# Patient Record
Sex: MALE | Race: WHITE | HISPANIC OR LATINO | Employment: OTHER | URBAN - METROPOLITAN AREA
[De-identification: names, ages, dates, MRNs, and addresses within clinical notes are randomized per-mention and may not be internally consistent; named-entity substitution may affect disease eponyms.]

---

## 2017-02-13 ENCOUNTER — HOSPITAL ENCOUNTER (OUTPATIENT)
Dept: RADIOLOGY | Facility: MEDICAL CENTER | Age: 62
End: 2017-02-13
Attending: FAMILY MEDICINE
Payer: MEDICARE

## 2017-02-13 DIAGNOSIS — C64.9 MALIGNANT NEOPLASM OF KIDNEY, EXCEPT PELVIS: ICD-10-CM

## 2017-02-13 PROCEDURE — 76775 US EXAM ABDO BACK WALL LIM: CPT

## 2017-03-01 ENCOUNTER — HOSPITAL ENCOUNTER (OUTPATIENT)
Dept: LAB | Facility: MEDICAL CENTER | Age: 62
End: 2017-03-01
Attending: NURSE PRACTITIONER
Payer: MEDICARE

## 2017-03-01 LAB
ALBUMIN SERPL BCP-MCNC: 4.2 G/DL (ref 3.2–4.9)
ALBUMIN/GLOB SERPL: 1.3 G/DL
ALP SERPL-CCNC: 44 U/L (ref 30–99)
ALT SERPL-CCNC: 23 U/L (ref 2–50)
ANION GAP SERPL CALC-SCNC: 7 MMOL/L (ref 0–11.9)
AST SERPL-CCNC: 17 U/L (ref 12–45)
BASOPHILS # BLD AUTO: 0.06 K/UL (ref 0–0.12)
BASOPHILS NFR BLD AUTO: 0.9 % (ref 0–1.8)
BILIRUB SERPL-MCNC: 0.2 MG/DL (ref 0.1–1.5)
BUN SERPL-MCNC: 11 MG/DL (ref 8–22)
CALCIUM SERPL-MCNC: 9.8 MG/DL (ref 8.5–10.5)
CHLORIDE SERPL-SCNC: 101 MMOL/L (ref 96–112)
CO2 SERPL-SCNC: 26 MMOL/L (ref 20–33)
CREAT SERPL-MCNC: 0.95 MG/DL (ref 0.5–1.4)
EOSINOPHIL # BLD: 0.32 K/UL (ref 0–0.51)
EOSINOPHIL NFR BLD AUTO: 4.7 % (ref 0–6.9)
ERYTHROCYTE [DISTWIDTH] IN BLOOD BY AUTOMATED COUNT: 40.5 FL (ref 35.9–50)
GLOBULIN SER CALC-MCNC: 3.3 G/DL (ref 1.9–3.5)
GLUCOSE SERPL-MCNC: 114 MG/DL (ref 65–99)
HCT VFR BLD AUTO: 47.5 % (ref 42–52)
HGB BLD-MCNC: 15.4 G/DL (ref 14–18)
IMM GRANULOCYTES # BLD AUTO: 0.01 K/UL (ref 0–0.11)
IMM GRANULOCYTES NFR BLD AUTO: 0.1 % (ref 0–0.9)
LYMPHOCYTES # BLD: 2.15 K/UL (ref 1–4.8)
LYMPHOCYTES NFR BLD AUTO: 31.4 % (ref 22–41)
MCH RBC QN AUTO: 30.8 PG (ref 27–33)
MCHC RBC AUTO-ENTMCNC: 32.4 G/DL (ref 33.7–35.3)
MCV RBC AUTO: 95 FL (ref 81.4–97.8)
MONOCYTES # BLD: 0.58 K/UL (ref 0–0.85)
MONOCYTES NFR BLD AUTO: 8.5 % (ref 0–13.4)
NEUTROPHILS # BLD: 3.73 K/UL (ref 1.82–7.42)
NEUTROPHILS NFR BLD AUTO: 54.4 % (ref 44–72)
NRBC # BLD AUTO: 0 K/UL
NRBC BLD-RTO: 0 /100 WBC
PLATELET # BLD AUTO: 190 K/UL (ref 164–446)
PMV BLD AUTO: 9.6 FL (ref 9–12.9)
POTASSIUM SERPL-SCNC: 4.6 MMOL/L (ref 3.6–5.5)
PROT SERPL-MCNC: 7.5 G/DL (ref 6–8.2)
RBC # BLD AUTO: 5 M/UL (ref 4.7–6.1)
SODIUM SERPL-SCNC: 134 MMOL/L (ref 135–145)
WBC # BLD AUTO: 6.9 K/UL (ref 4.8–10.8)

## 2017-03-01 PROCEDURE — 80053 COMPREHEN METABOLIC PANEL: CPT

## 2017-03-01 PROCEDURE — 85025 COMPLETE CBC W/AUTO DIFF WBC: CPT

## 2017-03-01 PROCEDURE — 87522 HEPATITIS C REVRS TRNSCRPJ: CPT

## 2017-03-01 PROCEDURE — 36415 COLL VENOUS BLD VENIPUNCTURE: CPT

## 2017-03-14 LAB
DETECTION Z3100: NORMAL
HCV RNA # SERPL NAA+PROBE: NORMAL COPIES/ML
HCV RNA SERPL NAA+PROBE-ACNC: NORMAL IU/ML
HCV RNA SERPL NAA+PROBE-LOG IU: NORMAL LOG10 IU/ML
HCV RNA SERPL NAA+PROBE-LOG#: NORMAL LOG10COPY/ML
TEST INFO  93644: NORMAL

## 2018-01-22 ENCOUNTER — HOSPITAL ENCOUNTER (OUTPATIENT)
Dept: RADIOLOGY | Facility: MEDICAL CENTER | Age: 63
End: 2018-01-22
Attending: FAMILY MEDICINE
Payer: MEDICARE

## 2018-01-22 DIAGNOSIS — C64.9 RENAL CELL CARCINOMA, UNSPECIFIED LATERALITY (HCC): ICD-10-CM

## 2018-01-22 PROCEDURE — 76775 US EXAM ABDO BACK WALL LIM: CPT

## 2018-01-27 ENCOUNTER — HOSPITAL ENCOUNTER (OUTPATIENT)
Dept: LAB | Facility: MEDICAL CENTER | Age: 63
End: 2018-01-27
Attending: FAMILY MEDICINE
Payer: MEDICARE

## 2018-01-27 ENCOUNTER — HOSPITAL ENCOUNTER (OUTPATIENT)
Dept: LAB | Facility: MEDICAL CENTER | Age: 63
End: 2018-01-27
Attending: INTERNAL MEDICINE
Payer: MEDICARE

## 2018-01-27 LAB
ALBUMIN SERPL BCP-MCNC: 4.4 G/DL (ref 3.2–4.9)
ALBUMIN SERPL BCP-MCNC: 4.7 G/DL (ref 3.2–4.9)
ALBUMIN/GLOB SERPL: 1.4 G/DL
ALBUMIN/GLOB SERPL: 1.7 G/DL
ALP SERPL-CCNC: 49 U/L (ref 30–99)
ALP SERPL-CCNC: 49 U/L (ref 30–99)
ALT SERPL-CCNC: 25 U/L (ref 2–50)
ALT SERPL-CCNC: 25 U/L (ref 2–50)
ANION GAP SERPL CALC-SCNC: 8 MMOL/L (ref 0–11.9)
ANION GAP SERPL CALC-SCNC: 9 MMOL/L (ref 0–11.9)
APPEARANCE UR: CLEAR
AST SERPL-CCNC: 24 U/L (ref 12–45)
AST SERPL-CCNC: 24 U/L (ref 12–45)
BASOPHILS # BLD AUTO: 0.9 % (ref 0–1.8)
BASOPHILS # BLD AUTO: 0.9 % (ref 0–1.8)
BASOPHILS # BLD: 0.06 K/UL (ref 0–0.12)
BASOPHILS # BLD: 0.06 K/UL (ref 0–0.12)
BILIRUB CONJ SERPL-MCNC: <0.1 MG/DL (ref 0.1–0.5)
BILIRUB INDIRECT SERPL-MCNC: NORMAL MG/DL (ref 0–1)
BILIRUB SERPL-MCNC: 0.5 MG/DL (ref 0.1–1.5)
BILIRUB SERPL-MCNC: 0.5 MG/DL (ref 0.1–1.5)
BILIRUB UR QL STRIP.AUTO: NEGATIVE
BUN SERPL-MCNC: 12 MG/DL (ref 8–22)
BUN SERPL-MCNC: 12 MG/DL (ref 8–22)
CALCIUM SERPL-MCNC: 9.1 MG/DL (ref 8.5–10.5)
CALCIUM SERPL-MCNC: 9.2 MG/DL (ref 8.5–10.5)
CHLORIDE SERPL-SCNC: 103 MMOL/L (ref 96–112)
CHLORIDE SERPL-SCNC: 104 MMOL/L (ref 96–112)
CHOLEST SERPL-MCNC: 190 MG/DL (ref 100–199)
CO2 SERPL-SCNC: 23 MMOL/L (ref 20–33)
CO2 SERPL-SCNC: 24 MMOL/L (ref 20–33)
COLOR UR: YELLOW
CREAT SERPL-MCNC: 0.93 MG/DL (ref 0.5–1.4)
CREAT SERPL-MCNC: 0.93 MG/DL (ref 0.5–1.4)
EOSINOPHIL # BLD AUTO: 0.34 K/UL (ref 0–0.51)
EOSINOPHIL # BLD AUTO: 0.37 K/UL (ref 0–0.51)
EOSINOPHIL NFR BLD: 5.2 % (ref 0–6.9)
EOSINOPHIL NFR BLD: 5.5 % (ref 0–6.9)
ERYTHROCYTE [DISTWIDTH] IN BLOOD BY AUTOMATED COUNT: 44.9 FL (ref 35.9–50)
ERYTHROCYTE [DISTWIDTH] IN BLOOD BY AUTOMATED COUNT: 45.1 FL (ref 35.9–50)
GLOBULIN SER CALC-MCNC: 2.7 G/DL (ref 1.9–3.5)
GLOBULIN SER CALC-MCNC: 3.1 G/DL (ref 1.9–3.5)
GLUCOSE SERPL-MCNC: 94 MG/DL (ref 65–99)
GLUCOSE SERPL-MCNC: 94 MG/DL (ref 65–99)
GLUCOSE UR STRIP.AUTO-MCNC: NEGATIVE MG/DL
HCT VFR BLD AUTO: 48.3 % (ref 42–52)
HCT VFR BLD AUTO: 49.7 % (ref 42–52)
HDLC SERPL-MCNC: 55 MG/DL
HGB BLD-MCNC: 16.3 G/DL (ref 14–18)
HGB BLD-MCNC: 16.6 G/DL (ref 14–18)
IMM GRANULOCYTES # BLD AUTO: 0.03 K/UL (ref 0–0.11)
IMM GRANULOCYTES # BLD AUTO: 0.04 K/UL (ref 0–0.11)
IMM GRANULOCYTES NFR BLD AUTO: 0.5 % (ref 0–0.9)
IMM GRANULOCYTES NFR BLD AUTO: 0.6 % (ref 0–0.9)
KETONES UR STRIP.AUTO-MCNC: NEGATIVE MG/DL
LDLC SERPL CALC-MCNC: 117 MG/DL
LEUKOCYTE ESTERASE UR QL STRIP.AUTO: NEGATIVE
LYMPHOCYTES # BLD AUTO: 2.48 K/UL (ref 1–4.8)
LYMPHOCYTES # BLD AUTO: 2.49 K/UL (ref 1–4.8)
LYMPHOCYTES NFR BLD: 36.9 % (ref 22–41)
LYMPHOCYTES NFR BLD: 37.6 % (ref 22–41)
MCH RBC QN AUTO: 30.5 PG (ref 27–33)
MCH RBC QN AUTO: 31.7 PG (ref 27–33)
MCHC RBC AUTO-ENTMCNC: 32.8 G/DL (ref 33.7–35.3)
MCHC RBC AUTO-ENTMCNC: 34.4 G/DL (ref 33.7–35.3)
MCV RBC AUTO: 92.4 FL (ref 81.4–97.8)
MCV RBC AUTO: 92.9 FL (ref 81.4–97.8)
MICRO URNS: NORMAL
MONOCYTES # BLD AUTO: 0.8 K/UL (ref 0–0.85)
MONOCYTES # BLD AUTO: 0.84 K/UL (ref 0–0.85)
MONOCYTES NFR BLD AUTO: 11.9 % (ref 0–13.4)
MONOCYTES NFR BLD AUTO: 12.7 % (ref 0–13.4)
NEUTROPHILS # BLD AUTO: 2.85 K/UL (ref 1.82–7.42)
NEUTROPHILS # BLD AUTO: 2.99 K/UL (ref 1.82–7.42)
NEUTROPHILS NFR BLD: 43.1 % (ref 44–72)
NEUTROPHILS NFR BLD: 44.2 % (ref 44–72)
NITRITE UR QL STRIP.AUTO: NEGATIVE
NRBC # BLD AUTO: 0 K/UL
NRBC # BLD AUTO: 0 K/UL
NRBC BLD-RTO: 0 /100 WBC
NRBC BLD-RTO: 0 /100 WBC
PH UR STRIP.AUTO: 5.5 [PH]
PLATELET # BLD AUTO: 232 K/UL (ref 164–446)
PLATELET # BLD AUTO: 237 K/UL (ref 164–446)
PMV BLD AUTO: 9.7 FL (ref 9–12.9)
PMV BLD AUTO: 9.8 FL (ref 9–12.9)
POTASSIUM SERPL-SCNC: 4.2 MMOL/L (ref 3.6–5.5)
POTASSIUM SERPL-SCNC: 4.2 MMOL/L (ref 3.6–5.5)
PROT SERPL-MCNC: 7.4 G/DL (ref 6–8.2)
PROT SERPL-MCNC: 7.5 G/DL (ref 6–8.2)
PROT UR QL STRIP: NEGATIVE MG/DL
PSA SERPL-MCNC: 1.41 NG/ML (ref 0–4)
RBC # BLD AUTO: 5.23 M/UL (ref 4.7–6.1)
RBC # BLD AUTO: 5.35 M/UL (ref 4.7–6.1)
RBC UR QL AUTO: NEGATIVE
SODIUM SERPL-SCNC: 135 MMOL/L (ref 135–145)
SODIUM SERPL-SCNC: 136 MMOL/L (ref 135–145)
SP GR UR STRIP.AUTO: 1.01
TRIGL SERPL-MCNC: 91 MG/DL (ref 0–149)
UROBILINOGEN UR STRIP.AUTO-MCNC: 0.2 MG/DL
WBC # BLD AUTO: 6.6 K/UL (ref 4.8–10.8)
WBC # BLD AUTO: 6.8 K/UL (ref 4.8–10.8)

## 2018-01-27 PROCEDURE — 80061 LIPID PANEL: CPT

## 2018-01-27 PROCEDURE — 80053 COMPREHEN METABOLIC PANEL: CPT | Mod: 91

## 2018-01-27 PROCEDURE — 85025 COMPLETE CBC W/AUTO DIFF WBC: CPT

## 2018-01-27 PROCEDURE — 80053 COMPREHEN METABOLIC PANEL: CPT

## 2018-01-27 PROCEDURE — 84153 ASSAY OF PSA TOTAL: CPT

## 2018-01-27 PROCEDURE — 85025 COMPLETE CBC W/AUTO DIFF WBC: CPT | Mod: 91

## 2018-01-27 PROCEDURE — 82248 BILIRUBIN DIRECT: CPT

## 2018-01-27 PROCEDURE — 80197 ASSAY OF TACROLIMUS: CPT

## 2018-01-27 PROCEDURE — 36415 COLL VENOUS BLD VENIPUNCTURE: CPT

## 2018-01-27 PROCEDURE — 87522 HEPATITIS C REVRS TRNSCRPJ: CPT

## 2018-01-27 PROCEDURE — 81003 URINALYSIS AUTO W/O SCOPE: CPT

## 2018-01-30 LAB
HCV RNA SERPL NAA+PROBE-ACNC: NORMAL IU/ML
TACROLIMUS BLD-MCNC: 11.9 NG/ML
TEST INFO  93644: NORMAL

## 2018-03-31 ENCOUNTER — APPOINTMENT (OUTPATIENT)
Dept: RADIOLOGY | Facility: MEDICAL CENTER | Age: 63
DRG: 083 | End: 2018-03-31
Attending: EMERGENCY MEDICINE
Payer: MEDICARE

## 2018-03-31 ENCOUNTER — HOSPITAL ENCOUNTER (INPATIENT)
Facility: MEDICAL CENTER | Age: 63
LOS: 9 days | DRG: 083 | End: 2018-04-10
Attending: EMERGENCY MEDICINE | Admitting: SURGERY
Payer: MEDICARE

## 2018-03-31 DIAGNOSIS — S20.212A CHEST WALL CONTUSION, LEFT, INITIAL ENCOUNTER: ICD-10-CM

## 2018-03-31 DIAGNOSIS — G89.4 CHRONIC PAIN SYNDROME: ICD-10-CM

## 2018-03-31 DIAGNOSIS — S06.2X0A: ICD-10-CM
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
## 2018-03-31 DIAGNOSIS — S22.42XA CLOSED FRACTURE OF MULTIPLE RIBS OF LEFT SIDE, INITIAL ENCOUNTER: ICD-10-CM

## 2018-03-31 DIAGNOSIS — F10.920 ALCOHOLIC INTOXICATION WITHOUT COMPLICATION (HCC): ICD-10-CM

## 2018-03-31 LAB
ABO GROUP BLD: NORMAL
ALBUMIN SERPL BCP-MCNC: 3.9 G/DL (ref 3.2–4.9)
ALBUMIN/GLOB SERPL: 1.3 G/DL
ALP SERPL-CCNC: 51 U/L (ref 30–99)
ALT SERPL-CCNC: 26 U/L (ref 2–50)
ANION GAP SERPL CALC-SCNC: 8 MMOL/L (ref 0–11.9)
APTT PPP: 27 SEC (ref 24.7–36)
AST SERPL-CCNC: 21 U/L (ref 12–45)
BILIRUB SERPL-MCNC: 0.3 MG/DL (ref 0.1–1.5)
BLD GP AB SCN SERPL QL: NORMAL
BUN SERPL-MCNC: 9 MG/DL (ref 8–22)
CALCIUM SERPL-MCNC: 8.1 MG/DL (ref 8.5–10.5)
CHLORIDE SERPL-SCNC: 103 MMOL/L (ref 96–112)
CO2 SERPL-SCNC: 23 MMOL/L (ref 20–33)
CREAT SERPL-MCNC: 0.9 MG/DL (ref 0.5–1.4)
ERYTHROCYTE [DISTWIDTH] IN BLOOD BY AUTOMATED COUNT: 43.8 FL (ref 35.9–50)
ETHANOL BLD-MCNC: 0.19 G/DL
GLOBULIN SER CALC-MCNC: 3 G/DL (ref 1.9–3.5)
GLUCOSE SERPL-MCNC: 97 MG/DL (ref 65–99)
HCT VFR BLD AUTO: 44.3 % (ref 42–52)
HGB BLD-MCNC: 15.3 G/DL (ref 14–18)
INR PPP: 1.06 (ref 0.87–1.13)
MCH RBC QN AUTO: 32.1 PG (ref 27–33)
MCHC RBC AUTO-ENTMCNC: 34.5 G/DL (ref 33.7–35.3)
MCV RBC AUTO: 93.1 FL (ref 81.4–97.8)
PLATELET # BLD AUTO: 190 K/UL (ref 164–446)
PMV BLD AUTO: 8.8 FL (ref 9–12.9)
POTASSIUM SERPL-SCNC: 3.2 MMOL/L (ref 3.6–5.5)
PROT SERPL-MCNC: 6.9 G/DL (ref 6–8.2)
PROTHROMBIN TIME: 13.5 SEC (ref 12–14.6)
RBC # BLD AUTO: 4.76 M/UL (ref 4.7–6.1)
RH BLD: NORMAL
SODIUM SERPL-SCNC: 134 MMOL/L (ref 135–145)
WBC # BLD AUTO: 9.5 K/UL (ref 4.8–10.8)

## 2018-03-31 PROCEDURE — 99291 CRITICAL CARE FIRST HOUR: CPT

## 2018-03-31 PROCEDURE — 700117 HCHG RX CONTRAST REV CODE 255: Performed by: EMERGENCY MEDICINE

## 2018-03-31 PROCEDURE — G0390 TRAUMA RESPONS W/HOSP CRITI: HCPCS

## 2018-03-31 PROCEDURE — 85610 PROTHROMBIN TIME: CPT

## 2018-03-31 PROCEDURE — 72125 CT NECK SPINE W/O DYE: CPT

## 2018-03-31 PROCEDURE — 700111 HCHG RX REV CODE 636 W/ 250 OVERRIDE (IP): Performed by: EMERGENCY MEDICINE

## 2018-03-31 PROCEDURE — 71260 CT THORAX DX C+: CPT

## 2018-03-31 PROCEDURE — 72131 CT LUMBAR SPINE W/O DYE: CPT

## 2018-03-31 PROCEDURE — 86900 BLOOD TYPING SEROLOGIC ABO: CPT

## 2018-03-31 PROCEDURE — 72128 CT CHEST SPINE W/O DYE: CPT

## 2018-03-31 PROCEDURE — 96374 THER/PROPH/DIAG INJ IV PUSH: CPT

## 2018-03-31 PROCEDURE — 86901 BLOOD TYPING SEROLOGIC RH(D): CPT

## 2018-03-31 PROCEDURE — 85730 THROMBOPLASTIN TIME PARTIAL: CPT

## 2018-03-31 PROCEDURE — 85027 COMPLETE CBC AUTOMATED: CPT

## 2018-03-31 PROCEDURE — 80053 COMPREHEN METABOLIC PANEL: CPT

## 2018-03-31 PROCEDURE — 86850 RBC ANTIBODY SCREEN: CPT

## 2018-03-31 PROCEDURE — 70450 CT HEAD/BRAIN W/O DYE: CPT

## 2018-03-31 PROCEDURE — 80307 DRUG TEST PRSMV CHEM ANLYZR: CPT

## 2018-03-31 RX ORDER — MORPHINE SULFATE 4 MG/ML
INJECTION, SOLUTION INTRAMUSCULAR; INTRAVENOUS
Status: DISPENSED
Start: 2018-03-31 | End: 2018-04-01

## 2018-03-31 RX ORDER — MORPHINE SULFATE 4 MG/ML
INJECTION, SOLUTION INTRAMUSCULAR; INTRAVENOUS
Status: DISCONTINUED | OUTPATIENT
Start: 2018-03-31 | End: 2018-04-02

## 2018-03-31 RX ORDER — OXYCODONE HYDROCHLORIDE 5 MG/1
5 TABLET ORAL ONCE
Status: COMPLETED | OUTPATIENT
Start: 2018-04-01 | End: 2018-04-01

## 2018-03-31 RX ADMIN — IOHEXOL 100 ML: 350 INJECTION, SOLUTION INTRAVENOUS at 22:52

## 2018-03-31 RX ADMIN — MORPHINE SULFATE 4 MG: 4 INJECTION INTRAVENOUS at 21:46

## 2018-04-01 ENCOUNTER — APPOINTMENT (OUTPATIENT)
Dept: RADIOLOGY | Facility: MEDICAL CENTER | Age: 63
DRG: 083 | End: 2018-04-01
Attending: NURSE PRACTITIONER
Payer: MEDICARE

## 2018-04-01 ENCOUNTER — APPOINTMENT (OUTPATIENT)
Dept: RADIOLOGY | Facility: MEDICAL CENTER | Age: 63
DRG: 083 | End: 2018-04-01
Attending: SURGERY
Payer: MEDICARE

## 2018-04-01 ENCOUNTER — RESOLUTE PROFESSIONAL BILLING HOSPITAL PROF FEE (OUTPATIENT)
Dept: HOSPITALIST | Facility: MEDICAL CENTER | Age: 63
End: 2018-04-01
Payer: MEDICARE

## 2018-04-01 PROBLEM — S22.42XA CLOSED FRACTURE OF MULTIPLE RIBS OF LEFT SIDE: Status: ACTIVE | Noted: 2018-04-01

## 2018-04-01 PROBLEM — Z78.9 NO CONTRAINDICATION TO DEEP VEIN THROMBOSIS (DVT) PROPHYLAXIS: Status: ACTIVE | Noted: 2018-04-01

## 2018-04-01 PROBLEM — Z90.5 H/O UNILATERAL NEPHRECTOMY: Status: ACTIVE | Noted: 2018-04-01

## 2018-04-01 PROBLEM — F10.929 ALCOHOL INTOXICATION (HCC): Status: ACTIVE | Noted: 2018-04-01

## 2018-04-01 PROBLEM — B18.2 CHRONIC HEPATITIS C VIRUS INFECTION (HCC): Status: ACTIVE | Noted: 2018-04-01

## 2018-04-01 PROBLEM — G89.4 CHRONIC PAIN SYNDROME: Status: ACTIVE | Noted: 2018-04-01

## 2018-04-01 PROBLEM — M79.642 HAND PAIN, LEFT: Status: ACTIVE | Noted: 2018-04-01

## 2018-04-01 PROBLEM — Q60.0 KIDNEY CONGENITALLY ABSENT, LEFT: Status: ACTIVE | Noted: 2018-04-01

## 2018-04-01 PROBLEM — Z94.4 LIVER TRANSPLANT RECIPIENT (HCC): Status: ACTIVE | Noted: 2018-04-01

## 2018-04-01 PROBLEM — T14.90XA TRAUMA: Status: ACTIVE | Noted: 2018-04-01

## 2018-04-01 LAB
ABO GROUP BLD: NORMAL
BASOPHILS # BLD AUTO: 0.5 % (ref 0–1.8)
BASOPHILS # BLD: 0.08 K/UL (ref 0–0.12)
EOSINOPHIL # BLD AUTO: 0.08 K/UL (ref 0–0.51)
EOSINOPHIL NFR BLD: 0.5 % (ref 0–6.9)
ERYTHROCYTE [DISTWIDTH] IN BLOOD BY AUTOMATED COUNT: 42.5 FL (ref 35.9–50)
HCT VFR BLD AUTO: 45.3 % (ref 42–52)
HGB BLD-MCNC: 15.6 G/DL (ref 14–18)
IMM GRANULOCYTES # BLD AUTO: 0.1 K/UL (ref 0–0.11)
IMM GRANULOCYTES NFR BLD AUTO: 0.7 % (ref 0–0.9)
LYMPHOCYTES # BLD AUTO: 2.65 K/UL (ref 1–4.8)
LYMPHOCYTES NFR BLD: 17.2 % (ref 22–41)
MCH RBC QN AUTO: 31.3 PG (ref 27–33)
MCHC RBC AUTO-ENTMCNC: 34.4 G/DL (ref 33.7–35.3)
MCV RBC AUTO: 90.8 FL (ref 81.4–97.8)
MONOCYTES # BLD AUTO: 1.63 K/UL (ref 0–0.85)
MONOCYTES NFR BLD AUTO: 10.6 % (ref 0–13.4)
NEUTROPHILS # BLD AUTO: 10.84 K/UL (ref 1.82–7.42)
NEUTROPHILS NFR BLD: 70.5 % (ref 44–72)
NRBC # BLD AUTO: 0 K/UL
NRBC BLD-RTO: 0 /100 WBC
PLATELET # BLD AUTO: 196 K/UL (ref 164–446)
PMV BLD AUTO: 9.5 FL (ref 9–12.9)
RBC # BLD AUTO: 4.99 M/UL (ref 4.7–6.1)
RH BLD: NORMAL
WBC # BLD AUTO: 15.4 K/UL (ref 4.8–10.8)

## 2018-04-01 PROCEDURE — 700111 HCHG RX REV CODE 636 W/ 250 OVERRIDE (IP): Performed by: SURGERY

## 2018-04-01 PROCEDURE — A9270 NON-COVERED ITEM OR SERVICE: HCPCS | Performed by: SURGERY

## 2018-04-01 PROCEDURE — 700111 HCHG RX REV CODE 636 W/ 250 OVERRIDE (IP): Mod: JG | Performed by: SURGERY

## 2018-04-01 PROCEDURE — 700105 HCHG RX REV CODE 258: Performed by: SURGERY

## 2018-04-01 PROCEDURE — 99233 SBSQ HOSP IP/OBS HIGH 50: CPT | Performed by: SURGERY

## 2018-04-01 PROCEDURE — 93005 ELECTROCARDIOGRAM TRACING: CPT | Performed by: EMERGENCY MEDICINE

## 2018-04-01 PROCEDURE — 85025 COMPLETE CBC W/AUTO DIFF WBC: CPT

## 2018-04-01 PROCEDURE — 71045 X-RAY EXAM CHEST 1 VIEW: CPT

## 2018-04-01 PROCEDURE — 700112 HCHG RX REV CODE 229: Performed by: SURGERY

## 2018-04-01 PROCEDURE — 700102 HCHG RX REV CODE 250 W/ 637 OVERRIDE(OP): Performed by: SURGERY

## 2018-04-01 PROCEDURE — 770022 HCHG ROOM/CARE - ICU (200)

## 2018-04-01 PROCEDURE — 700111 HCHG RX REV CODE 636 W/ 250 OVERRIDE (IP): Performed by: NURSE PRACTITIONER

## 2018-04-01 PROCEDURE — 94668 MNPJ CHEST WALL SBSQ: CPT

## 2018-04-01 PROCEDURE — 700101 HCHG RX REV CODE 250: Performed by: EMERGENCY MEDICINE

## 2018-04-01 PROCEDURE — 700105 HCHG RX REV CODE 258: Performed by: EMERGENCY MEDICINE

## 2018-04-01 PROCEDURE — A9270 NON-COVERED ITEM OR SERVICE: HCPCS | Performed by: EMERGENCY MEDICINE

## 2018-04-01 PROCEDURE — 700102 HCHG RX REV CODE 250 W/ 637 OVERRIDE(OP): Performed by: EMERGENCY MEDICINE

## 2018-04-01 PROCEDURE — 73130 X-RAY EXAM OF HAND: CPT | Mod: LT

## 2018-04-01 RX ORDER — SODIUM CHLORIDE 9 MG/ML
1000 INJECTION, SOLUTION INTRAVENOUS ONCE
Status: COMPLETED | OUTPATIENT
Start: 2018-04-01 | End: 2018-04-01

## 2018-04-01 RX ORDER — TACROLIMUS 1 MG/1
2 CAPSULE ORAL 2 TIMES DAILY
COMMUNITY

## 2018-04-01 RX ORDER — HYDROMORPHONE HYDROCHLORIDE 2 MG/ML
1 INJECTION, SOLUTION INTRAMUSCULAR; INTRAVENOUS; SUBCUTANEOUS
Status: DISCONTINUED | OUTPATIENT
Start: 2018-04-01 | End: 2018-04-01

## 2018-04-01 RX ORDER — BISACODYL 10 MG
10 SUPPOSITORY, RECTAL RECTAL
Status: DISCONTINUED | OUTPATIENT
Start: 2018-04-01 | End: 2018-04-10 | Stop reason: HOSPADM

## 2018-04-01 RX ORDER — HYDROMORPHONE HYDROCHLORIDE 2 MG/ML
1 INJECTION, SOLUTION INTRAMUSCULAR; INTRAVENOUS; SUBCUTANEOUS
Status: DISCONTINUED | OUTPATIENT
Start: 2018-04-01 | End: 2018-04-02

## 2018-04-01 RX ORDER — SODIUM CHLORIDE, SODIUM LACTATE, POTASSIUM CHLORIDE, CALCIUM CHLORIDE 600; 310; 30; 20 MG/100ML; MG/100ML; MG/100ML; MG/100ML
INJECTION, SOLUTION INTRAVENOUS CONTINUOUS
Status: DISCONTINUED | OUTPATIENT
Start: 2018-04-01 | End: 2018-04-02

## 2018-04-01 RX ORDER — MULTIVIT WITH MINERALS/LUTEIN
1 TABLET ORAL DAILY
COMMUNITY
End: 2018-04-24

## 2018-04-01 RX ORDER — CITALOPRAM 20 MG/1
20 TABLET ORAL
Status: DISCONTINUED | OUTPATIENT
Start: 2018-04-01 | End: 2018-04-10 | Stop reason: HOSPADM

## 2018-04-01 RX ORDER — CHLORHEXIDINE GLUCONATE ORAL RINSE 1.2 MG/ML
15 SOLUTION DENTAL EVERY 12 HOURS
Status: DISCONTINUED | OUTPATIENT
Start: 2018-04-01 | End: 2018-04-01

## 2018-04-01 RX ORDER — FAMOTIDINE 20 MG/1
20 TABLET, FILM COATED ORAL 2 TIMES DAILY
Status: DISCONTINUED | OUTPATIENT
Start: 2018-04-01 | End: 2018-04-02

## 2018-04-01 RX ORDER — CITALOPRAM 20 MG/1
20 TABLET ORAL
COMMUNITY
End: 2021-05-06

## 2018-04-01 RX ORDER — TACROLIMUS 1 MG/1
2 CAPSULE ORAL EVERY 12 HOURS
Status: DISCONTINUED | OUTPATIENT
Start: 2018-04-01 | End: 2018-04-10 | Stop reason: HOSPADM

## 2018-04-01 RX ORDER — LISINOPRIL 10 MG/1
10 TABLET ORAL EVERY MORNING
COMMUNITY

## 2018-04-01 RX ORDER — OXYCODONE HYDROCHLORIDE 10 MG/1
10 TABLET ORAL
Status: DISCONTINUED | OUTPATIENT
Start: 2018-04-01 | End: 2018-04-02

## 2018-04-01 RX ORDER — ENEMA 19; 7 G/133ML; G/133ML
1 ENEMA RECTAL
Status: DISCONTINUED | OUTPATIENT
Start: 2018-04-01 | End: 2018-04-10 | Stop reason: HOSPADM

## 2018-04-01 RX ORDER — LISINOPRIL 10 MG/1
10 TABLET ORAL DAILY
Status: DISCONTINUED | OUTPATIENT
Start: 2018-04-01 | End: 2018-04-10 | Stop reason: HOSPADM

## 2018-04-01 RX ORDER — ONDANSETRON 2 MG/ML
4 INJECTION INTRAMUSCULAR; INTRAVENOUS EVERY 4 HOURS PRN
Status: DISCONTINUED | OUTPATIENT
Start: 2018-04-01 | End: 2018-04-10 | Stop reason: HOSPADM

## 2018-04-01 RX ORDER — OXYCODONE HYDROCHLORIDE 10 MG/1
20 TABLET ORAL
Status: DISCONTINUED | OUTPATIENT
Start: 2018-04-01 | End: 2018-04-02

## 2018-04-01 RX ORDER — AMOXICILLIN 250 MG
1 CAPSULE ORAL
Status: DISCONTINUED | OUTPATIENT
Start: 2018-04-01 | End: 2018-04-10 | Stop reason: HOSPADM

## 2018-04-01 RX ORDER — DOCUSATE SODIUM 100 MG/1
100 CAPSULE, LIQUID FILLED ORAL 2 TIMES DAILY
Status: DISCONTINUED | OUTPATIENT
Start: 2018-04-01 | End: 2018-04-10 | Stop reason: HOSPADM

## 2018-04-01 RX ORDER — LIDOCAINE 50 MG/G
1 PATCH TOPICAL ONCE
Status: COMPLETED | OUTPATIENT
Start: 2018-04-01 | End: 2018-04-01

## 2018-04-01 RX ORDER — AMOXICILLIN 250 MG
1 CAPSULE ORAL NIGHTLY
Status: DISCONTINUED | OUTPATIENT
Start: 2018-04-01 | End: 2018-04-10 | Stop reason: HOSPADM

## 2018-04-01 RX ORDER — POLYETHYLENE GLYCOL 3350 17 G/17G
1 POWDER, FOR SOLUTION ORAL 2 TIMES DAILY
Status: DISCONTINUED | OUTPATIENT
Start: 2018-04-01 | End: 2018-04-10 | Stop reason: HOSPADM

## 2018-04-01 RX ORDER — MORPHINE SULFATE 15 MG/1
15 TABLET, FILM COATED, EXTENDED RELEASE ORAL EVERY 12 HOURS
Status: DISCONTINUED | OUTPATIENT
Start: 2018-04-01 | End: 2018-04-02

## 2018-04-01 RX ADMIN — HYDROMORPHONE HYDROCHLORIDE 1 MG: 2 INJECTION INTRAMUSCULAR; INTRAVENOUS; SUBCUTANEOUS at 10:32

## 2018-04-01 RX ADMIN — SODIUM CHLORIDE, POTASSIUM CHLORIDE, SODIUM LACTATE AND CALCIUM CHLORIDE: 600; 310; 30; 20 INJECTION, SOLUTION INTRAVENOUS at 02:42

## 2018-04-01 RX ADMIN — HYDROMORPHONE HYDROCHLORIDE 1 MG: 2 INJECTION INTRAMUSCULAR; INTRAVENOUS; SUBCUTANEOUS at 14:31

## 2018-04-01 RX ADMIN — OXYCODONE HYDROCHLORIDE 20 MG: 10 TABLET ORAL at 12:31

## 2018-04-01 RX ADMIN — HYDROMORPHONE HYDROCHLORIDE 1 MG: 2 INJECTION INTRAMUSCULAR; INTRAVENOUS; SUBCUTANEOUS at 08:29

## 2018-04-01 RX ADMIN — FAMOTIDINE 20 MG: 20 TABLET, FILM COATED ORAL at 20:25

## 2018-04-01 RX ADMIN — OXYCODONE HYDROCHLORIDE 20 MG: 10 TABLET ORAL at 18:35

## 2018-04-01 RX ADMIN — TACROLIMUS 2 MG: 1 CAPSULE ORAL at 14:24

## 2018-04-01 RX ADMIN — ONDANSETRON HYDROCHLORIDE 4 MG: 2 INJECTION, SOLUTION INTRAMUSCULAR; INTRAVENOUS at 19:39

## 2018-04-01 RX ADMIN — ENOXAPARIN SODIUM 30 MG: 100 INJECTION SUBCUTANEOUS at 02:51

## 2018-04-01 RX ADMIN — LIDOCAINE 1 PATCH: 50 PATCH CUTANEOUS at 01:09

## 2018-04-01 RX ADMIN — HYDROMORPHONE HYDROCHLORIDE 1 MG: 2 INJECTION INTRAMUSCULAR; INTRAVENOUS; SUBCUTANEOUS at 04:40

## 2018-04-01 RX ADMIN — OXYCODONE HYDROCHLORIDE 5 MG: 5 TABLET ORAL at 00:20

## 2018-04-01 RX ADMIN — SODIUM CHLORIDE 1000 ML: 9 INJECTION, SOLUTION INTRAVENOUS at 01:45

## 2018-04-01 RX ADMIN — DOCUSATE SODIUM 100 MG: 100 CAPSULE ORAL at 02:51

## 2018-04-01 RX ADMIN — HYDROMORPHONE HYDROCHLORIDE 1 MG: 2 INJECTION INTRAMUSCULAR; INTRAVENOUS; SUBCUTANEOUS at 12:32

## 2018-04-01 RX ADMIN — OXYCODONE HYDROCHLORIDE 20 MG: 10 TABLET ORAL at 09:25

## 2018-04-01 RX ADMIN — OXYCODONE HYDROCHLORIDE 20 MG: 10 TABLET ORAL at 03:02

## 2018-04-01 RX ADMIN — ENOXAPARIN SODIUM 30 MG: 100 INJECTION SUBCUTANEOUS at 08:28

## 2018-04-01 RX ADMIN — HYDROMORPHONE HYDROCHLORIDE 1 MG: 2 INJECTION INTRAMUSCULAR; INTRAVENOUS; SUBCUTANEOUS at 16:40

## 2018-04-01 RX ADMIN — FAMOTIDINE 20 MG: 20 TABLET, FILM COATED ORAL at 08:28

## 2018-04-01 RX ADMIN — ENOXAPARIN SODIUM 30 MG: 100 INJECTION SUBCUTANEOUS at 20:25

## 2018-04-01 RX ADMIN — TACROLIMUS 2 MG: 1 CAPSULE ORAL at 20:25

## 2018-04-01 RX ADMIN — OXYCODONE HYDROCHLORIDE 20 MG: 10 TABLET ORAL at 06:11

## 2018-04-01 RX ADMIN — OXYCODONE HYDROCHLORIDE 20 MG: 10 TABLET ORAL at 15:31

## 2018-04-01 RX ADMIN — OXYCODONE HYDROCHLORIDE 20 MG: 10 TABLET ORAL at 22:18

## 2018-04-01 RX ADMIN — HYDROMORPHONE HYDROCHLORIDE 1 MG: 2 INJECTION INTRAMUSCULAR; INTRAVENOUS; SUBCUTANEOUS at 20:27

## 2018-04-01 RX ADMIN — LISINOPRIL 10 MG: 20 TABLET ORAL at 14:25

## 2018-04-01 RX ADMIN — HYDROMORPHONE HYDROCHLORIDE 1 MG: 2 INJECTION INTRAMUSCULAR; INTRAVENOUS; SUBCUTANEOUS at 02:38

## 2018-04-01 RX ADMIN — FAMOTIDINE 20 MG: 20 TABLET, FILM COATED ORAL at 02:51

## 2018-04-01 ASSESSMENT — PAIN SCALES - GENERAL
PAINLEVEL_OUTOF10: 10
PAINLEVEL_OUTOF10: 9
PAINLEVEL_OUTOF10: 10
PAINLEVEL_OUTOF10: 8
PAINLEVEL_OUTOF10: 10
PAINLEVEL_OUTOF10: 3
PAINLEVEL_OUTOF10: 10

## 2018-04-01 ASSESSMENT — COPD QUESTIONNAIRES
HAVE YOU SMOKED AT LEAST 100 CIGARETTES IN YOUR ENTIRE LIFE: YES
COPD SCREENING SCORE: 4
DO YOU EVER COUGH UP ANY MUCUS OR PHLEGM?: NO/ONLY WITH OCCASIONAL COLDS OR INFECTIONS
DURING THE PAST 4 WEEKS HOW MUCH DID YOU FEEL SHORT OF BREATH: NONE/LITTLE OF THE TIME

## 2018-04-01 ASSESSMENT — LIFESTYLE VARIABLES
TOTAL SCORE: 0
ALCOHOL_USE: YES
EVER FELT BAD OR GUILTY ABOUT YOUR DRINKING: NO
HAVE PEOPLE ANNOYED YOU BY CRITICIZING YOUR DRINKING: NO
HAVE YOU EVER FELT YOU SHOULD CUT DOWN ON YOUR DRINKING: NO
AVERAGE NUMBER OF DAYS PER WEEK YOU HAVE A DRINK CONTAINING ALCOHOL: 1
ON A TYPICAL DAY WHEN YOU DRINK ALCOHOL HOW MANY DRINKS DO YOU HAVE: 2
HOW MANY TIMES IN THE PAST YEAR HAVE YOU HAD 5 OR MORE DRINKS IN A DAY: 0
EVER_SMOKED: YES
CONSUMPTION TOTAL: NEGATIVE
EVER HAD A DRINK FIRST THING IN THE MORNING TO STEADY YOUR NERVES TO GET RID OF A HANGOVER: NO
TOTAL SCORE: 0
TOTAL SCORE: 0

## 2018-04-01 ASSESSMENT — PATIENT HEALTH QUESTIONNAIRE - PHQ9
5. POOR APPETITE OR OVEREATING: NOT AT ALL
4. FEELING TIRED OR HAVING LITTLE ENERGY: NOT AT ALL
2. FEELING DOWN, DEPRESSED, IRRITABLE, OR HOPELESS: NOT AT ALL
3. TROUBLE FALLING OR STAYING ASLEEP OR SLEEPING TOO MUCH: SEVERAL DAYS
8. MOVING OR SPEAKING SO SLOWLY THAT OTHER PEOPLE COULD HAVE NOTICED. OR THE OPPOSITE, BEING SO FIGETY OR RESTLESS THAT YOU HAVE BEEN MOVING AROUND A LOT MORE THAN USUAL: NOT AT ALL
SUM OF ALL RESPONSES TO PHQ9 QUESTIONS 1 AND 2: 1
SUM OF ALL RESPONSES TO PHQ QUESTIONS 1-9: 2
6. FEELING BAD ABOUT YOURSELF - OR THAT YOU ARE A FAILURE OR HAVE LET YOURSELF OR YOUR FAMILY DOWN: NOT AL ALL
1. LITTLE INTEREST OR PLEASURE IN DOING THINGS: SEVERAL DAYS
7. TROUBLE CONCENTRATING ON THINGS, SUCH AS READING THE NEWSPAPER OR WATCHING TELEVISION: NOT AT ALL
9. THOUGHTS THAT YOU WOULD BE BETTER OFF DEAD, OR OF HURTING YOURSELF: NOT AT ALL

## 2018-04-01 ASSESSMENT — ENCOUNTER SYMPTOMS
ABDOMINAL PAIN: 0
MYALGIAS: 1
SHORTNESS OF BREATH: 0
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
## 2018-04-01 NOTE — CARE PLAN
Problem: Hyperinflation:  Goal: Prevent or improve atelectasis  Outcome: PROGRESSING AS EXPECTED  Pt is achieving 3944-3580 mL on IS. PEP ordered QID

## 2018-04-01 NOTE — PROGRESS NOTES
Patient admitted to [place] [id number]. Brought up from ED via gurney with ACLS RN and CCT. Vital signs stable. Patient complaining of left rib cage pain.

## 2018-04-01 NOTE — PROGRESS NOTES
Med rec partially completed by interview with patient at bedside and [facility]  [facility] has been called and awaiting return call back regarding pain pump  No abx in past 30 days  Allergy reviewed

## 2018-04-01 NOTE — PROGRESS NOTES
2 RN skin check complete. Abrasion on left knee and left palm. All preventative precautions in place.

## 2018-04-01 NOTE — CARE PLAN
Problem: Pain Management  Goal: Pain level will decrease to patient's comfort goal  Outcome: PROGRESSING SLOWER THAN EXPECTED  Providing pain meds as ordered. Educated patient on splinting ribs to reduce pain when coughing. Assisting patient with repositioning for comfort.

## 2018-04-01 NOTE — CARE PLAN
Problem: Communication  Goal: The ability to communicate needs accurately and effectively will improve    Intervention: Educate patient and significant other/support system about the plan of care, procedures, treatments, medications and allow for questions  Educated the patient on pain management techniques available for comfort in order to deep breathe and cough effectively.         Problem: Respiratory:  Goal: Respiratory status will improve    Intervention: Assess and monitor pulmonary status  Educated patient on use of incentive spirometer use for lung expansion and healing.
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
## 2018-04-01 NOTE — CARE PLAN
Problem: Respiratory:  Goal: Respiratory status will improve    Intervention: Educate and encourage coughing and deep breathing  Educated patient on importance of coughing and deep breathing to improve lung status and clear secretions. Encouraging patient to utilize pillow to splint and improve ability to take deep breaths.

## 2018-04-01 NOTE — ED NOTES
Pt ambulated w/ assistance and some minimal difficulty, however on the whole able to ambulate under own power, upon sitting down Pt had syncopal event, laid flat in bed, hooked back up to monitor, immediately regained consciousness w/ recollection of the event. ERP notified immediately, EKG being obtained, 1 L NS infusing, lab called for repeat CBC.

## 2018-04-01 NOTE — ED NOTES
Upon assessment pt has chest asymmetry with left larger than right side, brought to attention of ICU Rn, trauma rn comments possible new change. VSS, Pt to ICU now. NS gtts continued to floor

## 2018-04-02 ENCOUNTER — APPOINTMENT (OUTPATIENT)
Dept: RADIOLOGY | Facility: MEDICAL CENTER | Age: 63
DRG: 083 | End: 2018-04-02
Attending: SURGERY
Payer: MEDICARE

## 2018-04-02 PROBLEM — E83.42 HYPOMAGNESEMIA: Status: ACTIVE | Noted: 2018-04-02

## 2018-04-02 LAB
ALBUMIN SERPL BCP-MCNC: 3.8 G/DL (ref 3.2–4.9)
ALBUMIN/GLOB SERPL: 1.3 G/DL
ALP SERPL-CCNC: 55 U/L (ref 30–99)
ALT SERPL-CCNC: 23 U/L (ref 2–50)
ANION GAP SERPL CALC-SCNC: 6 MMOL/L (ref 0–11.9)
AST SERPL-CCNC: 23 U/L (ref 12–45)
BASOPHILS # BLD AUTO: 0.4 % (ref 0–1.8)
BASOPHILS # BLD: 0.04 K/UL (ref 0–0.12)
BILIRUB SERPL-MCNC: 0.6 MG/DL (ref 0.1–1.5)
BUN SERPL-MCNC: 11 MG/DL (ref 8–22)
CALCIUM SERPL-MCNC: 8.5 MG/DL (ref 8.5–10.5)
CHLORIDE SERPL-SCNC: 98 MMOL/L (ref 96–112)
CO2 SERPL-SCNC: 26 MMOL/L (ref 20–33)
CREAT SERPL-MCNC: 0.85 MG/DL (ref 0.5–1.4)
EOSINOPHIL # BLD AUTO: 0.2 K/UL (ref 0–0.51)
EOSINOPHIL NFR BLD: 2.1 % (ref 0–6.9)
ERYTHROCYTE [DISTWIDTH] IN BLOOD BY AUTOMATED COUNT: 41.6 FL (ref 35.9–50)
GLOBULIN SER CALC-MCNC: 3 G/DL (ref 1.9–3.5)
GLUCOSE SERPL-MCNC: 116 MG/DL (ref 65–99)
HCT VFR BLD AUTO: 40.6 % (ref 42–52)
HGB BLD-MCNC: 14.2 G/DL (ref 14–18)
IMM GRANULOCYTES # BLD AUTO: 0.04 K/UL (ref 0–0.11)
IMM GRANULOCYTES NFR BLD AUTO: 0.4 % (ref 0–0.9)
LYMPHOCYTES # BLD AUTO: 1.86 K/UL (ref 1–4.8)
LYMPHOCYTES NFR BLD: 19.6 % (ref 22–41)
MAGNESIUM SERPL-MCNC: 1.3 MG/DL (ref 1.5–2.5)
MCH RBC QN AUTO: 31.7 PG (ref 27–33)
MCHC RBC AUTO-ENTMCNC: 35 G/DL (ref 33.7–35.3)
MCV RBC AUTO: 90.6 FL (ref 81.4–97.8)
MONOCYTES # BLD AUTO: 1.25 K/UL (ref 0–0.85)
MONOCYTES NFR BLD AUTO: 13.2 % (ref 0–13.4)
NEUTROPHILS # BLD AUTO: 6.1 K/UL (ref 1.82–7.42)
NEUTROPHILS NFR BLD: 64.3 % (ref 44–72)
NRBC # BLD AUTO: 0 K/UL
NRBC BLD-RTO: 0 /100 WBC
PHOSPHATE SERPL-MCNC: 3 MG/DL (ref 2.5–4.5)
PLATELET # BLD AUTO: 171 K/UL (ref 164–446)
PMV BLD AUTO: 9 FL (ref 9–12.9)
POTASSIUM SERPL-SCNC: 3.7 MMOL/L (ref 3.6–5.5)
PROT SERPL-MCNC: 6.8 G/DL (ref 6–8.2)
RBC # BLD AUTO: 4.48 M/UL (ref 4.7–6.1)
SODIUM SERPL-SCNC: 130 MMOL/L (ref 135–145)
WBC # BLD AUTO: 9.5 K/UL (ref 4.8–10.8)

## 2018-04-02 PROCEDURE — 84100 ASSAY OF PHOSPHORUS: CPT

## 2018-04-02 PROCEDURE — 700111 HCHG RX REV CODE 636 W/ 250 OVERRIDE (IP): Performed by: SURGERY

## 2018-04-02 PROCEDURE — A9270 NON-COVERED ITEM OR SERVICE: HCPCS | Performed by: SURGERY

## 2018-04-02 PROCEDURE — 83735 ASSAY OF MAGNESIUM: CPT

## 2018-04-02 PROCEDURE — 700102 HCHG RX REV CODE 250 W/ 637 OVERRIDE(OP): Performed by: SURGERY

## 2018-04-02 PROCEDURE — 71045 X-RAY EXAM CHEST 1 VIEW: CPT

## 2018-04-02 PROCEDURE — 700111 HCHG RX REV CODE 636 W/ 250 OVERRIDE (IP): Performed by: NURSE PRACTITIONER

## 2018-04-02 PROCEDURE — 700102 HCHG RX REV CODE 250 W/ 637 OVERRIDE(OP): Performed by: NURSE PRACTITIONER

## 2018-04-02 PROCEDURE — 700111 HCHG RX REV CODE 636 W/ 250 OVERRIDE (IP): Performed by: PHYSICIAN ASSISTANT

## 2018-04-02 PROCEDURE — 80053 COMPREHEN METABOLIC PANEL: CPT

## 2018-04-02 PROCEDURE — 94668 MNPJ CHEST WALL SBSQ: CPT

## 2018-04-02 PROCEDURE — 85025 COMPLETE CBC W/AUTO DIFF WBC: CPT

## 2018-04-02 PROCEDURE — A9270 NON-COVERED ITEM OR SERVICE: HCPCS | Performed by: NURSE PRACTITIONER

## 2018-04-02 PROCEDURE — 700112 HCHG RX REV CODE 229: Performed by: SURGERY

## 2018-04-02 PROCEDURE — 700111 HCHG RX REV CODE 636 W/ 250 OVERRIDE (IP): Mod: JG | Performed by: SURGERY

## 2018-04-02 PROCEDURE — 99233 SBSQ HOSP IP/OBS HIGH 50: CPT | Performed by: SURGERY

## 2018-04-02 PROCEDURE — 770006 HCHG ROOM/CARE - MED/SURG/GYN SEMI*

## 2018-04-02 RX ORDER — SODIUM CHLORIDE, SODIUM LACTATE, POTASSIUM CHLORIDE, CALCIUM CHLORIDE 600; 310; 30; 20 MG/100ML; MG/100ML; MG/100ML; MG/100ML
INJECTION, SOLUTION INTRAVENOUS CONTINUOUS
Status: DISCONTINUED | OUTPATIENT
Start: 2018-04-02 | End: 2018-04-02

## 2018-04-02 RX ORDER — HYDROMORPHONE HYDROCHLORIDE 2 MG/ML
1 INJECTION, SOLUTION INTRAMUSCULAR; INTRAVENOUS; SUBCUTANEOUS
Status: DISCONTINUED | OUTPATIENT
Start: 2018-04-02 | End: 2018-04-02

## 2018-04-02 RX ORDER — HYDROMORPHONE HYDROCHLORIDE 2 MG/1
2 TABLET ORAL
Status: DISCONTINUED | OUTPATIENT
Start: 2018-04-02 | End: 2018-04-04

## 2018-04-02 RX ORDER — HYDROMORPHONE HYDROCHLORIDE 2 MG/1
4 TABLET ORAL
Status: DISCONTINUED | OUTPATIENT
Start: 2018-04-02 | End: 2018-04-04

## 2018-04-02 RX ORDER — HEPARIN SODIUM 5000 [USP'U]/ML
5000 INJECTION, SOLUTION INTRAVENOUS; SUBCUTANEOUS EVERY 8 HOURS
Status: DISCONTINUED | OUTPATIENT
Start: 2018-04-02 | End: 2018-04-04

## 2018-04-02 RX ORDER — MAGNESIUM SULFATE HEPTAHYDRATE 40 MG/ML
4 INJECTION, SOLUTION INTRAVENOUS ONCE
Status: COMPLETED | OUTPATIENT
Start: 2018-04-02 | End: 2018-04-02

## 2018-04-02 RX ORDER — MORPHINE SULFATE 15 MG/1
30 TABLET, FILM COATED, EXTENDED RELEASE ORAL EVERY 12 HOURS
Status: DISCONTINUED | OUTPATIENT
Start: 2018-04-02 | End: 2018-04-02

## 2018-04-02 RX ADMIN — TACROLIMUS 2 MG: 1 CAPSULE ORAL at 07:58

## 2018-04-02 RX ADMIN — HYDROMORPHONE HYDROCHLORIDE 4 MG: 2 TABLET ORAL at 20:07

## 2018-04-02 RX ADMIN — LISINOPRIL 10 MG: 20 TABLET ORAL at 07:58

## 2018-04-02 RX ADMIN — HYDROMORPHONE HYDROCHLORIDE 1 MG: 10 INJECTION, SOLUTION INTRAMUSCULAR; INTRAVENOUS; SUBCUTANEOUS at 21:32

## 2018-04-02 RX ADMIN — HYDROMORPHONE HYDROCHLORIDE 1 MG: 2 INJECTION INTRAMUSCULAR; INTRAVENOUS; SUBCUTANEOUS at 14:12

## 2018-04-02 RX ADMIN — ENOXAPARIN SODIUM 30 MG: 100 INJECTION SUBCUTANEOUS at 07:57

## 2018-04-02 RX ADMIN — HYDROMORPHONE HYDROCHLORIDE 1 MG: 2 INJECTION INTRAMUSCULAR; INTRAVENOUS; SUBCUTANEOUS at 00:03

## 2018-04-02 RX ADMIN — MAGNESIUM SULFATE IN WATER 4 G: 40 INJECTION, SOLUTION INTRAVENOUS at 10:11

## 2018-04-02 RX ADMIN — HYDROMORPHONE HYDROCHLORIDE 4 MG: 2 TABLET ORAL at 10:09

## 2018-04-02 RX ADMIN — HYDROMORPHONE HYDROCHLORIDE 1 MG: 2 INJECTION INTRAMUSCULAR; INTRAVENOUS; SUBCUTANEOUS at 03:14

## 2018-04-02 RX ADMIN — FAMOTIDINE 20 MG: 20 TABLET, FILM COATED ORAL at 07:58

## 2018-04-02 RX ADMIN — HYDROMORPHONE HYDROCHLORIDE 1 MG: 10 INJECTION, SOLUTION INTRAMUSCULAR; INTRAVENOUS; SUBCUTANEOUS at 21:08

## 2018-04-02 RX ADMIN — STANDARDIZED SENNA CONCENTRATE AND DOCUSATE SODIUM 1 TABLET: 8.6; 5 TABLET, FILM COATED ORAL at 20:08

## 2018-04-02 RX ADMIN — HYDROMORPHONE HYDROCHLORIDE 1 MG: 2 INJECTION INTRAMUSCULAR; INTRAVENOUS; SUBCUTANEOUS at 05:14

## 2018-04-02 RX ADMIN — HYDROMORPHONE HYDROCHLORIDE 1 MG: 2 INJECTION INTRAMUSCULAR; INTRAVENOUS; SUBCUTANEOUS at 08:09

## 2018-04-02 RX ADMIN — HYDROMORPHONE HYDROCHLORIDE 4 MG: 2 TABLET ORAL at 13:09

## 2018-04-02 RX ADMIN — TACROLIMUS 2 MG: 1 CAPSULE ORAL at 20:10

## 2018-04-02 RX ADMIN — OXYCODONE HYDROCHLORIDE 20 MG: 10 TABLET ORAL at 07:12

## 2018-04-02 RX ADMIN — CITALOPRAM HYDROBROMIDE 20 MG: 20 TABLET ORAL at 20:08

## 2018-04-02 RX ADMIN — HEPARIN SODIUM 5000 UNITS: 5000 INJECTION, SOLUTION INTRAVENOUS; SUBCUTANEOUS at 20:08

## 2018-04-02 RX ADMIN — HYDROMORPHONE HYDROCHLORIDE 1 MG: 2 INJECTION INTRAMUSCULAR; INTRAVENOUS; SUBCUTANEOUS at 11:14

## 2018-04-02 RX ADMIN — HYDROMORPHONE HYDROCHLORIDE 4 MG: 2 TABLET ORAL at 16:16

## 2018-04-02 RX ADMIN — HYDROMORPHONE HYDROCHLORIDE 1 MG: 2 INJECTION INTRAMUSCULAR; INTRAVENOUS; SUBCUTANEOUS at 16:03

## 2018-04-02 RX ADMIN — ASPIRIN 81 MG: 81 TABLET, COATED ORAL at 07:58

## 2018-04-02 RX ADMIN — HYDROMORPHONE HYDROCHLORIDE 4 MG: 2 TABLET ORAL at 23:05

## 2018-04-02 RX ADMIN — HYDROMORPHONE HYDROCHLORIDE 1 MG: 2 INJECTION INTRAMUSCULAR; INTRAVENOUS; SUBCUTANEOUS at 17:54

## 2018-04-02 RX ADMIN — DOCUSATE SODIUM 100 MG: 100 CAPSULE ORAL at 14:12

## 2018-04-02 ASSESSMENT — ENCOUNTER SYMPTOMS
RESPIRATORY NEGATIVE: 1
NECK PAIN: 0
MYALGIAS: 1
GASTROINTESTINAL NEGATIVE: 1
BACK PAIN: 1
CONSTITUTIONAL NEGATIVE: 1
CARDIOVASCULAR NEGATIVE: 1
EYES NEGATIVE: 1
ROS GI COMMENTS: BM PRIOR TO ARRIVAL, (+) FLATUS
DEPRESSION: 1
NEUROLOGICAL NEGATIVE: 1

## 2018-04-02 ASSESSMENT — PATIENT HEALTH QUESTIONNAIRE - PHQ9
9. THOUGHTS THAT YOU WOULD BE BETTER OFF DEAD, OR OF HURTING YOURSELF: NOT AT ALL
4. FEELING TIRED OR HAVING LITTLE ENERGY: NOT AT ALL
2. FEELING DOWN, DEPRESSED, IRRITABLE, OR HOPELESS: NOT AT ALL
8. MOVING OR SPEAKING SO SLOWLY THAT OTHER PEOPLE COULD HAVE NOTICED. OR THE OPPOSITE, BEING SO FIGETY OR RESTLESS THAT YOU HAVE BEEN MOVING AROUND A LOT MORE THAN USUAL: NOT AT ALL
7. TROUBLE CONCENTRATING ON THINGS, SUCH AS READING THE NEWSPAPER OR WATCHING TELEVISION: NOT AT ALL
5. POOR APPETITE OR OVEREATING: NOT AT ALL
6. FEELING BAD ABOUT YOURSELF - OR THAT YOU ARE A FAILURE OR HAVE LET YOURSELF OR YOUR FAMILY DOWN: NOT AL ALL
SUM OF ALL RESPONSES TO PHQ QUESTIONS 1-9: 2
SUM OF ALL RESPONSES TO PHQ9 QUESTIONS 1 AND 2: 1
3. TROUBLE FALLING OR STAYING ASLEEP OR SLEEPING TOO MUCH: SEVERAL DAYS
1. LITTLE INTEREST OR PLEASURE IN DOING THINGS: SEVERAL DAYS

## 2018-04-02 ASSESSMENT — PAIN SCALES - GENERAL
PAINLEVEL_OUTOF10: 10
PAINLEVEL_OUTOF10: 9
PAINLEVEL_OUTOF10: 7
PAINLEVEL_OUTOF10: 9
PAINLEVEL_OUTOF10: 10
PAINLEVEL_OUTOF10: 7
PAINLEVEL_OUTOF10: 10
PAINLEVEL_OUTOF10: 6
PAINLEVEL_OUTOF10: 10
PAINLEVEL_OUTOF10: 10
PAINLEVEL_OUTOF10: 8
PAINLEVEL_OUTOF10: 7
PAINLEVEL_OUTOF10: 8
PAINLEVEL_OUTOF10: 9
PAINLEVEL_OUTOF10: 4
PAINLEVEL_OUTOF10: 10
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
## 2018-04-02 NOTE — PROGRESS NOTES
"Patient continues to complain of pain, medicated per MAR. Chronic pain issues complicated by acute left-sided rib pain the patient describes as \"sharp and stabbing\". Patient mobilized, given support from pillows, provided emotional reassurance and warm blankets.    "

## 2018-04-02 NOTE — CARE PLAN
Problem: Safety  Goal: Will remain free from injury  Call light within reach, bed in low position, non skid socks in place, and pt educated on importance of calling for assistance. Pt verbalizes understanding of safety measures and calls for assistance.      Problem: Pain Management  Goal: Pain level will decrease to patient's comfort goal  Scheduled and prn pain medication in use, see MAR.

## 2018-04-02 NOTE — CARE PLAN
Problem: Safety  Goal: Will remain free from injury    Intervention: Provide assistance with mobility  Patient educated on call light and mobilizing with assistance.      Problem: Mobility  Goal: Risk for activity intolerance will decrease  Outcome: MET Date Met: 04/02/18  Patient up to chair x 2, plans to walk around unit today.
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
## 2018-04-02 NOTE — PROGRESS NOTES
Late entry: Bedside report received from oly RN. Plan of care and pt condition reviewed. LDA's and IVF verified. Pt has 9/10 pain at this time. Medicated per MAR. Assessment complete per OBS. VSS and at baseline at this time. Pt repositioned for comfort, up to chair. Mouth brushed, dentures in place, face washed, linen changed. Call light within reach. Bed in lowest, locked position with bed alarm on. Will continue to monitor.

## 2018-04-03 ENCOUNTER — APPOINTMENT (OUTPATIENT)
Dept: RADIOLOGY | Facility: MEDICAL CENTER | Age: 63
DRG: 083 | End: 2018-04-03
Attending: SURGERY
Payer: MEDICARE

## 2018-04-03 PROBLEM — S06.2XAA INTRACRANIAL HEMATOMA FOLLOWING INJURY (HCC): Status: ACTIVE | Noted: 2018-04-03

## 2018-04-03 LAB
ALBUMIN SERPL BCP-MCNC: 3.3 G/DL (ref 3.2–4.9)
ALBUMIN/GLOB SERPL: 1.2 G/DL
ALP SERPL-CCNC: 45 U/L (ref 30–99)
ALT SERPL-CCNC: 26 U/L (ref 2–50)
ANION GAP SERPL CALC-SCNC: 9 MMOL/L (ref 0–11.9)
APTT PPP: 29.2 SEC (ref 24.7–36)
AST SERPL-CCNC: 27 U/L (ref 12–45)
BASOPHILS # BLD AUTO: 0.4 % (ref 0–1.8)
BASOPHILS # BLD AUTO: 0.4 % (ref 0–1.8)
BASOPHILS # BLD: 0.03 K/UL (ref 0–0.12)
BASOPHILS # BLD: 0.04 K/UL (ref 0–0.12)
BILIRUB SERPL-MCNC: 0.8 MG/DL (ref 0.1–1.5)
BUN SERPL-MCNC: 11 MG/DL (ref 8–22)
CALCIUM SERPL-MCNC: 8.7 MG/DL (ref 8.4–10.2)
CHLORIDE SERPL-SCNC: 97 MMOL/L (ref 96–112)
CO2 SERPL-SCNC: 27 MMOL/L (ref 20–33)
CREAT SERPL-MCNC: 0.94 MG/DL (ref 0.5–1.4)
EOSINOPHIL # BLD AUTO: 0.15 K/UL (ref 0–0.51)
EOSINOPHIL # BLD AUTO: 0.22 K/UL (ref 0–0.51)
EOSINOPHIL NFR BLD: 1.8 % (ref 0–6.9)
EOSINOPHIL NFR BLD: 2.3 % (ref 0–6.9)
ERYTHROCYTE [DISTWIDTH] IN BLOOD BY AUTOMATED COUNT: 41.1 FL (ref 35.9–50)
ERYTHROCYTE [DISTWIDTH] IN BLOOD BY AUTOMATED COUNT: 43 FL (ref 35.9–50)
GLOBULIN SER CALC-MCNC: 2.8 G/DL (ref 1.9–3.5)
GLUCOSE SERPL-MCNC: 111 MG/DL (ref 65–99)
HCT VFR BLD AUTO: 38.7 % (ref 42–52)
HCT VFR BLD AUTO: 39.7 % (ref 42–52)
HGB BLD-MCNC: 13.5 G/DL (ref 14–18)
HGB BLD-MCNC: 13.6 G/DL (ref 14–18)
IMM GRANULOCYTES # BLD AUTO: 0.03 K/UL (ref 0–0.11)
IMM GRANULOCYTES # BLD AUTO: 0.06 K/UL (ref 0–0.11)
IMM GRANULOCYTES NFR BLD AUTO: 0.4 % (ref 0–0.9)
IMM GRANULOCYTES NFR BLD AUTO: 0.6 % (ref 0–0.9)
INR PPP: 1.02 (ref 0.87–1.13)
LYMPHOCYTES # BLD AUTO: 1.67 K/UL (ref 1–4.8)
LYMPHOCYTES # BLD AUTO: 1.7 K/UL (ref 1–4.8)
LYMPHOCYTES NFR BLD: 18.1 % (ref 22–41)
LYMPHOCYTES NFR BLD: 20.1 % (ref 22–41)
MAGNESIUM SERPL-MCNC: 1.8 MG/DL (ref 1.5–2.5)
MCH RBC QN AUTO: 31.6 PG (ref 27–33)
MCH RBC QN AUTO: 32 PG (ref 27–33)
MCHC RBC AUTO-ENTMCNC: 34.3 G/DL (ref 33.7–35.3)
MCHC RBC AUTO-ENTMCNC: 34.9 G/DL (ref 33.7–35.3)
MCV RBC AUTO: 90.6 FL (ref 81.4–97.8)
MCV RBC AUTO: 93.4 FL (ref 81.4–97.8)
MONOCYTES # BLD AUTO: 1.03 K/UL (ref 0–0.85)
MONOCYTES # BLD AUTO: 1.16 K/UL (ref 0–0.85)
MONOCYTES NFR BLD AUTO: 12.3 % (ref 0–13.4)
MONOCYTES NFR BLD AUTO: 12.4 % (ref 0–13.4)
NEUTROPHILS # BLD AUTO: 5.4 K/UL (ref 1.82–7.42)
NEUTROPHILS # BLD AUTO: 6.22 K/UL (ref 1.82–7.42)
NEUTROPHILS NFR BLD: 64.9 % (ref 44–72)
NEUTROPHILS NFR BLD: 66.3 % (ref 44–72)
NRBC # BLD AUTO: 0 K/UL
NRBC # BLD AUTO: 0 K/UL
NRBC BLD-RTO: 0 /100 WBC
NRBC BLD-RTO: 0 /100 WBC
PLATELET # BLD AUTO: 160 K/UL (ref 164–446)
PLATELET # BLD AUTO: 164 K/UL (ref 164–446)
PMV BLD AUTO: 8.9 FL (ref 9–12.9)
PMV BLD AUTO: 9.2 FL (ref 9–12.9)
POTASSIUM SERPL-SCNC: 3.7 MMOL/L (ref 3.6–5.5)
PROT SERPL-MCNC: 6.1 G/DL (ref 6–8.2)
PROTHROMBIN TIME: 13.1 SEC (ref 12–14.6)
RBC # BLD AUTO: 4.25 M/UL (ref 4.7–6.1)
RBC # BLD AUTO: 4.27 M/UL (ref 4.7–6.1)
SODIUM SERPL-SCNC: 133 MMOL/L (ref 135–145)
WBC # BLD AUTO: 8.3 K/UL (ref 4.8–10.8)
WBC # BLD AUTO: 9.4 K/UL (ref 4.8–10.8)

## 2018-04-03 PROCEDURE — 71045 X-RAY EXAM CHEST 1 VIEW: CPT

## 2018-04-03 PROCEDURE — 80053 COMPREHEN METABOLIC PANEL: CPT

## 2018-04-03 PROCEDURE — 85025 COMPLETE CBC W/AUTO DIFF WBC: CPT

## 2018-04-03 PROCEDURE — 700112 HCHG RX REV CODE 229: Performed by: SURGERY

## 2018-04-03 PROCEDURE — 70498 CT ANGIOGRAPHY NECK: CPT

## 2018-04-03 PROCEDURE — A9270 NON-COVERED ITEM OR SERVICE: HCPCS | Performed by: NURSE PRACTITIONER

## 2018-04-03 PROCEDURE — A9270 NON-COVERED ITEM OR SERVICE: HCPCS | Performed by: SURGERY

## 2018-04-03 PROCEDURE — 83735 ASSAY OF MAGNESIUM: CPT

## 2018-04-03 PROCEDURE — 85610 PROTHROMBIN TIME: CPT

## 2018-04-03 PROCEDURE — 94668 MNPJ CHEST WALL SBSQ: CPT

## 2018-04-03 PROCEDURE — 770022 HCHG ROOM/CARE - ICU (200)

## 2018-04-03 PROCEDURE — 700102 HCHG RX REV CODE 250 W/ 637 OVERRIDE(OP): Performed by: NURSE PRACTITIONER

## 2018-04-03 PROCEDURE — 700102 HCHG RX REV CODE 250 W/ 637 OVERRIDE(OP): Performed by: SURGERY

## 2018-04-03 PROCEDURE — 700111 HCHG RX REV CODE 636 W/ 250 OVERRIDE (IP): Performed by: PHYSICIAN ASSISTANT

## 2018-04-03 PROCEDURE — 700111 HCHG RX REV CODE 636 W/ 250 OVERRIDE (IP): Mod: JG | Performed by: SURGERY

## 2018-04-03 PROCEDURE — 700111 HCHG RX REV CODE 636 W/ 250 OVERRIDE (IP): Performed by: NURSE PRACTITIONER

## 2018-04-03 PROCEDURE — 94760 N-INVAS EAR/PLS OXIMETRY 1: CPT

## 2018-04-03 PROCEDURE — 70496 CT ANGIOGRAPHY HEAD: CPT

## 2018-04-03 PROCEDURE — 700117 HCHG RX CONTRAST REV CODE 255: Performed by: PSYCHIATRY & NEUROLOGY

## 2018-04-03 PROCEDURE — 700111 HCHG RX REV CODE 636 W/ 250 OVERRIDE (IP): Performed by: PSYCHIATRY & NEUROLOGY

## 2018-04-03 PROCEDURE — 85730 THROMBOPLASTIN TIME PARTIAL: CPT

## 2018-04-03 RX ORDER — OXYCODONE HYDROCHLORIDE 10 MG/1
10 TABLET ORAL
Status: DISCONTINUED | OUTPATIENT
Start: 2018-04-03 | End: 2018-04-04

## 2018-04-03 RX ORDER — HYDROMORPHONE HYDROCHLORIDE 2 MG/ML
1 INJECTION, SOLUTION INTRAMUSCULAR; INTRAVENOUS; SUBCUTANEOUS
Status: DISCONTINUED | OUTPATIENT
Start: 2018-04-03 | End: 2018-04-05

## 2018-04-03 RX ORDER — LABETALOL HYDROCHLORIDE 5 MG/ML
10 INJECTION, SOLUTION INTRAVENOUS
Status: DISCONTINUED | OUTPATIENT
Start: 2018-04-03 | End: 2018-04-10 | Stop reason: HOSPADM

## 2018-04-03 RX ORDER — GABAPENTIN 300 MG/1
300 CAPSULE ORAL 3 TIMES DAILY
Status: DISCONTINUED | OUTPATIENT
Start: 2018-04-03 | End: 2018-04-10 | Stop reason: HOSPADM

## 2018-04-03 RX ORDER — HYDRALAZINE HYDROCHLORIDE 20 MG/ML
10 INJECTION INTRAMUSCULAR; INTRAVENOUS
Status: DISCONTINUED | OUTPATIENT
Start: 2018-04-03 | End: 2018-04-10 | Stop reason: HOSPADM

## 2018-04-03 RX ADMIN — HYDROMORPHONE HYDROCHLORIDE 1 MG: 10 INJECTION, SOLUTION INTRAMUSCULAR; INTRAVENOUS; SUBCUTANEOUS at 13:00

## 2018-04-03 RX ADMIN — TACROLIMUS 2 MG: 1 CAPSULE ORAL at 21:37

## 2018-04-03 RX ADMIN — GABAPENTIN 300 MG: 300 CAPSULE ORAL at 12:22

## 2018-04-03 RX ADMIN — IOHEXOL 100 ML: 350 INJECTION, SOLUTION INTRAVENOUS at 18:11

## 2018-04-03 RX ADMIN — STANDARDIZED SENNA CONCENTRATE AND DOCUSATE SODIUM 1 TABLET: 8.6; 5 TABLET, FILM COATED ORAL at 21:38

## 2018-04-03 RX ADMIN — HYDROMORPHONE HYDROCHLORIDE 1 MG: 2 INJECTION INTRAMUSCULAR; INTRAVENOUS; SUBCUTANEOUS at 20:13

## 2018-04-03 RX ADMIN — HYDROMORPHONE HYDROCHLORIDE 4 MG: 2 TABLET ORAL at 12:27

## 2018-04-03 RX ADMIN — TACROLIMUS 2 MG: 1 CAPSULE ORAL at 08:28

## 2018-04-03 RX ADMIN — LISINOPRIL 10 MG: 20 TABLET ORAL at 08:26

## 2018-04-03 RX ADMIN — HYDROMORPHONE HYDROCHLORIDE 1 MG: 10 INJECTION, SOLUTION INTRAMUSCULAR; INTRAVENOUS; SUBCUTANEOUS at 00:01

## 2018-04-03 RX ADMIN — HEPARIN SODIUM 5000 UNITS: 5000 INJECTION, SOLUTION INTRAVENOUS; SUBCUTANEOUS at 12:22

## 2018-04-03 RX ADMIN — POLYETHYLENE GLYCOL 3350 1 PACKET: 17 POWDER, FOR SOLUTION ORAL at 08:29

## 2018-04-03 RX ADMIN — HYDROMORPHONE HYDROCHLORIDE 1 MG: 2 INJECTION INTRAMUSCULAR; INTRAVENOUS; SUBCUTANEOUS at 22:20

## 2018-04-03 RX ADMIN — HYDRALAZINE HYDROCHLORIDE 10 MG: 20 INJECTION INTRAMUSCULAR; INTRAVENOUS at 20:56

## 2018-04-03 RX ADMIN — HYDROMORPHONE HYDROCHLORIDE 4 MG: 2 TABLET ORAL at 05:39

## 2018-04-03 RX ADMIN — MAGNESIUM HYDROXIDE 30 ML: 400 SUSPENSION ORAL at 08:25

## 2018-04-03 RX ADMIN — DOCUSATE SODIUM 100 MG: 100 CAPSULE ORAL at 21:37

## 2018-04-03 RX ADMIN — HYDROMORPHONE HYDROCHLORIDE 4 MG: 2 TABLET ORAL at 08:30

## 2018-04-03 RX ADMIN — POLYETHYLENE GLYCOL 3350 1 PACKET: 17 POWDER, FOR SOLUTION ORAL at 21:38

## 2018-04-03 RX ADMIN — HYDROMORPHONE HYDROCHLORIDE 4 MG: 2 TABLET ORAL at 21:38

## 2018-04-03 RX ADMIN — HEPARIN SODIUM 5000 UNITS: 5000 INJECTION, SOLUTION INTRAVENOUS; SUBCUTANEOUS at 05:39

## 2018-04-03 RX ADMIN — HYDROMORPHONE HYDROCHLORIDE 4 MG: 2 TABLET ORAL at 16:02

## 2018-04-03 RX ADMIN — ASPIRIN 81 MG: 81 TABLET, COATED ORAL at 08:29

## 2018-04-03 RX ADMIN — HYDROMORPHONE HYDROCHLORIDE 4 MG: 2 TABLET ORAL at 02:25

## 2018-04-03 ASSESSMENT — PAIN SCALES - GENERAL
PAINLEVEL_OUTOF10: 7
PAINLEVEL_OUTOF10: 4
PAINLEVEL_OUTOF10: 10
PAINLEVEL_OUTOF10: 3
PAINLEVEL_OUTOF10: 7
PAINLEVEL_OUTOF10: 10
PAINLEVEL_OUTOF10: 8
PAINLEVEL_OUTOF10: 10
PAINLEVEL_OUTOF10: 6

## 2018-04-03 ASSESSMENT — ENCOUNTER SYMPTOMS
DEPRESSION: 1
GASTROINTESTINAL NEGATIVE: 1
ROS GI COMMENTS: BM PRIOR TO ARRIVAL, (+) FLATUS
MYALGIAS: 1
NEUROLOGICAL NEGATIVE: 1
NECK PAIN: 0
CARDIOVASCULAR NEGATIVE: 1
CONSTITUTIONAL NEGATIVE: 1
COUGH: 1
BACK PAIN: 1
EYES NEGATIVE: 1
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
## 2018-04-03 NOTE — PROGRESS NOTES
VSS  A& O x 4  Pain severe to Left Ribs. Medicated per MAR   Denies nausea or vomiting  O2 NC 1L with O2 sats in the 90's  + voiding.   Denies numbness and tingling.  Ambulated self   POC discussed  No further needs at this time.

## 2018-04-03 NOTE — CARE PLAN
Problem: Safety  Goal: Will remain free from injury  Outcome: PROGRESSING AS EXPECTED  Call light within reach. Bed locked in lowest position    Problem: Pain Management  Goal: Pain level will decrease to patient's comfort goal  Outcome: PROGRESSING AS EXPECTED  IV and oral medications given, Ice pack and pillows for added comfort and support.

## 2018-04-03 NOTE — PROGRESS NOTES
Patient is AOX4. Patient able to GAGNON with range of motion deficit noted to the RUE d/t pain from rib fractures. PRN pain meds given upon assessment. Patient on 1L NC satting low 90s. Patient encouraged to use IS x10 q hour while awake. Patient also encouraged to ambulate in the halls. Patient was able to walk 60 feet before needing to go sit down again. Abrasions noted to LUE and covered by dressing. Patient is supposed to see Dr. Mendes [facility] to check Dilaudid implant on patient. Will reach out to MD if nothing is heard by end of shift. No acute distresses noted otherwise at this time. Call light within reach. Will continue to monitor.

## 2018-04-03 NOTE — CARE PLAN
Problem: Safety  Goal: Will remain free from falls  Outcome: PROGRESSING AS EXPECTED  Patient verbalized the need to use the call light for anything out of bed.

## 2018-04-04 ENCOUNTER — APPOINTMENT (OUTPATIENT)
Dept: RADIOLOGY | Facility: MEDICAL CENTER | Age: 63
DRG: 083 | End: 2018-04-04
Attending: PSYCHIATRY & NEUROLOGY
Payer: MEDICARE

## 2018-04-04 ENCOUNTER — APPOINTMENT (OUTPATIENT)
Dept: RADIOLOGY | Facility: MEDICAL CENTER | Age: 63
DRG: 083 | End: 2018-04-04
Attending: SURGERY
Payer: MEDICARE

## 2018-04-04 PROBLEM — Z53.09 CONTRAINDICATION TO DEEP VEIN THROMBOSIS (DVT) PROPHYLAXIS: Status: ACTIVE | Noted: 2018-04-01

## 2018-04-04 LAB
ALBUMIN SERPL BCP-MCNC: 3.6 G/DL (ref 3.2–4.9)
ALBUMIN/GLOB SERPL: 1.1 G/DL
ALP SERPL-CCNC: 39 U/L (ref 30–99)
ALT SERPL-CCNC: 17 U/L (ref 2–50)
ANION GAP SERPL CALC-SCNC: 8 MMOL/L (ref 0–11.9)
AST SERPL-CCNC: 18 U/L (ref 12–45)
BASOPHILS # BLD AUTO: 0.3 % (ref 0–1.8)
BASOPHILS # BLD: 0.03 K/UL (ref 0–0.12)
BILIRUB SERPL-MCNC: 0.7 MG/DL (ref 0.1–1.5)
BUN SERPL-MCNC: 13 MG/DL (ref 8–22)
CALCIUM SERPL-MCNC: 9 MG/DL (ref 8.5–10.5)
CFT BLD TEG: 6 MIN (ref 5–10)
CHLORIDE SERPL-SCNC: 98 MMOL/L (ref 96–112)
CLOT ANGLE BLD TEG: 64.2 DEGREES (ref 53–72)
CLOT LYSIS 30M P MA LENFR BLD TEG: 0.5 % (ref 0–8)
CO2 SERPL-SCNC: 25 MMOL/L (ref 20–33)
CREAT SERPL-MCNC: 0.89 MG/DL (ref 0.5–1.4)
CT.EXTRINSIC BLD ROTEM: 1.8 MIN (ref 1–3)
EOSINOPHIL # BLD AUTO: 0.2 K/UL (ref 0–0.51)
EOSINOPHIL NFR BLD: 2.3 % (ref 0–6.9)
ERYTHROCYTE [DISTWIDTH] IN BLOOD BY AUTOMATED COUNT: 41.4 FL (ref 35.9–50)
GLOBULIN SER CALC-MCNC: 3.4 G/DL (ref 1.9–3.5)
GLUCOSE SERPL-MCNC: 102 MG/DL (ref 65–99)
HCT VFR BLD AUTO: 38.2 % (ref 42–52)
HGB BLD-MCNC: 13.3 G/DL (ref 14–18)
IMM GRANULOCYTES # BLD AUTO: 0.04 K/UL (ref 0–0.11)
IMM GRANULOCYTES NFR BLD AUTO: 0.5 % (ref 0–0.9)
LYMPHOCYTES # BLD AUTO: 1.98 K/UL (ref 1–4.8)
LYMPHOCYTES NFR BLD: 22.9 % (ref 22–41)
MAGNESIUM SERPL-MCNC: 1.6 MG/DL (ref 1.5–2.5)
MCF BLD TEG: 64.2 MM (ref 50–70)
MCH RBC QN AUTO: 31.7 PG (ref 27–33)
MCHC RBC AUTO-ENTMCNC: 34.8 G/DL (ref 33.7–35.3)
MCV RBC AUTO: 91.2 FL (ref 81.4–97.8)
MONOCYTES # BLD AUTO: 1.37 K/UL (ref 0–0.85)
MONOCYTES NFR BLD AUTO: 15.9 % (ref 0–13.4)
NEUTROPHILS # BLD AUTO: 5.01 K/UL (ref 1.82–7.42)
NEUTROPHILS NFR BLD: 58.1 % (ref 44–72)
NRBC # BLD AUTO: 0 K/UL
NRBC BLD-RTO: 0 /100 WBC
PA AA BLD-ACNC: 77.9 %
PA ADP BLD-ACNC: 38.2 %
PLATELET # BLD AUTO: 175 K/UL (ref 164–446)
PMV BLD AUTO: 9.3 FL (ref 9–12.9)
POTASSIUM SERPL-SCNC: 4 MMOL/L (ref 3.6–5.5)
PROT SERPL-MCNC: 7 G/DL (ref 6–8.2)
RBC # BLD AUTO: 4.19 M/UL (ref 4.7–6.1)
SODIUM SERPL-SCNC: 131 MMOL/L (ref 135–145)
TEG ALGORITHM TGALG: NORMAL
WBC # BLD AUTO: 8.6 K/UL (ref 4.8–10.8)

## 2018-04-04 PROCEDURE — A9270 NON-COVERED ITEM OR SERVICE: HCPCS | Performed by: SURGERY

## 2018-04-04 PROCEDURE — 71045 X-RAY EXAM CHEST 1 VIEW: CPT

## 2018-04-04 PROCEDURE — 770022 HCHG ROOM/CARE - ICU (200)

## 2018-04-04 PROCEDURE — 700102 HCHG RX REV CODE 250 W/ 637 OVERRIDE(OP): Performed by: NURSE PRACTITIONER

## 2018-04-04 PROCEDURE — A9270 NON-COVERED ITEM OR SERVICE: HCPCS | Performed by: NURSE PRACTITIONER

## 2018-04-04 PROCEDURE — G8978 MOBILITY CURRENT STATUS: HCPCS | Mod: CK

## 2018-04-04 PROCEDURE — 85025 COMPLETE CBC W/AUTO DIFF WBC: CPT

## 2018-04-04 PROCEDURE — G8988 SELF CARE GOAL STATUS: HCPCS | Mod: CI

## 2018-04-04 PROCEDURE — 99233 SBSQ HOSP IP/OBS HIGH 50: CPT | Performed by: SURGERY

## 2018-04-04 PROCEDURE — G8987 SELF CARE CURRENT STATUS: HCPCS | Mod: CK

## 2018-04-04 PROCEDURE — 85576 BLOOD PLATELET AGGREGATION: CPT

## 2018-04-04 PROCEDURE — 70450 CT HEAD/BRAIN W/O DYE: CPT

## 2018-04-04 PROCEDURE — 83735 ASSAY OF MAGNESIUM: CPT

## 2018-04-04 PROCEDURE — 85384 FIBRINOGEN ACTIVITY: CPT

## 2018-04-04 PROCEDURE — 700112 HCHG RX REV CODE 229: Performed by: SURGERY

## 2018-04-04 PROCEDURE — 700102 HCHG RX REV CODE 250 W/ 637 OVERRIDE(OP): Performed by: SURGERY

## 2018-04-04 PROCEDURE — 700111 HCHG RX REV CODE 636 W/ 250 OVERRIDE (IP): Mod: JG | Performed by: SURGERY

## 2018-04-04 PROCEDURE — 700111 HCHG RX REV CODE 636 W/ 250 OVERRIDE (IP): Performed by: PHYSICIAN ASSISTANT

## 2018-04-04 PROCEDURE — 85347 COAGULATION TIME ACTIVATED: CPT

## 2018-04-04 PROCEDURE — 80053 COMPREHEN METABOLIC PANEL: CPT

## 2018-04-04 PROCEDURE — G8979 MOBILITY GOAL STATUS: HCPCS | Mod: CI

## 2018-04-04 PROCEDURE — 94668 MNPJ CHEST WALL SBSQ: CPT

## 2018-04-04 PROCEDURE — 97166 OT EVAL MOD COMPLEX 45 MIN: CPT

## 2018-04-04 PROCEDURE — 97163 PT EVAL HIGH COMPLEX 45 MIN: CPT

## 2018-04-04 PROCEDURE — 700111 HCHG RX REV CODE 636 W/ 250 OVERRIDE (IP): Performed by: SURGERY

## 2018-04-04 RX ORDER — OXYCODONE HYDROCHLORIDE 5 MG/1
15 TABLET ORAL EVERY 4 HOURS PRN
Status: DISCONTINUED | OUTPATIENT
Start: 2018-04-04 | End: 2018-04-06

## 2018-04-04 RX ORDER — MAGNESIUM SULFATE HEPTAHYDRATE 40 MG/ML
2 INJECTION, SOLUTION INTRAVENOUS ONCE
Status: COMPLETED | OUTPATIENT
Start: 2018-04-04 | End: 2018-04-04

## 2018-04-04 RX ORDER — OXYCODONE HYDROCHLORIDE 10 MG/1
10 TABLET ORAL EVERY 4 HOURS PRN
Status: DISCONTINUED | OUTPATIENT
Start: 2018-04-04 | End: 2018-04-06

## 2018-04-04 RX ADMIN — TACROLIMUS 2 MG: 1 CAPSULE ORAL at 08:38

## 2018-04-04 RX ADMIN — DOCUSATE SODIUM 100 MG: 100 CAPSULE ORAL at 09:00

## 2018-04-04 RX ADMIN — HYDROMORPHONE HYDROCHLORIDE 1 MG: 2 INJECTION INTRAMUSCULAR; INTRAVENOUS; SUBCUTANEOUS at 15:07

## 2018-04-04 RX ADMIN — MAGNESIUM HYDROXIDE 30 ML: 400 SUSPENSION ORAL at 12:55

## 2018-04-04 RX ADMIN — OXYCODONE HYDROCHLORIDE 15 MG: 5 TABLET ORAL at 17:49

## 2018-04-04 RX ADMIN — HYDROMORPHONE HYDROCHLORIDE 1 MG: 2 INJECTION INTRAMUSCULAR; INTRAVENOUS; SUBCUTANEOUS at 01:43

## 2018-04-04 RX ADMIN — GABAPENTIN 300 MG: 300 CAPSULE ORAL at 15:05

## 2018-04-04 RX ADMIN — LISINOPRIL 10 MG: 20 TABLET ORAL at 08:38

## 2018-04-04 RX ADMIN — HYDROMORPHONE HYDROCHLORIDE 4 MG: 2 TABLET ORAL at 15:04

## 2018-04-04 RX ADMIN — HYDROMORPHONE HYDROCHLORIDE 1 MG: 2 INJECTION INTRAMUSCULAR; INTRAVENOUS; SUBCUTANEOUS at 21:25

## 2018-04-04 RX ADMIN — MAGNESIUM SULFATE HEPTAHYDRATE 2 G: 40 INJECTION, SOLUTION INTRAVENOUS at 11:04

## 2018-04-04 RX ADMIN — GABAPENTIN 300 MG: 300 CAPSULE ORAL at 21:25

## 2018-04-04 RX ADMIN — HYDROMORPHONE HYDROCHLORIDE 4 MG: 2 TABLET ORAL at 01:42

## 2018-04-04 RX ADMIN — HYDROMORPHONE HYDROCHLORIDE 4 MG: 2 TABLET ORAL at 10:55

## 2018-04-04 RX ADMIN — TACROLIMUS 2 MG: 1 CAPSULE ORAL at 21:25

## 2018-04-04 RX ADMIN — GABAPENTIN 300 MG: 300 CAPSULE ORAL at 08:38

## 2018-04-04 RX ADMIN — HYDROMORPHONE HYDROCHLORIDE 1 MG: 2 INJECTION INTRAMUSCULAR; INTRAVENOUS; SUBCUTANEOUS at 10:58

## 2018-04-04 RX ADMIN — HYDROMORPHONE HYDROCHLORIDE 1 MG: 2 INJECTION INTRAMUSCULAR; INTRAVENOUS; SUBCUTANEOUS at 12:57

## 2018-04-04 RX ADMIN — HYDROMORPHONE HYDROCHLORIDE 4 MG: 2 TABLET ORAL at 07:45

## 2018-04-04 RX ADMIN — HYDROMORPHONE HYDROCHLORIDE 1 MG: 2 INJECTION INTRAMUSCULAR; INTRAVENOUS; SUBCUTANEOUS at 08:39

## 2018-04-04 RX ADMIN — HYDROMORPHONE HYDROCHLORIDE 1 MG: 2 INJECTION INTRAMUSCULAR; INTRAVENOUS; SUBCUTANEOUS at 23:47

## 2018-04-04 RX ADMIN — HYDROMORPHONE HYDROCHLORIDE 1 MG: 2 INJECTION INTRAMUSCULAR; INTRAVENOUS; SUBCUTANEOUS at 06:32

## 2018-04-04 ASSESSMENT — COGNITIVE AND FUNCTIONAL STATUS - GENERAL
STANDING UP FROM CHAIR USING ARMS: A LITTLE
SUGGESTED CMS G CODE MODIFIER MOBILITY: CM
CLIMB 3 TO 5 STEPS WITH RAILING: TOTAL
DRESSING REGULAR LOWER BODY CLOTHING: A LOT
PERSONAL GROOMING: A LITTLE
WALKING IN HOSPITAL ROOM: A LOT
MOVING TO AND FROM BED TO CHAIR: UNABLE
MOBILITY SCORE: 9
SUGGESTED CMS G CODE MODIFIER DAILY ACTIVITY: CK
DRESSING REGULAR UPPER BODY CLOTHING: A LOT
MOVING FROM LYING ON BACK TO SITTING ON SIDE OF FLAT BED: UNABLE
DAILY ACTIVITIY SCORE: 14
EATING MEALS: A LITTLE
HELP NEEDED FOR BATHING: A LOT
TURNING FROM BACK TO SIDE WHILE IN FLAT BAD: UNABLE
TOILETING: A LOT

## 2018-04-04 ASSESSMENT — PAIN SCALES - GENERAL
PAINLEVEL_OUTOF10: 10
PAINLEVEL_OUTOF10: 9
PAINLEVEL_OUTOF10: 10

## 2018-04-04 ASSESSMENT — ENCOUNTER SYMPTOMS
DEPRESSION: 1
EYES NEGATIVE: 1
NEUROLOGICAL NEGATIVE: 1
RESPIRATORY NEGATIVE: 1
MYALGIAS: 1
CONSTITUTIONAL NEGATIVE: 1
BACK PAIN: 1
NECK PAIN: 0
ROS GI COMMENTS: BM PRIOR TO ARRIVAL, (+) FLATUS
GASTROINTESTINAL NEGATIVE: 1
CARDIOVASCULAR NEGATIVE: 1

## 2018-04-04 ASSESSMENT — ACTIVITIES OF DAILY LIVING (ADL): TOILETING: INDEPENDENT

## 2018-04-04 ASSESSMENT — GAIT ASSESSMENTS
DISTANCE (FEET): 5
ASSISTIVE DEVICE: HAND HELD ASSIST
GAIT LEVEL OF ASSIST: MINIMAL ASSIST
DEVIATION: DECREASED HEEL STRIKE;DECREASED TOE OFF

## 2018-04-04 NOTE — FLOWSHEET NOTE
Respiratory Therapy Update    Interdisciplinary Plan of Care-Goals (Indications)  Blunt Chest Indications: Chest Trauma (04/03/18 1800)  Hyperinflation Protocol Indications: Chest Trauma (follow Blunt Chest Protocol) (04/03/18 1800)  Interdisciplinary Plan of Care-Outcomes   Blunt Chest IS Outcome: Patient can Explain Need for I.S., Continues to use I.S., is Free of Active Lung Disease and Reaches a Stable Baseline (04/03/18 1800)  Hyperinflation Protocol Goals/Outcome: Greater Than 60% of Predicted I.S. Volume x 24 hrs (04/03/18 1800)               Cough: Non Productive;Weak (04/04/18 0730)                           O2 (LPM): 4 (04/04/18 0730)  O2 Daily Delivery Respiratory : Silicone Nasal Cannula (04/04/18 0730)    Breath Sounds  Pre/Post Intervention: Pre Intervention Assessment (04/02/18 1505)  RUL Breath Sounds: Clear (04/04/18 0730)  RML Breath Sounds: Clear (04/04/18 0730)  RLL Breath Sounds: Clear;Diminished (04/04/18 0730)  WHITNEY Breath Sounds: Clear (04/04/18 0730)  LLL Breath Sounds: Clear;Diminished (04/04/18 0730)      Events/Summary/Plan: PEP and IS done and tolerated fair. (04/04/18 0730)
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
## 2018-04-04 NOTE — CARE PLAN
Problem: Pain Management  Goal: Pain level will decrease to patient's comfort goal    Intervention: Follow pain managment plan developed in collaboration with patient and Interdisciplinary Team  Hourly rounding for patient pain requirements, medications assessed for need of pain management.

## 2018-04-04 NOTE — PROGRESS NOTES
"While walking patient, I noticed the patient was dragging his left foot and he was verbalizing that he was also weak on RUE. Patient stated he tried to  drink and \"it felt like it was 100 lbs\". Rapid response was called. CARLO Evans ordered a stat head CT and a carotid duplex. Will continue to monitor  "

## 2018-04-04 NOTE — CODE DOCUMENTATION
Last known well 1645. RRT called at 1700. Code Stroke activated. Pt to STAT CT-A contrast w/ w/o. CT findings hemorrhagic. MD paged for approval to go ahead with 6th CT within 6 months for CT-A neck.

## 2018-04-04 NOTE — CARE PLAN
Problem: Safety  Goal: Will remain free from falls  Patient bed in lowest position, locked, call light in reach, bed alarm activated.  Patient reminded on high fall risk.

## 2018-04-04 NOTE — PROGRESS NOTES
Neurosurgery Progress Note    Reason for visit:  Intraparenchymal hemorrhage    Subjective:  No headache    Exam:  Alert  Right hemiparesis; right arm 0/5 at the shoulder and the elbow, 4/5 at the hand intrinsics; 3/5 in the right leg  Power 5/5 on the left side  Normal sensation bilaterally    BP  Min: 154/93  Max: 159/81  Pulse  Av.2  Min: 79  Max: 112  Resp  Av.2  Min: 12  Max: 30  Temp  Av.2 °C (98.9 °F)  Min: 36.8 °C (98.3 °F)  Max: 37.9 °C (100.3 °F)  SpO2  Av.8 %  Min: 87 %  Max: 95 %    No Data Recorded    Recent Labs      18   WBC  9.4  8.3  8.6   RBC  4.27*  4.25*  4.19*   HEMOGLOBIN  13.5*  13.6*  13.3*   HEMATOCRIT  38.7*  39.7*  38.2*   MCV  90.6  93.4  91.2   MCH  31.6  32.0  31.7   MCHC  34.9  34.3  34.8   RDW  41.1  43.0  41.4   PLATELETCT  164  160*  175   MPV  8.9*  9.2  9.3     Recent Labs      18   SODIUM  130*  133*  131*   POTASSIUM  3.7  3.7  4.0   CHLORIDE  98  97  98   CO2  26  27  25   GLUCOSE  116*  111*  102*   BUN  11  11  13   CREATININE  0.85  0.94  0.89   CALCIUM  8.5  8.7  9.0     Recent Labs      18   APTT  29.2   INR  1.02     Recent Labs      18   REACTMIN  6.0   CLOTKINET  1.8   CLOTANGL  64.2   MAXCLOTS  64.2   [id number]  0.5   PRCINADP  38.2   PRCINAA  77.9       Intake/Output       18 07 - 18 0659 18 07 - 18 0659      [id number] 3740-0935 Total 6843-6742 5255-3351 Total       Intake    P.O.  240  480 720  --  -- --    P.O. 240 480 720 -- -- --    Total Intake 240 480 720 -- -- --       Output    Urine  400  900 1300  --  -- --    Number of Times Voided 1 x -- 1 x -- -- --    Void (ml)  -- -- --    Stool  --  -- --  --  -- --    Number of Times Stooled -- 0 x 0 x -- -- --    Total Output  -- -- --       Net I/O     -160 -420 -580 -- -- --            Intake/Output Summary (Last 24 hours) at  04/04/18 0909  Last data filed at 04/04/18 0600   Gross per 24 hour   Intake              480 ml   Output             1000 ml   Net             -520 ml            • gabapentin  300 mg TID   • oxyCODONE immediate-release  10 mg Q3HRS PRN   • Pain Pump   Continuous   • HYDROmorphone  1 mg Q2HRS PRN   • hydrALAZINE  10 mg Q HOUR PRN   • labetalol  10 mg Q HOUR PRN   • heparin  5,000 Units Q8HRS   • HYDROmorphone  2 mg Q3HRS PRN    Or   • HYDROmorphone  4 mg Q3HRS PRN   • Respiratory Care per Protocol   Continuous RT   • docusate sodium  100 mg BID   • senna-docusate  1 Tab Nightly   • senna-docusate  1 Tab Q24HRS PRN   • polyethylene glycol/lytes  1 Packet BID   • magnesium hydroxide  30 mL DAILY   • bisacodyl  10 mg Q24HRS PRN   • fleet  1 Each Once PRN   • ondansetron  4 mg Q4HRS PRN   • tacrolimus  2 mg Q12HRS   • lisinopril  10 mg DAILY   • citalopram  20 mg QHS   • aspirin EC  81 mg DAILY       Assessment and Plan:  Hospital day #5  Prophylactic anticoagulation: yes         Start date/time: 4/4/2018  Follow-up CT scan of the head in stable  Okay from neurosurgical point of view to start prophylactic anticoagulation today if felt to be clinically indicated  Recommend follow-up CT scan in 48 hours after starting prophylactic anticoagulation to ensure that hematoma is not enlarging  No plans for neurosurgical intervention  Mobilize with PT/OT

## 2018-04-04 NOTE — FLOWSHEET NOTE
Respiratory Therapy Update    Interdisciplinary Plan of Care-Goals (Indications)  Blunt Chest Indications: Chest Trauma (04/03/18 1800)  Hyperinflation Protocol Indications: Chest Trauma (follow Blunt Chest Protocol) (04/03/18 1800)  Interdisciplinary Plan of Care-Outcomes   Blunt Chest IS Outcome: Patient can Explain Need for I.S., Continues to use I.S., is Free of Active Lung Disease and Reaches a Stable Baseline (04/03/18 1800)  Hyperinflation Protocol Goals/Outcome: Greater Than 60% of Predicted I.S. Volume x 24 hrs (04/03/18 1800)               Cough: Moist;Non Productive;Weak (04/04/18 1605)  Sputum Amount: Unable to Evaluate (04/04/18 1605)  Sputum Color: Unable to Evaluate (04/04/18 1605)                     O2 (LPM): 4 (04/04/18 1605)  O2 Daily Delivery Respiratory : Silicone Nasal Cannula (04/04/18 1605)    Breath Sounds  Pre/Post Intervention: Pre Intervention Assessment (04/02/18 1505)  RUL Breath Sounds: Clear (04/04/18 1605)  RML Breath Sounds: Clear (04/04/18 1605)  RLL Breath Sounds: Clear;Diminished (04/04/18 1605)  WHITNEY Breath Sounds: Clear (04/04/18 1605)  LLL Breath Sounds: Clear;Diminished (04/04/18 1605)        Events/Summary/Plan: PEP/IS done in fowlers and tolerated well. (04/04/18 1605)
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
## 2018-04-04 NOTE — PROGRESS NOTES
Rapid response RN telephoned from CT  Head CT positive for hemorrhagic bleed  Code Stroke called  Pt to transfer to SICU  Discussed with rapid response RN, SICU charge RN and Dr. Zeng
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
## 2018-04-04 NOTE — CARE PLAN
Problem: Acute Care of the Stroke Patient  Goal: Optimal Outcome for the Stroke patient  Outcome: PROGRESSING AS EXPECTED  Q1h neuro checks, fu CT at 0600    Problem: Skin Integrity  Goal: Skin Integrity is maintained or improved  Outcome: PROGRESSING AS EXPECTED  Turns Q2h, all pressure points assessed. No skin breakdown at this time

## 2018-04-04 NOTE — PROGRESS NOTES
Rapid response called for new onset right sided weakness    Pt seen  A&O x 4, VSS  Right upper and lower weakness  No facial droop or slurred speech, PERRLA    Stat head CT and carotid duplex  Discussed with Dr. Zeng
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
## 2018-04-04 NOTE — PROGRESS NOTES
Neurosurgery at bedside with patient for observation of patient status.  Addressed aspirin and heparin medication, orders to discontinue until further notice.

## 2018-04-05 ENCOUNTER — APPOINTMENT (OUTPATIENT)
Dept: RADIOLOGY | Facility: MEDICAL CENTER | Age: 63
DRG: 083 | End: 2018-04-05
Attending: SURGERY
Payer: MEDICARE

## 2018-04-05 PROBLEM — E83.42 HYPOMAGNESEMIA: Status: RESOLVED | Noted: 2018-04-02 | Resolved: 2018-04-05

## 2018-04-05 PROBLEM — E87.1 HYPONATREMIA: Status: ACTIVE | Noted: 2018-04-05

## 2018-04-05 LAB
ALBUMIN SERPL BCP-MCNC: 3.6 G/DL (ref 3.2–4.9)
ALBUMIN/GLOB SERPL: 1.2 G/DL
ALP SERPL-CCNC: 44 U/L (ref 30–99)
ALT SERPL-CCNC: 17 U/L (ref 2–50)
ANION GAP SERPL CALC-SCNC: 8 MMOL/L (ref 0–11.9)
AST SERPL-CCNC: 20 U/L (ref 12–45)
BASOPHILS # BLD AUTO: 0.4 % (ref 0–1.8)
BASOPHILS # BLD: 0.03 K/UL (ref 0–0.12)
BILIRUB SERPL-MCNC: 0.5 MG/DL (ref 0.1–1.5)
BUN SERPL-MCNC: 14 MG/DL (ref 8–22)
CALCIUM SERPL-MCNC: 8.6 MG/DL (ref 8.5–10.5)
CHLORIDE SERPL-SCNC: 99 MMOL/L (ref 96–112)
CO2 SERPL-SCNC: 24 MMOL/L (ref 20–33)
CREAT SERPL-MCNC: 0.83 MG/DL (ref 0.5–1.4)
EOSINOPHIL # BLD AUTO: 0.37 K/UL (ref 0–0.51)
EOSINOPHIL NFR BLD: 4.5 % (ref 0–6.9)
ERYTHROCYTE [DISTWIDTH] IN BLOOD BY AUTOMATED COUNT: 42.7 FL (ref 35.9–50)
GLOBULIN SER CALC-MCNC: 3.1 G/DL (ref 1.9–3.5)
GLUCOSE SERPL-MCNC: 110 MG/DL (ref 65–99)
HCT VFR BLD AUTO: 38.3 % (ref 42–52)
HGB BLD-MCNC: 13.2 G/DL (ref 14–18)
IMM GRANULOCYTES # BLD AUTO: 0.05 K/UL (ref 0–0.11)
IMM GRANULOCYTES NFR BLD AUTO: 0.6 % (ref 0–0.9)
LYMPHOCYTES # BLD AUTO: 2.06 K/UL (ref 1–4.8)
LYMPHOCYTES NFR BLD: 25.2 % (ref 22–41)
MAGNESIUM SERPL-MCNC: 1.8 MG/DL (ref 1.5–2.5)
MCH RBC QN AUTO: 32 PG (ref 27–33)
MCHC RBC AUTO-ENTMCNC: 34.5 G/DL (ref 33.7–35.3)
MCV RBC AUTO: 92.7 FL (ref 81.4–97.8)
MONOCYTES # BLD AUTO: 1.37 K/UL (ref 0–0.85)
MONOCYTES NFR BLD AUTO: 16.7 % (ref 0–13.4)
NEUTROPHILS # BLD AUTO: 4.31 K/UL (ref 1.82–7.42)
NEUTROPHILS NFR BLD: 52.6 % (ref 44–72)
NRBC # BLD AUTO: 0 K/UL
NRBC BLD-RTO: 0 /100 WBC
PHOSPHATE SERPL-MCNC: 3.8 MG/DL (ref 2.5–4.5)
PLATELET # BLD AUTO: 194 K/UL (ref 164–446)
PMV BLD AUTO: 9 FL (ref 9–12.9)
POTASSIUM SERPL-SCNC: 4.1 MMOL/L (ref 3.6–5.5)
PROT SERPL-MCNC: 6.7 G/DL (ref 6–8.2)
RBC # BLD AUTO: 4.13 M/UL (ref 4.7–6.1)
SODIUM SERPL-SCNC: 131 MMOL/L (ref 135–145)
WBC # BLD AUTO: 8.2 K/UL (ref 4.8–10.8)

## 2018-04-05 PROCEDURE — 99233 SBSQ HOSP IP/OBS HIGH 50: CPT | Performed by: SURGERY

## 2018-04-05 PROCEDURE — A9270 NON-COVERED ITEM OR SERVICE: HCPCS | Performed by: NURSE PRACTITIONER

## 2018-04-05 PROCEDURE — A9270 NON-COVERED ITEM OR SERVICE: HCPCS | Performed by: SURGERY

## 2018-04-05 PROCEDURE — 80053 COMPREHEN METABOLIC PANEL: CPT

## 2018-04-05 PROCEDURE — 85025 COMPLETE CBC W/AUTO DIFF WBC: CPT

## 2018-04-05 PROCEDURE — 770001 HCHG ROOM/CARE - MED/SURG/GYN PRIV*

## 2018-04-05 PROCEDURE — 700102 HCHG RX REV CODE 250 W/ 637 OVERRIDE(OP): Performed by: NURSE PRACTITIONER

## 2018-04-05 PROCEDURE — 700102 HCHG RX REV CODE 250 W/ 637 OVERRIDE(OP): Performed by: SURGERY

## 2018-04-05 PROCEDURE — 71045 X-RAY EXAM CHEST 1 VIEW: CPT

## 2018-04-05 PROCEDURE — 700111 HCHG RX REV CODE 636 W/ 250 OVERRIDE (IP): Performed by: NURSE PRACTITIONER

## 2018-04-05 PROCEDURE — 83735 ASSAY OF MAGNESIUM: CPT

## 2018-04-05 PROCEDURE — 700112 HCHG RX REV CODE 229: Performed by: SURGERY

## 2018-04-05 PROCEDURE — 700101 HCHG RX REV CODE 250: Performed by: PSYCHIATRY & NEUROLOGY

## 2018-04-05 PROCEDURE — 84100 ASSAY OF PHOSPHORUS: CPT

## 2018-04-05 PROCEDURE — 700111 HCHG RX REV CODE 636 W/ 250 OVERRIDE (IP): Mod: JG | Performed by: SURGERY

## 2018-04-05 PROCEDURE — 700111 HCHG RX REV CODE 636 W/ 250 OVERRIDE (IP): Performed by: PHYSICIAN ASSISTANT

## 2018-04-05 RX ORDER — SODIUM CHLORIDE 1 G/1
1 TABLET ORAL
Status: DISCONTINUED | OUTPATIENT
Start: 2018-04-05 | End: 2018-04-10 | Stop reason: HOSPADM

## 2018-04-05 RX ORDER — HEPARIN SODIUM 5000 [USP'U]/ML
5000 INJECTION, SOLUTION INTRAVENOUS; SUBCUTANEOUS EVERY 8 HOURS
Status: DISCONTINUED | OUTPATIENT
Start: 2018-04-05 | End: 2018-04-10 | Stop reason: HOSPADM

## 2018-04-05 RX ADMIN — DOCUSATE SODIUM 100 MG: 100 CAPSULE ORAL at 22:07

## 2018-04-05 RX ADMIN — CITALOPRAM HYDROBROMIDE 20 MG: 20 TABLET ORAL at 22:07

## 2018-04-05 RX ADMIN — HEPARIN SODIUM 5000 UNITS: 5000 INJECTION, SOLUTION INTRAVENOUS; SUBCUTANEOUS at 22:07

## 2018-04-05 RX ADMIN — DOCUSATE SODIUM 100 MG: 100 CAPSULE ORAL at 09:53

## 2018-04-05 RX ADMIN — HEPARIN SODIUM 5000 UNITS: 5000 INJECTION, SOLUTION INTRAVENOUS; SUBCUTANEOUS at 14:21

## 2018-04-05 RX ADMIN — HYDROMORPHONE HYDROCHLORIDE 1 MG: 2 INJECTION INTRAMUSCULAR; INTRAVENOUS; SUBCUTANEOUS at 04:55

## 2018-04-05 RX ADMIN — OXYCODONE HYDROCHLORIDE 15 MG: 5 TABLET ORAL at 01:22

## 2018-04-05 RX ADMIN — HYDROMORPHONE HYDROCHLORIDE 1 MG: 10 INJECTION, SOLUTION INTRAMUSCULAR; INTRAVENOUS; SUBCUTANEOUS at 22:07

## 2018-04-05 RX ADMIN — OXYCODONE HYDROCHLORIDE 15 MG: 5 TABLET ORAL at 09:53

## 2018-04-05 RX ADMIN — GABAPENTIN 300 MG: 300 CAPSULE ORAL at 14:21

## 2018-04-05 RX ADMIN — OXYCODONE HYDROCHLORIDE 15 MG: 5 TABLET ORAL at 14:15

## 2018-04-05 RX ADMIN — SODIUM CHLORIDE TAB 1 GM 1 G: 1 TAB at 17:07

## 2018-04-05 RX ADMIN — GABAPENTIN 300 MG: 300 CAPSULE ORAL at 22:07

## 2018-04-05 RX ADMIN — HYDROMORPHONE HYDROCHLORIDE 1 MG: 2 INJECTION INTRAMUSCULAR; INTRAVENOUS; SUBCUTANEOUS at 17:06

## 2018-04-05 RX ADMIN — MAGNESIUM HYDROXIDE 30 ML: 400 SUSPENSION ORAL at 09:53

## 2018-04-05 RX ADMIN — HYDROMORPHONE HYDROCHLORIDE 1 MG: 2 INJECTION INTRAMUSCULAR; INTRAVENOUS; SUBCUTANEOUS at 15:50

## 2018-04-05 RX ADMIN — GABAPENTIN 300 MG: 300 CAPSULE ORAL at 09:54

## 2018-04-05 RX ADMIN — POLYETHYLENE GLYCOL 3350 1 PACKET: 17 POWDER, FOR SOLUTION ORAL at 22:06

## 2018-04-05 RX ADMIN — OXYCODONE HYDROCHLORIDE 15 MG: 5 TABLET ORAL at 19:51

## 2018-04-05 RX ADMIN — HYDROMORPHONE HYDROCHLORIDE 1 MG: 2 INJECTION INTRAMUSCULAR; INTRAVENOUS; SUBCUTANEOUS at 11:17

## 2018-04-05 RX ADMIN — POLYETHYLENE GLYCOL 3350 1 PACKET: 17 POWDER, FOR SOLUTION ORAL at 09:54

## 2018-04-05 RX ADMIN — LABETALOL HYDROCHLORIDE 10 MG: 5 INJECTION, SOLUTION INTRAVENOUS at 14:21

## 2018-04-05 RX ADMIN — LISINOPRIL 10 MG: 20 TABLET ORAL at 09:53

## 2018-04-05 RX ADMIN — TACROLIMUS 2 MG: 1 CAPSULE ORAL at 09:55

## 2018-04-05 RX ADMIN — STANDARDIZED SENNA CONCENTRATE AND DOCUSATE SODIUM 1 TABLET: 8.6; 5 TABLET, FILM COATED ORAL at 22:08

## 2018-04-05 ASSESSMENT — ENCOUNTER SYMPTOMS
ROS GI COMMENTS: BM PRIOR TO ARRIVAL, (+) FLATUS
GASTROINTESTINAL NEGATIVE: 1
EYES NEGATIVE: 1
BACK PAIN: 1
CONSTITUTIONAL NEGATIVE: 1
DEPRESSION: 1
NECK PAIN: 0
RESPIRATORY NEGATIVE: 1
CARDIOVASCULAR NEGATIVE: 1
MYALGIAS: 1
NEUROLOGICAL NEGATIVE: 1

## 2018-04-05 ASSESSMENT — PAIN SCALES - GENERAL
PAINLEVEL_OUTOF10: 10
PAINLEVEL_OUTOF10: 9
PAINLEVEL_OUTOF10: 10
PAINLEVEL_OUTOF10: 10
PAINLEVEL_OUTOF10: 9
PAINLEVEL_OUTOF10: 10

## 2018-04-05 ASSESSMENT — LIFESTYLE VARIABLES: DO YOU DRINK ALCOHOL: NO

## 2018-04-05 NOTE — DISCHARGE PLANNING
Care Transition Team Assessment    SW met with pt at bedside and obtained the information used in this assessment. Pt verified accuracy of facesheet. Pt lives in a single story home by himself.  Pt uses [facility] in [place]. Prior to current hospitalization, pt was completely independent in ADLS/IADLS. Pt drives and is able to attend necessary MD appointments. Pt has no financial concerns. Pt has a good support system. Pt denies any hx of substance use and denies any dx of mh.     Plan: Pt's d/c plan is unknown at this time as they are still in ICU. This SW to remain available to provide support and to assist with d/c.     Information Source  Orientation : Oriented x 4  Information Given By: Patient  Informant's Name:  (Quirino)  Who is responsible for making decisions for patient? : Patient    Readmission Evaluation  Is this a readmission?: No    Elopement Risk  Legal Hold: No  Ambulatory or Self Mobile in Wheelchair: No-Not an Elopement Risk  Disoriented: No  Psychiatric Symptoms: None  History of Wandering: No  Elopement this Admit: No  Vocalizing Wanting to Leave: No  Displays Behaviors, Body Language Wanting to Leave: No-Not at Risk for Elopement  Elopement Risk: Not at Risk for Elopement    Interdisciplinary Discharge Planning  Does Admitting Nurse Feel This Could be a Complex Discharge?: No  Primary Care Physician: Dr. Alejandra Phoenix  Lives with - Patient's Self Care Capacity: Alone and Able to Care For Self  Patient or legal guardian wants to designate a caregiver (see row info): No  Support Systems: Family Member(s), Friends / Neighbors, Children  Housing / Facility: 1 Story House  Do You Take your Prescribed Medications Regularly: Yes  Able to Return to Previous ADL's: Yes  Mobility Issues: No  Prior Services: None, Home-Independent  Patient Expects to be Discharged to:: Home  Assistance Needed: No    Discharge Preparedness  What is your plan after discharge?: Uncertain - pending medical team  collaboration, Home with help, Skilled nursing facility, Home health care  What are your discharge supports?: Child, Sibling  Prior Functional Level: Ambulatory, Drives Self, Independent with Activities of Daily Living, Independent with Medication Management  Difficulity with ADLs: Dressing, Eating, Walking  Difficulity with IADLs: Cooking, Driving, Laundry, Shopping    Functional Assesment  Prior Functional Level: Ambulatory, Drives Self, Independent with Activities of Daily Living, Independent with Medication Management    Finances  Financial Barriers to Discharge: No  Prescription Coverage: Yes    Vision / Hearing Impairment  Vision Impairment : Yes  Right Eye Vision: Wears Glasses  Left Eye Vision: Wears Glasses  Hearing Impairment : No    Values / Beliefs / Concerns  Values / Beliefs Concerns : No    Advance Directive  Advance Directive?: None    Domestic Abuse  Have you ever been the victim of abuse or violence?: No  Physical Abuse or Sexual Abuse: No  Verbal Abuse or Emotional Abuse: No  Possible Abuse Reported to:: Not Applicable    Psychological Assessment  History of Substance Abuse: None  History of Psychiatric Problems: No  Non-compliant with Treatment: No  Newly Diagnosed Illness: Yes    Discharge Risks or Barriers  Discharge risks or barriers?: No    Anticipated Discharge Information  Anticipated discharge disposition: Discharge needs currently unknown
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
## 2018-04-05 NOTE — PROGRESS NOTES
Neurosurgery Progress Note    Reason for visit:  Intraparenchymal hemorrhage    Subjective:  No headache, denies new changes overnight    Exam:  Alert  Right hemiparesis; right arm 2/5 at the shoulder and the elbow, 4/5 at the hand intrinsics; 3/5 in the right leg  Power 5/5 on the left side  Normal sensation bilaterally    Pulse  Av.4  Min: 71  Max: 99  Resp  Av.8  Min: 12  Max: 27  Temp  Av °C (98.6 °F)  Min: 36.9 °C (98.4 °F)  Max: 37.1 °C (98.8 °F)  SpO2  Av.5 %  Min: 92 %  Max: 100 %    No Data Recorded    Recent Labs      18   043   WBC  8.3  8.6  8.2   RBC  4.25*  4.19*  4.13*   HEMOGLOBIN  13.6*  13.3*  13.2*   HEMATOCRIT  39.7*  38.2*  38.3*   MCV  93.4  91.2  92.7   MCH  32.0  31.7  32.0   MCHC  34.3  34.8  34.5   RDW  43.0  41.4  42.7   PLATELETCT  160*  175  194   MPV  9.2  9.3  9.0     Recent Labs      18   043   SODIUM  133*  131*  131*   POTASSIUM  3.7  4.0  4.1   CHLORIDE  97  98  99   CO2  27  25  24   GLUCOSE  111*  102*  110*   BUN  11  13  14   CREATININE  0.94  0.89  0.83   CALCIUM  8.7  9.0  8.6     Recent Labs      18   APTT  29.2   INR  1.02     Recent Labs      18   REACTMIN  6.0   CLOTKINET  1.8   CLOTANGL  64.2   MAXCLOTS  64.2   YCB16IRN  0.5   PRCINADP  38.2   PRCINAA  77.9       Intake/Output       18 - 18 0659 18 07 - 18 0659      0270-8811 0593-7110 Total 5170-3789 1774-8323 Total       Intake    P.O.  1450  860 2310  --  -- --    P.O. 0960 591 7139 -- -- --    Total Intake 8327 939 4552 -- -- --       Output    Urine  525  1225 1750  --  -- --    Number of Times Voided 6 x 3 x 9 x -- -- --    Void (ml) 525 1225 1750 -- -- --    Total Output 525 1225 1750 -- -- --       Net I/O     925 -259 560 -- -- --            Intake/Output Summary (Last 24 hours) at 18 1127  Last data filed at 18 0600   Gross per 24 hour    Intake             1760 ml   Output             1550 ml   Net              210 ml            • oxyCODONE immediate release  10 mg Q4HRS PRN   • oxyCODONE immediate release  15 mg Q4HRS PRN   • gabapentin  300 mg TID   • Pain Pump   Continuous   • HYDROmorphone  1 mg Q2HRS PRN   • hydrALAZINE  10 mg Q HOUR PRN   • labetalol  10 mg Q HOUR PRN   • Respiratory Care per Protocol   Continuous RT   • docusate sodium  100 mg BID   • senna-docusate  1 Tab Nightly   • senna-docusate  1 Tab Q24HRS PRN   • polyethylene glycol/lytes  1 Packet BID   • magnesium hydroxide  30 mL DAILY   • bisacodyl  10 mg Q24HRS PRN   • fleet  1 Each Once PRN   • ondansetron  4 mg Q4HRS PRN   • tacrolimus  2 mg Q12HRS   • lisinopril  10 mg DAILY   • citalopram  20 mg QHS       Assessment and Plan:  Hospital day #5  Prophylactic anticoagulation: yes         Start date/time: 4/4/2018  Follow-up CT scan of the head in stable  Okay from neurosurgical point of view to start prophylactic anticoagulation today if felt to be clinically indicated  Recommend follow-up CT scan in 48 hours after starting prophylactic anticoagulation to ensure that hematoma is not enlarging  No plans for neurosurgical intervention  Mobilize with PT/OT  Start salt tabs- recommend normalizing Na  Will sign off- call for further questions or concerns
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
## 2018-04-05 NOTE — CARE PLAN
Problem: Bowel/Gastric:  Goal: Normal bowel function is maintained or improved  Bowel protocol in place and pt provided education and necessity, diet, and fluids. Pt says he's passing flatus.    Problem: Pain Management  Goal: Pain level will decrease to patient's comfort goal  Pain assessed q2h and PRN. Pt medicated per MAR.

## 2018-04-06 ENCOUNTER — APPOINTMENT (OUTPATIENT)
Dept: RADIOLOGY | Facility: MEDICAL CENTER | Age: 63
DRG: 083 | End: 2018-04-06
Attending: SURGERY
Payer: MEDICARE

## 2018-04-06 LAB
ALBUMIN SERPL BCP-MCNC: 3.5 G/DL (ref 3.2–4.9)
ALBUMIN/GLOB SERPL: 1.2 G/DL
ALP SERPL-CCNC: 34 U/L (ref 30–99)
ALT SERPL-CCNC: 18 U/L (ref 2–50)
ANION GAP SERPL CALC-SCNC: 6 MMOL/L (ref 0–11.9)
AST SERPL-CCNC: 20 U/L (ref 12–45)
BASOPHILS # BLD AUTO: 0.3 % (ref 0–1.8)
BASOPHILS # BLD: 0.03 K/UL (ref 0–0.12)
BILIRUB SERPL-MCNC: 0.5 MG/DL (ref 0.1–1.5)
BUN SERPL-MCNC: 18 MG/DL (ref 8–22)
CALCIUM SERPL-MCNC: 8.8 MG/DL (ref 8.5–10.5)
CHLORIDE SERPL-SCNC: 98 MMOL/L (ref 96–112)
CO2 SERPL-SCNC: 27 MMOL/L (ref 20–33)
CREAT SERPL-MCNC: 0.89 MG/DL (ref 0.5–1.4)
EOSINOPHIL # BLD AUTO: 0.38 K/UL (ref 0–0.51)
EOSINOPHIL NFR BLD: 4.2 % (ref 0–6.9)
ERYTHROCYTE [DISTWIDTH] IN BLOOD BY AUTOMATED COUNT: 43.3 FL (ref 35.9–50)
GLOBULIN SER CALC-MCNC: 3 G/DL (ref 1.9–3.5)
GLUCOSE SERPL-MCNC: 96 MG/DL (ref 65–99)
HCT VFR BLD AUTO: 36.6 % (ref 42–52)
HGB BLD-MCNC: 12.3 G/DL (ref 14–18)
IMM GRANULOCYTES # BLD AUTO: 0.05 K/UL (ref 0–0.11)
IMM GRANULOCYTES NFR BLD AUTO: 0.6 % (ref 0–0.9)
LYMPHOCYTES # BLD AUTO: 2.21 K/UL (ref 1–4.8)
LYMPHOCYTES NFR BLD: 24.5 % (ref 22–41)
MCH RBC QN AUTO: 31.5 PG (ref 27–33)
MCHC RBC AUTO-ENTMCNC: 33.6 G/DL (ref 33.7–35.3)
MCV RBC AUTO: 93.6 FL (ref 81.4–97.8)
MONOCYTES # BLD AUTO: 1.53 K/UL (ref 0–0.85)
MONOCYTES NFR BLD AUTO: 16.9 % (ref 0–13.4)
NEUTROPHILS # BLD AUTO: 4.83 K/UL (ref 1.82–7.42)
NEUTROPHILS NFR BLD: 53.5 % (ref 44–72)
NRBC # BLD AUTO: 0 K/UL
NRBC BLD-RTO: 0 /100 WBC
PLATELET # BLD AUTO: 212 K/UL (ref 164–446)
PMV BLD AUTO: 9.3 FL (ref 9–12.9)
POTASSIUM SERPL-SCNC: 4.1 MMOL/L (ref 3.6–5.5)
PROT SERPL-MCNC: 6.5 G/DL (ref 6–8.2)
RBC # BLD AUTO: 3.91 M/UL (ref 4.7–6.1)
SODIUM SERPL-SCNC: 131 MMOL/L (ref 135–145)
WBC # BLD AUTO: 9 K/UL (ref 4.8–10.8)

## 2018-04-06 PROCEDURE — 36415 COLL VENOUS BLD VENIPUNCTURE: CPT

## 2018-04-06 PROCEDURE — 770001 HCHG ROOM/CARE - MED/SURG/GYN PRIV*

## 2018-04-06 PROCEDURE — 94668 MNPJ CHEST WALL SBSQ: CPT

## 2018-04-06 PROCEDURE — 700111 HCHG RX REV CODE 636 W/ 250 OVERRIDE (IP): Performed by: NURSE PRACTITIONER

## 2018-04-06 PROCEDURE — 700111 HCHG RX REV CODE 636 W/ 250 OVERRIDE (IP): Mod: JG | Performed by: SURGERY

## 2018-04-06 PROCEDURE — A9270 NON-COVERED ITEM OR SERVICE: HCPCS | Performed by: NURSE PRACTITIONER

## 2018-04-06 PROCEDURE — A9270 NON-COVERED ITEM OR SERVICE: HCPCS | Performed by: SURGERY

## 2018-04-06 PROCEDURE — 80053 COMPREHEN METABOLIC PANEL: CPT

## 2018-04-06 PROCEDURE — 700102 HCHG RX REV CODE 250 W/ 637 OVERRIDE(OP): Performed by: SURGERY

## 2018-04-06 PROCEDURE — 700102 HCHG RX REV CODE 250 W/ 637 OVERRIDE(OP): Performed by: NURSE PRACTITIONER

## 2018-04-06 PROCEDURE — 94669 MECHANICAL CHEST WALL OSCILL: CPT

## 2018-04-06 PROCEDURE — 85025 COMPLETE CBC W/AUTO DIFF WBC: CPT

## 2018-04-06 PROCEDURE — 94760 N-INVAS EAR/PLS OXIMETRY 1: CPT

## 2018-04-06 PROCEDURE — 700112 HCHG RX REV CODE 229: Performed by: SURGERY

## 2018-04-06 PROCEDURE — 700111 HCHG RX REV CODE 636 W/ 250 OVERRIDE (IP): Performed by: PHYSICIAN ASSISTANT

## 2018-04-06 PROCEDURE — 71045 X-RAY EXAM CHEST 1 VIEW: CPT

## 2018-04-06 RX ORDER — OXYCODONE HYDROCHLORIDE 10 MG/1
10 TABLET ORAL
Status: DISCONTINUED | OUTPATIENT
Start: 2018-04-06 | End: 2018-04-10 | Stop reason: HOSPADM

## 2018-04-06 RX ORDER — OXYCODONE HYDROCHLORIDE 5 MG/1
15 TABLET ORAL
Status: DISCONTINUED | OUTPATIENT
Start: 2018-04-06 | End: 2018-04-10 | Stop reason: HOSPADM

## 2018-04-06 RX ADMIN — HEPARIN SODIUM 5000 UNITS: 5000 INJECTION, SOLUTION INTRAVENOUS; SUBCUTANEOUS at 22:21

## 2018-04-06 RX ADMIN — OXYCODONE HYDROCHLORIDE 15 MG: 5 TABLET ORAL at 14:26

## 2018-04-06 RX ADMIN — GABAPENTIN 300 MG: 300 CAPSULE ORAL at 09:00

## 2018-04-06 RX ADMIN — OXYCODONE HYDROCHLORIDE 15 MG: 5 TABLET ORAL at 00:04

## 2018-04-06 RX ADMIN — TACROLIMUS 2 MG: 1 CAPSULE ORAL at 19:33

## 2018-04-06 RX ADMIN — HYDROMORPHONE HYDROCHLORIDE 1 MG: 10 INJECTION, SOLUTION INTRAMUSCULAR; INTRAVENOUS; SUBCUTANEOUS at 03:24

## 2018-04-06 RX ADMIN — DOCUSATE SODIUM 100 MG: 100 CAPSULE ORAL at 08:14

## 2018-04-06 RX ADMIN — TACROLIMUS 2 MG: 1 CAPSULE ORAL at 08:33

## 2018-04-06 RX ADMIN — HEPARIN SODIUM 5000 UNITS: 5000 INJECTION, SOLUTION INTRAVENOUS; SUBCUTANEOUS at 05:38

## 2018-04-06 RX ADMIN — HEPARIN SODIUM 5000 UNITS: 5000 INJECTION, SOLUTION INTRAVENOUS; SUBCUTANEOUS at 14:26

## 2018-04-06 RX ADMIN — SODIUM CHLORIDE TAB 1 GM 1 G: 1 TAB at 08:14

## 2018-04-06 RX ADMIN — TACROLIMUS 2 MG: 1 CAPSULE ORAL at 00:04

## 2018-04-06 RX ADMIN — SODIUM CHLORIDE TAB 1 GM 1 G: 1 TAB at 10:58

## 2018-04-06 RX ADMIN — OXYCODONE HYDROCHLORIDE 15 MG: 5 TABLET ORAL at 21:13

## 2018-04-06 RX ADMIN — HYDROMORPHONE HYDROCHLORIDE 0.5 MG: 10 INJECTION, SOLUTION INTRAMUSCULAR; INTRAVENOUS; SUBCUTANEOUS at 22:21

## 2018-04-06 RX ADMIN — HYDROMORPHONE HYDROCHLORIDE 0.5 MG: 10 INJECTION, SOLUTION INTRAMUSCULAR; INTRAVENOUS; SUBCUTANEOUS at 19:34

## 2018-04-06 RX ADMIN — HYDROMORPHONE HYDROCHLORIDE 1 MG: 10 INJECTION, SOLUTION INTRAMUSCULAR; INTRAVENOUS; SUBCUTANEOUS at 12:12

## 2018-04-06 RX ADMIN — GABAPENTIN 300 MG: 300 CAPSULE ORAL at 19:34

## 2018-04-06 RX ADMIN — HYDROMORPHONE HYDROCHLORIDE 1 MG: 10 INJECTION, SOLUTION INTRAMUSCULAR; INTRAVENOUS; SUBCUTANEOUS at 07:25

## 2018-04-06 RX ADMIN — HYDROMORPHONE HYDROCHLORIDE 0.5 MG: 10 INJECTION, SOLUTION INTRAMUSCULAR; INTRAVENOUS; SUBCUTANEOUS at 15:27

## 2018-04-06 RX ADMIN — OXYCODONE HYDROCHLORIDE 15 MG: 5 TABLET ORAL at 17:46

## 2018-04-06 RX ADMIN — BISACODYL 10 MG: 10 SUPPOSITORY RECTAL at 07:05

## 2018-04-06 RX ADMIN — GABAPENTIN 300 MG: 300 CAPSULE ORAL at 14:26

## 2018-04-06 RX ADMIN — SODIUM CHLORIDE TAB 1 GM 1 G: 1 TAB at 17:46

## 2018-04-06 RX ADMIN — OXYCODONE HYDROCHLORIDE 15 MG: 5 TABLET ORAL at 10:58

## 2018-04-06 RX ADMIN — CITALOPRAM HYDROBROMIDE 20 MG: 20 TABLET ORAL at 19:34

## 2018-04-06 RX ADMIN — POLYETHYLENE GLYCOL 3350 1 PACKET: 17 POWDER, FOR SOLUTION ORAL at 08:15

## 2018-04-06 RX ADMIN — HYDROMORPHONE HYDROCHLORIDE 1 MG: 10 INJECTION, SOLUTION INTRAMUSCULAR; INTRAVENOUS; SUBCUTANEOUS at 00:50

## 2018-04-06 RX ADMIN — LISINOPRIL 10 MG: 20 TABLET ORAL at 08:15

## 2018-04-06 RX ADMIN — STANDARDIZED SENNA CONCENTRATE AND DOCUSATE SODIUM 1 TABLET: 8.6; 5 TABLET, FILM COATED ORAL at 19:34

## 2018-04-06 RX ADMIN — MAGNESIUM HYDROXIDE 30 ML: 400 SUSPENSION ORAL at 08:15

## 2018-04-06 RX ADMIN — OXYCODONE HYDROCHLORIDE 15 MG: 5 TABLET ORAL at 05:38

## 2018-04-06 ASSESSMENT — ENCOUNTER SYMPTOMS
NEUROLOGICAL NEGATIVE: 1
NECK PAIN: 0
BACK PAIN: 1
EYES NEGATIVE: 1
CHILLS: 0
FEVER: 0
MYALGIAS: 1
ROS GI COMMENTS: BM PRIOR TO ARRIVAL, (+) FLATUS
CONSTIPATION: 1
CONSTITUTIONAL NEGATIVE: 1
CARDIOVASCULAR NEGATIVE: 1
DIZZINESS: 0
RESPIRATORY NEGATIVE: 1

## 2018-04-06 ASSESSMENT — PAIN SCALES - GENERAL
PAINLEVEL_OUTOF10: 4
PAINLEVEL_OUTOF10: 8
PAINLEVEL_OUTOF10: 8
PAINLEVEL_OUTOF10: 9
PAINLEVEL_OUTOF10: 3
PAINLEVEL_OUTOF10: 8
PAINLEVEL_OUTOF10: 9
PAINLEVEL_OUTOF10: 5
PAINLEVEL_OUTOF10: 9
PAINLEVEL_OUTOF10: 8
PAINLEVEL_OUTOF10: 2
PAINLEVEL_OUTOF10: 10
PAINLEVEL_OUTOF10: 8

## 2018-04-06 ASSESSMENT — LIFESTYLE VARIABLES: DO YOU DRINK ALCOHOL: NO
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
## 2018-04-06 NOTE — PROGRESS NOTES
2 RN skin check performed, Old abrasion found on RLE. Small scabby abrasion on LUE. All bony prominences checked. No other findings observed.

## 2018-04-06 NOTE — PROGRESS NOTES
Bedside report received.  Assessment complete.  A&O x 4. Patient calls appropriately.  Patient ambualtes with frontwheel walker and one person assist.   Patient has 9/10 pain. Medicated per MAR.  Denies N&V. Tolerating regular diet.  Bruising on left side. Right sided weakness secondary to hemorrhagic stroke.  + void, - flatus Last BM 3/31. Bowel protocol advanced. Suppository given. Patient ambulated.   Patient denies SOB.  SCD's in use.  Review plan with of care with patient. Call light and personal belongings with in reach. Hourly rounding in place. All needs met at this time.

## 2018-04-06 NOTE — CARE PLAN
Problem: Safety  Goal: Will remain free from injury  Outcome: PROGRESSING AS EXPECTED      Problem: Bowel/Gastric:  Goal: Normal bowel function is maintained or improved  Outcome: PROGRESSING AS EXPECTED      Problem: Respiratory:  Goal: Respiratory status will improve  Outcome: PROGRESSING AS EXPECTED      Problem: Pain Management  Goal: Pain level will decrease to patient's comfort goal  Outcome: PROGRESSING AS EXPECTED
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
## 2018-04-06 NOTE — CARE PLAN
Problem: Knowledge Deficit  Goal: Knowledge of the prescribed therapeutic regimen will improve  Outcome: PROGRESSING AS EXPECTED  RN discussed ordered medications with patient    Problem: Discharge Barriers/Planning  Goal: Patient's continuum of care needs will be met  Outcome: PROGRESSING AS EXPECTED  Discharge plans discussed with treatment team

## 2018-04-06 NOTE — CARE PLAN
Problem: Safety  Goal: Will remain free from falls    Intervention: Implement fall precautions  Pt willing to call for any needs. Hourly rounding in place. Informed CNA of Disease process asked for .5 rounding.       Problem: Pain Management  Goal: Pain level will decrease to patient's comfort goal    Intervention: Follow pain managment plan developed in collaboration with patient and Interdisciplinary Team  Pt has been on opiod pain management for a long time. Will provide ordered pain management protocol with full neuro assessments.

## 2018-04-06 NOTE — PROGRESS NOTES
Report received.  Assumed Care.  Pt in bed, 401. AOx4, responds appropriately.  Denies pain, SOB.  Plan of care discussed.  Explained importance of calling before getting OOB and pt verbalizes understanding.  Call light and belongings within reach, treaded slipper socks on, bed alarm in use, bed in lowest position.  Will monitor frequently.

## 2018-04-07 ENCOUNTER — APPOINTMENT (OUTPATIENT)
Dept: RADIOLOGY | Facility: MEDICAL CENTER | Age: 63
DRG: 083 | End: 2018-04-07
Attending: NURSE PRACTITIONER
Payer: MEDICARE

## 2018-04-07 ENCOUNTER — APPOINTMENT (OUTPATIENT)
Dept: RADIOLOGY | Facility: MEDICAL CENTER | Age: 63
DRG: 083 | End: 2018-04-07
Attending: SURGERY
Payer: MEDICARE

## 2018-04-07 LAB
ALBUMIN SERPL BCP-MCNC: 3.6 G/DL (ref 3.2–4.9)
ALBUMIN/GLOB SERPL: 1.1 G/DL
ALP SERPL-CCNC: 44 U/L (ref 30–99)
ALT SERPL-CCNC: 23 U/L (ref 2–50)
ANION GAP SERPL CALC-SCNC: 5 MMOL/L (ref 0–11.9)
AST SERPL-CCNC: 25 U/L (ref 12–45)
BASOPHILS # BLD AUTO: 0.5 % (ref 0–1.8)
BASOPHILS # BLD: 0.04 K/UL (ref 0–0.12)
BILIRUB SERPL-MCNC: 0.5 MG/DL (ref 0.1–1.5)
BUN SERPL-MCNC: 18 MG/DL (ref 8–22)
CALCIUM SERPL-MCNC: 8.8 MG/DL (ref 8.5–10.5)
CHLORIDE SERPL-SCNC: 100 MMOL/L (ref 96–112)
CO2 SERPL-SCNC: 27 MMOL/L (ref 20–33)
CREAT SERPL-MCNC: 0.96 MG/DL (ref 0.5–1.4)
EOSINOPHIL # BLD AUTO: 0.44 K/UL (ref 0–0.51)
EOSINOPHIL NFR BLD: 5 % (ref 0–6.9)
ERYTHROCYTE [DISTWIDTH] IN BLOOD BY AUTOMATED COUNT: 42.5 FL (ref 35.9–50)
GLOBULIN SER CALC-MCNC: 3.2 G/DL (ref 1.9–3.5)
GLUCOSE SERPL-MCNC: 113 MG/DL (ref 65–99)
HCT VFR BLD AUTO: 37.9 % (ref 42–52)
HGB BLD-MCNC: 12.9 G/DL (ref 14–18)
IMM GRANULOCYTES # BLD AUTO: 0.06 K/UL (ref 0–0.11)
IMM GRANULOCYTES NFR BLD AUTO: 0.7 % (ref 0–0.9)
LYMPHOCYTES # BLD AUTO: 2.15 K/UL (ref 1–4.8)
LYMPHOCYTES NFR BLD: 24.4 % (ref 22–41)
MCH RBC QN AUTO: 31.6 PG (ref 27–33)
MCHC RBC AUTO-ENTMCNC: 34 G/DL (ref 33.7–35.3)
MCV RBC AUTO: 92.9 FL (ref 81.4–97.8)
MONOCYTES # BLD AUTO: 1.28 K/UL (ref 0–0.85)
MONOCYTES NFR BLD AUTO: 14.5 % (ref 0–13.4)
NEUTROPHILS # BLD AUTO: 4.84 K/UL (ref 1.82–7.42)
NEUTROPHILS NFR BLD: 54.9 % (ref 44–72)
NRBC # BLD AUTO: 0 K/UL
NRBC BLD-RTO: 0 /100 WBC
PLATELET # BLD AUTO: 236 K/UL (ref 164–446)
PMV BLD AUTO: 9.1 FL (ref 9–12.9)
POTASSIUM SERPL-SCNC: 4.3 MMOL/L (ref 3.6–5.5)
PROT SERPL-MCNC: 6.8 G/DL (ref 6–8.2)
RBC # BLD AUTO: 4.08 M/UL (ref 4.7–6.1)
SODIUM SERPL-SCNC: 132 MMOL/L (ref 135–145)
WBC # BLD AUTO: 8.8 K/UL (ref 4.8–10.8)

## 2018-04-07 PROCEDURE — A9270 NON-COVERED ITEM OR SERVICE: HCPCS | Performed by: SURGERY

## 2018-04-07 PROCEDURE — 700111 HCHG RX REV CODE 636 W/ 250 OVERRIDE (IP): Performed by: NURSE PRACTITIONER

## 2018-04-07 PROCEDURE — A9270 NON-COVERED ITEM OR SERVICE: HCPCS | Performed by: NURSE PRACTITIONER

## 2018-04-07 PROCEDURE — 70450 CT HEAD/BRAIN W/O DYE: CPT

## 2018-04-07 PROCEDURE — 85025 COMPLETE CBC W/AUTO DIFF WBC: CPT

## 2018-04-07 PROCEDURE — 700112 HCHG RX REV CODE 229: Performed by: SURGERY

## 2018-04-07 PROCEDURE — 700111 HCHG RX REV CODE 636 W/ 250 OVERRIDE (IP): Performed by: SURGERY

## 2018-04-07 PROCEDURE — 94669 MECHANICAL CHEST WALL OSCILL: CPT

## 2018-04-07 PROCEDURE — 80053 COMPREHEN METABOLIC PANEL: CPT

## 2018-04-07 PROCEDURE — 700102 HCHG RX REV CODE 250 W/ 637 OVERRIDE(OP): Performed by: SURGERY

## 2018-04-07 PROCEDURE — 94668 MNPJ CHEST WALL SBSQ: CPT

## 2018-04-07 PROCEDURE — 770001 HCHG ROOM/CARE - MED/SURG/GYN PRIV*

## 2018-04-07 PROCEDURE — 700102 HCHG RX REV CODE 250 W/ 637 OVERRIDE(OP): Performed by: NURSE PRACTITIONER

## 2018-04-07 PROCEDURE — 700111 HCHG RX REV CODE 636 W/ 250 OVERRIDE (IP): Performed by: PSYCHIATRY & NEUROLOGY

## 2018-04-07 PROCEDURE — 71045 X-RAY EXAM CHEST 1 VIEW: CPT

## 2018-04-07 PROCEDURE — 700111 HCHG RX REV CODE 636 W/ 250 OVERRIDE (IP): Mod: JG | Performed by: SURGERY

## 2018-04-07 PROCEDURE — 36415 COLL VENOUS BLD VENIPUNCTURE: CPT

## 2018-04-07 PROCEDURE — 94760 N-INVAS EAR/PLS OXIMETRY 1: CPT

## 2018-04-07 RX ADMIN — OXYCODONE HYDROCHLORIDE 15 MG: 5 TABLET ORAL at 00:26

## 2018-04-07 RX ADMIN — OXYCODONE HYDROCHLORIDE 15 MG: 5 TABLET ORAL at 22:53

## 2018-04-07 RX ADMIN — HEPARIN SODIUM 5000 UNITS: 5000 INJECTION, SOLUTION INTRAVENOUS; SUBCUTANEOUS at 05:09

## 2018-04-07 RX ADMIN — TACROLIMUS 2 MG: 1 CAPSULE ORAL at 08:45

## 2018-04-07 RX ADMIN — SODIUM CHLORIDE TAB 1 GM 1 G: 1 TAB at 12:01

## 2018-04-07 RX ADMIN — HYDROMORPHONE HYDROCHLORIDE 0.5 MG: 10 INJECTION, SOLUTION INTRAMUSCULAR; INTRAVENOUS; SUBCUTANEOUS at 12:02

## 2018-04-07 RX ADMIN — HEPARIN SODIUM 5000 UNITS: 5000 INJECTION, SOLUTION INTRAVENOUS; SUBCUTANEOUS at 20:30

## 2018-04-07 RX ADMIN — LISINOPRIL 10 MG: 20 TABLET ORAL at 08:46

## 2018-04-07 RX ADMIN — HYDROMORPHONE HYDROCHLORIDE 0.5 MG: 10 INJECTION, SOLUTION INTRAMUSCULAR; INTRAVENOUS; SUBCUTANEOUS at 08:47

## 2018-04-07 RX ADMIN — MAGNESIUM HYDROXIDE 30 ML: 400 SUSPENSION ORAL at 08:46

## 2018-04-07 RX ADMIN — HYDROMORPHONE HYDROCHLORIDE 0.5 MG: 10 INJECTION, SOLUTION INTRAMUSCULAR; INTRAVENOUS; SUBCUTANEOUS at 18:23

## 2018-04-07 RX ADMIN — OXYCODONE HYDROCHLORIDE 15 MG: 5 TABLET ORAL at 05:09

## 2018-04-07 RX ADMIN — HYDROMORPHONE HYDROCHLORIDE 0.5 MG: 10 INJECTION, SOLUTION INTRAMUSCULAR; INTRAVENOUS; SUBCUTANEOUS at 06:15

## 2018-04-07 RX ADMIN — GABAPENTIN 300 MG: 300 CAPSULE ORAL at 13:47

## 2018-04-07 RX ADMIN — STANDARDIZED SENNA CONCENTRATE AND DOCUSATE SODIUM 1 TABLET: 8.6; 5 TABLET, FILM COATED ORAL at 19:53

## 2018-04-07 RX ADMIN — DOCUSATE SODIUM 100 MG: 100 CAPSULE ORAL at 19:53

## 2018-04-07 RX ADMIN — GABAPENTIN 300 MG: 300 CAPSULE ORAL at 08:45

## 2018-04-07 RX ADMIN — TACROLIMUS 2 MG: 1 CAPSULE ORAL at 20:05

## 2018-04-07 RX ADMIN — DOCUSATE SODIUM 100 MG: 100 CAPSULE ORAL at 08:45

## 2018-04-07 RX ADMIN — ONDANSETRON HYDROCHLORIDE 4 MG: 2 INJECTION, SOLUTION INTRAMUSCULAR; INTRAVENOUS at 20:05

## 2018-04-07 RX ADMIN — HYDROMORPHONE HYDROCHLORIDE 0.5 MG: 10 INJECTION, SOLUTION INTRAMUSCULAR; INTRAVENOUS; SUBCUTANEOUS at 14:48

## 2018-04-07 RX ADMIN — HYDROMORPHONE HYDROCHLORIDE 0.5 MG: 10 INJECTION, SOLUTION INTRAMUSCULAR; INTRAVENOUS; SUBCUTANEOUS at 22:32

## 2018-04-07 RX ADMIN — HEPARIN SODIUM 5000 UNITS: 5000 INJECTION, SOLUTION INTRAVENOUS; SUBCUTANEOUS at 13:47

## 2018-04-07 RX ADMIN — OXYCODONE HYDROCHLORIDE 15 MG: 5 TABLET ORAL at 13:48

## 2018-04-07 RX ADMIN — STANDARDIZED SENNA CONCENTRATE AND DOCUSATE SODIUM 1 TABLET: 8.6; 5 TABLET, FILM COATED ORAL at 08:46

## 2018-04-07 RX ADMIN — SODIUM CHLORIDE TAB 1 GM 1 G: 1 TAB at 08:45

## 2018-04-07 RX ADMIN — HYDRALAZINE HYDROCHLORIDE 10 MG: 20 INJECTION INTRAMUSCULAR; INTRAVENOUS at 05:14

## 2018-04-07 RX ADMIN — OXYCODONE HYDROCHLORIDE 15 MG: 5 TABLET ORAL at 16:47

## 2018-04-07 RX ADMIN — GABAPENTIN 300 MG: 300 CAPSULE ORAL at 19:53

## 2018-04-07 RX ADMIN — OXYCODONE HYDROCHLORIDE 15 MG: 5 TABLET ORAL at 10:44

## 2018-04-07 RX ADMIN — HYDROMORPHONE HYDROCHLORIDE 0.5 MG: 10 INJECTION, SOLUTION INTRAMUSCULAR; INTRAVENOUS; SUBCUTANEOUS at 20:30

## 2018-04-07 RX ADMIN — OXYCODONE HYDROCHLORIDE 15 MG: 5 TABLET ORAL at 19:53

## 2018-04-07 ASSESSMENT — PAIN SCALES - GENERAL
PAINLEVEL_OUTOF10: 10
PAINLEVEL_OUTOF10: 10
PAINLEVEL_OUTOF10: 7
PAINLEVEL_OUTOF10: 9
PAINLEVEL_OUTOF10: 5
PAINLEVEL_OUTOF10: 9
PAINLEVEL_OUTOF10: 9
PAINLEVEL_OUTOF10: 10
PAINLEVEL_OUTOF10: 9
PAINLEVEL_OUTOF10: 7
PAINLEVEL_OUTOF10: 10

## 2018-04-07 ASSESSMENT — ENCOUNTER SYMPTOMS
RESPIRATORY NEGATIVE: 1
MYALGIAS: 1
CARDIOVASCULAR NEGATIVE: 1
NECK PAIN: 0
CHILLS: 0
BACK PAIN: 1
DIZZINESS: 0
EYES NEGATIVE: 1
CONSTIPATION: 0
FOCAL WEAKNESS: 1
CONSTITUTIONAL NEGATIVE: 1
FEVER: 0
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
## 2018-04-07 NOTE — CARE PLAN
Problem: Pain Management  Goal: Pain level will decrease to patient's comfort goal    Intervention: Follow pain managment plan developed in collaboration with patient and Interdisciplinary Team  Educated pt on increased use of IV opioids. PT insist on max opioid dose available. Will cont to educate risks      Problem: Safety  Goal: Free from accidental injury  Outcome: PROGRESSING AS EXPECTED  Scheduled activities with pt to insure health care provider is in room for any activity.

## 2018-04-07 NOTE — PROGRESS NOTES
0645 Received report, introduced self to pt, reviewed labs, orders, allergies, code status      0845 pt pleasant with staff, ambulated around unit, steady gait with front wheel walker. Nathan GO is aware for need for pain MD to see pt about implanted dilaudid pump. She states it will most likely be Monday that this issue will be able to be addressed.

## 2018-04-07 NOTE — CARE PLAN
Problem: Pain Management  Goal: Pain level will decrease to patient's comfort goal  Outcome: PROGRESSING SLOWER THAN EXPECTED  Provide distraction, give PRNs as requested    Problem: Skin Integrity  Goal: Risk for impaired skin integrity will decrease  Outcome: PROGRESSING AS EXPECTED  Encourage ambulation

## 2018-04-08 ENCOUNTER — APPOINTMENT (OUTPATIENT)
Dept: RADIOLOGY | Facility: MEDICAL CENTER | Age: 63
DRG: 083 | End: 2018-04-08
Attending: SURGERY
Payer: MEDICARE

## 2018-04-08 PROBLEM — M79.642 HAND PAIN, LEFT: Status: RESOLVED | Noted: 2018-04-01 | Resolved: 2018-04-08

## 2018-04-08 LAB
ALBUMIN SERPL BCP-MCNC: 3.4 G/DL (ref 3.2–4.9)
ALBUMIN/GLOB SERPL: 1 G/DL
ALP SERPL-CCNC: 46 U/L (ref 30–99)
ALT SERPL-CCNC: 23 U/L (ref 2–50)
ANION GAP SERPL CALC-SCNC: 6 MMOL/L (ref 0–11.9)
AST SERPL-CCNC: 23 U/L (ref 12–45)
BASOPHILS # BLD AUTO: 0.6 % (ref 0–1.8)
BASOPHILS # BLD: 0.06 K/UL (ref 0–0.12)
BILIRUB SERPL-MCNC: 0.5 MG/DL (ref 0.1–1.5)
BUN SERPL-MCNC: 18 MG/DL (ref 8–22)
CALCIUM SERPL-MCNC: 8.8 MG/DL (ref 8.5–10.5)
CHLORIDE SERPL-SCNC: 98 MMOL/L (ref 96–112)
CO2 SERPL-SCNC: 26 MMOL/L (ref 20–33)
CREAT SERPL-MCNC: 1.02 MG/DL (ref 0.5–1.4)
EOSINOPHIL # BLD AUTO: 0.42 K/UL (ref 0–0.51)
EOSINOPHIL NFR BLD: 4 % (ref 0–6.9)
ERYTHROCYTE [DISTWIDTH] IN BLOOD BY AUTOMATED COUNT: 41.6 FL (ref 35.9–50)
GLOBULIN SER CALC-MCNC: 3.3 G/DL (ref 1.9–3.5)
GLUCOSE SERPL-MCNC: 108 MG/DL (ref 65–99)
HCT VFR BLD AUTO: 35.2 % (ref 42–52)
HGB BLD-MCNC: 12 G/DL (ref 14–18)
IMM GRANULOCYTES # BLD AUTO: 0.08 K/UL (ref 0–0.11)
IMM GRANULOCYTES NFR BLD AUTO: 0.8 % (ref 0–0.9)
LYMPHOCYTES # BLD AUTO: 2.12 K/UL (ref 1–4.8)
LYMPHOCYTES NFR BLD: 20.4 % (ref 22–41)
MCH RBC QN AUTO: 31.5 PG (ref 27–33)
MCHC RBC AUTO-ENTMCNC: 34.1 G/DL (ref 33.7–35.3)
MCV RBC AUTO: 92.4 FL (ref 81.4–97.8)
MONOCYTES # BLD AUTO: 1.72 K/UL (ref 0–0.85)
MONOCYTES NFR BLD AUTO: 16.6 % (ref 0–13.4)
NEUTROPHILS # BLD AUTO: 5.99 K/UL (ref 1.82–7.42)
NEUTROPHILS NFR BLD: 57.6 % (ref 44–72)
NRBC # BLD AUTO: 0 K/UL
NRBC BLD-RTO: 0 /100 WBC
PLATELET # BLD AUTO: 261 K/UL (ref 164–446)
PMV BLD AUTO: 9.1 FL (ref 9–12.9)
POTASSIUM SERPL-SCNC: 4.1 MMOL/L (ref 3.6–5.5)
PROT SERPL-MCNC: 6.7 G/DL (ref 6–8.2)
RBC # BLD AUTO: 3.81 M/UL (ref 4.7–6.1)
SODIUM SERPL-SCNC: 130 MMOL/L (ref 135–145)
WBC # BLD AUTO: 10.4 K/UL (ref 4.8–10.8)

## 2018-04-08 PROCEDURE — G8987 SELF CARE CURRENT STATUS: HCPCS | Mod: CJ

## 2018-04-08 PROCEDURE — 700112 HCHG RX REV CODE 229: Performed by: SURGERY

## 2018-04-08 PROCEDURE — 700102 HCHG RX REV CODE 250 W/ 637 OVERRIDE(OP): Performed by: SURGERY

## 2018-04-08 PROCEDURE — 97110 THERAPEUTIC EXERCISES: CPT

## 2018-04-08 PROCEDURE — A9270 NON-COVERED ITEM OR SERVICE: HCPCS | Performed by: SURGERY

## 2018-04-08 PROCEDURE — 700102 HCHG RX REV CODE 250 W/ 637 OVERRIDE(OP): Performed by: NURSE PRACTITIONER

## 2018-04-08 PROCEDURE — 85025 COMPLETE CBC W/AUTO DIFF WBC: CPT

## 2018-04-08 PROCEDURE — 700111 HCHG RX REV CODE 636 W/ 250 OVERRIDE (IP): Mod: JG | Performed by: SURGERY

## 2018-04-08 PROCEDURE — A9270 NON-COVERED ITEM OR SERVICE: HCPCS | Performed by: NURSE PRACTITIONER

## 2018-04-08 PROCEDURE — 36415 COLL VENOUS BLD VENIPUNCTURE: CPT

## 2018-04-08 PROCEDURE — 700111 HCHG RX REV CODE 636 W/ 250 OVERRIDE (IP): Performed by: NURSE PRACTITIONER

## 2018-04-08 PROCEDURE — 700111 HCHG RX REV CODE 636 W/ 250 OVERRIDE (IP): Performed by: PHYSICIAN ASSISTANT

## 2018-04-08 PROCEDURE — 770001 HCHG ROOM/CARE - MED/SURG/GYN PRIV*

## 2018-04-08 PROCEDURE — 97535 SELF CARE MNGMENT TRAINING: CPT

## 2018-04-08 PROCEDURE — 71045 X-RAY EXAM CHEST 1 VIEW: CPT

## 2018-04-08 PROCEDURE — G8988 SELF CARE GOAL STATUS: HCPCS | Mod: CI

## 2018-04-08 PROCEDURE — 80053 COMPREHEN METABOLIC PANEL: CPT

## 2018-04-08 RX ORDER — BENZOCAINE/MENTHOL 6 MG-10 MG
LOZENGE MUCOUS MEMBRANE 2 TIMES DAILY
Status: DISCONTINUED | OUTPATIENT
Start: 2018-04-08 | End: 2018-04-10 | Stop reason: HOSPADM

## 2018-04-08 RX ADMIN — HYDROMORPHONE HYDROCHLORIDE 0.5 MG: 10 INJECTION, SOLUTION INTRAMUSCULAR; INTRAVENOUS; SUBCUTANEOUS at 00:48

## 2018-04-08 RX ADMIN — HEPARIN SODIUM 5000 UNITS: 5000 INJECTION, SOLUTION INTRAVENOUS; SUBCUTANEOUS at 12:42

## 2018-04-08 RX ADMIN — HEPARIN SODIUM 5000 UNITS: 5000 INJECTION, SOLUTION INTRAVENOUS; SUBCUTANEOUS at 23:12

## 2018-04-08 RX ADMIN — TACROLIMUS 2 MG: 1 CAPSULE ORAL at 20:08

## 2018-04-08 RX ADMIN — MAGNESIUM HYDROXIDE 30 ML: 400 SUSPENSION ORAL at 09:18

## 2018-04-08 RX ADMIN — HYDROMORPHONE HYDROCHLORIDE 0.5 MG: 10 INJECTION, SOLUTION INTRAMUSCULAR; INTRAVENOUS; SUBCUTANEOUS at 12:47

## 2018-04-08 RX ADMIN — HYDROMORPHONE HYDROCHLORIDE 0.5 MG: 10 INJECTION, SOLUTION INTRAMUSCULAR; INTRAVENOUS; SUBCUTANEOUS at 10:13

## 2018-04-08 RX ADMIN — HYDROMORPHONE HYDROCHLORIDE 0.5 MG: 10 INJECTION, SOLUTION INTRAMUSCULAR; INTRAVENOUS; SUBCUTANEOUS at 20:08

## 2018-04-08 RX ADMIN — HEPARIN SODIUM 5000 UNITS: 5000 INJECTION, SOLUTION INTRAVENOUS; SUBCUTANEOUS at 05:07

## 2018-04-08 RX ADMIN — OXYCODONE HYDROCHLORIDE 15 MG: 5 TABLET ORAL at 06:22

## 2018-04-08 RX ADMIN — OXYCODONE HYDROCHLORIDE 15 MG: 5 TABLET ORAL at 22:10

## 2018-04-08 RX ADMIN — GABAPENTIN 300 MG: 300 CAPSULE ORAL at 09:17

## 2018-04-08 RX ADMIN — HYDROMORPHONE HYDROCHLORIDE 0.5 MG: 10 INJECTION, SOLUTION INTRAMUSCULAR; INTRAVENOUS; SUBCUTANEOUS at 03:06

## 2018-04-08 RX ADMIN — HYDROMORPHONE HYDROCHLORIDE 0.5 MG: 10 INJECTION, SOLUTION INTRAMUSCULAR; INTRAVENOUS; SUBCUTANEOUS at 07:06

## 2018-04-08 RX ADMIN — GABAPENTIN 300 MG: 300 CAPSULE ORAL at 14:32

## 2018-04-08 RX ADMIN — HYDROMORPHONE HYDROCHLORIDE 0.5 MG: 10 INJECTION, SOLUTION INTRAMUSCULAR; INTRAVENOUS; SUBCUTANEOUS at 14:32

## 2018-04-08 RX ADMIN — LISINOPRIL 10 MG: 20 TABLET ORAL at 09:18

## 2018-04-08 RX ADMIN — SODIUM CHLORIDE TAB 1 GM 1 G: 1 TAB at 10:13

## 2018-04-08 RX ADMIN — OXYCODONE HYDROCHLORIDE 15 MG: 5 TABLET ORAL at 12:40

## 2018-04-08 RX ADMIN — OXYCODONE HYDROCHLORIDE 15 MG: 5 TABLET ORAL at 01:54

## 2018-04-08 RX ADMIN — DOCUSATE SODIUM 100 MG: 100 CAPSULE ORAL at 09:17

## 2018-04-08 RX ADMIN — GABAPENTIN 300 MG: 300 CAPSULE ORAL at 20:09

## 2018-04-08 RX ADMIN — HYDROCORTISONE: 1 CREAM TOPICAL at 10:18

## 2018-04-08 RX ADMIN — OXYCODONE HYDROCHLORIDE 15 MG: 5 TABLET ORAL at 09:17

## 2018-04-08 RX ADMIN — TACROLIMUS 2 MG: 1 CAPSULE ORAL at 09:18

## 2018-04-08 RX ADMIN — SODIUM CHLORIDE TAB 1 GM 1 G: 1 TAB at 17:54

## 2018-04-08 RX ADMIN — OXYCODONE HYDROCHLORIDE 15 MG: 5 TABLET ORAL at 17:54

## 2018-04-08 RX ADMIN — HYDROMORPHONE HYDROCHLORIDE 0.5 MG: 10 INJECTION, SOLUTION INTRAMUSCULAR; INTRAVENOUS; SUBCUTANEOUS at 23:19

## 2018-04-08 RX ADMIN — HYDROMORPHONE HYDROCHLORIDE 0.5 MG: 10 INJECTION, SOLUTION INTRAMUSCULAR; INTRAVENOUS; SUBCUTANEOUS at 18:00

## 2018-04-08 RX ADMIN — HYDROMORPHONE HYDROCHLORIDE 0.5 MG: 10 INJECTION, SOLUTION INTRAMUSCULAR; INTRAVENOUS; SUBCUTANEOUS at 05:07

## 2018-04-08 RX ADMIN — SODIUM CHLORIDE TAB 1 GM 1 G: 1 TAB at 09:17

## 2018-04-08 RX ADMIN — HYDROCORTISONE: 1 CREAM TOPICAL at 20:10

## 2018-04-08 ASSESSMENT — COGNITIVE AND FUNCTIONAL STATUS - GENERAL
HELP NEEDED FOR BATHING: A LITTLE
DRESSING REGULAR LOWER BODY CLOTHING: A LITTLE
DRESSING REGULAR UPPER BODY CLOTHING: A LITTLE
DAILY ACTIVITIY SCORE: 20
SUGGESTED CMS G CODE MODIFIER DAILY ACTIVITY: CJ
TOILETING: A LITTLE

## 2018-04-08 ASSESSMENT — ENCOUNTER SYMPTOMS
FEVER: 0
RESPIRATORY NEGATIVE: 1
EYES NEGATIVE: 1
CARDIOVASCULAR NEGATIVE: 1
CHILLS: 0
CONSTIPATION: 0
BACK PAIN: 1
TINGLING: 1
NECK PAIN: 0
CONSTITUTIONAL NEGATIVE: 1
FOCAL WEAKNESS: 1
MYALGIAS: 1
DIZZINESS: 0

## 2018-04-08 ASSESSMENT — PAIN SCALES - GENERAL
PAINLEVEL_OUTOF10: 10
PAINLEVEL_OUTOF10: 9
PAINLEVEL_OUTOF10: 10
PAINLEVEL_OUTOF10: 3
PAINLEVEL_OUTOF10: 10
PAINLEVEL_OUTOF10: 4
PAINLEVEL_OUTOF10: 10

## 2018-04-08 NOTE — PROGRESS NOTES
"/67   Pulse 97   Temp 36.8 °C (98.2 °F)   Resp 18   Ht 1.676 m (5' 6\")   Wt 74 kg (163 lb 2.3 oz)   SpO2 94%   BMI 26.33 kg/m²     Patient is A&Ox4.   GAGNON, CMS intact; limited strength to RUE, right arm sling ordered per patient request. Pt denies numbness and tingling.   Mobilizes SBA, uses fww when ambulating longer distances, gait steady, pt calls appropriately.   On RA, denies SOB, chest pain; achieves 1500 on IS.   Normoactive BS x 4. Tolerating diet. Reports nausea, addressed per MAR   + flatus, LBM 4/7. Pt is voiding via bedside urinal.   Generalized bruising and abrasions. Skin intact otherwise.   PIV SL.   Updated on POC. Belongings and call light within reach. All needs met at this time.   "
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
## 2018-04-08 NOTE — THERAPY
"Occupational Therapy Treatment completed with focus on ADLs and ADL transfers.  Functional Status:  RN called OT for d/c planning. Pt was agreeable to tx. Pt reports no tactile sensation deficits with testing, but has inconsistent motor response of Rue. Pt can only elevate R arm off of lap for a few seconds (1 to 2-/5 MMG of gross R arm strength. Pt  is 3+/5 MMG. Pt reports he wants to go home. Pt walked with fair+ balance into hallway was able to transfer to chair with spv. Pt attempted 4 stairs in stairwell, had a gross LOB but did not hit the ground. Pt reports R leg does give out from time to time, but has been walking on the hospital floor with no LOB. Pt reattempted stairs with coaching of \"up with good, down with bad leg\" and arm on L railing. Pt had improved balance but is still a fall risk. Pt would benefit from acute rehabilitation d/t poor R sided strength as he prevents as a high fall risk and lives alone.   Plan of Care: Will benefit from Occupational Therapy 3 times per week  Discharge Recommendations:  Equipment Will Continue to Assess for Equipment Needs. Post-acute therapy Discharge to a transitional care facility for continued skilled therapy services.    See \"Rehab Therapy-Acute\" Patient Summary Report for complete documentation.   "

## 2018-04-08 NOTE — CARE PLAN
Problem: Venous Thromboembolism (VTW)/Deep Vein Thrombosis (DVT) Prevention:  Goal: Patient will participate in Venous Thrombosis (VTE)/Deep Vein Thrombosis (DVT)Prevention Measures  Outcome: PROGRESSING AS EXPECTED  Encourage ambulation, administer anticoags as ordered    Problem: Pain Management  Goal: Pain level will decrease to patient's comfort goal  Outcome: PROGRESSING AS EXPECTED  Provide distraction, give PRNs as ordered

## 2018-04-08 NOTE — CARE PLAN
Problem: Safety  Goal: Will remain free from injury  Outcome: PROGRESSING AS EXPECTED  Safety precautions in place. Bed in locked/low position. 2 side rails up. Treaded socks. Call light in reach, calls appropriately. Hourly rounding practiced.    Problem: Pain Management  Goal: Pain level will decrease to patient's comfort goal  Outcome: PROGRESSING AS EXPECTED  Patient has an internal Dilaudid pump. Pain also managed with PRN Dilaudid IV and PRN oxycodone PO.

## 2018-04-08 NOTE — PROGRESS NOTES
"Patient reports discomfort with the bed. He ambulated around his room, tried to sit on a chair, and then called this RN to help him lay on the pull out couch. Patient was educated that pull out couch is not optimal for comfort and poses a potential safety risk, however patient reports that he is unable to sleep due to the discomfort of the bed. This RN was helping patient sit and get comfortable in the couch when patient slid and lost his footing. This RN partially caught the patient and helped him sit on the floor. Patient did report bumping the right side of his head slightly on the wall. No other injuries noted. Patient was placed on a recliner chair which he reports is more comfortable. Charge RN made aware of fall. Patient was educated regarding fall risk, however refused the chair alarm or the fall risk armband, patient insists, \"I slid, I didn't fall.\" Vital signs were taken. Will alert MD/APN regarding fall.   "

## 2018-04-08 NOTE — PROGRESS NOTES
0645 Received report, introduced self to pt, reviewed labs, orders, allergies, code status    1100 pt c/o loose BMs, states he has had 3bms today already    1253 left message with inpatient therapy services to come see pt when possible to help with DC planning since occupational/physical therapy has not seen patient since 4/4/18.

## 2018-04-09 ENCOUNTER — APPOINTMENT (OUTPATIENT)
Dept: RADIOLOGY | Facility: MEDICAL CENTER | Age: 63
DRG: 083 | End: 2018-04-09
Attending: SURGERY
Payer: MEDICARE

## 2018-04-09 ENCOUNTER — HOSPITAL ENCOUNTER (INPATIENT)
Facility: REHABILITATION | Age: 63
End: 2018-04-09
Attending: PHYSICAL MEDICINE & REHABILITATION | Admitting: PHYSICAL MEDICINE & REHABILITATION
Payer: MEDICARE

## 2018-04-09 LAB
ALBUMIN SERPL BCP-MCNC: 3.4 G/DL (ref 3.2–4.9)
ALBUMIN/GLOB SERPL: 1.1 G/DL
ALP SERPL-CCNC: 47 U/L (ref 30–99)
ALT SERPL-CCNC: 23 U/L (ref 2–50)
ANION GAP SERPL CALC-SCNC: 6 MMOL/L (ref 0–11.9)
AST SERPL-CCNC: 21 U/L (ref 12–45)
BASOPHILS # BLD AUTO: 0.6 % (ref 0–1.8)
BASOPHILS # BLD: 0.05 K/UL (ref 0–0.12)
BILIRUB SERPL-MCNC: 0.4 MG/DL (ref 0.1–1.5)
BUN SERPL-MCNC: 17 MG/DL (ref 8–22)
CALCIUM SERPL-MCNC: 8.8 MG/DL (ref 8.5–10.5)
CHLORIDE SERPL-SCNC: 102 MMOL/L (ref 96–112)
CO2 SERPL-SCNC: 25 MMOL/L (ref 20–33)
CREAT SERPL-MCNC: 0.87 MG/DL (ref 0.5–1.4)
EOSINOPHIL # BLD AUTO: 0.46 K/UL (ref 0–0.51)
EOSINOPHIL NFR BLD: 5.4 % (ref 0–6.9)
ERYTHROCYTE [DISTWIDTH] IN BLOOD BY AUTOMATED COUNT: 40.8 FL (ref 35.9–50)
GLOBULIN SER CALC-MCNC: 3.1 G/DL (ref 1.9–3.5)
GLUCOSE SERPL-MCNC: 114 MG/DL (ref 65–99)
HCT VFR BLD AUTO: 34.7 % (ref 42–52)
HGB BLD-MCNC: 11.9 G/DL (ref 14–18)
IMM GRANULOCYTES # BLD AUTO: 0.07 K/UL (ref 0–0.11)
IMM GRANULOCYTES NFR BLD AUTO: 0.8 % (ref 0–0.9)
LYMPHOCYTES # BLD AUTO: 2.17 K/UL (ref 1–4.8)
LYMPHOCYTES NFR BLD: 25.3 % (ref 22–41)
MAGNESIUM SERPL-MCNC: 1.8 MG/DL (ref 1.5–2.5)
MCH RBC QN AUTO: 31.4 PG (ref 27–33)
MCHC RBC AUTO-ENTMCNC: 34.3 G/DL (ref 33.7–35.3)
MCV RBC AUTO: 91.6 FL (ref 81.4–97.8)
MONOCYTES # BLD AUTO: 1.36 K/UL (ref 0–0.85)
MONOCYTES NFR BLD AUTO: 15.8 % (ref 0–13.4)
NEUTROPHILS # BLD AUTO: 4.48 K/UL (ref 1.82–7.42)
NEUTROPHILS NFR BLD: 52.1 % (ref 44–72)
NRBC # BLD AUTO: 0 K/UL
NRBC BLD-RTO: 0 /100 WBC
PHOSPHATE SERPL-MCNC: 4.2 MG/DL (ref 2.5–4.5)
PLATELET # BLD AUTO: 271 K/UL (ref 164–446)
PMV BLD AUTO: 8.6 FL (ref 9–12.9)
POTASSIUM SERPL-SCNC: 3.8 MMOL/L (ref 3.6–5.5)
PROT SERPL-MCNC: 6.5 G/DL (ref 6–8.2)
RBC # BLD AUTO: 3.79 M/UL (ref 4.7–6.1)
SODIUM SERPL-SCNC: 133 MMOL/L (ref 135–145)
WBC # BLD AUTO: 8.6 K/UL (ref 4.8–10.8)

## 2018-04-09 PROCEDURE — 36415 COLL VENOUS BLD VENIPUNCTURE: CPT

## 2018-04-09 PROCEDURE — A9270 NON-COVERED ITEM OR SERVICE: HCPCS | Performed by: SURGERY

## 2018-04-09 PROCEDURE — A9270 NON-COVERED ITEM OR SERVICE: HCPCS | Performed by: NURSE PRACTITIONER

## 2018-04-09 PROCEDURE — 97116 GAIT TRAINING THERAPY: CPT

## 2018-04-09 PROCEDURE — 700102 HCHG RX REV CODE 250 W/ 637 OVERRIDE(OP): Performed by: NURSE PRACTITIONER

## 2018-04-09 PROCEDURE — 83735 ASSAY OF MAGNESIUM: CPT

## 2018-04-09 PROCEDURE — 84100 ASSAY OF PHOSPHORUS: CPT

## 2018-04-09 PROCEDURE — 71045 X-RAY EXAM CHEST 1 VIEW: CPT

## 2018-04-09 PROCEDURE — 700111 HCHG RX REV CODE 636 W/ 250 OVERRIDE (IP): Performed by: NURSE PRACTITIONER

## 2018-04-09 PROCEDURE — 97530 THERAPEUTIC ACTIVITIES: CPT

## 2018-04-09 PROCEDURE — 700111 HCHG RX REV CODE 636 W/ 250 OVERRIDE (IP): Mod: JG | Performed by: SURGERY

## 2018-04-09 PROCEDURE — 700102 HCHG RX REV CODE 250 W/ 637 OVERRIDE(OP): Performed by: SURGERY

## 2018-04-09 PROCEDURE — 85025 COMPLETE CBC W/AUTO DIFF WBC: CPT

## 2018-04-09 PROCEDURE — 770001 HCHG ROOM/CARE - MED/SURG/GYN PRIV*

## 2018-04-09 PROCEDURE — 80053 COMPREHEN METABOLIC PANEL: CPT

## 2018-04-09 RX ADMIN — GABAPENTIN 300 MG: 300 CAPSULE ORAL at 13:54

## 2018-04-09 RX ADMIN — HYDROMORPHONE HYDROCHLORIDE 0.5 MG: 10 INJECTION, SOLUTION INTRAMUSCULAR; INTRAVENOUS; SUBCUTANEOUS at 20:27

## 2018-04-09 RX ADMIN — HEPARIN SODIUM 5000 UNITS: 5000 INJECTION, SOLUTION INTRAVENOUS; SUBCUTANEOUS at 13:54

## 2018-04-09 RX ADMIN — HYDROMORPHONE HYDROCHLORIDE 0.5 MG: 10 INJECTION, SOLUTION INTRAMUSCULAR; INTRAVENOUS; SUBCUTANEOUS at 15:37

## 2018-04-09 RX ADMIN — HYDROMORPHONE HYDROCHLORIDE 0.5 MG: 10 INJECTION, SOLUTION INTRAMUSCULAR; INTRAVENOUS; SUBCUTANEOUS at 22:49

## 2018-04-09 RX ADMIN — OXYCODONE HYDROCHLORIDE 15 MG: 5 TABLET ORAL at 17:47

## 2018-04-09 RX ADMIN — GABAPENTIN 300 MG: 300 CAPSULE ORAL at 08:46

## 2018-04-09 RX ADMIN — SODIUM CHLORIDE TAB 1 GM 1 G: 1 TAB at 12:30

## 2018-04-09 RX ADMIN — LISINOPRIL 10 MG: 20 TABLET ORAL at 08:46

## 2018-04-09 RX ADMIN — OXYCODONE HYDROCHLORIDE 15 MG: 5 TABLET ORAL at 02:44

## 2018-04-09 RX ADMIN — TACROLIMUS 2 MG: 1 CAPSULE ORAL at 20:26

## 2018-04-09 RX ADMIN — HYDROMORPHONE HYDROCHLORIDE 0.5 MG: 10 INJECTION, SOLUTION INTRAMUSCULAR; INTRAVENOUS; SUBCUTANEOUS at 12:33

## 2018-04-09 RX ADMIN — HYDROCORTISONE: 1 CREAM TOPICAL at 20:27

## 2018-04-09 RX ADMIN — OXYCODONE HYDROCHLORIDE 15 MG: 5 TABLET ORAL at 09:24

## 2018-04-09 RX ADMIN — HEPARIN SODIUM 5000 UNITS: 5000 INJECTION, SOLUTION INTRAVENOUS; SUBCUTANEOUS at 20:26

## 2018-04-09 RX ADMIN — HEPARIN SODIUM 5000 UNITS: 5000 INJECTION, SOLUTION INTRAVENOUS; SUBCUTANEOUS at 05:48

## 2018-04-09 RX ADMIN — SODIUM CHLORIDE TAB 1 GM 1 G: 1 TAB at 17:47

## 2018-04-09 RX ADMIN — OXYCODONE HYDROCHLORIDE 15 MG: 5 TABLET ORAL at 05:48

## 2018-04-09 RX ADMIN — HYDROMORPHONE HYDROCHLORIDE 0.5 MG: 10 INJECTION, SOLUTION INTRAMUSCULAR; INTRAVENOUS; SUBCUTANEOUS at 04:18

## 2018-04-09 RX ADMIN — OXYCODONE HYDROCHLORIDE 15 MG: 5 TABLET ORAL at 21:38

## 2018-04-09 RX ADMIN — HYDROCORTISONE: 1 CREAM TOPICAL at 08:47

## 2018-04-09 RX ADMIN — HYDROMORPHONE HYDROCHLORIDE 0.5 MG: 10 INJECTION, SOLUTION INTRAMUSCULAR; INTRAVENOUS; SUBCUTANEOUS at 01:23

## 2018-04-09 RX ADMIN — TACROLIMUS 2 MG: 1 CAPSULE ORAL at 08:46

## 2018-04-09 RX ADMIN — OXYCODONE HYDROCHLORIDE 15 MG: 5 TABLET ORAL at 13:54

## 2018-04-09 RX ADMIN — SODIUM CHLORIDE TAB 1 GM 1 G: 1 TAB at 08:46

## 2018-04-09 RX ADMIN — GABAPENTIN 300 MG: 300 CAPSULE ORAL at 20:26

## 2018-04-09 RX ADMIN — HYDROMORPHONE HYDROCHLORIDE 0.5 MG: 10 INJECTION, SOLUTION INTRAMUSCULAR; INTRAVENOUS; SUBCUTANEOUS at 17:47

## 2018-04-09 RX ADMIN — HYDROMORPHONE HYDROCHLORIDE 0.5 MG: 10 INJECTION, SOLUTION INTRAMUSCULAR; INTRAVENOUS; SUBCUTANEOUS at 08:47

## 2018-04-09 ASSESSMENT — GAIT ASSESSMENTS
DEVIATION: DECREASED HEEL STRIKE;DECREASED TOE OFF
DISTANCE (FEET): 500
GAIT LEVEL OF ASSIST: STAND BY ASSIST

## 2018-04-09 ASSESSMENT — PAIN SCALES - GENERAL
PAINLEVEL_OUTOF10: 10
PAINLEVEL_OUTOF10: 8
PAINLEVEL_OUTOF10: 10
PAINLEVEL_OUTOF10: 6
PAINLEVEL_OUTOF10: 9
PAINLEVEL_OUTOF10: 6
PAINLEVEL_OUTOF10: 9
PAINLEVEL_OUTOF10: 8
PAINLEVEL_OUTOF10: 10

## 2018-04-09 ASSESSMENT — COGNITIVE AND FUNCTIONAL STATUS - GENERAL
CLIMB 3 TO 5 STEPS WITH RAILING: A LITTLE
MOVING TO AND FROM BED TO CHAIR: A LOT
SUGGESTED CMS G CODE MODIFIER MOBILITY: CK
TURNING FROM BACK TO SIDE WHILE IN FLAT BAD: A LOT
WALKING IN HOSPITAL ROOM: A LITTLE
MOBILITY SCORE: 15
MOVING FROM LYING ON BACK TO SITTING ON SIDE OF FLAT BED: A LITTLE
STANDING UP FROM CHAIR USING ARMS: A LOT

## 2018-04-09 ASSESSMENT — ENCOUNTER SYMPTOMS
DIZZINESS: 0
CARDIOVASCULAR NEGATIVE: 1
MYALGIAS: 1
FOCAL WEAKNESS: 1
TINGLING: 1
RESPIRATORY NEGATIVE: 1
CONSTIPATION: 0
BACK PAIN: 1
NECK PAIN: 0
CHILLS: 0
EYES NEGATIVE: 1
FEVER: 0
CONSTITUTIONAL NEGATIVE: 1

## 2018-04-09 NOTE — THERAPY
"Physical Therapy Treatment completed.   Bed Mobility:  Supine to Sit: Moderate Assist  Transfers: Sit to Stand: Stand by Assist  Gait: Level Of Assist: Stand by Assist (onging cues for gait quality) with No Equipment Needed  x500 ft     Plan of Care: Will benefit from Physical Therapy 5 times per week  Discharge Recommendations: Equipment: Will Continue to Assess for Equipment Needs.   Post-acute therapy Discharge to a transitional care facility for continued skilled therapy services.     Patient with continued R sided weakness and inattention. Able to partially correct RLE circumduction and intermittent R foot drag with cues during gait training. Patient able to go up stairs with CGA, requires intermittent MIN A for stability with descent. Patient highly motivated and receptive to teaching with good carryover today. Patient with good insight into deficits. Extensive discussion with patient regarding d/c planning, and need for further therapies upon d/c. At this point would rec post acute services as patient lives alone, and demos continued instability with stair management (patient has 4 steps at entry), and need for MOD A with bed mobility.     See \"Rehab Therapy-Acute\" Patient Summary Report for complete documentation.       "
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
## 2018-04-09 NOTE — DISCHARGE PLANNING
Referral: Acute Rehab    Intervention: ADEBAYO called Caroline, Clinical Admissions Coordinator with [facility].  ADEBAYO requested an update on this referral.    Plan: Acute Rehab, pending acceptance.
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
## 2018-04-09 NOTE — PROGRESS NOTES
Bedside report received.  Assessment complete.  A&O x 4. Patient calls appropriately.  Patient has 10/10 pain. MAR medication given.  Denies N&V. Tolerating diet.  + void, +flatus, + BM  Patient denies SOB.  RUE weakness.  Generalized bruising and abrasions.  Review plan with of care with patient. Call light and personal belongings with in reach. Hourly rounding in place. All needs met at this time.

## 2018-04-09 NOTE — DISCHARGE PLANNING
Dr. Alejo Physiatry consult recommending discharge home with outpatient PT/OT. Voice message to ADEBAYO Sawyer.

## 2018-04-09 NOTE — PROGRESS NOTES
Assumed care at 0700. Received report from night shift RN. Bedside report completed. AOx4.    C/o pain-medicated.  Denies nausea.  Tolerating diet. +voiding. +loose BM RUE sling in place.  Ambulating with steady gait. Pt call light and belongings within reach, fall precautions in place.

## 2018-04-10 ENCOUNTER — APPOINTMENT (OUTPATIENT)
Dept: RADIOLOGY | Facility: MEDICAL CENTER | Age: 63
DRG: 083 | End: 2018-04-10
Attending: SURGERY
Payer: MEDICARE

## 2018-04-10 ENCOUNTER — HOME HEALTH ADMISSION (OUTPATIENT)
Dept: HOME HEALTH SERVICES | Facility: HOME HEALTHCARE | Age: 63
End: 2018-04-10
Payer: MEDICARE

## 2018-04-10 ENCOUNTER — PATIENT OUTREACH (OUTPATIENT)
Dept: HEALTH INFORMATION MANAGEMENT | Facility: OTHER | Age: 63
End: 2018-04-10

## 2018-04-10 VITALS
DIASTOLIC BLOOD PRESSURE: 65 MMHG | RESPIRATION RATE: 18 BRPM | SYSTOLIC BLOOD PRESSURE: 129 MMHG | HEART RATE: 86 BPM | WEIGHT: 163.14 LBS | HEIGHT: 66 IN | OXYGEN SATURATION: 96 % | TEMPERATURE: 97.1 F | BODY MASS INDEX: 26.22 KG/M2

## 2018-04-10 LAB
ALBUMIN SERPL BCP-MCNC: 4 G/DL (ref 3.2–4.9)
ALBUMIN/GLOB SERPL: 1.1 G/DL
ALP SERPL-CCNC: 49 U/L (ref 30–99)
ALT SERPL-CCNC: 23 U/L (ref 2–50)
ANION GAP SERPL CALC-SCNC: 7 MMOL/L (ref 0–11.9)
AST SERPL-CCNC: 21 U/L (ref 12–45)
BASOPHILS # BLD AUTO: 0.7 % (ref 0–1.8)
BASOPHILS # BLD: 0.08 K/UL (ref 0–0.12)
BILIRUB SERPL-MCNC: 0.5 MG/DL (ref 0.1–1.5)
BUN SERPL-MCNC: 17 MG/DL (ref 8–22)
CALCIUM SERPL-MCNC: 9.5 MG/DL (ref 8.5–10.5)
CHLORIDE SERPL-SCNC: 101 MMOL/L (ref 96–112)
CO2 SERPL-SCNC: 27 MMOL/L (ref 20–33)
CREAT SERPL-MCNC: 1.05 MG/DL (ref 0.5–1.4)
EOSINOPHIL # BLD AUTO: 0.59 K/UL (ref 0–0.51)
EOSINOPHIL NFR BLD: 5.5 % (ref 0–6.9)
ERYTHROCYTE [DISTWIDTH] IN BLOOD BY AUTOMATED COUNT: 42.5 FL (ref 35.9–50)
GLOBULIN SER CALC-MCNC: 3.7 G/DL (ref 1.9–3.5)
GLUCOSE SERPL-MCNC: 79 MG/DL (ref 65–99)
HCT VFR BLD AUTO: 38.7 % (ref 42–52)
HGB BLD-MCNC: 13 G/DL (ref 14–18)
IMM GRANULOCYTES # BLD AUTO: 0.11 K/UL (ref 0–0.11)
IMM GRANULOCYTES NFR BLD AUTO: 1 % (ref 0–0.9)
LYMPHOCYTES # BLD AUTO: 2.91 K/UL (ref 1–4.8)
LYMPHOCYTES NFR BLD: 27 % (ref 22–41)
MCH RBC QN AUTO: 31.3 PG (ref 27–33)
MCHC RBC AUTO-ENTMCNC: 33.6 G/DL (ref 33.7–35.3)
MCV RBC AUTO: 93.3 FL (ref 81.4–97.8)
MONOCYTES # BLD AUTO: 2 K/UL (ref 0–0.85)
MONOCYTES NFR BLD AUTO: 18.6 % (ref 0–13.4)
NEUTROPHILS # BLD AUTO: 5.09 K/UL (ref 1.82–7.42)
NEUTROPHILS NFR BLD: 47.2 % (ref 44–72)
NRBC # BLD AUTO: 0 K/UL
NRBC BLD-RTO: 0 /100 WBC
PLATELET # BLD AUTO: 339 K/UL (ref 164–446)
PMV BLD AUTO: 9 FL (ref 9–12.9)
POTASSIUM SERPL-SCNC: 4 MMOL/L (ref 3.6–5.5)
PROT SERPL-MCNC: 7.7 G/DL (ref 6–8.2)
RBC # BLD AUTO: 4.15 M/UL (ref 4.7–6.1)
SODIUM SERPL-SCNC: 135 MMOL/L (ref 135–145)
WBC # BLD AUTO: 10.8 K/UL (ref 4.8–10.8)

## 2018-04-10 PROCEDURE — 80053 COMPREHEN METABOLIC PANEL: CPT

## 2018-04-10 PROCEDURE — 700111 HCHG RX REV CODE 636 W/ 250 OVERRIDE (IP): Mod: JG | Performed by: SURGERY

## 2018-04-10 PROCEDURE — A9270 NON-COVERED ITEM OR SERVICE: HCPCS | Performed by: SURGERY

## 2018-04-10 PROCEDURE — 700102 HCHG RX REV CODE 250 W/ 637 OVERRIDE(OP): Performed by: NURSE PRACTITIONER

## 2018-04-10 PROCEDURE — 700112 HCHG RX REV CODE 229: Performed by: SURGERY

## 2018-04-10 PROCEDURE — A9270 NON-COVERED ITEM OR SERVICE: HCPCS | Performed by: NURSE PRACTITIONER

## 2018-04-10 PROCEDURE — 36415 COLL VENOUS BLD VENIPUNCTURE: CPT

## 2018-04-10 PROCEDURE — 71045 X-RAY EXAM CHEST 1 VIEW: CPT

## 2018-04-10 PROCEDURE — 700111 HCHG RX REV CODE 636 W/ 250 OVERRIDE (IP): Performed by: NURSE PRACTITIONER

## 2018-04-10 PROCEDURE — 85025 COMPLETE CBC W/AUTO DIFF WBC: CPT

## 2018-04-10 PROCEDURE — 700102 HCHG RX REV CODE 250 W/ 637 OVERRIDE(OP): Performed by: SURGERY

## 2018-04-10 RX ORDER — OXYCODONE HYDROCHLORIDE 15 MG/1
7.5-15 TABLET ORAL EVERY 6 HOURS PRN
Qty: 20 TAB | Refills: 0 | Status: SHIPPED | OUTPATIENT
Start: 2018-04-10 | End: 2018-04-17

## 2018-04-10 RX ORDER — GABAPENTIN 300 MG/1
300 CAPSULE ORAL 3 TIMES DAILY
Qty: 21 CAP | Refills: 0 | Status: SHIPPED | OUTPATIENT
Start: 2018-04-10 | End: 2018-04-17

## 2018-04-10 RX ADMIN — HYDROMORPHONE HYDROCHLORIDE 0.5 MG: 10 INJECTION, SOLUTION INTRAMUSCULAR; INTRAVENOUS; SUBCUTANEOUS at 04:57

## 2018-04-10 RX ADMIN — SODIUM CHLORIDE TAB 1 GM 1 G: 1 TAB at 12:03

## 2018-04-10 RX ADMIN — GABAPENTIN 300 MG: 300 CAPSULE ORAL at 08:18

## 2018-04-10 RX ADMIN — HYDROCORTISONE: 1 CREAM TOPICAL at 08:18

## 2018-04-10 RX ADMIN — OXYCODONE HYDROCHLORIDE 15 MG: 5 TABLET ORAL at 08:19

## 2018-04-10 RX ADMIN — LISINOPRIL 10 MG: 20 TABLET ORAL at 08:18

## 2018-04-10 RX ADMIN — HEPARIN SODIUM 5000 UNITS: 5000 INJECTION, SOLUTION INTRAVENOUS; SUBCUTANEOUS at 04:57

## 2018-04-10 RX ADMIN — TACROLIMUS 2 MG: 1 CAPSULE ORAL at 08:18

## 2018-04-10 RX ADMIN — DOCUSATE SODIUM 100 MG: 100 CAPSULE ORAL at 08:18

## 2018-04-10 RX ADMIN — SODIUM CHLORIDE TAB 1 GM 1 G: 1 TAB at 08:18

## 2018-04-10 RX ADMIN — OXYCODONE HYDROCHLORIDE 15 MG: 5 TABLET ORAL at 11:59

## 2018-04-10 ASSESSMENT — ENCOUNTER SYMPTOMS
EYES NEGATIVE: 1
CARDIOVASCULAR NEGATIVE: 1
NECK PAIN: 0
TINGLING: 1
CHILLS: 0
FOCAL WEAKNESS: 1
FEVER: 0
CONSTIPATION: 0
RESPIRATORY NEGATIVE: 1
DIZZINESS: 0
MYALGIAS: 1
BACK PAIN: 1
CONSTITUTIONAL NEGATIVE: 1

## 2018-04-10 ASSESSMENT — PAIN SCALES - GENERAL
PAINLEVEL_OUTOF10: 10
PAINLEVEL_OUTOF10: 10

## 2018-04-10 NOTE — DISCHARGE SUMMARY
Trauma Discharge Summary    DATE OF ADMISSION: 3/31/2018    DATE OF DISCHARGE: 4/10/2018    DISCHARGE TRANSCRIPTION [id number]    ATTENDING PHYSICIAN: Casandra Zeng M.D.    DISCHARGE PHYSICAL EXAM: See [facility] physical exam dated 4/10/2018    DISCHARGE MEDICATIONS:  I reviewed the patients controlled substance history and obtained a controlled substance use informed consent (if applicable) provided by [facility] and the patient has been prescribed.       Medication List      START taking these medications      Instructions   gabapentin 300 MG Caps  Commonly known as:  NEURONTIN   Take 1 Cap by mouth 3 times a day for 7 days.  Dose:  300 mg     oxycodone 15 MG immediate release tablet  Commonly known as:  OXY-IR   Take 0.5-1 Tabs by mouth every 6 hours as needed for Severe Pain for up to 7 days.  Dose:  7.5-15 mg        CONTINUE taking these medications      Instructions   CENTRUM SILVER Tabs   Take 1 Tab by mouth every day.  Dose:  1 Tab     citalopram 20 MG Tabs  Commonly known as:  CELEXA   Take 20 mg by mouth every bedtime.  Dose:  20 mg     lisinopril 10 MG Tabs  Commonly known as:  PRINIVIL   Take 10 mg by mouth every day.  Dose:  10 mg     Pain Pump Odilia  Commonly known as:  patient supplied   by Injection route Continuous. Patient's Pain Pump (placed and maintained as an outpatient) Medications/concentrations:   Next change: 5/15/18 Continuous infusion rates (Drug/Rate): Hydromorphone/99 mcg/day  Patient activation dose: 10 mcg  Patient activation lockout interval: 1 hour Maximum activations per day: 7 activations     tacrolimus 1 MG Caps  Commonly known as:  PROGRAF   Take 2 mg by mouth every 12 hours.  Dose:  2 mg     VITAMIN B COMPLEX PO   Take 1 Tab by mouth every day.  Dose:  1 Tab        STOP taking these medications    aspirin EC 81 MG Tbec  Commonly known as:  ECOTRIN            DISPOSITION: The patient will be discharged home in stable condition.     The patient and family have been  extensively counseled and all questions have been answered. Special attention was paid to respiratory decompensation, and signs and symptoms of infection and to seek immediate medical attention if these develop. The patient and family demonstrate understanding and give verbal compliance with discharge instructions.        ____________________________________________  TY Phan    DD: 4/10/2018 12:55 PM

## 2018-04-10 NOTE — DISCHARGE PLANNING
called SW back and pt has an appointment with his PCP scheduled for this Thursday at 1020am. MSW notified the pt. MSW also faxed a referral for [facility] services for meals on wheels, case management, and the homemaker program.

## 2018-04-10 NOTE — DISCHARGE PLANNING
Received choice form from ADEBAYO Petty for HH. Referral sent to [facility] at 0934. Notified ADEBAYO Petty that skilled nursing would need to be added.

## 2018-04-10 NOTE — PROGRESS NOTES
Pt educated on the safety measures of using a safety device: bed alarm. Pt understands with verbal acknowledgment. Pt refuses the use of a safety device: bed alarm. Pt AAOx4. Bed locked in lowest position, upper bed rails up, treaded slippers on. Call light within reach. Pt calls appropriately.
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
## 2018-04-10 NOTE — FACE TO FACE
Face to Face Supporting Documentation - Home Health    The encounter with this patient was in whole or in part the primary reason for home health admission.    Date of encounter:   Patient:                    MRN:                       YOB: 2019  Quirino Ruiz  [id number]  1955     Home health to see patient for:  Physical Therapy evaluation and treatment and Occupational therapy evaluation and treatment    Skilled need for:  New Onset Medical Diagnosis cva    Skilled nursing interventions to include:  Comment: home assessment    Homebound status evidenced by:  Require the use of special transportation or Needs the assistance of another person in order to leave the home. Leaving home requires a considerable and taxing effort. There is a normal inability to leave the home.    Community Physician to provide follow up care: No primary care provider on file.     Optional Interventions? No      I certify the face to face encounter for this home health care referral meets the CMS requirements and the encounter/clinical assessment with the patient was, in whole, or in part, for the medical condition(s) listed above, which is the primary reason for home health care. Based on my clinical findings: the service(s) are medically necessary, support the need for home health care, and the homebound criteria are met.  I certify that this patient has had a face to face encounter by myself.  Silvia Evans, [email or web address].[email or web address]. - NPI: [id number]

## 2018-04-10 NOTE — CARE PLAN
Problem: Safety  Goal: Will remain free from falls  Outcome: PROGRESSING SLOWER THAN EXPECTED    Intervention: Assess risk factors for falls   04/10/18 0244   OTHER   Fall Risk High Risk to Fall - 2 or more points    Risk for Injury-Any positive answers results in the pt being at high risk for fall related injury Not Applicable   Mobility Status Assessment 0-Ambulates & Transfers Independently. No Assistance Required   History of fall 2   Pt Calls for Assistance No assistance required     Intervention: Implement fall precautions   04/09/18 2000 04/10/18 0244   OTHER   Environmental Precautions Treaded Slipper Socks on Patient;Personal Belongings, Wastebasket, Call Bell etc. in Easy Reach;Transferred to Stronger Side;Report Given to Other Health Care Providers Regarding Fall Risk;Bed in Low Position;Communication Sign for Patients & Families;Mobility Assessed & Appropriate Sign Placed --    Bedrails --  Bedrails Closest to Bathroom Down         Problem: Venous Thromboembolism (VTW)/Deep Vein Thrombosis (DVT) Prevention:  Goal: Patient will participate in Venous Thrombosis (VTE)/Deep Vein Thrombosis (DVT)Prevention Measures  Outcome: PROGRESSING AS EXPECTED   04/09/18 2000   OTHER   Risk Assessment Score 5   VTE RISK Very High   Pharmacologic Prophylaxis Used Unfractionated Heparin

## 2018-04-10 NOTE — PROGRESS NOTES
"Pt A&O x4. Ambulating halls, talking with staff, calm. Anxious about pain medication.     Vitals: /76   Pulse 94   Temp 37.1 °C (98.8 °F)   Resp 18   Ht 1.676 m (5' 6\")   Wt 74 kg (163 lb 2.3 oz)   SpO2 94%   BMI 26.33 kg/m²     Pt rates pain 10 out of 10. Medicated for pain.    Neuro: GAGNON. Numbness/ tingling right shoulder. RUE strength 5/5, RLE 4/5. Left side strength 5/5.     Cardiac: Denies new onset of chest pain.    Vascular: Pulses 2+ BUE, BLE. No edema noted.    Respiratory: Lungs sound diminished in bases. Pulling 1500 on IS, effective, strong effort. On RA. Denies SOB.    GI: Abdomen rounded, semi-firm, baseline. + bowel sounds, + flatus, + BM today. On regular diet with 1500 ml fluid restriction, tolerating well. - nausea/ vomiting.    : Pt voiding adequately.      MSK: Pt up to bathroom self, no assistance required, tolerating well. Ambulating halls. RUE ROM limited, pain with movement. Pt actively performing strengthening exercises to increase movement and strength. RLE occasionally drags when walking, per pt.     Integumentary: Bruising, intact. Hydrocortisone scheduled BID for rectum, pt applies to self after BM's.    Labs noted.    Fall precautions in place: Bed locked in lowest position, Upper bed rails up, treaded socks in place, personal belongings within reach, call light within reach, appropriate mobility signs in place, refused bed alarm. Pt calls appropriately.     Pt updated on POC.   "

## 2018-04-10 NOTE — PROGRESS NOTES
Report received, assumed care.   A&Ox4.   Pain 10/10, medicated per MAR.     Tolerating Regular diet with 1500ml fluid restriction   denies nausea.    +voiding, +flatus   Ambulating: up-self with c/o R. Sided weakness   Encouraged pt. To continue to exercise his right side.     Patient call light within reach, bed in the lowest position.  POC discussed with patient.
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
## 2018-04-10 NOTE — DISCHARGE PLANNING
ATTN: Case Management  RE: Referral for Home Health                We would like to take this opportunity to thank you for submitting a referral for your patient to continue care with [facility]. Our skilled team is dedicated to helping all patients recover and gain independence in the home setting.            As of 4/10/18, we have accepted the above patient into our service. A [facility] clinician will be out to see the patient within 48 hours to conduct our initial visit. If you have any questions or concerns regarding the patient’s transition to Home Health, please do not hesitate to contact us. We are open for referrals 7 days a week from 8AM to 5PM at [phone].      We look forward to collaborating with you,  [facility] Team
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
## 2018-04-10 NOTE — PROGRESS NOTES
Spoke with Dr Mendes's MA.  Informed that we are sending home with Rx for pain medication.  Scheduled pt appt for pump refill tomorrow.  MA stated they will take over pain management from that appointment on.  Pt is to bring any leftover pain pills with him to the appointment.  Pt aware and agrees to go to appointment tomorrow at 11:15am

## 2018-04-10 NOTE — DISCHARGE PLANNING
MSW met with pt at bedside and discussed home health order. MSW provided pt with a choice form and pt chose [facility]. MSW faxed choice form to [facility]. MSW called a left a message with the inpatient  to schedule a PCP appointment after d/c for pt per his request. PT follows with Dr. Alejandra Sutherland.

## 2018-04-15 LAB — EKG IMPRESSION: NORMAL

## 2018-04-20 ENCOUNTER — HOME CARE VISIT (OUTPATIENT)
Dept: HOME HEALTH SERVICES | Facility: HOME HEALTHCARE | Age: 63
End: 2018-04-20
Payer: MEDICARE

## 2018-04-20 ENCOUNTER — HOSPITAL ENCOUNTER (OUTPATIENT)
Dept: RADIOLOGY | Facility: MEDICAL CENTER | Age: 63
End: 2018-04-20
Attending: FAMILY MEDICINE
Payer: MEDICARE

## 2018-04-20 VITALS
HEIGHT: 66 IN | TEMPERATURE: 98.4 F | OXYGEN SATURATION: 98 % | HEART RATE: 68 BPM | BODY MASS INDEX: 25.68 KG/M2 | SYSTOLIC BLOOD PRESSURE: 122 MMHG | DIASTOLIC BLOOD PRESSURE: 64 MMHG | WEIGHT: 159.8 LBS | RESPIRATION RATE: 20 BRPM

## 2018-04-20 DIAGNOSIS — I61.9 NONTRAUMATIC INTRACEREBRAL HEMORRHAGE, UNSPECIFIED CEREBRAL LOCATION, UNSPECIFIED LATERALITY (HCC): ICD-10-CM

## 2018-04-20 PROCEDURE — 70450 CT HEAD/BRAIN W/O DYE: CPT

## 2018-04-20 PROCEDURE — G0493 RN CARE EA 15 MIN HH/HOSPICE: HCPCS

## 2018-04-20 PROCEDURE — 665001 SOC-HOME HEALTH

## 2018-04-20 SDOH — ECONOMIC STABILITY: HOUSING INSECURITY: HOME SAFETY: INSTRUCT PATIENT ON STRATEGIES/MODIFICATIONS TO HOME ENVIRONMENT.

## 2018-04-20 SDOH — ECONOMIC STABILITY: HOUSING INSECURITY: UNSAFE APPLIANCES: 0

## 2018-04-20 SDOH — ECONOMIC STABILITY: HOUSING INSECURITY: UNSAFE COOKING RANGE AREA: 0

## 2018-04-20 ASSESSMENT — ENCOUNTER SYMPTOMS
DEPRESSED MOOD: 1
ADDITIONAL INFORMATION: BACK PAIN
DEBILITATING PAIN: 1

## 2018-04-20 ASSESSMENT — PATIENT HEALTH QUESTIONNAIRE - PHQ9
1. LITTLE INTEREST OR PLEASURE IN DOING THINGS: 00
2. FEELING DOWN, DEPRESSED, IRRITABLE, OR HOPELESS: 01

## 2018-04-21 ASSESSMENT — ACTIVITIES OF DAILY LIVING (ADL)
OASIS_M1830: 03
HOME_HEALTH_OASIS: 01
HOME_HEALTH_OASIS: 02

## 2018-04-21 ASSESSMENT — ENCOUNTER SYMPTOMS
NAUSEA: DENIES
VOMITING: DENIES

## 2018-04-23 ENCOUNTER — HOME CARE VISIT (OUTPATIENT)
Dept: HOME HEALTH SERVICES | Facility: HOME HEALTHCARE | Age: 63
End: 2018-04-23
Payer: MEDICARE

## 2018-04-23 ENCOUNTER — PATIENT OUTREACH (OUTPATIENT)
Dept: HEALTH INFORMATION MANAGEMENT | Facility: OTHER | Age: 63
End: 2018-04-23

## 2018-04-23 ENCOUNTER — TELEPHONE (OUTPATIENT)
Dept: HEALTH INFORMATION MANAGEMENT | Facility: OTHER | Age: 63
End: 2018-04-23

## 2018-04-23 RX ORDER — OXYCODONE HYDROCHLORIDE 10 MG/1
10 TABLET ORAL 4 TIMES DAILY PRN
COMMUNITY
End: 2018-05-07

## 2018-04-23 RX ORDER — ROPINIROLE 2 MG/1
.5-1 TABLET, FILM COATED ORAL NIGHTLY
COMMUNITY
End: 2019-08-15

## 2018-04-23 NOTE — PROGRESS NOTES
"Received referral from [facility] for med rec. [facility] RN notes that pt has the following meds [phone] Ropinirole Hcl 2 mg 1/2-1 tab po @[facility], Oxycodone 10mg 1 tab po 4xday. Updated patient's medication list in EPIC. Medications reviewed.  Interaction noted between citalopram and tacrolimus for increased risk of QT prolongation. Patient's most recent EKG on 4/15/18 showed a \"borderline QT interval\" of 478. Left VM with MA of Dr. Sutherland regarding drug interaction.     Kathryn Vasquez, PharmD    "

## 2018-04-23 NOTE — TELEPHONE ENCOUNTER
"[facility] RN notes that pt has the following meds with RX from Dr. [facility] ([phone]): Ropinirole Hcl 2 mg 1/2-1 tab po @[facility], Oxycodone 10mg 1 tab po 4xday. Updated patient's medication list in EPIC. Medications reviewed.  Interaction noted between citalopram and tacrolimus for increased risk of QT prolongation. Patient's most recent EKG on 4/15/18 showed a \"borderline QT interval\" of 478. Left VM with MA of Dr. Sutherland regarding drug interaction.     Kathryn Vasquez, PharmD    "

## 2018-04-24 ENCOUNTER — HOME CARE VISIT (OUTPATIENT)
Dept: HOME HEALTH SERVICES | Facility: HOME HEALTHCARE | Age: 63
End: 2018-04-24
Payer: MEDICARE

## 2018-04-24 PROCEDURE — G0299 HHS/HOSPICE OF RN EA 15 MIN: HCPCS

## 2018-04-25 ENCOUNTER — HOME CARE VISIT (OUTPATIENT)
Dept: HOME HEALTH SERVICES | Facility: HOME HEALTHCARE | Age: 63
End: 2018-04-25
Payer: MEDICARE

## 2018-04-25 VITALS
OXYGEN SATURATION: 94 % | SYSTOLIC BLOOD PRESSURE: 104 MMHG | RESPIRATION RATE: 16 BRPM | HEART RATE: 94 BPM | DIASTOLIC BLOOD PRESSURE: 52 MMHG | TEMPERATURE: 98.4 F

## 2018-04-25 PROCEDURE — G0151 HHCP-SERV OF PT,EA 15 MIN: HCPCS

## 2018-04-25 PROCEDURE — G0155 HHCP-SVS OF CSW,EA 15 MIN: HCPCS

## 2018-04-25 SDOH — ECONOMIC STABILITY: HOUSING INSECURITY: HOME SAFETY: PT WITH ALOT OF TRIP HAZARDS AND CLUTTER.

## 2018-04-25 ASSESSMENT — ACTIVITIES OF DAILY LIVING (ADL)
BATHING_ASSISTANCE: 0
GROOMING_ASSISTANCE: 0
DRESSING_UB_ASSISTANCE: 0
EATING_ASSISTANCE: 0
ORAL_CARE_ASSISTANCE: 0
DRESSING_LB_ASSISTANCE: 0
TOILETING_ASSISTANCE: 0

## 2018-04-26 ENCOUNTER — HOME CARE VISIT (OUTPATIENT)
Dept: HOME HEALTH SERVICES | Facility: HOME HEALTHCARE | Age: 63
End: 2018-04-26
Payer: MEDICARE

## 2018-04-26 PROCEDURE — G0152 HHCP-SERV OF OT,EA 15 MIN: HCPCS

## 2018-04-27 ENCOUNTER — HOME CARE VISIT (OUTPATIENT)
Dept: HOME HEALTH SERVICES | Facility: HOME HEALTHCARE | Age: 63
End: 2018-04-27
Payer: MEDICARE

## 2018-04-27 VITALS
SYSTOLIC BLOOD PRESSURE: 143 MMHG | RESPIRATION RATE: 16 BRPM | TEMPERATURE: 98.7 F | HEART RATE: 77 BPM | OXYGEN SATURATION: 97 % | DIASTOLIC BLOOD PRESSURE: 85 MMHG

## 2018-04-27 PROCEDURE — G0493 RN CARE EA 15 MIN HH/HOSPICE: HCPCS

## 2018-04-27 ASSESSMENT — ACTIVITIES OF DAILY LIVING (ADL)
SHOPPING_ASSISTANCE: 0
BATHING_ASSISTANCE: 0
TOILETING_ASSISTANCE: 0
ORAL_CARE_ASSISTANCE: 0
EATING_ASSISTANCE: 0
HOUSEKEEPING_ASSISTANCE: 0
LAUNDRY_ASSISTANCE: 0
DRESSING_UB_ASSISTANCE: 0
GROOMING_ASSISTANCE: 0
TELEPHONE_ASSISTANCE: 0
TRANSPORTATION_ASSISTANCE: 6
MEAL_PREP_ASSISTANCE: 0
DRESSING_LB_ASSISTANCE: 0

## 2018-04-28 VITALS
RESPIRATION RATE: 16 BRPM | HEART RATE: 83 BPM | SYSTOLIC BLOOD PRESSURE: 120 MMHG | OXYGEN SATURATION: 98 % | DIASTOLIC BLOOD PRESSURE: 66 MMHG | TEMPERATURE: 97.7 F

## 2018-04-28 VITALS
HEART RATE: 101 BPM | SYSTOLIC BLOOD PRESSURE: 132 MMHG | OXYGEN SATURATION: 98 % | RESPIRATION RATE: 18 BRPM | DIASTOLIC BLOOD PRESSURE: 74 MMHG | TEMPERATURE: 97.4 F

## 2018-04-28 SDOH — ECONOMIC STABILITY: HOUSING INSECURITY
HOME SAFETY: 2 OFTHE 3 DOGS NEEDED TO BE PUT AWAY AS THEY BARK ALOT AND APPREARS AGGRESSIVE, CALL PATIENT ON THE WAY SO HE CAN PUT THEM OUT. HOUSE IS DIRTY

## 2018-04-28 SDOH — ECONOMIC STABILITY: HOUSING INSECURITY: UNSAFE APPLIANCES: 0

## 2018-04-28 SDOH — ECONOMIC STABILITY: HOUSING INSECURITY: UNSAFE COOKING RANGE AREA: 0

## 2018-04-28 ASSESSMENT — ENCOUNTER SYMPTOMS
DEBILITATING PAIN: 1
NAUSEA: PT DENIES
VOMITING: PT DENIES
VOMITING: DENIES ANY

## 2018-04-30 ENCOUNTER — HOME CARE VISIT (OUTPATIENT)
Dept: HOME HEALTH SERVICES | Facility: HOME HEALTHCARE | Age: 63
End: 2018-04-30
Payer: MEDICARE

## 2018-05-01 ENCOUNTER — HOME CARE VISIT (OUTPATIENT)
Dept: HOME HEALTH SERVICES | Facility: HOME HEALTHCARE | Age: 63
End: 2018-05-01
Payer: MEDICARE

## 2018-05-01 VITALS
TEMPERATURE: 98 F | HEART RATE: 84 BPM | DIASTOLIC BLOOD PRESSURE: 62 MMHG | RESPIRATION RATE: 16 BRPM | OXYGEN SATURATION: 96 % | SYSTOLIC BLOOD PRESSURE: 128 MMHG

## 2018-05-01 VITALS
TEMPERATURE: 98.1 F | HEART RATE: 84 BPM | DIASTOLIC BLOOD PRESSURE: 62 MMHG | OXYGEN SATURATION: 97 % | SYSTOLIC BLOOD PRESSURE: 128 MMHG | RESPIRATION RATE: 16 BRPM

## 2018-05-01 PROCEDURE — G0155 HHCP-SVS OF CSW,EA 15 MIN: HCPCS

## 2018-05-01 PROCEDURE — G0151 HHCP-SERV OF PT,EA 15 MIN: HCPCS

## 2018-05-01 PROCEDURE — G0496 LPN CARE TRAIN/EDU IN HH: HCPCS

## 2018-05-01 SDOH — ECONOMIC STABILITY: HOUSING INSECURITY: UNSAFE APPLIANCES: 0

## 2018-05-01 SDOH — ECONOMIC STABILITY: HOUSING INSECURITY: UNSAFE COOKING RANGE AREA: 0

## 2018-05-01 ASSESSMENT — ENCOUNTER SYMPTOMS: RESPIRATORY SYMPTOMS COMMENTS: PT LUNGS CLEAR IN ALL FIELDS, NO ADVANTAGEOUS SOUND NOTED.

## 2018-05-03 ENCOUNTER — HOME CARE VISIT (OUTPATIENT)
Dept: HOME HEALTH SERVICES | Facility: HOME HEALTHCARE | Age: 63
End: 2018-05-03
Payer: MEDICARE

## 2018-05-04 ENCOUNTER — HOME CARE VISIT (OUTPATIENT)
Dept: HOME HEALTH SERVICES | Facility: HOME HEALTHCARE | Age: 63
End: 2018-05-04
Payer: MEDICARE

## 2018-05-07 ENCOUNTER — HOME CARE VISIT (OUTPATIENT)
Dept: HOME HEALTH SERVICES | Facility: HOME HEALTHCARE | Age: 63
End: 2018-05-07
Payer: MEDICARE

## 2018-05-07 PROCEDURE — G0496 LPN CARE TRAIN/EDU IN HH: HCPCS

## 2018-05-07 SDOH — ECONOMIC STABILITY: HOUSING INSECURITY: UNSAFE APPLIANCES: 0

## 2018-05-07 SDOH — ECONOMIC STABILITY: HOUSING INSECURITY: UNSAFE COOKING RANGE AREA: 0

## 2018-05-07 ASSESSMENT — ENCOUNTER SYMPTOMS: RESPIRATORY SYMPTOMS COMMENTS: PT LUNGS CLEAR IN ALL FIELDS, NO ADVANTAGEOUS SOUND NOTED.

## 2018-05-08 ENCOUNTER — HOME CARE VISIT (OUTPATIENT)
Dept: HOME HEALTH SERVICES | Facility: HOME HEALTHCARE | Age: 63
End: 2018-05-08
Payer: MEDICARE

## 2018-05-08 VITALS
TEMPERATURE: 97 F | OXYGEN SATURATION: 94 % | HEART RATE: 94 BPM | RESPIRATION RATE: 16 BRPM | DIASTOLIC BLOOD PRESSURE: 78 MMHG | SYSTOLIC BLOOD PRESSURE: 148 MMHG

## 2018-05-08 VITALS
TEMPERATURE: 98.3 F | RESPIRATION RATE: 16 BRPM | HEART RATE: 80 BPM | OXYGEN SATURATION: 96 % | SYSTOLIC BLOOD PRESSURE: 124 MMHG | DIASTOLIC BLOOD PRESSURE: 64 MMHG

## 2018-05-08 PROCEDURE — G0151 HHCP-SERV OF PT,EA 15 MIN: HCPCS

## 2018-05-09 ENCOUNTER — HOME CARE VISIT (OUTPATIENT)
Dept: HOME HEALTH SERVICES | Facility: HOME HEALTHCARE | Age: 63
End: 2018-05-09
Payer: MEDICARE

## 2018-05-10 ENCOUNTER — HOME CARE VISIT (OUTPATIENT)
Dept: HOME HEALTH SERVICES | Facility: HOME HEALTHCARE | Age: 63
End: 2018-05-10
Payer: MEDICARE

## 2018-05-10 VITALS
RESPIRATION RATE: 16 BRPM | DIASTOLIC BLOOD PRESSURE: 68 MMHG | TEMPERATURE: 98.2 F | HEART RATE: 84 BPM | OXYGEN SATURATION: 95 % | SYSTOLIC BLOOD PRESSURE: 138 MMHG

## 2018-05-10 PROCEDURE — G0151 HHCP-SERV OF PT,EA 15 MIN: HCPCS

## 2018-05-10 ASSESSMENT — ENCOUNTER SYMPTOMS: DEBILITATING PAIN: 1

## 2018-05-14 ENCOUNTER — HOSPITAL ENCOUNTER (OUTPATIENT)
Facility: MEDICAL CENTER | Age: 63
End: 2018-05-14
Attending: FAMILY MEDICINE
Payer: MEDICARE

## 2018-05-14 ENCOUNTER — HOME CARE VISIT (OUTPATIENT)
Dept: HOME HEALTH SERVICES | Facility: HOME HEALTHCARE | Age: 63
End: 2018-05-14
Payer: MEDICARE

## 2018-05-14 LAB
ALBUMIN SERPL BCP-MCNC: 3.7 G/DL (ref 3.2–4.9)
ALBUMIN/GLOB SERPL: 1 G/DL
ALP SERPL-CCNC: 59 U/L (ref 30–99)
ALT SERPL-CCNC: 17 U/L (ref 2–50)
ANION GAP SERPL CALC-SCNC: 9 MMOL/L (ref 0–11.9)
AST SERPL-CCNC: 18 U/L (ref 12–45)
BASOPHILS # BLD AUTO: 0.7 % (ref 0–1.8)
BASOPHILS # BLD: 0.06 K/UL (ref 0–0.12)
BILIRUB SERPL-MCNC: 0.3 MG/DL (ref 0.1–1.5)
BUN SERPL-MCNC: 9 MG/DL (ref 8–22)
CALCIUM SERPL-MCNC: 9 MG/DL (ref 8.5–10.5)
CHLORIDE SERPL-SCNC: 106 MMOL/L (ref 96–112)
CO2 SERPL-SCNC: 22 MMOL/L (ref 20–33)
CREAT SERPL-MCNC: 0.72 MG/DL (ref 0.5–1.4)
EOSINOPHIL # BLD AUTO: 0.31 K/UL (ref 0–0.51)
EOSINOPHIL NFR BLD: 3.7 % (ref 0–6.9)
ERYTHROCYTE [DISTWIDTH] IN BLOOD BY AUTOMATED COUNT: 42.8 FL (ref 35.9–50)
GLOBULIN SER CALC-MCNC: 3.7 G/DL (ref 1.9–3.5)
GLUCOSE SERPL-MCNC: 90 MG/DL (ref 65–99)
HCT VFR BLD AUTO: 45.3 % (ref 42–52)
HGB BLD-MCNC: 15.5 G/DL (ref 14–18)
IMM GRANULOCYTES # BLD AUTO: 0.04 K/UL (ref 0–0.11)
IMM GRANULOCYTES NFR BLD AUTO: 0.5 % (ref 0–0.9)
LYMPHOCYTES # BLD AUTO: 2.95 K/UL (ref 1–4.8)
LYMPHOCYTES NFR BLD: 35.3 % (ref 22–41)
MCH RBC QN AUTO: 31 PG (ref 27–33)
MCHC RBC AUTO-ENTMCNC: 34.2 G/DL (ref 33.7–35.3)
MCV RBC AUTO: 90.6 FL (ref 81.4–97.8)
MONOCYTES # BLD AUTO: 0.73 K/UL (ref 0–0.85)
MONOCYTES NFR BLD AUTO: 8.7 % (ref 0–13.4)
NEUTROPHILS # BLD AUTO: 4.27 K/UL (ref 1.82–7.42)
NEUTROPHILS NFR BLD: 51.1 % (ref 44–72)
NRBC # BLD AUTO: 0 K/UL
NRBC BLD-RTO: 0 /100 WBC
PLATELET # BLD AUTO: 254 K/UL (ref 164–446)
PMV BLD AUTO: 9.3 FL (ref 9–12.9)
POTASSIUM SERPL-SCNC: 4.1 MMOL/L (ref 3.6–5.5)
PROT SERPL-MCNC: 7.4 G/DL (ref 6–8.2)
RBC # BLD AUTO: 5 M/UL (ref 4.7–6.1)
SODIUM SERPL-SCNC: 137 MMOL/L (ref 135–145)
WBC # BLD AUTO: 8.4 K/UL (ref 4.8–10.8)

## 2018-05-14 PROCEDURE — 85025 COMPLETE CBC W/AUTO DIFF WBC: CPT

## 2018-05-14 PROCEDURE — 80053 COMPREHEN METABOLIC PANEL: CPT

## 2018-05-14 PROCEDURE — G0300 HHS/HOSPICE OF LPN EA 15 MIN: HCPCS

## 2018-05-15 VITALS
RESPIRATION RATE: 16 BRPM | TEMPERATURE: 97.9 F | HEART RATE: 84 BPM | OXYGEN SATURATION: 96 % | DIASTOLIC BLOOD PRESSURE: 76 MMHG | SYSTOLIC BLOOD PRESSURE: 138 MMHG

## 2018-05-15 SDOH — ECONOMIC STABILITY: HOUSING INSECURITY: UNSAFE APPLIANCES: 0

## 2018-05-15 SDOH — ECONOMIC STABILITY: HOUSING INSECURITY: UNSAFE COOKING RANGE AREA: 0

## 2018-05-15 ASSESSMENT — ENCOUNTER SYMPTOMS: RESPIRATORY SYMPTOMS COMMENTS: PT LUNGS CLEAR IN ALL FIELDS, NO ADVANTAGEOUS SOUND NOTED.

## 2018-05-16 ENCOUNTER — HOME CARE VISIT (OUTPATIENT)
Dept: HOME HEALTH SERVICES | Facility: HOME HEALTHCARE | Age: 63
End: 2018-05-16
Payer: MEDICARE

## 2018-05-17 ENCOUNTER — HOME CARE VISIT (OUTPATIENT)
Dept: HOME HEALTH SERVICES | Facility: HOME HEALTHCARE | Age: 63
End: 2018-05-17
Payer: MEDICARE

## 2018-05-17 VITALS
TEMPERATURE: 98.3 F | HEART RATE: 84 BPM | DIASTOLIC BLOOD PRESSURE: 64 MMHG | OXYGEN SATURATION: 97 % | RESPIRATION RATE: 16 BRPM | SYSTOLIC BLOOD PRESSURE: 104 MMHG

## 2018-05-17 PROCEDURE — G0151 HHCP-SERV OF PT,EA 15 MIN: HCPCS

## 2018-05-18 ENCOUNTER — HOSPITAL ENCOUNTER (OUTPATIENT)
Dept: RADIOLOGY | Facility: MEDICAL CENTER | Age: 63
End: 2018-05-18
Attending: FAMILY MEDICINE
Payer: MEDICARE

## 2018-05-18 VITALS — BODY MASS INDEX: 26.37 KG/M2 | HEIGHT: 67 IN | WEIGHT: 168 LBS

## 2018-05-18 DIAGNOSIS — M25.511 RIGHT SHOULDER PAIN, UNSPECIFIED CHRONICITY: ICD-10-CM

## 2018-05-18 PROCEDURE — 72141 MRI NECK SPINE W/O DYE: CPT

## 2018-05-18 PROCEDURE — 73221 MRI JOINT UPR EXTREM W/O DYE: CPT | Mod: RT

## 2018-05-18 PROCEDURE — 700102 HCHG RX REV CODE 250 W/ 637 OVERRIDE(OP): Performed by: RADIOLOGY

## 2018-05-18 PROCEDURE — A9270 NON-COVERED ITEM OR SERVICE: HCPCS | Performed by: RADIOLOGY

## 2018-05-18 RX ORDER — DIAZEPAM 5 MG/1
10 TABLET ORAL
Status: COMPLETED | OUTPATIENT
Start: 2018-05-18 | End: 2018-05-18

## 2018-05-18 RX ADMIN — DIAZEPAM 10 MG: 5 TABLET ORAL at 15:52

## 2018-05-18 ASSESSMENT — PAIN SCALES - GENERAL
PAINLEVEL_OUTOF10: 0
PAINLEVEL_OUTOF10: 0

## 2018-05-18 NOTE — DISCHARGE INSTRUCTIONS
MRI ADULT DISCHARGE INSTRUCTIONS    You have been medicated today for your scan. Please follow the instructions below to ensure your safe recovery. If you have any questions or problems, feel free to call us at [phone] or [phone].     1.   Have someone stay with you to assist you as needed.    2.   Do not drive or operate any mechanical devices.    3.   Do not perform any activity that requires concentration. Make no major decisions over the next 24 hours.     4.   Be careful changing positions from laying down to sitting or standing, as you may become dizzy.     5.   Do not drink alcohol for 48 hours.    6.   There are no restrictions for eating your normal meals. Drink fluids.    7.   You may continue your usual medications for pain, tranquilizers, muscle relaxants or sedatives when awake.     8.   Tomorrow, you may continue your normal daily activities.     9.   Pressure dressing on 10 - 15 minutes. If swelling or bleeding occurs when removed, continue placing direct pressure on injection site for another 5 minutes, or until bleeding stops.   Diazepam (VALIUM) Oral solution  What is this medicine?  You were prescribed DIAZEPAM (dye AZ e juan) for the procedure you had today. This medication is a benzodiazepine. It is used to treat anxiety and nervousness. It also can help treat alcohol withdrawal, relax muscles, and treat certain types of seizures.  This medicine may be used for other purposes; ask your health care provider or pharmacist if you have questions.  What side effects may I notice from receiving this medicine?  Side effects that you should report to your doctor or health care professional as soon as possible:  • allergic reactions like skin rash, itching or hives, swelling of the face, lips, or tongue  • angry, confused, depressed, other mood changes  • breathing problems  • feeling faint or lightheaded, falls  • muscle cramps  • problems with balance, talking,  walking  • restlessness  • tremors  • trouble passing urine or change in the amount of urine  • unusually weak or tired  Side effects that usually do not require medical attention (report to your doctor or health care professional if they continue or are bothersome):  • difficulty sleeping, nightmares  • dizziness, drowsiness, clumsiness, or unsteadiness, a hangover effect  • headache  • nausea, vomiting  This list may not describe all possible side effects. Call your doctor for medical advice about side effects. You may report side effects to FDA at 9-641-BUI-0776.    I have been informed of and understand the above discharge instructions.

## 2018-05-21 ENCOUNTER — HOME CARE VISIT (OUTPATIENT)
Dept: HOME HEALTH SERVICES | Facility: HOME HEALTHCARE | Age: 63
End: 2018-05-21
Payer: MEDICARE

## 2018-05-21 PROCEDURE — G0162 HHC RN E&M PLAN SVS, 15 MIN: HCPCS

## 2018-05-22 VITALS
HEART RATE: 79 BPM | OXYGEN SATURATION: 97 % | RESPIRATION RATE: 17 BRPM | TEMPERATURE: 96.8 F | SYSTOLIC BLOOD PRESSURE: 122 MMHG | DIASTOLIC BLOOD PRESSURE: 60 MMHG

## 2018-05-22 SDOH — ECONOMIC STABILITY: HOUSING INSECURITY: HOME SAFETY: 3 DOGS AT HOME, 1 IS VERY FRIENDLY AND JUMPS ON PEOPLE

## 2018-05-22 SDOH — ECONOMIC STABILITY: HOUSING INSECURITY: UNSAFE APPLIANCES: 0

## 2018-05-22 SDOH — ECONOMIC STABILITY: HOUSING INSECURITY: UNSAFE COOKING RANGE AREA: 0

## 2018-05-22 ASSESSMENT — ENCOUNTER SYMPTOMS: DEBILITATING PAIN: 1

## 2018-05-22 ASSESSMENT — ACTIVITIES OF DAILY LIVING (ADL)
HOME_HEALTH_OASIS: 00
OASIS_M1830: 01

## 2018-06-20 ENCOUNTER — HOSPITAL ENCOUNTER (OUTPATIENT)
Dept: RADIOLOGY | Facility: MEDICAL CENTER | Age: 63
End: 2018-06-20
Attending: NEUROLOGICAL SURGERY
Payer: MEDICARE

## 2018-06-20 DIAGNOSIS — M54.2 CERVICALGIA: ICD-10-CM

## 2018-06-20 PROCEDURE — 72050 X-RAY EXAM NECK SPINE 4/5VWS: CPT

## 2018-12-14 ENCOUNTER — HOSPITAL ENCOUNTER (OUTPATIENT)
Dept: LAB | Facility: MEDICAL CENTER | Age: 63
End: 2018-12-14
Attending: INTERNAL MEDICINE
Payer: MEDICARE

## 2018-12-14 LAB
ALBUMIN SERPL BCP-MCNC: 4.4 G/DL (ref 3.2–4.9)
ALBUMIN/GLOB SERPL: 1.5 G/DL
ALP SERPL-CCNC: 57 U/L (ref 30–99)
ALT SERPL-CCNC: 16 U/L (ref 2–50)
ANION GAP SERPL CALC-SCNC: 6 MMOL/L (ref 0–11.9)
AST SERPL-CCNC: 17 U/L (ref 12–45)
BASOPHILS # BLD AUTO: 0.6 % (ref 0–1.8)
BASOPHILS # BLD: 0.05 K/UL (ref 0–0.12)
BILIRUB SERPL-MCNC: 0.7 MG/DL (ref 0.1–1.5)
BUN SERPL-MCNC: 10 MG/DL (ref 8–22)
CALCIUM SERPL-MCNC: 9.2 MG/DL (ref 8.5–10.5)
CHLORIDE SERPL-SCNC: 106 MMOL/L (ref 96–112)
CO2 SERPL-SCNC: 24 MMOL/L (ref 20–33)
CREAT SERPL-MCNC: 0.85 MG/DL (ref 0.5–1.4)
EOSINOPHIL # BLD AUTO: 0.25 K/UL (ref 0–0.51)
EOSINOPHIL NFR BLD: 2.9 % (ref 0–6.9)
ERYTHROCYTE [DISTWIDTH] IN BLOOD BY AUTOMATED COUNT: 44.3 FL (ref 35.9–50)
FASTING STATUS PATIENT QL REPORTED: NORMAL
GLOBULIN SER CALC-MCNC: 3 G/DL (ref 1.9–3.5)
GLUCOSE SERPL-MCNC: 87 MG/DL (ref 65–99)
HCT VFR BLD AUTO: 48.6 % (ref 42–52)
HGB BLD-MCNC: 16.3 G/DL (ref 14–18)
IMM GRANULOCYTES # BLD AUTO: 0.03 K/UL (ref 0–0.11)
IMM GRANULOCYTES NFR BLD AUTO: 0.4 % (ref 0–0.9)
LYMPHOCYTES # BLD AUTO: 2.5 K/UL (ref 1–4.8)
LYMPHOCYTES NFR BLD: 29.3 % (ref 22–41)
MCH RBC QN AUTO: 31.5 PG (ref 27–33)
MCHC RBC AUTO-ENTMCNC: 33.5 G/DL (ref 33.7–35.3)
MCV RBC AUTO: 93.8 FL (ref 81.4–97.8)
MONOCYTES # BLD AUTO: 0.95 K/UL (ref 0–0.85)
MONOCYTES NFR BLD AUTO: 11.1 % (ref 0–13.4)
NEUTROPHILS # BLD AUTO: 4.75 K/UL (ref 1.82–7.42)
NEUTROPHILS NFR BLD: 55.7 % (ref 44–72)
NRBC # BLD AUTO: 0 K/UL
NRBC BLD-RTO: 0 /100 WBC
PLATELET # BLD AUTO: 241 K/UL (ref 164–446)
PMV BLD AUTO: 10.1 FL (ref 9–12.9)
POTASSIUM SERPL-SCNC: 4.1 MMOL/L (ref 3.6–5.5)
PROT SERPL-MCNC: 7.4 G/DL (ref 6–8.2)
RBC # BLD AUTO: 5.18 M/UL (ref 4.7–6.1)
SODIUM SERPL-SCNC: 136 MMOL/L (ref 135–145)
WBC # BLD AUTO: 8.5 K/UL (ref 4.8–10.8)

## 2018-12-14 PROCEDURE — 87517 HEPATITIS B DNA QUANT: CPT

## 2018-12-14 PROCEDURE — 80197 ASSAY OF TACROLIMUS: CPT

## 2018-12-14 PROCEDURE — 36415 COLL VENOUS BLD VENIPUNCTURE: CPT

## 2018-12-14 PROCEDURE — 85025 COMPLETE CBC W/AUTO DIFF WBC: CPT

## 2018-12-14 PROCEDURE — 80053 COMPREHEN METABOLIC PANEL: CPT

## 2018-12-16 LAB — TACROLIMUS BLD-MCNC: 3.1 NG/ML

## 2018-12-18 LAB
HBV DNA SERPL NAA+PROBE-ACNC: NOT DETECTED IU/ML
HBV DNA SERPL NAA+PROBE-LOG IU: NOT DETECTED LOG IU/ML
HBV DNA SERPL QL NAA+PROBE: NOT DETECTED

## 2019-01-09 ENCOUNTER — HOSPITAL ENCOUNTER (EMERGENCY)
Facility: MEDICAL CENTER | Age: 64
End: 2019-01-09
Payer: MEDICARE

## 2019-01-09 ENCOUNTER — HOSPITAL ENCOUNTER (EMERGENCY)
Facility: MEDICAL CENTER | Age: 64
End: 2019-01-09
Attending: EMERGENCY MEDICINE
Payer: MEDICARE

## 2019-01-09 VITALS
WEIGHT: 164.9 LBS | HEIGHT: 66 IN | HEART RATE: 82 BPM | OXYGEN SATURATION: 97 % | DIASTOLIC BLOOD PRESSURE: 87 MMHG | RESPIRATION RATE: 16 BRPM | TEMPERATURE: 98.7 F | SYSTOLIC BLOOD PRESSURE: 160 MMHG | BODY MASS INDEX: 26.5 KG/M2
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
## 2019-01-09 DIAGNOSIS — H02.9 LESION OF RIGHT EYELID: ICD-10-CM

## 2019-01-09 PROCEDURE — 99283 EMERGENCY DEPT VISIT LOW MDM: CPT

## 2019-01-09 NOTE — ED NOTES
Ring cut from finger, ring cut off in 3 pieces, all pieces returned to pt  Pt discharged, reviewed all discharge instructions including follow up, pt verbalizes understanding, and denies questions.   Escorted to lobby. No belongings left in room.

## 2019-01-09 NOTE — ED NOTES
"Pt ambulates to triage with c/c \"I got a ring stuck on my right ring finger\" since Monday.  Pt also c/c \"a growth on my right eyelid\" x2 years.  A&ox4.  Pt to lobby & advised to inform RN of any changes.  "
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
## 2019-01-10 ENCOUNTER — PATIENT OUTREACH (OUTPATIENT)
Dept: HEALTH INFORMATION MANAGEMENT | Facility: OTHER | Age: 64
End: 2019-01-10

## 2019-06-07 NOTE — PROGRESS NOTES
I have evaluated the patient as the Provider in Triage. I have reviewed His vital signs and the triage nurse assessment. I have talked with the patient and any available family and advised that I am the provider in triage and have ordered the appropriate study to initiate their work up based on the clinical presentation during my assessment. I have advised that the patient will be accommodated in the Main ED as soon as possible. I have also requested to contact the triage nurse or myself immediately if the patient experiences any changes in their condition during this brief waiting period. Note written by Anatoliy Leblanc, as dictated by Narinder Dickinson MD 2:11 PM    46 Anatoliy Leblanc, as dictated by Desmond Nayak.  RONNY Ortiz 2:22 PM      The history is provided by Spoke with patient's doctor at [facility]  Unsure of specifics on the pain pump without having his computer in front of him  Will follow up in the AM   is oriented to person, place, and time. He appears well-developed and well-nourished. No distress. Moderate pain discomfort   HENT:   Head: Normocephalic and atraumatic. Right Ear: External ear normal.   Left Ear: External ear normal.   Eyes: Pupils are equal, round, and reactive to light. EOM are normal.   Neck: Neck supple. Cardiovascular: Normal rate, regular rhythm, normal heart sounds and intact distal pulses. Exam reveals no gallop and no friction rub. No murmur heard. Pulmonary/Chest: Effort normal and breath sounds normal. No stridor. No respiratory distress. He has no wheezes. He has no rales. He exhibits no tenderness. Abdominal: Soft. Bowel sounds are normal. He exhibits distension. He exhibits no mass. There is tenderness. There is guarding.  There is no rebound. No hernia. Markedly ttp with focal distention superior to umbilicus   Musculoskeletal: Normal range of motion. He exhibits no edema, tenderness or deformity. Neurological: He is alert and oriented to person, place, and time. No cranial nerve deficit. Coordination normal.   Skin: Skin is warm and dry. Capillary refill takes less than 2 seconds. No rash noted. No erythema. No pallor. Psychiatric: He has a normal mood and affect. His behavior is normal.   Nursing Glory Bad. RONNY Ortiz    4:03 PM   Glory Bad. RONNY Ortiz spoke with Carol Cunningham PA-C with Dr Juani Soliman, Consult for Surgery. Discussed available diagnostic tests and clinical findings. She is in agreement with care plans as outlined. Dr Juani Soliman will admit pt. Glory Bad.  RONNY Ortiz      

## 2019-06-10 ENCOUNTER — HOSPITAL ENCOUNTER (OUTPATIENT)
Dept: LAB | Facility: MEDICAL CENTER | Age: 64
End: 2019-06-10
Attending: INTERNAL MEDICINE
Payer: MEDICARE

## 2019-06-10 ENCOUNTER — HOSPITAL ENCOUNTER (OUTPATIENT)
Dept: LAB | Facility: MEDICAL CENTER | Age: 64
End: 2019-06-10
Attending: OPHTHALMOLOGY
Payer: MEDICARE

## 2019-06-10 ENCOUNTER — HOSPITAL ENCOUNTER (OUTPATIENT)
Dept: LAB | Facility: MEDICAL CENTER | Age: 64
End: 2019-06-10
Attending: PHYSICIAN ASSISTANT
Payer: MEDICARE

## 2019-06-10 LAB
BASOPHILS # BLD AUTO: 0.4 % (ref 0–1.8)
BASOPHILS # BLD: 0.04 K/UL (ref 0–0.12)
EOSINOPHIL # BLD AUTO: 0.25 K/UL (ref 0–0.51)
EOSINOPHIL NFR BLD: 2.5 % (ref 0–6.9)
ERYTHROCYTE [DISTWIDTH] IN BLOOD BY AUTOMATED COUNT: 44.6 FL (ref 35.9–50)
GGT SERPL-CCNC: 15 U/L (ref 7–51)
HCT VFR BLD AUTO: 48.5 % (ref 42–52)
HGB BLD-MCNC: 16 G/DL (ref 14–18)
IMM GRANULOCYTES # BLD AUTO: 0.03 K/UL (ref 0–0.11)
IMM GRANULOCYTES NFR BLD AUTO: 0.3 % (ref 0–0.9)
LYMPHOCYTES # BLD AUTO: 2.62 K/UL (ref 1–4.8)
LYMPHOCYTES NFR BLD: 26.4 % (ref 22–41)
MCH RBC QN AUTO: 30.3 PG (ref 27–33)
MCHC RBC AUTO-ENTMCNC: 33 G/DL (ref 33.7–35.3)
MCV RBC AUTO: 91.9 FL (ref 81.4–97.8)
MONOCYTES # BLD AUTO: 1.31 K/UL (ref 0–0.85)
MONOCYTES NFR BLD AUTO: 13.2 % (ref 0–13.4)
NEUTROPHILS # BLD AUTO: 5.67 K/UL (ref 1.82–7.42)
NEUTROPHILS NFR BLD: 57.2 % (ref 44–72)
NRBC # BLD AUTO: 0 K/UL
NRBC BLD-RTO: 0 /100 WBC
PLATELET # BLD AUTO: 224 K/UL (ref 164–446)
PMV BLD AUTO: 10 FL (ref 9–12.9)
RBC # BLD AUTO: 5.28 M/UL (ref 4.7–6.1)
WBC # BLD AUTO: 9.9 K/UL (ref 4.8–10.8)

## 2019-06-10 PROCEDURE — 83036 HEMOGLOBIN GLYCOSYLATED A1C: CPT

## 2019-06-10 PROCEDURE — 80197 ASSAY OF TACROLIMUS: CPT

## 2019-06-10 PROCEDURE — 85025 COMPLETE CBC W/AUTO DIFF WBC: CPT

## 2019-06-10 PROCEDURE — 82977 ASSAY OF GGT: CPT

## 2019-06-10 PROCEDURE — 80053 COMPREHEN METABOLIC PANEL: CPT

## 2019-06-10 PROCEDURE — 87522 HEPATITIS C REVRS TRNSCRPJ: CPT

## 2019-06-10 PROCEDURE — 80048 BASIC METABOLIC PNL TOTAL CA: CPT

## 2019-06-10 PROCEDURE — 36415 COLL VENOUS BLD VENIPUNCTURE: CPT

## 2019-06-11 LAB
ALBUMIN SERPL BCP-MCNC: 4.2 G/DL (ref 3.2–4.9)
ALBUMIN/GLOB SERPL: 1.4 G/DL
ALP SERPL-CCNC: 60 U/L (ref 30–99)
ALT SERPL-CCNC: 16 U/L (ref 2–50)
ANION GAP SERPL CALC-SCNC: 10 MMOL/L (ref 0–11.9)
ANION GAP SERPL CALC-SCNC: 9 MMOL/L (ref 0–11.9)
AST SERPL-CCNC: 16 U/L (ref 12–45)
BILIRUB SERPL-MCNC: 0.6 MG/DL (ref 0.1–1.5)
BUN SERPL-MCNC: 9 MG/DL (ref 8–22)
BUN SERPL-MCNC: 9 MG/DL (ref 8–22)
CALCIUM SERPL-MCNC: 9.1 MG/DL (ref 8.5–10.5)
CALCIUM SERPL-MCNC: 9.1 MG/DL (ref 8.5–10.5)
CHLORIDE SERPL-SCNC: 108 MMOL/L (ref 96–112)
CHLORIDE SERPL-SCNC: 108 MMOL/L (ref 96–112)
CO2 SERPL-SCNC: 21 MMOL/L (ref 20–33)
CO2 SERPL-SCNC: 21 MMOL/L (ref 20–33)
CREAT SERPL-MCNC: 0.9 MG/DL (ref 0.5–1.4)
CREAT SERPL-MCNC: 0.9 MG/DL (ref 0.5–1.4)
EST. AVERAGE GLUCOSE BLD GHB EST-MCNC: 108 MG/DL
GLOBULIN SER CALC-MCNC: 3 G/DL (ref 1.9–3.5)
GLUCOSE SERPL-MCNC: 108 MG/DL (ref 65–99)
GLUCOSE SERPL-MCNC: 109 MG/DL (ref 65–99)
HBA1C MFR BLD: 5.4 % (ref 0–5.6)
POTASSIUM SERPL-SCNC: 4.1 MMOL/L (ref 3.6–5.5)
POTASSIUM SERPL-SCNC: 4.1 MMOL/L (ref 3.6–5.5)
PROT SERPL-MCNC: 7.2 G/DL (ref 6–8.2)
SODIUM SERPL-SCNC: 138 MMOL/L (ref 135–145)
SODIUM SERPL-SCNC: 139 MMOL/L (ref 135–145)

## 2019-06-12 LAB — TACROLIMUS BLD-MCNC: 3.4 NG/ML

## 2019-06-13 ENCOUNTER — APPOINTMENT (RX ONLY)
Dept: URBAN - METROPOLITAN AREA CLINIC 22 | Facility: CLINIC | Age: 64
Setting detail: DERMATOLOGY
End: 2019-06-13

## 2019-06-13 DIAGNOSIS — R20.8 OTHER DISTURBANCES OF SKIN SENSATION: ICD-10-CM

## 2019-06-13 PROBLEM — C44.1221 SQUAMOUS CELL CARCINOMA OF SKIN OF RIGHT UPPER EYELID, INCLUDING CANTHUS: Status: ACTIVE | Noted: 2019-06-13

## 2019-06-13 LAB
HCV RNA SERPL NAA+PROBE-ACNC: NOT DETECTED IU/ML
HCV RNA SERPL NAA+PROBE-LOG IU: NOT DETECTED LOG IU/ML
HCV RNA SERPL QL NAA+PROBE: NOT DETECTED

## 2019-06-13 PROCEDURE — ? ADDITIONAL NOTES

## 2019-06-13 PROCEDURE — ? PRESCRIPTION

## 2019-06-13 PROCEDURE — ? MOHS SURGERY

## 2019-06-13 PROCEDURE — 17312 MOHS ADDL STAGE: CPT

## 2019-06-13 PROCEDURE — ? DIAGNOSIS COMMENT

## 2019-06-13 PROCEDURE — ? REFERRAL CORRESPONDENCE

## 2019-06-13 PROCEDURE — 17311 MOHS 1 STAGE H/N/HF/G: CPT

## 2019-06-13 RX ORDER — HYDROCODONE BITARTRATE AND ACETAMINOPHEN 5; 325 MG/1; MG/1
TABLET ORAL
Qty: 4 | Refills: 0 | COMMUNITY
Start: 2019-06-13

## 2019-06-13 RX ADMIN — HYDROCODONE BITARTRATE AND ACETAMINOPHEN: 5; 325 TABLET ORAL at 00:00

## 2019-06-13 ASSESSMENT — LOCATION DETAILED DESCRIPTION DERM: LOCATION DETAILED: RIGHT LATERAL SUPERIOR EYELID

## 2019-06-13 ASSESSMENT — LOCATION SIMPLE DESCRIPTION DERM: LOCATION SIMPLE: RIGHT SUPERIOR EYELID

## 2019-06-13 ASSESSMENT — LOCATION ZONE DERM: LOCATION ZONE: EYELID

## 2019-06-13 NOTE — PROCEDURE: MOHS SURGERY
Subsequent Stages Histo Method Verbiage: Using a similar technique to that described above, a thin layer of tissue was removed from all areas where tumor was visible on the previous stage.  The tissue was again oriented, mapped, dyed, and processed as above.
Show Referring Provider Variable: Yes
Primary Defect Length In Cm (Final Defect Size - Required For Flaps/Grafts): 3.5
Stage 5: Additional Anesthesia Volume In Cc: 0
Consent (Near Eyelid Margin)/Introductory Paragraph: The rationale for Mohs was explained to the patient and consent was obtained. The risks, benefits and alternatives to therapy were discussed in detail. Specifically, the risks of ectropion or eyelid deformity, infection, scarring, bleeding, prolonged wound healing, incomplete removal, allergy to anesthesia, nerve injury and recurrence were addressed. Prior to the procedure, the treatment site was clearly identified and confirmed by the patient. All components of Universal Protocol/PAUSE Rule completed.
Cartilage Fenestration Performed?: No
Keystone Flap Text: The defect edges were debeveled with a #15 scalpel blade.  Given the location of the defect, shape of the defect a keystone flap was deemed most appropriate.  Using a sterile surgical marker, an appropriate keystone flap was drawn incorporating the defect, outlining the appropriate donor tissue and placing the expected incisions within the relaxed skin tension lines where possible. The area thus outlined was incised deep to adipose tissue with a #15 scalpel blade.  The skin margins were undermined to an appropriate distance in all directions around the primary defect and laterally outward around the flap utilizing iris scissors.
Localized Dermabrasion With Wire Brush Text: The patient was draped in routine manner.  Localized dermabrasion using 3 x 17 mm wire brush was performed in routine manner to papillary dermis. This spot dermabrasion is being performed to complete skin cancer reconstruction. It also will eliminate the other sun damaged precancerous cells that are known to be part of the regional effect of a lifetime's worth of sun exposure. This localized dermabrasion is therapeutic and should not be considered cosmetic in any regard.
Repair Performed By Another Provider Text (Leave Blank If You Do Not Want): After obtaining clear surgical margins the defect was repaired by another provider.
Stage 13: Additional Anesthesia Type: 1% lidocaine with epinephrine
Mid-Level Procedure Text (F): After obtaining clear surgical margins the patient was sent to a mid-level provider for surgical repair.  The patient understands they will receive post-surgical care and follow-up from the mid-level provider.
Asc Procedure Text (B): After obtaining clear surgical margins the patient was sent to an ASC for surgical repair.  The patient understands they will receive post-surgical care and follow-up from the ASC physician.
Crescentic Complex Repair Preamble Text (Leave Blank If You Do Not Want): Extensive wide undermining was performed.
Split-Thickness Skin Graft Text: The defect edges were debeveled with a #15 scalpel blade.  Given the location of the defect, shape of the defect and the proximity to free margins a split thickness skin graft was deemed most appropriate.  Using a sterile surgical marker, the primary defect shape was transferred to the donor site. The split thickness graft was then harvested.  The skin graft was then placed in the primary defect and oriented appropriately.
Melolabial Transposition Flap Text: The defect edges were debeveled with a #15 scalpel blade.  Given the location of the defect and the proximity to free margins a melolabial flap was deemed most appropriate.  Using a sterile surgical marker, an appropriate melolabial transposition flap was drawn incorporating the defect.    The area thus outlined was incised deep to adipose tissue with a #15 scalpel blade.  The skin margins were undermined to an appropriate distance in all directions utilizing iris scissors.
Consent (Scalp)/Introductory Paragraph: The rationale for Mohs was explained to the patient and consent was obtained. The risks, benefits and alternatives to therapy were discussed in detail. Specifically, the risks of changes in hair growth pattern secondary to repair, infection, scarring, bleeding, prolonged wound healing, incomplete removal, allergy to anesthesia, nerve injury and recurrence were addressed. Prior to the procedure, the treatment site was clearly identified and confirmed by the patient. All components of Universal Protocol/PAUSE Rule completed.
Plastic Surgeon Procedure Text (C): After obtaining clear surgical margins the patient was sent to plastics for surgical repair.  The patient understands they will receive post-surgical care and follow-up from the referring physician's office.
S Plasty Text: Given the location and shape of the defect, and the orientation of relaxed skin tension lines, an S-plasty was deemed most appropriate for repair.  Using a sterile surgical marker, the appropriate outline of the S-plasty was drawn, incorporating the defect and placing the expected incisions within the relaxed skin tension lines where possible.  The area thus outlined was incised deep to adipose tissue with a #15 scalpel blade.  The skin margins were undermined to an appropriate distance in all directions utilizing iris scissors. The skin flaps were advanced over the defect.  The opposing margins were then approximated with interrupted buried subcutaneous sutures.
Crescentic Advancement Flap Text: The defect edges were debeveled with a #15 scalpel blade.  Given the location of the defect and the proximity to free margins a crescentic advancement flap was deemed most appropriate.  Using a sterile surgical marker, the appropriate advancement flap was drawn incorporating the defect and placing the expected incisions within the relaxed skin tension lines where possible.    The area thus outlined was incised deep to adipose tissue with a #15 scalpel blade.  The skin margins were undermined to an appropriate distance in all directions utilizing iris scissors.
Bcc Infiltrative Histology Text: There were numerous aggregates of basaloid cells demonstrating an infiltrative pattern.
Dermal Autograft Text: The defect edges were debeveled with a #15 scalpel blade.  Given the location of the defect, shape of the defect and the proximity to free margins a dermal autograft was deemed most appropriate.  Using a sterile surgical marker, the primary defect shape was transferred to the donor site. The area thus outlined was incised deep to adipose tissue with a #15 scalpel blade.  The harvested graft was then trimmed of adipose and epidermal tissue until only dermis was left.  The skin graft was then placed in the primary defect and oriented appropriately.
Crescentic Intermediate Repair Preamble Text (Leave Blank If You Do Not Want): Undermining was performed with blunt dissection.
Helical Rim Advancement Flap Text: The defect edges were debeveled with a #15 blade scalpel.  Given the location of the defect and the proximity to free margins (helical rim) a double helical rim advancement flap was deemed most appropriate.  Using a sterile surgical marker, the appropriate advancement flaps were drawn incorporating the defect and placing the expected incisions between the helical rim and antihelix where possible.  The area thus outlined was incised through and through with a #15 scalpel blade.  With a skin hook and iris scissors, the flaps were gently and sharply undermined and freed up.
Tumor Debulked?: curette
Wound Care (No Sutures): Petrolatum
Cheek Interpolation Flap Text: A decision was made to reconstruct the defect utilizing an interpolation axial flap and a staged reconstruction.  A telfa template was made of the defect.  This telfa template was then used to outline the Cheek Interpolation flap.  The donor area for the pedicle flap was then injected with anesthesia.  The flap was excised through the skin and subcutaneous tissue down to the layer of the underlying musculature.  The interpolation flap was carefully excised within this deep plane to maintain its blood supply.  The edges of the donor site were undermined.   The donor site was closed in a primary fashion.  The pedicle was then rotated into position and sutured.  Once the tube was sutured into place, adequate blood supply was confirmed with blanching and refill.  The pedicle was then wrapped with xeroform gauze and dressed appropriately with a telfa and gauze bandage to ensure continued blood supply and protect the attached pedicle.
Double O-Z Flap Text: The defect edges were debeveled with a #15 scalpel blade.  Given the location of the defect, shape of the defect and the proximity to free margins a Double O-Z flap was deemed most appropriate.  Using a sterile surgical marker, an appropriate transposition flap was drawn incorporating the defect and placing the expected incisions within the relaxed skin tension lines where possible. The area thus outlined was incised deep to adipose tissue with a #15 scalpel blade.  The skin margins were undermined to an appropriate distance in all directions utilizing iris scissors.
Retention Suture Bite Size: 3 mm
Hemostasis: Electricator
Medical Necessity Statement: Based on my medical judgement, Mohs surgery is the most appropriate treatment for this cancer compared to other treatments.
Otolaryngologist Procedure Text (E): After obtaining clear surgical margins the patient was sent to otolaryngology for surgical repair.  The patient understands they will receive post-surgical care and follow-up from the referring physician's office.
Bilobed Transposition Flap Text: The defect edges were debeveled with a #15 scalpel blade.  Given the location of the defect and the proximity to free margins a bilobed transposition flap was deemed most appropriate.  Using a sterile surgical marker, an appropriate bilobe flap drawn around the defect.    The area thus outlined was incised deep to adipose tissue with a #15 scalpel blade.  The skin margins were undermined to an appropriate distance in all directions utilizing iris scissors.
Stage 3: Additional Anesthesia Volume In Cc: 3
Epidermal Closure: simple interrupted
Graft Cartilage Fenestration Text: The cartilage was fenestrated with a 2mm punch biopsy to help facilitate graft survival and healing.
Oculoplastic Surgeon Procedure Text (B): After obtaining clear surgical margins the patient was sent to oculoplastics for surgical repair.  The patient understands they will receive post-surgical care and follow-up from the referring physician's office.
Stage 2: Number Of Blocks?: 1
Modified Advancement Flap Text: The defect edges were debeveled with a #15 scalpel blade.  Given the location of the defect, shape of the defect and the proximity to free margins a modified advancement flap was deemed most appropriate.  Using a sterile surgical marker, an appropriate advancement flap was drawn incorporating the defect and placing the expected incisions within the relaxed skin tension lines where possible.    The area thus outlined was incised deep to adipose tissue with a #15 scalpel blade.  The skin margins were undermined to an appropriate distance in all directions utilizing iris scissors.
Mastoid Interpolation Flap Text: A decision was made to reconstruct the defect utilizing an interpolation axial flap and a staged reconstruction.  A telfa template was made of the defect.  This telfa template was then used to outline the mastoid interpolation flap.  The donor area for the pedicle flap was then injected with anesthesia.  The flap was excised through the skin and subcutaneous tissue down to the layer of the underlying musculature.  The pedicle flap was carefully excised within this deep plane to maintain its blood supply.  The edges of the donor site were undermined.   The donor site was closed in a primary fashion.  The pedicle was then rotated into position and sutured.  Once the tube was sutured into place, adequate blood supply was confirmed with blanching and refill.  The pedicle was then wrapped with xeroform gauze and dressed appropriately with a telfa and gauze bandage to ensure continued blood supply and protect the attached pedicle.
Surgeon/Pathologist Verbiage (Will Incorporate Name Of Surgeon From Intro If Not Blank): operated in two distinct and integrated capacities as the surgeon and pathologist.
Purse String (Simple) Text: Given the location of the defect and the characteristics of the surrounding skin a purse string closure was deemed most appropriate.  Undermining was performed circumfirentially around the surgical defect.  A purse string suture was then placed and tightened.
Surgeon Performing Repair (Optional): Cecilio De Paz M.D.
Consent 3/Introductory Paragraph: I gave the patient a chance to ask questions they had about the procedure.  Following this I explained the Mohs procedure and consent was obtained. The risks, benefits and alternatives to therapy were discussed in detail. Specifically, the risks of infection, scarring, bleeding, prolonged wound healing, incomplete removal, allergy to anesthesia, nerve injury and recurrence were addressed. Prior to the procedure, the treatment site was clearly identified and confirmed by the patient. All components of Universal Protocol/PAUSE Rule completed.
Island Pedicle Flap With Canthal Suspension Text: The defect edges were debeveled with a #15 scalpel blade.  Given the location of the defect, shape of the defect and the proximity to free margins an island pedicle advancement flap was deemed most appropriate.  Using a sterile surgical marker, an appropriate advancement flap was drawn incorporating the defect, outlining the appropriate donor tissue and placing the expected incisions within the relaxed skin tension lines where possible. The area thus outlined was incised deep to adipose tissue with a #15 scalpel blade.  The skin margins were undermined to an appropriate distance in all directions around the primary defect and laterally outward around the island pedicle utilizing iris scissors.  There was minimal undermining beneath the pedicle flap. A suspension suture was placed in the canthal tendon to prevent tension and prevent ectropion.
Graft Donor Site Bandage (Optional-Leave Blank If You Don't Want In Note): Steri-strips and a pressure bandage were applied to the donor site.
No Residual Tumor Seen Histology Text: There were no malignant cells seen in the sections examined.
Cheiloplasty (Complex) Text: A decision was made to reconstruct the defect with a  cheiloplasty.  The defect was undermined extensively.  Additional obicularis oris muscle was excised with a 15 blade scalpel.  The defect was converted into a full thickness wedge to facilite a better cosmetic result.  Small vessels were then tied off with 5-0 monocyrl. The obicularis oris, superficial fascia, adipose and dermis were then reapproximated.  After the deeper layers were approximated the epidermis was reapproximated with particular care given to realign the vermilion border.
Spiral Flap Text: The defect edges were debeveled with a #15 scalpel blade.  Given the location of the defect, shape of the defect and the proximity to free margins a spiral flap was deemed most appropriate.  Using a sterile surgical marker, an appropriate rotation flap was drawn incorporating the defect and placing the expected incisions within the relaxed skin tension lines where possible. The area thus outlined was incised deep to adipose tissue with a #15 scalpel blade.  The skin margins were undermined to an appropriate distance in all directions utilizing iris scissors.
Inflammation Suggestive Of Cancer Camouflage Histology Text: There was a dense lymphocytic infiltrate which prevented adequate histologic evaluation of adjacent structures.
Deep Sutures: 5-0 Vicryl
Surgical Defect Length In Cm (Optional): 2.9
Mohs Rapid Report Verbiage: The area of clinically evident tumor was marked with skin marking ink and appropriately hatched.  The initial incision was made following the Mohs approach through the skin.  The specimen was taken to the lab, divided into the necessary number of pieces, chromacoded and processed according to the Mohs protocol.  This was repeated in successive stages until a tumor free defect was achieved.
Ear Wedge Repair Text: A wedge excision was completed by carrying down an excision through the full thickness of the ear and cartilage with an inward facing Burow's triangle. The wound was then closed in a layered fashion.
Star Wedge Flap Text: The defect edges were debeveled with a #15 scalpel blade.  Given the location of the defect, shape of the defect and the proximity to free margins a star wedge flap was deemed most appropriate.  Using a sterile surgical marker, an appropriate rotation flap was drawn incorporating the defect and placing the expected incisions within the relaxed skin tension lines where possible. The area thus outlined was incised deep to adipose tissue with a #15 scalpel blade.  The skin margins were undermined to an appropriate distance in all directions utilizing iris scissors.
Consent (Ear)/Introductory Paragraph: The rationale for Mohs was explained to the patient and consent was obtained. The risks, benefits and alternatives to therapy were discussed in detail. Specifically, the risks of ear deformity, infection, scarring, bleeding, prolonged wound healing, incomplete removal, allergy to anesthesia, nerve injury and recurrence were addressed. Prior to the procedure, the treatment site was clearly identified and confirmed by the patient. All components of Universal Protocol/PAUSE Rule completed.
O-T Plasty Text: The defect edges were debeveled with a #15 scalpel blade.  Given the location of the defect, shape of the defect and the proximity to free margins an O-T plasty was deemed most appropriate.  Using a sterile surgical marker, an appropriate O-T plasty was drawn incorporating the defect and placing the expected incisions within the relaxed skin tension lines where possible.    The area thus outlined was incised deep to adipose tissue with a #15 scalpel blade.  The skin margins were undermined to an appropriate distance in all directions utilizing iris scissors.
Tarsorrhaphy Text: A tarsorrhaphy was performed using Frost sutures.
Advancement Flap (Single) Text: The defect edges were debeveled with a #15 scalpel blade.  Given the location of the defect and the proximity to free margins a single advancement flap was deemed most appropriate.  Using a sterile surgical marker, an appropriate advancement flap was drawn incorporating the defect and placing the expected incisions within the relaxed skin tension lines where possible.    The area thus outlined was incised deep to adipose tissue with a #15 scalpel blade.  The skin margins were undermined to an appropriate distance in all directions utilizing iris scissors.
Cartilage Graft Text: The defect edges were debeveled with a #15 scalpel blade.  Given the location of the defect, shape of the defect, the fact the defect involved a full thickness cartilage defect a cartilage graft was deemed most appropriate.  An appropriate donor site was identified, cleansed, and anesthetized. The cartilage graft was then harvested and transferred to the recipient site, oriented appropriately and then sutured into place.  The secondary defect was then repaired using a primary closure.
Mohs Case Number: [id number]
Rhombic Flap Text: The defect edges were debeveled with a #15 scalpel blade.  Given the location of the defect and the proximity to free margins a rhombic flap was deemed most appropriate.  Using a sterile surgical marker, an appropriate rhombic flap was drawn incorporating the defect.    The area thus outlined was incised deep to adipose tissue with a #15 scalpel blade.  The skin margins were undermined to an appropriate distance in all directions utilizing iris scissors.
Area H Indication Text: Tumors in this location are included in Area H (eyelids, eyebrows, nose, lips, chin, ear, pre-auricular, post-auricular, temple, genitalia, hands, feet, ankles and areola).  Tissue conservation is critical in these anatomic locations.
Dressing: pressure dressing with telfa
Detail Level: Detailed
V-Y Plasty Text: The defect edges were debeveled with a #15 scalpel blade.  Given the location of the defect, shape of the defect and the proximity to free margins an V-Y advancement flap was deemed most appropriate.  Using a sterile surgical marker, an appropriate advancement flap was drawn incorporating the defect and placing the expected incisions within the relaxed skin tension lines where possible.    The area thus outlined was incised deep to adipose tissue with a #15 scalpel blade.  The skin margins were undermined to an appropriate distance in all directions utilizing iris scissors.
A-T Advancement Flap Text: The defect edges were debeveled with a #15 scalpel blade.  Given the location of the defect, shape of the defect and the proximity to free margins an A-T advancement flap was deemed most appropriate.  Using a sterile surgical marker, an appropriate advancement flap was drawn incorporating the defect and placing the expected incisions within the relaxed skin tension lines where possible.    The area thus outlined was incised deep to adipose tissue with a #15 scalpel blade.  The skin margins were undermined to an appropriate distance in all directions utilizing iris scissors.
Anesthesia Volume In Cc: 8
Skin Substitute Text: The defect edges were debeveled with a #15 scalpel blade.  Given the location of the defect, shape of the defect and the proximity to free margins a skin substitute graft was deemed most appropriate.  The graft material was trimmed to fit the size of the defect. The graft was then placed in the primary defect and oriented appropriately.
Bilateral Helical Rim Advancement Flap Text: The defect edges were debeveled with a #15 blade scalpel.  Given the location of the defect and the proximity to free margins (helical rim) a bilateral helical rim advancement flap was deemed most appropriate.  Using a sterile surgical marker, the appropriate advancement flaps were drawn incorporating the defect and placing the expected incisions between the helical rim and antihelix where possible.  The area thus outlined was incised through and through with a #15 scalpel blade.  With a skin hook and iris scissors, the flaps were gently and sharply undermined and freed up.
Tissue Cultured Epidermal Autograft Text: The defect edges were debeveled with a #15 scalpel blade.  Given the location of the defect, shape of the defect and the proximity to free margins a tissue cultured epidermal autograft was deemed most appropriate.  The graft was then trimmed to fit the size of the defect.  The graft was then placed in the primary defect and oriented appropriately.
Ear Star Wedge Flap Text: The defect edges were debeveled with a #15 blade scalpel.  Given the location of the defect and the proximity to free margins (helical rim) an ear star wedge flap was deemed most appropriate.  Using a sterile surgical marker, the appropriate flap was drawn incorporating the defect and placing the expected incisions between the helical rim and antihelix where possible.  The area thus outlined was incised through and through with a #15 scalpel blade.
V-Y Flap Text: The defect edges were debeveled with a #15 scalpel blade.  Given the location of the defect, shape of the defect and the proximity to free margins a V-Y flap was deemed most appropriate.  Using a sterile surgical marker, an appropriate advancement flap was drawn incorporating the defect and placing the expected incisions within the relaxed skin tension lines where possible.    The area thus outlined was incised deep to adipose tissue with a #15 scalpel blade.  The skin margins were undermined to an appropriate distance in all directions utilizing iris scissors.
Cheek-To-Nose Interpolation Flap Text: A decision was made to reconstruct the defect utilizing an interpolation axial flap and a staged reconstruction.  A telfa template was made of the defect.  This telfa template was then used to outline the Cheek-To-Nose Interpolation flap.  The donor area for the pedicle flap was then injected with anesthesia.  The flap was excised through the skin and subcutaneous tissue down to the layer of the underlying musculature.  The interpolation flap was carefully excised within this deep plane to maintain its blood supply.  The edges of the donor site were undermined.   The donor site was closed in a primary fashion.  The pedicle was then rotated into position and sutured.  Once the tube was sutured into place, adequate blood supply was confirmed with blanching and refill.  The pedicle was then wrapped with xeroform gauze and dressed appropriately with a telfa and gauze bandage to ensure continued blood supply and protect the attached pedicle.
Stage 3: Additional Anesthesia Type: 1% lidocaine with epinephrine and a 1:10 solution of 8.4% sodium bicarbonate
Alternatives Discussed Intro (Do Not Add Period): I discussed alternative treatments to Mohs surgery and specifically discussed the risks and benefits of
Secondary Intention Text (Leave Blank If You Do Not Want): The defect will heal with secondary intention.
Trilobed Flap Text: The defect edges were debeveled with a #15 scalpel blade.  Given the location of the defect and the proximity to free margins a trilobed flap was deemed most appropriate.  Using a sterile surgical marker, an appropriate trilobed flap drawn around the defect.    The area thus outlined was incised deep to adipose tissue with a #15 scalpel blade.  The skin margins were undermined to an appropriate distance in all directions utilizing iris scissors.
Depth Of Tumor Invasion (For Histology): dermis
Closure 4 Information: This tab is for additional flaps and grafts above and beyond our usual structured repairs.  Please note if you enter information here it will not currently bill and you will need to add the billing information manually.
Posterior Auricular Interpolation Flap Text: A decision was made to reconstruct the defect utilizing an interpolation axial flap and a staged reconstruction.  A telfa template was made of the defect.  This telfa template was then used to outline the posterior auricular interpolation flap.  The donor area for the pedicle flap was then injected with anesthesia.  The flap was excised through the skin and subcutaneous tissue down to the layer of the underlying musculature.  The pedicle flap was carefully excised within this deep plane to maintain its blood supply.  The edges of the donor site were undermined.   The donor site was closed in a primary fashion.  The pedicle was then rotated into position and sutured.  Once the tube was sutured into place, adequate blood supply was confirmed with blanching and refill.  The pedicle was then wrapped with xeroform gauze and dressed appropriately with a telfa and gauze bandage to ensure continued blood supply and protect the attached pedicle.
Mucosal Advancement Flap Text: Given the location of the defect, shape of the defect and the proximity to free margins a mucosal advancement flap was deemed most appropriate. Incisions were made with a 15 blade scalpel in the appropriate fashion along the cutaneous vermilion border and the mucosal lip. The remaining actinically damaged mucosal tissue was excised.  The mucosal advancement flap was then elevated to the gingival sulcus with care taken to preserve the neurovascular structures and advanced into the primary defect. Care was taken to ensure that precise realignment of the vermilion border was achieved.
Mohs Histo Method Verbiage: Each section was then chromacoded and processed in the Mohs lab using the Mohs protocol and submitted for frozen section.
Consent (Temporal Branch)/Introductory Paragraph: The rationale for Mohs was explained to the patient and consent was obtained. The risks, benefits and alternatives to therapy were discussed in detail. Specifically, the risks of damage to the temporal branch of the facial nerve, infection, scarring, bleeding, prolonged wound healing, incomplete removal, allergy to anesthesia, and recurrence were addressed. Prior to the procedure, the treatment site was clearly identified and confirmed by the patient. All components of Universal Protocol/PAUSE Rule completed.
Alar Island Pedicle Flap Text: The defect edges were debeveled with a #15 scalpel blade.  Given the location of the defect, shape of the defect and the proximity to the alar rim an island pedicle advancement flap was deemed most appropriate.  Using a sterile surgical marker, an appropriate advancement flap was drawn incorporating the defect, outlining the appropriate donor tissue and placing the expected incisions within the nasal ala running parallel to the alar rim. The area thus outlined was incised with a #15 scalpel blade.  The skin margins were undermined minimally to an appropriate distance in all directions around the primary defect and laterally outward around the island pedicle utilizing iris scissors.  There was minimal undermining beneath the pedicle flap.
Suturegard Intro: Intraoperative tissue expansion was performed, utilizing the SUTUREGARD device, in order to reduce wound tension.
Purse String (Intermediate) Text: Given the location of the defect and the characteristics of the surrounding skin a purse string intermediate closure was deemed most appropriate.  Undermining was performed circumfirentially around the surgical defect.  A purse string suture was then placed and tightened.
Presence Of Scar Tissue (For Histology): absent
Unna Boot Text: An Unna boot was placed to help immobilize the limb and facilitate more rapid healing.
Full Thickness Lip Wedge Repair (Flap) Text: Given the location of the defect and the proximity to free margins a full thickness wedge repair was deemed most appropriate.  Using a sterile surgical marker, the appropriate repair was drawn incorporating the defect and placing the expected incisions perpendicular to the vermilion border.  The vermilion border was also meticulously outlined to ensure appropriate reapproximation during the repair.  The area thus outlined was incised through and through with a #15 scalpel blade.  The muscularis and dermis were reaproximated with deep sutures following hemostasis. Care was taken to realign the vermilion border before proceeding with the superficial closure.  Once the vermilion was realigned the superfical and mucosal closure was finished.
Transposition Flap Text: The defect edges were debeveled with a #15 scalpel blade.  Given the location of the defect and the proximity to free margins a transposition flap was deemed most appropriate.  Using a sterile surgical marker, an appropriate transposition flap was drawn incorporating the defect.    The area thus outlined was incised deep to adipose tissue with a #15 scalpel blade.  The skin margins were undermined to an appropriate distance in all directions utilizing iris scissors.
Complex Repair And Flap Additional Text (Will Appearing After The Standard Complex Repair Text): The complex repair was not sufficient to completely close the primary defect. The remaining additional defect was repaired with the flap mentioned below.
Repair Type: Referred to oculoplastics for closure
Consent (Nose)/Introductory Paragraph: The rationale for Mohs was explained to the patient and consent was obtained. The risks, benefits and alternatives to therapy were discussed in detail. Specifically, the risks of nasal deformity, changes in the flow of air through the nose, infection, scarring, bleeding, prolonged wound healing, incomplete removal, allergy to anesthesia, nerve injury and recurrence were addressed. Prior to the procedure, the treatment site was clearly identified and confirmed by the patient. All components of Universal Protocol/PAUSE Rule completed.
O-Z Plasty Text: The defect edges were debeveled with a #15 scalpel blade.  Given the location of the defect, shape of the defect and the proximity to free margins an O-Z plasty (double transposition flap) was deemed most appropriate.  Using a sterile surgical marker, the appropriate transposition flaps were drawn incorporating the defect and placing the expected incisions within the relaxed skin tension lines where possible.    The area thus outlined was incised deep to adipose tissue with a #15 scalpel blade.  The skin margins were undermined to an appropriate distance in all directions utilizing iris scissors.  Hemostasis was achieved with electrocautery.  The flaps were then transposed into place, one clockwise and the other counterclockwise, and anchored with interrupted buried subcutaneous sutures.
Post-Care Instructions: I reviewed with the patient in detail post-care instructions. Patient is not to engage in any heavy lifting, exercise, or swimming for the next 14 days. Should the patient develop any fevers, chills, bleeding, severe pain patient will contact the office immediately.
Advancement Flap (Double) Text: The defect edges were debeveled with a #15 scalpel blade.  Given the location of the defect and the proximity to free margins a double advancement flap was deemed most appropriate.  Using a sterile surgical marker, the appropriate advancement flaps were drawn incorporating the defect and placing the expected incisions within the relaxed skin tension lines where possible.    The area thus outlined was incised deep to adipose tissue with a #15 scalpel blade.  The skin margins were undermined to an appropriate distance in all directions utilizing iris scissors.
Composite Graft Text: The defect edges were debeveled with a #15 scalpel blade.  Given the location of the defect, shape of the defect, the proximity to free margins and the fact the defect was full thickness a composite graft was deemed most appropriate.  The defect was outline and then transferred to the donor site.  A full thickness graft was then excised from the donor site. The graft was then placed in the primary defect, oriented appropriately and then sutured into place.  The secondary defect was then repaired using a primary closure.
Rhomboid Transposition Flap Text: The defect edges were debeveled with a #15 scalpel blade.  Given the location of the defect and the proximity to free margins a rhomboid transposition flap was deemed most appropriate.  Using a sterile surgical marker, an appropriate rhomboid flap was drawn incorporating the defect.    The area thus outlined was incised deep to adipose tissue with a #15 scalpel blade.  The skin margins were undermined to an appropriate distance in all directions utilizing iris scissors.
Area M Indication Text: Tumors in this location are included in Area M (cheek, forehead, scalp, neck, jawline and pretibial skin).  Mohs surgery is indicated for tumors in these anatomic locations.
H Plasty Text: Given the location of the defect, shape of the defect and the proximity to free margins a H-plasty was deemed most appropriate for repair.  Using a sterile surgical marker, the appropriate advancement arms of the H-plasty were drawn incorporating the defect and placing the expected incisions within the relaxed skin tension lines where possible. The area thus outlined was incised deep to adipose tissue with a #15 scalpel blade. The skin margins were undermined to an appropriate distance in all directions utilizing iris scissors.  The opposing advancement arms were then advanced into place in opposite direction and anchored with interrupted buried subcutaneous sutures.
O-T Advancement Flap Text: The defect edges were debeveled with a #15 scalpel blade.  Given the location of the defect, shape of the defect and the proximity to free margins an O-T advancement flap was deemed most appropriate.  Using a sterile surgical marker, an appropriate advancement flap was drawn incorporating the defect and placing the expected incisions within the relaxed skin tension lines where possible.    The area thus outlined was incised deep to adipose tissue with a #15 scalpel blade.  The skin margins were undermined to an appropriate distance in all directions utilizing iris scissors.
Referring Physician (Optional): Alex Hamm MD
Suturegard Retention Suture: 2-0 Nylon
Mauc Instructions: By selecting yes to the question below the MAUC number will be added into the note.  This will be calculated automatically based on the diagnosis chosen, the size entered, the body zone selected (H,M,L) and the specific indications you chose. You will also have the option to override the Mohs AUC if you disagree with the automatically calculated number and this option is found in the Case Summary tab.
Consent Type: Consent 1 (Standard)
Banner Transposition Flap Text: The defect edges were debeveled with a #15 scalpel blade.  Given the location of the defect and the proximity to free margins a Banner transposition flap was deemed most appropriate.  Using a sterile surgical marker, an appropriate flap drawn around the defect. The area thus outlined was incised deep to adipose tissue with a #15 scalpel blade.  The skin margins were undermined to an appropriate distance in all directions utilizing iris scissors.
Dressing (No Sutures): dry sterile dressing
Interpolation Flap Text: A decision was made to reconstruct the defect utilizing an interpolation axial flap and a staged reconstruction.  A telfa template was made of the defect.  This telfa template was then used to outline the interpolation flap.  The donor area for the pedicle flap was then injected with anesthesia.  The flap was excised through the skin and subcutaneous tissue down to the layer of the underlying musculature.  The interpolation flap was carefully excised within this deep plane to maintain its blood supply.  The edges of the donor site were undermined.   The donor site was closed in a primary fashion.  The pedicle was then rotated into position and sutured.  Once the tube was sutured into place, adequate blood supply was confirmed with blanching and refill.  The pedicle was then wrapped with xeroform gauze and dressed appropriately with a telfa and gauze bandage to ensure continued blood supply and protect the attached pedicle.
Anesthesia Volume In Cc: 6
Eye Protection Verbiage: Before proceeding with the stage, a plastic scleral shield was inserted. The globe was anesthetized with a few drops of 1% lidocaine with 1:100,000 epinephrine. Then, an appropriate sized scleral shield was chosen and coated with lacrilube ointment. The shield was gently inserted and left in place for the duration of each stage. After the stage was completed, the shield was gently removed.
Advancement-Rotation Flap Text: The defect edges were debeveled with a #15 scalpel blade.  Given the location of the defect, shape of the defect and the proximity to free margins an advancement-rotation flap was deemed most appropriate.  Using a sterile surgical marker, an appropriate flap was drawn incorporating the defect and placing the expected incisions within the relaxed skin tension lines where possible. The area thus outlined was incised deep to adipose tissue with a #15 scalpel blade.  The skin margins were undermined to an appropriate distance in all directions utilizing iris scissors.
Length To Time In Minutes Device Was In Place: 10
Estimated Blood Loss (Cc): minimal
Consent 1/Introductory Paragraph: The rationale for Mohs was explained to the patient and consent was obtained. The risks, benefits and alternatives to therapy were discussed in detail. Specifically, the risks of infection, scarring, bleeding, prolonged wound healing, incomplete removal, allergy to anesthesia, nerve injury and recurrence were addressed. Prior to the procedure, the treatment site was clearly identified and confirmed by the patient. All components of Universal Protocol/PAUSE Rule completed.
Dorsal Nasal Flap Text: The defect edges were debeveled with a #15 scalpel blade.  Given the location of the defect and the proximity to free margins a dorsal nasal flap was deemed most appropriate.  Using a sterile surgical marker, an appropriate dorsal nasal flap was drawn around the defect.    The area thus outlined was incised deep to adipose tissue with a #15 scalpel blade.  The skin margins were undermined to an appropriate distance in all directions utilizing iris scissors.
No Repair - Repaired With Adjacent Surgical Defect Text (Leave Blank If You Do Not Want): After obtaining clear surgical margins the defect was repaired concurrently with another surgical defect which was in close approximation.
Wound Check: 28 days
Hatchet Flap Text: The defect edges were debeveled with a #15 scalpel blade.  Given the location of the defect, shape of the defect and the proximity to free margins a hatchet flap was deemed most appropriate.  Using a sterile surgical marker, an appropriate hatchet flap was drawn incorporating the defect and placing the expected incisions within the relaxed skin tension lines where possible.    The area thus outlined was incised deep to adipose tissue with a #15 scalpel blade.  The skin margins were undermined to an appropriate distance in all directions utilizing iris scissors.
Paramedian Forehead Flap Text: A decision was made to reconstruct the defect utilizing an interpolation axial flap and a staged reconstruction.  A telfa template was made of the defect.  This telfa template was then used to outline the paramedian forehead pedicle flap.  The donor area for the pedicle flap was then injected with anesthesia.  The flap was excised through the skin and subcutaneous tissue down to the layer of the underlying musculature.  The pedicle flap was carefully excised within this deep plane to maintain its blood supply.  The edges of the donor site were undermined.   The donor site was closed in a primary fashion.  The pedicle was then rotated into position and sutured.  Once the tube was sutured into place, adequate blood supply was confirmed with blanching and refill.  The pedicle was then wrapped with xeroform gauze and dressed appropriately with a telfa and gauze bandage to ensure continued blood supply and protect the attached pedicle.
Consent (Marginal Mandibular)/Introductory Paragraph: The rationale for Mohs was explained to the patient and consent was obtained. The risks, benefits and alternatives to therapy were discussed in detail. Specifically, the risks of damage to the marginal mandibular branch of the facial nerve, infection, scarring, bleeding, prolonged wound healing, incomplete removal, allergy to anesthesia, and recurrence were addressed. Prior to the procedure, the treatment site was clearly identified and confirmed by the patient. All components of Universal Protocol/PAUSE Rule completed.
X Size Of Lesion In Cm (Optional): 0.5
Double Island Pedicle Flap Text: The defect edges were debeveled with a #15 scalpel blade.  Given the location of the defect, shape of the defect and the proximity to free margins a double island pedicle advancement flap was deemed most appropriate.  Using a sterile surgical marker, an appropriate advancement flap was drawn incorporating the defect, outlining the appropriate donor tissue and placing the expected incisions within the relaxed skin tension lines where possible.    The area thus outlined was incised deep to adipose tissue with a #15 scalpel blade.  The skin margins were undermined to an appropriate distance in all directions around the primary defect and laterally outward around the island pedicle utilizing iris scissors.  There was minimal undermining beneath the pedicle flap.
Suturegard Body: The suture ends were repeatedly re-tightened and re-clamped to achieve the desired tissue expansion.
Partial Purse String (Simple) Text: Given the location of the defect and the characteristics of the surrounding skin a simple purse string closure was deemed most appropriate.  Undermining was performed circumfirentially around the surgical defect.  A purse string suture was then placed and tightened. Wound tension only allowed a partial closure of the circular defect.
Donor Site Anesthesia Type: same as repair anesthesia
Partial Purse String (Intermediate) Text: Given the location of the defect and the characteristics of the surrounding skin an intermediate purse string closure was deemed most appropriate.  Undermining was performed circumfirentially around the surgical defect.  A purse string suture was then placed and tightened. Wound tension only allowed a partial closure of the circular defect.
Consent (Spinal Accessory)/Introductory Paragraph: The rationale for Mohs was explained to the patient and consent was obtained. The risks, benefits and alternatives to therapy were discussed in detail. Specifically, the risks of damage to the spinal accessory nerve, infection, scarring, bleeding, prolonged wound healing, incomplete removal, allergy to anesthesia, and recurrence were addressed. Prior to the procedure, the treatment site was clearly identified and confirmed by the patient. All components of Universal Protocol/PAUSE Rule completed.
Island Pedicle Flap-Requiring Vessel Identification Text: The defect edges were debeveled with a #15 scalpel blade.  Given the location of the defect, shape of the defect and the proximity to free margins an island pedicle advancement flap was deemed most appropriate.  Using a sterile surgical marker, an appropriate advancement flap was drawn, based on the axial vessel mentioned above, incorporating the defect, outlining the appropriate donor tissue and placing the expected incisions within the relaxed skin tension lines where possible.    The area thus outlined was incised deep to adipose tissue with a #15 scalpel blade.  The skin margins were undermined to an appropriate distance in all directions around the primary defect and laterally outward around the island pedicle utilizing iris scissors.  There was minimal undermining beneath the pedicle flap.
Home Suture Removal Text: Patient was provided instructions on removing sutures and will remove their sutures at home.  If they have any questions or difficulties they will call the office.
Closure 2 Information: This tab is for additional flaps and grafts, including complex repair and grafts and complex repair and flaps. You can also specify a different location for the additional defect, if the location is the same you do not need to select a new one. We will insert the automated text for the repair you select below just as we do for solitary flaps and grafts. Please note that at this time if you select a location with a different insurance zone you will need to override the ICD10 and CPT if appropriate.
Body Location Override (Optional - Billing Will Still Be Based On Selected Body Map Location If Applicable): right upper eyelid
Ftsg Text: The defect edges were debeveled with a #15 scalpel blade.  Given the location of the defect, shape of the defect and the proximity to free margins a full thickness skin graft was deemed most appropriate.  Using a sterile surgical marker, the primary defect shape was transferred to the donor site. The area thus outlined was incised deep to adipose tissue with a #15 scalpel blade.  The harvested graft was then trimmed of adipose tissue until only dermis and epidermis was left.  The skin margins of the secondary defect were undermined to an appropriate distance in all directions utilizing iris scissors.  The secondary defect was closed with interrupted buried subcutaneous sutures.  The skin edges were then re-apposed with running  sutures.  The skin graft was then placed in the primary defect and oriented appropriately.
Muscle Hinge Flap Text: The defect edges were debeveled with a #15 scalpel blade.  Given the size, depth and location of the defect and the proximity to free margins a muscle hinge flap was deemed most appropriate.  Using a sterile surgical marker, an appropriate hinge flap was drawn incorporating the defect. The area thus outlined was incised with a #15 scalpel blade.  The skin margins were undermined to an appropriate distance in all directions utilizing iris scissors.
Consent (Lip)/Introductory Paragraph: The rationale for Mohs was explained to the patient and consent was obtained. The risks, benefits and alternatives to therapy were discussed in detail. Specifically, the risks of lip deformity, changes in the oral aperture, infection, scarring, bleeding, prolonged wound healing, incomplete removal, allergy to anesthesia, nerve injury and recurrence were addressed. Prior to the procedure, the treatment site was clearly identified and confirmed by the patient. All components of Universal Protocol/PAUSE Rule completed.
Double O-Z Plasty Text: The defect edges were debeveled with a #15 scalpel blade.  Given the location of the defect, shape of the defect and the proximity to free margins a Double O-Z plasty (double transposition flap) was deemed most appropriate.  Using a sterile surgical marker, the appropriate transposition flaps were drawn incorporating the defect and placing the expected incisions within the relaxed skin tension lines where possible. The area thus outlined was incised deep to adipose tissue with a #15 scalpel blade.  The skin margins were undermined to an appropriate distance in all directions utilizing iris scissors.  Hemostasis was achieved with electrocautery.  The flaps were then transposed into place, one clockwise and the other counterclockwise, and anchored with interrupted buried subcutaneous sutures.
Complex Repair And Graft Additional Text (Will Appearing After The Standard Complex Repair Text): The complex repair was not sufficient to completely close the primary defect. The remaining additional defect was repaired with the graft mentioned below.
Burow's Advancement Flap Text: The defect edges were debeveled with a #15 scalpel blade.  Given the location of the defect and the proximity to free margins a Burow's advancement flap was deemed most appropriate.  Using a sterile surgical marker, the appropriate advancement flap was drawn incorporating the defect and placing the expected incisions within the relaxed skin tension lines where possible.    The area thus outlined was incised deep to adipose tissue with a #15 scalpel blade.  The skin margins were undermined to an appropriate distance in all directions utilizing iris scissors.
Bcc Histology Text: There were numerous aggregates of basaloid cells.
Epidermal Sutures: 6-0 Ethilon
Epidermal Autograft Text: The defect edges were debeveled with a #15 scalpel blade.  Given the location of the defect, shape of the defect and the proximity to free margins an epidermal autograft was deemed most appropriate.  Using a sterile surgical marker, the primary defect shape was transferred to the donor site. The epidermal graft was then harvested.  The skin graft was then placed in the primary defect and oriented appropriately.
Bi-Rhombic Flap Text: The defect edges were debeveled with a #15 scalpel blade.  Given the location of the defect and the proximity to free margins a bi-rhombic flap was deemed most appropriate.  Using a sterile surgical marker, an appropriate rhombic flap was drawn incorporating the defect. The area thus outlined was incised deep to adipose tissue with a #15 scalpel blade.  The skin margins were undermined to an appropriate distance in all directions utilizing iris scissors.
Area L Indication Text: Tumors in this location are included in Area L (trunk and extremities).  Mohs surgery is indicated for larger tumors, or tumors with aggressive histologic features, in these anatomic locations.
O-L Flap Text: The defect edges were debeveled with a #15 scalpel blade.  Given the location of the defect, shape of the defect and the proximity to free margins an O-L flap was deemed most appropriate.  Using a sterile surgical marker, an appropriate advancement flap was drawn incorporating the defect and placing the expected incisions within the relaxed skin tension lines where possible.    The area thus outlined was incised deep to adipose tissue with a #15 scalpel blade.  The skin margins were undermined to an appropriate distance in all directions utilizing iris scissors.
W Plasty Text: The lesion was extirpated to the level of the fat with a #15 scalpel blade.  Given the location of the defect, shape of the defect and the proximity to free margins a W-plasty was deemed most appropriate for repair.  Using a sterile surgical marker, the appropriate transposition arms of the W-plasty were drawn incorporating the defect and placing the expected incisions within the relaxed skin tension lines where possible.    The area thus outlined was incised deep to adipose tissue with a #15 scalpel blade.  The skin margins were undermined to an appropriate distance in all directions utilizing iris scissors.  The opposing transposition arms were then transposed into place in opposite direction and anchored with interrupted buried subcutaneous sutures.
Z Plasty Text: The lesion was extirpated to the level of the fat with a #15 scalpel blade.  Given the location of the defect, shape of the defect and the proximity to free margins a Z-plasty was deemed most appropriate for repair.  Using a sterile surgical marker, the appropriate transposition arms of the Z-plasty were drawn incorporating the defect and placing the expected incisions within the relaxed skin tension lines where possible.    The area thus outlined was incised deep to adipose tissue with a #15 scalpel blade.  The skin margins were undermined to an appropriate distance in all directions utilizing iris scissors.  The opposing transposition arms were then transposed into place in opposite direction and anchored with interrupted buried subcutaneous sutures.
O-Z Flap Text: The defect edges were debeveled with a #15 scalpel blade.  Given the location of the defect, shape of the defect and the proximity to free margins an O-Z flap was deemed most appropriate.  Using a sterile surgical marker, an appropriate transposition flap was drawn incorporating the defect and placing the expected incisions within the relaxed skin tension lines where possible. The area thus outlined was incised deep to adipose tissue with a #15 scalpel blade.  The skin margins were undermined to an appropriate distance in all directions utilizing iris scissors.
Information: Selecting Yes will display possible errors in your note based on the variables you have selected. This validation is only offered as a suggestion for you. PLEASE NOTE THAT THE VALIDATION TEXT WILL BE REMOVED WHEN YOU FINALIZE YOUR NOTE. IF YOU WANT TO FAX A PRELIMINARY NOTE YOU WILL NEED TO TOGGLE THIS TO 'NO' IF YOU DO NOT WANT IT IN YOUR FAXED NOTE.
Bilobed Flap Text: The defect edges were debeveled with a #15 scalpel blade.  Given the location of the defect and the proximity to free margins a bilobe flap was deemed most appropriate.  Using a sterile surgical marker, an appropriate bilobe flap drawn around the defect.    The area thus outlined was incised deep to adipose tissue with a #15 scalpel blade.  The skin margins were undermined to an appropriate distance in all directions utilizing iris scissors.
Non-Graft Cartilage Fenestration Text: The cartilage was fenestrated with a 2mm punch biopsy to help facilitate healing.
Postop Diagnosis: same
Manual Repair Warning Statement: We plan on removing the manually selected variable below in favor of our much easier automatic structured text blocks found in the previous tab. We decided to do this to help make the flow better and give you the full power of structured data. Manual selection is never going to be ideal in our platform and I would encourage you to avoid using manual selection from this point on, especially since I will be sunsetting this feature. It is important that you do one of two things with the customized text below. First, you can save all of the text in a word file so you can have it for future reference. Second, transfer the text to the appropriate area in the Library tab. Lastly, if there is a flap or graft type which we do not have you need to let us know right away so I can add it in before the variable is hidden. No need to panic, we plan to give you roughly 6 months to make the change.
Mohs Method Verbiage: An incision at a 45 degree angle following the standard Mohs approach was done and the specimen was harvested as a microscopic controlled layer.
Mercedes Flap Text: The defect edges were debeveled with a #15 scalpel blade.  Given the location of the defect, shape of the defect and the proximity to free margins a Mercedes flap was deemed most appropriate.  Using a sterile surgical marker, an appropriate advancement flap was drawn incorporating the defect and placing the expected incisions within the relaxed skin tension lines where possible. The area thus outlined was incised deep to adipose tissue with a #15 scalpel blade.  The skin margins were undermined to an appropriate distance in all directions utilizing iris scissors.
Melolabial Interpolation Flap Text: A decision was made to reconstruct the defect utilizing an interpolation axial flap and a staged reconstruction.  A telfa template was made of the defect.  This telfa template was then used to outline the melolabial interpolation flap.  The donor area for the pedicle flap was then injected with anesthesia.  The flap was excised through the skin and subcutaneous tissue down to the layer of the underlying musculature.  The pedicle flap was carefully excised within this deep plane to maintain its blood supply.  The edges of the donor site were undermined.   The donor site was closed in a primary fashion.  The pedicle was then rotated into position and sutured.  Once the tube was sutured into place, adequate blood supply was confirmed with blanching and refill.  The pedicle was then wrapped with xeroform gauze and dressed appropriately with a telfa and gauze bandage to ensure continued blood supply and protect the attached pedicle.
Location Indication Override (Is Already Calculated Based On Selected Body Location): Area H
Consent 2/Introductory Paragraph: Mohs surgery was explained to the patient and consent was obtained. The risks, benefits and alternatives to therapy were discussed in detail. Specifically, the risks of infection, scarring, bleeding, prolonged wound healing, incomplete removal, allergy to anesthesia, nerve injury and recurrence were addressed. Prior to the procedure, the treatment site was clearly identified and confirmed by the patient. All components of Universal Protocol/PAUSE Rule completed.
Island Pedicle Flap Text: The defect edges were debeveled with a #15 scalpel blade.  Given the location of the defect, shape of the defect and the proximity to free margins an island pedicle advancement flap was deemed most appropriate.  Using a sterile surgical marker, an appropriate advancement flap was drawn incorporating the defect, outlining the appropriate donor tissue and placing the expected incisions within the relaxed skin tension lines where possible.    The area thus outlined was incised deep to adipose tissue with a #15 scalpel blade.  The skin margins were undermined to an appropriate distance in all directions around the primary defect and laterally outward around the island pedicle utilizing iris scissors.  There was minimal undermining beneath the pedicle flap.
Xenograft Text: The defect edges were debeveled with a #15 scalpel blade.  Given the location of the defect, shape of the defect and the proximity to free margins a xenograft was deemed most appropriate.  The graft was then trimmed to fit the size of the defect.  The graft was then placed in the primary defect and oriented appropriately.
Same Histology In Subsequent Stages Text: The pattern and morphology of the tumor is as described in the first stage.
Rotation Flap Text: The defect edges were debeveled with a #15 scalpel blade.  Given the location of the defect, shape of the defect and the proximity to free margins a rotation flap was deemed most appropriate.  Using a sterile surgical marker, an appropriate rotation flap was drawn incorporating the defect and placing the expected incisions within the relaxed skin tension lines where possible.    The area thus outlined was incised deep to adipose tissue with a #15 scalpel blade.  The skin margins were undermined to an appropriate distance in all directions utilizing iris scissors.
Cheiloplasty (Less Than 50%) Text: A decision was made to reconstruct the defect with a  cheiloplasty.  The defect was undermined extensively.  Additional obicularis oris muscle was excised with a 15 blade scalpel.  The defect was converted into a full thickness wedge, of less than 50% of the vertical height of the lip, to facilite a better cosmetic result.  Small vessels were then tied off with 5-0 monocyrl. The obicularis oris, superficial fascia, adipose and dermis were then reapproximated.  After the deeper layers were approximated the epidermis was reapproximated with particular care given to realign the vermilion border.

## 2019-06-13 NOTE — PROCEDURE: ADDITIONAL NOTES
Additional Notes: 1 tab norco 5/325mg  given at 1130am for pain at 1135am by Mali Vizcarra RN.
Detail Level: Simple

## 2019-06-13 NOTE — HPI: PROCEDURE (MOHS)
Has The Growth Been Previously Biopsied?: has been previously biopsied
Body Location Override (Optional): Right upper eyelid

## 2019-08-15 ENCOUNTER — APPOINTMENT (OUTPATIENT)
Dept: RADIOLOGY | Facility: MEDICAL CENTER | Age: 64
End: 2019-08-15
Attending: HOSPITALIST
Payer: MEDICARE

## 2019-08-15 ENCOUNTER — HOSPITAL ENCOUNTER (OUTPATIENT)
Facility: MEDICAL CENTER | Age: 64
End: 2019-08-16
Attending: EMERGENCY MEDICINE | Admitting: HOSPITALIST
Payer: MEDICARE

## 2019-08-15 ENCOUNTER — APPOINTMENT (OUTPATIENT)
Dept: RADIOLOGY | Facility: MEDICAL CENTER | Age: 64
End: 2019-08-15
Attending: EMERGENCY MEDICINE
Payer: MEDICARE

## 2019-08-15 ENCOUNTER — OFFICE VISIT (OUTPATIENT)
Dept: URGENT CARE | Facility: CLINIC | Age: 64
End: 2019-08-15
Payer: MEDICARE

## 2019-08-15 VITALS
HEART RATE: 73 BPM | HEIGHT: 66 IN | BODY MASS INDEX: 26.03 KG/M2 | WEIGHT: 162 LBS | RESPIRATION RATE: 14 BRPM | DIASTOLIC BLOOD PRESSURE: 70 MMHG | SYSTOLIC BLOOD PRESSURE: 128 MMHG | TEMPERATURE: 98.1 F | OXYGEN SATURATION: 97 %

## 2019-08-15 DIAGNOSIS — R25.2 MUSCLE CRAMPS: ICD-10-CM

## 2019-08-15 DIAGNOSIS — R07.9 CHEST PAIN, UNSPECIFIED TYPE: ICD-10-CM

## 2019-08-15 DIAGNOSIS — N17.9 AKI (ACUTE KIDNEY INJURY) (HCC): ICD-10-CM

## 2019-08-15 LAB
ALBUMIN SERPL BCP-MCNC: 4.4 G/DL (ref 3.2–4.9)
ALBUMIN/GLOB SERPL: 1.1 G/DL
ALP SERPL-CCNC: 67 U/L (ref 30–99)
ALT SERPL-CCNC: 19 U/L (ref 2–50)
ANION GAP SERPL CALC-SCNC: 9 MMOL/L (ref 0–11.9)
AST SERPL-CCNC: 21 U/L (ref 12–45)
BASOPHILS # BLD AUTO: 0.6 % (ref 0–1.8)
BASOPHILS # BLD: 0.05 K/UL (ref 0–0.12)
BILIRUB SERPL-MCNC: 0.5 MG/DL (ref 0.1–1.5)
BUN SERPL-MCNC: 25 MG/DL (ref 8–22)
CALCIUM SERPL-MCNC: 9.3 MG/DL (ref 8.5–10.5)
CHLORIDE SERPL-SCNC: 99 MMOL/L (ref 96–112)
CK SERPL-CCNC: 303 U/L (ref 0–154)
CO2 SERPL-SCNC: 26 MMOL/L (ref 20–33)
CREAT SERPL-MCNC: 1.83 MG/DL (ref 0.5–1.4)
EKG IMPRESSION: NORMAL
EOSINOPHIL # BLD AUTO: 0.24 K/UL (ref 0–0.51)
EOSINOPHIL NFR BLD: 2.9 % (ref 0–6.9)
ERYTHROCYTE [DISTWIDTH] IN BLOOD BY AUTOMATED COUNT: 43.6 FL (ref 35.9–50)
ETHANOL BLD-MCNC: 0 G/DL
GLOBULIN SER CALC-MCNC: 3.9 G/DL (ref 1.9–3.5)
GLUCOSE SERPL-MCNC: 99 MG/DL (ref 65–99)
HCT VFR BLD AUTO: 44.5 % (ref 42–52)
HGB BLD-MCNC: 15.5 G/DL (ref 14–18)
IMM GRANULOCYTES # BLD AUTO: 0.07 K/UL (ref 0–0.11)
IMM GRANULOCYTES NFR BLD AUTO: 0.8 % (ref 0–0.9)
LYMPHOCYTES # BLD AUTO: 2.69 K/UL (ref 1–4.8)
LYMPHOCYTES NFR BLD: 32.1 % (ref 22–41)
MCH RBC QN AUTO: 31.4 PG (ref 27–33)
MCHC RBC AUTO-ENTMCNC: 34.8 G/DL (ref 33.7–35.3)
MCV RBC AUTO: 90.3 FL (ref 81.4–97.8)
MONOCYTES # BLD AUTO: 1.04 K/UL (ref 0–0.85)
MONOCYTES NFR BLD AUTO: 12.4 % (ref 0–13.4)
NEUTROPHILS # BLD AUTO: 4.3 K/UL (ref 1.82–7.42)
NEUTROPHILS NFR BLD: 51.2 % (ref 44–72)
NRBC # BLD AUTO: 0 K/UL
NRBC BLD-RTO: 0 /100 WBC
PLATELET # BLD AUTO: 205 K/UL (ref 164–446)
PMV BLD AUTO: 9.8 FL (ref 9–12.9)
POTASSIUM SERPL-SCNC: 4.2 MMOL/L (ref 3.6–5.5)
PROT SERPL-MCNC: 8.3 G/DL (ref 6–8.2)
RBC # BLD AUTO: 4.93 M/UL (ref 4.7–6.1)
SODIUM SERPL-SCNC: 134 MMOL/L (ref 135–145)
TROPONIN T SERPL-MCNC: 18 NG/L (ref 6–19)
WBC # BLD AUTO: 8.4 K/UL (ref 4.8–10.8)

## 2019-08-15 PROCEDURE — 84484 ASSAY OF TROPONIN QUANT: CPT

## 2019-08-15 PROCEDURE — 71045 X-RAY EXAM CHEST 1 VIEW: CPT

## 2019-08-15 PROCEDURE — G0378 HOSPITAL OBSERVATION PER HR: HCPCS

## 2019-08-15 PROCEDURE — 700102 HCHG RX REV CODE 250 W/ 637 OVERRIDE(OP): Performed by: EMERGENCY MEDICINE

## 2019-08-15 PROCEDURE — 36415 COLL VENOUS BLD VENIPUNCTURE: CPT

## 2019-08-15 PROCEDURE — 80053 COMPREHEN METABOLIC PANEL: CPT

## 2019-08-15 PROCEDURE — A9270 NON-COVERED ITEM OR SERVICE: HCPCS | Performed by: EMERGENCY MEDICINE

## 2019-08-15 PROCEDURE — 700105 HCHG RX REV CODE 258: Performed by: EMERGENCY MEDICINE

## 2019-08-15 PROCEDURE — 99285 EMERGENCY DEPT VISIT HI MDM: CPT

## 2019-08-15 PROCEDURE — 99204 OFFICE O/P NEW MOD 45 MIN: CPT | Performed by: NURSE PRACTITIONER

## 2019-08-15 PROCEDURE — 93005 ELECTROCARDIOGRAM TRACING: CPT

## 2019-08-15 PROCEDURE — 99220 PR INITIAL OBSERVATION CARE,LEVL III: CPT | Mod: 25 | Performed by: HOSPITALIST

## 2019-08-15 PROCEDURE — 80197 ASSAY OF TACROLIMUS: CPT

## 2019-08-15 PROCEDURE — 85025 COMPLETE CBC W/AUTO DIFF WBC: CPT

## 2019-08-15 PROCEDURE — 80307 DRUG TEST PRSMV CHEM ANLYZR: CPT

## 2019-08-15 PROCEDURE — 82550 ASSAY OF CK (CPK): CPT

## 2019-08-15 PROCEDURE — 93005 ELECTROCARDIOGRAM TRACING: CPT | Performed by: EMERGENCY MEDICINE

## 2019-08-15 PROCEDURE — 99407 BEHAV CHNG SMOKING > 10 MIN: CPT | Performed by: HOSPITALIST

## 2019-08-15 RX ORDER — POLYETHYLENE GLYCOL 3350 17 G/17G
1 POWDER, FOR SOLUTION ORAL
Status: DISCONTINUED | OUTPATIENT
Start: 2019-08-15 | End: 2019-08-16 | Stop reason: HOSPADM

## 2019-08-15 RX ORDER — TACROLIMUS 1 MG/1
2 CAPSULE ORAL
COMMUNITY
End: 2019-08-15

## 2019-08-15 RX ORDER — OXYCODONE HYDROCHLORIDE 10 MG/1
10 TABLET ORAL
Status: DISCONTINUED | OUTPATIENT
Start: 2019-08-15 | End: 2019-08-16 | Stop reason: HOSPADM

## 2019-08-15 RX ORDER — DIPHENOXYLATE HYDROCHLORIDE AND ATROPINE SULFATE 2.5; .025 MG/1; MG/1
1 TABLET ORAL
COMMUNITY
End: 2019-08-15

## 2019-08-15 RX ORDER — TACROLIMUS 1 MG/1
2 CAPSULE ORAL EVERY 12 HOURS
Status: DISCONTINUED | OUTPATIENT
Start: 2019-08-15 | End: 2019-08-16 | Stop reason: HOSPADM

## 2019-08-15 RX ORDER — HEPARIN SODIUM 5000 [USP'U]/ML
5000 INJECTION, SOLUTION INTRAVENOUS; SUBCUTANEOUS EVERY 8 HOURS
Status: DISCONTINUED | OUTPATIENT
Start: 2019-08-15 | End: 2019-08-16 | Stop reason: HOSPADM

## 2019-08-15 RX ORDER — ROPINIROLE 2 MG/1
TABLET, FILM COATED ORAL
COMMUNITY
Start: 2019-05-10 | End: 2019-08-15

## 2019-08-15 RX ORDER — LISINOPRIL 10 MG/1
10 TABLET ORAL
COMMUNITY
Start: 2005-10-24 | End: 2019-08-15

## 2019-08-15 RX ORDER — OXYCODONE HYDROCHLORIDE 5 MG/1
5 TABLET ORAL
Status: DISCONTINUED | OUTPATIENT
Start: 2019-08-15 | End: 2019-08-16 | Stop reason: HOSPADM

## 2019-08-15 RX ORDER — HYDROCODONE BITARTRATE AND ACETAMINOPHEN 5; 325 MG/1; MG/1
1 TABLET ORAL ONCE
Status: COMPLETED | OUTPATIENT
Start: 2019-08-15 | End: 2019-08-15

## 2019-08-15 RX ORDER — CITALOPRAM 20 MG/1
20 TABLET ORAL
COMMUNITY
Start: 2019-05-30 | End: 2019-08-15

## 2019-08-15 RX ORDER — SODIUM CHLORIDE 9 MG/ML
INJECTION, SOLUTION INTRAVENOUS CONTINUOUS
Status: DISCONTINUED | OUTPATIENT
Start: 2019-08-15 | End: 2019-08-16 | Stop reason: HOSPADM

## 2019-08-15 RX ORDER — SODIUM CHLORIDE 9 MG/ML
1000 INJECTION, SOLUTION INTRAVENOUS ONCE
Status: COMPLETED | OUTPATIENT
Start: 2019-08-15 | End: 2019-08-16

## 2019-08-15 RX ORDER — DIAZEPAM 5 MG/1
5 TABLET ORAL
COMMUNITY
End: 2019-08-15

## 2019-08-15 RX ORDER — OXYCODONE HYDROCHLORIDE 30 MG/1
30 TABLET ORAL
COMMUNITY
End: 2019-08-15

## 2019-08-15 RX ORDER — HYDROCODONE BITARTRATE AND ACETAMINOPHEN 5; 325 MG/1; MG/1
TABLET ORAL
Refills: 0 | COMMUNITY
Start: 2019-06-13 | End: 2019-08-15

## 2019-08-15 RX ORDER — NICOTINE 21 MG/24HR
14 PATCH, TRANSDERMAL 24 HOURS TRANSDERMAL
Status: DISCONTINUED | OUTPATIENT
Start: 2019-08-16 | End: 2019-08-16 | Stop reason: HOSPADM

## 2019-08-15 RX ORDER — AMOXICILLIN 250 MG
2 CAPSULE ORAL 2 TIMES DAILY
Status: DISCONTINUED | OUTPATIENT
Start: 2019-08-15 | End: 2019-08-16 | Stop reason: HOSPADM

## 2019-08-15 RX ORDER — ASPIRIN 81 MG/1
324 TABLET, CHEWABLE ORAL DAILY
Status: DISCONTINUED | OUTPATIENT
Start: 2019-08-16 | End: 2019-08-16 | Stop reason: HOSPADM

## 2019-08-15 RX ORDER — ASPIRIN 300 MG/1
300 SUPPOSITORY RECTAL DAILY
Status: DISCONTINUED | OUTPATIENT
Start: 2019-08-16 | End: 2019-08-16 | Stop reason: HOSPADM

## 2019-08-15 RX ORDER — ONDANSETRON 4 MG/1
4 TABLET, ORALLY DISINTEGRATING ORAL EVERY 4 HOURS PRN
Status: DISCONTINUED | OUTPATIENT
Start: 2019-08-15 | End: 2019-08-16 | Stop reason: HOSPADM

## 2019-08-15 RX ORDER — ONDANSETRON 2 MG/ML
4 INJECTION INTRAMUSCULAR; INTRAVENOUS EVERY 4 HOURS PRN
Status: DISCONTINUED | OUTPATIENT
Start: 2019-08-15 | End: 2019-08-16 | Stop reason: HOSPADM

## 2019-08-15 RX ORDER — HYDROMORPHONE HYDROCHLORIDE 1 MG/ML
0.5 INJECTION, SOLUTION INTRAMUSCULAR; INTRAVENOUS; SUBCUTANEOUS
Status: DISCONTINUED | OUTPATIENT
Start: 2019-08-15 | End: 2019-08-15

## 2019-08-15 RX ORDER — PROCHLORPERAZINE MALEATE 10 MG
10 TABLET ORAL
COMMUNITY
Start: 2014-02-28 | End: 2019-08-15

## 2019-08-15 RX ORDER — BISACODYL 10 MG
10 SUPPOSITORY, RECTAL RECTAL
Status: DISCONTINUED | OUTPATIENT
Start: 2019-08-15 | End: 2019-08-16 | Stop reason: HOSPADM

## 2019-08-15 RX ORDER — ASPIRIN 325 MG
325 TABLET ORAL ONCE
Status: COMPLETED | OUTPATIENT
Start: 2019-08-15 | End: 2019-08-16

## 2019-08-15 RX ORDER — CITALOPRAM 20 MG/1
20 TABLET ORAL
Status: DISCONTINUED | OUTPATIENT
Start: 2019-08-15 | End: 2019-08-16 | Stop reason: HOSPADM

## 2019-08-15 RX ORDER — CEPHALEXIN 500 MG/1
CAPSULE ORAL
Refills: 0 | COMMUNITY
Start: 2019-06-10 | End: 2019-08-15

## 2019-08-15 RX ORDER — ASPIRIN 325 MG
325 TABLET ORAL DAILY
Status: DISCONTINUED | OUTPATIENT
Start: 2019-08-16 | End: 2019-08-16 | Stop reason: HOSPADM

## 2019-08-15 RX ORDER — ERYTHROMYCIN 5 MG/G
OINTMENT OPHTHALMIC
Refills: 2 | COMMUNITY
Start: 2019-06-10 | End: 2019-08-15

## 2019-08-15 RX ORDER — OMEPRAZOLE 40 MG/1
40 CAPSULE, DELAYED RELEASE ORAL
COMMUNITY
End: 2019-08-15

## 2019-08-15 RX ADMIN — HYDROCODONE BITARTRATE AND ACETAMINOPHEN 1 TABLET: 5; 325 TABLET ORAL at 22:53

## 2019-08-15 RX ADMIN — SODIUM CHLORIDE 1000 ML: 9 INJECTION, SOLUTION INTRAVENOUS at 22:53

## 2019-08-15 SDOH — HEALTH STABILITY: MENTAL HEALTH: HOW OFTEN DO YOU HAVE A DRINK CONTAINING ALCOHOL?: 2-4 TIMES A MONTH

## 2019-08-15 ASSESSMENT — ENCOUNTER SYMPTOMS
DIZZINESS: 0
FLANK PAIN: 0
PALPITATIONS: 0
FEVER: 0
EYE DISCHARGE: 0
NECK PAIN: 1
ABDOMINAL PAIN: 1
BLURRED VISION: 0
COUGH: 0
WEAKNESS: 0
SHORTNESS OF BREATH: 1
VOMITING: 0
HALLUCINATIONS: 0
BRUISES/BLEEDS EASILY: 0
DEPRESSION: 0
NAUSEA: 0
HEMOPTYSIS: 0
HEARTBURN: 0
BACK PAIN: 1
SPEECH CHANGE: 0
SENSORY CHANGE: 0
DOUBLE VISION: 0
CHILLS: 0
MYALGIAS: 1
FOCAL WEAKNESS: 0

## 2019-08-15 ASSESSMENT — LIFESTYLE VARIABLES
SUBSTANCE_ABUSE: 0
DO YOU DRINK ALCOHOL: NO

## 2019-08-16 ENCOUNTER — APPOINTMENT (OUTPATIENT)
Dept: RADIOLOGY | Facility: MEDICAL CENTER | Age: 64
End: 2019-08-16
Attending: HOSPITALIST
Payer: MEDICARE

## 2019-08-16 ENCOUNTER — PATIENT OUTREACH (OUTPATIENT)
Dept: HEALTH INFORMATION MANAGEMENT | Facility: OTHER | Age: 64
End: 2019-08-16

## 2019-08-16 VITALS
RESPIRATION RATE: 16 BRPM | WEIGHT: 164.02 LBS | HEIGHT: 66 IN | HEART RATE: 77 BPM | SYSTOLIC BLOOD PRESSURE: 128 MMHG | BODY MASS INDEX: 26.36 KG/M2 | OXYGEN SATURATION: 97 % | TEMPERATURE: 98 F | DIASTOLIC BLOOD PRESSURE: 73 MMHG

## 2019-08-16 PROBLEM — M62.82 RHABDOMYOLYSIS: Status: ACTIVE | Noted: 2019-08-16

## 2019-08-16 PROBLEM — E87.1 HYPONATREMIA: Status: RESOLVED | Noted: 2018-04-05 | Resolved: 2019-08-16

## 2019-08-16 PROBLEM — N17.9 AKI (ACUTE KIDNEY INJURY) (HCC): Status: RESOLVED | Noted: 2019-08-15 | Resolved: 2019-08-16

## 2019-08-16 PROBLEM — R25.2 MUSCLE CRAMPS: Status: RESOLVED | Noted: 2019-08-15 | Resolved: 2019-08-16

## 2019-08-16 PROBLEM — R07.9 PAIN IN THE CHEST: Status: RESOLVED | Noted: 2019-08-15 | Resolved: 2019-08-16

## 2019-08-16 LAB
ALBUMIN SERPL BCP-MCNC: 3.7 G/DL (ref 3.2–4.9)
ALBUMIN/GLOB SERPL: 1.2 G/DL
ALP SERPL-CCNC: 60 U/L (ref 30–99)
ALT SERPL-CCNC: 12 U/L (ref 2–50)
AMPHET UR QL SCN: NEGATIVE
ANION GAP SERPL CALC-SCNC: 7 MMOL/L (ref 0–11.9)
ANION GAP SERPL CALC-SCNC: 7 MMOL/L (ref 0–11.9)
APPEARANCE UR: CLEAR
AST SERPL-CCNC: 18 U/L (ref 12–45)
BARBITURATES UR QL SCN: NEGATIVE
BASOPHILS # BLD AUTO: 0.6 % (ref 0–1.8)
BASOPHILS # BLD: 0.05 K/UL (ref 0–0.12)
BENZODIAZ UR QL SCN: NEGATIVE
BILIRUB SERPL-MCNC: 0.4 MG/DL (ref 0.1–1.5)
BILIRUB UR QL STRIP.AUTO: NEGATIVE
BUN SERPL-MCNC: 21 MG/DL (ref 8–22)
BUN SERPL-MCNC: 24 MG/DL (ref 8–22)
BZE UR QL SCN: NEGATIVE
CALCIUM SERPL-MCNC: 8.1 MG/DL (ref 8.5–10.5)
CALCIUM SERPL-MCNC: 8.5 MG/DL (ref 8.5–10.5)
CANNABINOIDS UR QL SCN: NEGATIVE
CHLORIDE SERPL-SCNC: 105 MMOL/L (ref 96–112)
CHLORIDE SERPL-SCNC: 110 MMOL/L (ref 96–112)
CHLORIDE UR-SCNC: 16 MMOL/L
CK SERPL-CCNC: 301 U/L (ref 0–154)
CK SERPL-CCNC: 312 U/L (ref 0–154)
CO2 SERPL-SCNC: 21 MMOL/L (ref 20–33)
CO2 SERPL-SCNC: 22 MMOL/L (ref 20–33)
COLOR UR: YELLOW
CREAT SERPL-MCNC: 1.04 MG/DL (ref 0.5–1.4)
CREAT SERPL-MCNC: 1.33 MG/DL (ref 0.5–1.4)
CREAT UR-MCNC: 73.4 MG/DL
EKG IMPRESSION: NORMAL
EOSINOPHIL # BLD AUTO: 0.31 K/UL (ref 0–0.51)
EOSINOPHIL NFR BLD: 4 % (ref 0–6.9)
ERYTHROCYTE [DISTWIDTH] IN BLOOD BY AUTOMATED COUNT: 45.9 FL (ref 35.9–50)
GLOBULIN SER CALC-MCNC: 3.1 G/DL (ref 1.9–3.5)
GLUCOSE SERPL-MCNC: 74 MG/DL (ref 65–99)
GLUCOSE SERPL-MCNC: 98 MG/DL (ref 65–99)
GLUCOSE UR STRIP.AUTO-MCNC: NEGATIVE MG/DL
HCT VFR BLD AUTO: 41.8 % (ref 42–52)
HGB BLD-MCNC: 13.9 G/DL (ref 14–18)
IMM GRANULOCYTES # BLD AUTO: 0.03 K/UL (ref 0–0.11)
IMM GRANULOCYTES NFR BLD AUTO: 0.4 % (ref 0–0.9)
KETONES UR STRIP.AUTO-MCNC: NEGATIVE MG/DL
LEUKOCYTE ESTERASE UR QL STRIP.AUTO: NEGATIVE
LYMPHOCYTES # BLD AUTO: 2.77 K/UL (ref 1–4.8)
LYMPHOCYTES NFR BLD: 35.8 % (ref 22–41)
MCH RBC QN AUTO: 30.8 PG (ref 27–33)
MCHC RBC AUTO-ENTMCNC: 33.3 G/DL (ref 33.7–35.3)
MCV RBC AUTO: 92.7 FL (ref 81.4–97.8)
METHADONE UR QL SCN: NEGATIVE
MICRO URNS: NORMAL
MONOCYTES # BLD AUTO: 1.14 K/UL (ref 0–0.85)
MONOCYTES NFR BLD AUTO: 14.7 % (ref 0–13.4)
NEUTROPHILS # BLD AUTO: 3.43 K/UL (ref 1.82–7.42)
NEUTROPHILS NFR BLD: 44.5 % (ref 44–72)
NITRITE UR QL STRIP.AUTO: NEGATIVE
NRBC # BLD AUTO: 0 K/UL
NRBC BLD-RTO: 0 /100 WBC
OPIATES UR QL SCN: POSITIVE
OXYCODONE UR QL SCN: NEGATIVE
PCP UR QL SCN: NEGATIVE
PH UR STRIP.AUTO: 5 [PH] (ref 5–8)
PLATELET # BLD AUTO: 172 K/UL (ref 164–446)
PMV BLD AUTO: 9.3 FL (ref 9–12.9)
POTASSIUM SERPL-SCNC: 3.9 MMOL/L (ref 3.6–5.5)
POTASSIUM SERPL-SCNC: 3.9 MMOL/L (ref 3.6–5.5)
POTASSIUM UR-SCNC: 19.4 MMOL/L
PROPOXYPH UR QL SCN: NEGATIVE
PROT SERPL-MCNC: 6.8 G/DL (ref 6–8.2)
PROT UR QL STRIP: NEGATIVE MG/DL
PROT UR-MCNC: 5.5 MG/DL (ref 0–15)
RBC # BLD AUTO: 4.51 M/UL (ref 4.7–6.1)
RBC UR QL AUTO: NEGATIVE
SODIUM SERPL-SCNC: 134 MMOL/L (ref 135–145)
SODIUM SERPL-SCNC: 138 MMOL/L (ref 135–145)
SODIUM UR-SCNC: 18 MMOL/L
SP GR UR STRIP.AUTO: 1.01
TROPONIN T SERPL-MCNC: 21 NG/L (ref 6–19)
TROPONIN T SERPL-MCNC: 22 NG/L (ref 6–19)
UROBILINOGEN UR STRIP.AUTO-MCNC: 0.2 MG/DL
WBC # BLD AUTO: 7.7 K/UL (ref 4.8–10.8)

## 2019-08-16 PROCEDURE — A9270 NON-COVERED ITEM OR SERVICE: HCPCS | Performed by: EMERGENCY MEDICINE

## 2019-08-16 PROCEDURE — A9270 NON-COVERED ITEM OR SERVICE: HCPCS | Performed by: HOSPITALIST

## 2019-08-16 PROCEDURE — 80053 COMPREHEN METABOLIC PANEL: CPT

## 2019-08-16 PROCEDURE — G0378 HOSPITAL OBSERVATION PER HR: HCPCS

## 2019-08-16 PROCEDURE — 700111 HCHG RX REV CODE 636 W/ 250 OVERRIDE (IP)

## 2019-08-16 PROCEDURE — A9502 TC99M TETROFOSMIN: HCPCS

## 2019-08-16 PROCEDURE — 700102 HCHG RX REV CODE 250 W/ 637 OVERRIDE(OP): Performed by: HOSPITALIST

## 2019-08-16 PROCEDURE — 76775 US EXAM ABDO BACK WALL LIM: CPT

## 2019-08-16 PROCEDURE — 80307 DRUG TEST PRSMV CHEM ANLYZR: CPT

## 2019-08-16 PROCEDURE — 96372 THER/PROPH/DIAG INJ SC/IM: CPT

## 2019-08-16 PROCEDURE — 80048 BASIC METABOLIC PNL TOTAL CA: CPT

## 2019-08-16 PROCEDURE — 84133 ASSAY OF URINE POTASSIUM: CPT

## 2019-08-16 PROCEDURE — 700102 HCHG RX REV CODE 250 W/ 637 OVERRIDE(OP): Performed by: EMERGENCY MEDICINE

## 2019-08-16 PROCEDURE — 84300 ASSAY OF URINE SODIUM: CPT

## 2019-08-16 PROCEDURE — 93005 ELECTROCARDIOGRAM TRACING: CPT | Performed by: HOSPITALIST

## 2019-08-16 PROCEDURE — 82436 ASSAY OF URINE CHLORIDE: CPT

## 2019-08-16 PROCEDURE — 82570 ASSAY OF URINE CREATININE: CPT | Mod: XU

## 2019-08-16 PROCEDURE — 82550 ASSAY OF CK (CPK): CPT

## 2019-08-16 PROCEDURE — 700111 HCHG RX REV CODE 636 W/ 250 OVERRIDE (IP): Performed by: HOSPITALIST

## 2019-08-16 PROCEDURE — 85025 COMPLETE CBC W/AUTO DIFF WBC: CPT

## 2019-08-16 PROCEDURE — 99217 PR OBSERVATION CARE DISCHARGE: CPT | Performed by: INTERNAL MEDICINE

## 2019-08-16 PROCEDURE — 700105 HCHG RX REV CODE 258: Performed by: HOSPITALIST

## 2019-08-16 PROCEDURE — 84484 ASSAY OF TROPONIN QUANT: CPT

## 2019-08-16 PROCEDURE — 84156 ASSAY OF PROTEIN URINE: CPT | Mod: XU

## 2019-08-16 PROCEDURE — 81003 URINALYSIS AUTO W/O SCOPE: CPT | Mod: XU

## 2019-08-16 PROCEDURE — 36415 COLL VENOUS BLD VENIPUNCTURE: CPT

## 2019-08-16 PROCEDURE — 93010 ELECTROCARDIOGRAM REPORT: CPT | Mod: 59 | Performed by: INTERNAL MEDICINE

## 2019-08-16 RX ORDER — REGADENOSON 0.08 MG/ML
INJECTION, SOLUTION INTRAVENOUS
Status: COMPLETED
Start: 2019-08-16 | End: 2019-08-16

## 2019-08-16 RX ADMIN — CITALOPRAM HYDROBROMIDE 20 MG: 20 TABLET ORAL at 02:15

## 2019-08-16 RX ADMIN — OXYCODONE HYDROCHLORIDE 10 MG: 10 TABLET ORAL at 05:32

## 2019-08-16 RX ADMIN — HEPARIN SODIUM 5000 UNITS: 5000 INJECTION, SOLUTION INTRAVENOUS; SUBCUTANEOUS at 01:34

## 2019-08-16 RX ADMIN — ASPIRIN 81 MG 324 MG: 81 TABLET ORAL at 05:31

## 2019-08-16 RX ADMIN — ASPIRIN 325 MG: 325 TABLET, FILM COATED ORAL at 01:35

## 2019-08-16 RX ADMIN — SODIUM CHLORIDE: 9 INJECTION, SOLUTION INTRAVENOUS at 01:36

## 2019-08-16 RX ADMIN — NICOTINE 14 MG: 14 PATCH, EXTENDED RELEASE TRANSDERMAL at 05:31

## 2019-08-16 RX ADMIN — REGADENOSON 0.4 MG: 0.08 INJECTION, SOLUTION INTRAVENOUS at 10:39

## 2019-08-16 RX ADMIN — TACROLIMUS 2 MG: 1 CAPSULE ORAL at 01:35

## 2019-08-16 RX ADMIN — OXYCODONE HYDROCHLORIDE 10 MG: 10 TABLET ORAL at 01:34

## 2019-08-16 ASSESSMENT — LIFESTYLE VARIABLES
CONSUMPTION TOTAL: NEGATIVE
HOW MANY TIMES IN THE PAST YEAR HAVE YOU HAD 5 OR MORE DRINKS IN A DAY: 0
ALCOHOL_USE: YES
HAVE PEOPLE ANNOYED YOU BY CRITICIZING YOUR DRINKING: NO
EVER FELT BAD OR GUILTY ABOUT YOUR DRINKING: NO
HAVE YOU EVER FELT YOU SHOULD CUT DOWN ON YOUR DRINKING: NO
TOTAL SCORE: 0
AVERAGE NUMBER OF DAYS PER WEEK YOU HAVE A DRINK CONTAINING ALCOHOL: 1
EVER_SMOKED: YES
TOTAL SCORE: 0
TOTAL SCORE: 0
EVER HAD A DRINK FIRST THING IN THE MORNING TO STEADY YOUR NERVES TO GET RID OF A HANGOVER: NO
DOES PATIENT WANT TO STOP DRINKING: NO
ON A TYPICAL DAY WHEN YOU DRINK ALCOHOL HOW MANY DRINKS DO YOU HAVE: 0

## 2019-08-16 ASSESSMENT — PATIENT HEALTH QUESTIONNAIRE - PHQ9
2. FEELING DOWN, DEPRESSED, IRRITABLE, OR HOPELESS: NOT AT ALL
2. FEELING DOWN, DEPRESSED, IRRITABLE, OR HOPELESS: NOT AT ALL
SUM OF ALL RESPONSES TO PHQ9 QUESTIONS 1 AND 2: 0
1. LITTLE INTEREST OR PLEASURE IN DOING THINGS: NOT AT ALL
SUM OF ALL RESPONSES TO PHQ9 QUESTIONS 1 AND 2: 0
1. LITTLE INTEREST OR PLEASURE IN DOING THINGS: NOT AT ALL

## 2019-08-16 NOTE — ED PROVIDER NOTES
ED Provider Note    Scribed for Ranjith Parker M.D. by Shree Bonner. 8/15/2019, 10:39 PM.    Primary care provider: Alejandra Sutherland M.D.  Means of arrival: Walk in   History obtained from: Patient  History limited by: None     CHIEF COMPLAINT  Chief Complaint   Patient presents with   • Sent from Urgent Care     Pt went to urgent care because he had numerous cramps today, developed bilateral chest tightness on his way to the ER from .    • Chest Pain     No longer has chest tightness   • Cramping       HPI  Quirino Ruiz is a 64 y.o. male who presents to the Emergency Department for cramping onset earlier today. The patient states that throughout the day he had multiple cramps in his legs and arms. He also notes that he was experiencing chest tightness earlier which has since resolved.  Chest tightness began he was walking over here.  It was bandlike around the anterior upper part of his chest.  Described as tightness.  There was associated shortness of breath but this is resolved.  No nausea vomiting no other acute concerns or complaints.  No history of PE or DVT.  Pain resolved with rest.  He denies any change of activity recently but does admit that he has not been eating or hydrating correctly.  He had is left kidney removed in 1996 and a cryoablation preformed on his right kidney. He has a history of hypertension. No history of diabetes. He is allergic to Penicillin and Niacin. He smokes tobacco daily.    REVIEW OF SYSTEMS  Review of Systems   Cardiovascular: Negative for chest pain.   Musculoskeletal:        Positive for cramps    All other systems reviewed and are negative.      PAST MEDICAL HISTORY   has a past medical history of Arthritis, Cancer (HCC) (2002;2013), Cholesterol blood decreased, Chronic pain, Cold (8/27/2014), Dental disorder, Heart burn, Hepatitis C (1993), Hepatitis C, Hypertension, Indigestion, Liver transplanted (HCC) (2002), Liver transplanted (HCC), Pain, Pain, Renal  "cancer (HCC), and Renal disorder.    SURGICAL HISTORY   has a past surgical history that includes other (2002); other (1996); other (1994); cervical disk and fusion anterior (12/11/2012); recovery (4/23/2013); lumbar fusion anterior (5/29/2013); evacuation of hematoma (5/31/2013); other (2/28/2014); cath place perm epidural (9/9/2014); pump insert/remove (11/25/2014); exploratory laparotomy (5/31/2013); recovery (11/30/2015); other surgical procedure; nephrectomy radical; other orthopedic surgery; cholecystectomy; and cervical decompression posterior.    SOCIAL HISTORY  Social History     Tobacco Use   • Smoking status: Current Every Day Smoker     Packs/day: 0.50     Years: 1.00     Pack years: 0.50     Types: Cigarettes   • Smokeless tobacco: Never Used   Substance Use Topics   • Alcohol use: Yes     Frequency: 2-4 times a month   • Drug use: No      Social History     Substance and Sexual Activity   Drug Use No       FAMILY HISTORY  Family History   Problem Relation Age of Onset   • Hypertension Other        CURRENT MEDICATIONS  Home Medications     Reviewed by Varun Maravilla (Pharmacy Tech) on 08/15/19 at 2323  Med List Status: Partial   Medication Last Dose Status   artificial tears (EYE LUBRICANT) Ointment ophth ointment 8/14/2019 Active   citalopram (CELEXA) 20 MG Tab 8/14/2019 Active   lisinopril (PRINIVIL) 10 MG Tab 8/15/2019 Active   Pain Pump (PATIENT SUPPLIED) XX BRANDON  Active   tacrolimus (PROGRAF) 1 MG Cap 8/15/2019 Active                ALLERGIES  Allergies   Allergen Reactions   • Niacin      Passed out.    • Pcn [Penicillins] Anaphylaxis and Swelling   • Latex      Reaction- Itching, Rash   • Morphine Sulfate      Per pt., \"it makes me cranky\"   • Mushroom Extract Complex    • Other Food      Mushrooms   • Penicillins Itching       PHYSICAL EXAM  VITAL SIGNS: /76   Pulse 84   Temp 37 °C (98.6 °F) (Temporal)   Resp 18   Ht 1.702 m (5' 7\")   Wt 73.4 kg (161 lb 13.1 oz)   SpO2 97%   " BMI 25.34 kg/m²   Vitals reviewed.  Constitutional: Well developed, Well nourished, No acute distress, Non-toxic appearance.   HENT: Normocephalic, Atraumatic, Bilateral external ears normal, Oropharynx moist, No oral exudates, Nose normal.   Eyes: PERRL, EOMI, Conjunctiva normal, No discharge.   Neck: Normal range of motion, No tenderness, Supple, No stridor.   Cardiovascular: Normal heart rate, Normal rhythm, No murmurs, No rubs, No gallops.   Thorax & Lungs: Normal breath sounds, No respiratory distress, No wheezing, No chest tenderness.   Abdomen: Bowel sounds normal, Soft, No tenderness  Skin: Warm, Dry, No erythema, No rash.   Back: No tenderness, No CVA tenderness.   Musculoskeletal: Good range of motion in all major joints. No edema. No tenderness to palpation or major deformities noted.  No asymmetric edema.  Neurologic: Alert, Normal motor function, Normal sensory function, No focal deficits noted.   Psychiatric: Affect normal    LABS  Results for orders placed or performed during the hospital encounter of 08/15/19   CBC with Differential   Result Value Ref Range    WBC 8.4 4.8 - 10.8 K/uL    RBC 4.93 4.70 - 6.10 M/uL    Hemoglobin 15.5 14.0 - 18.0 g/dL    Hematocrit 44.5 42.0 - 52.0 %    MCV 90.3 81.4 - 97.8 fL    MCH 31.4 27.0 - 33.0 pg    MCHC 34.8 33.7 - 35.3 g/dL    RDW 43.6 35.9 - 50.0 fL    Platelet Count 205 164 - 446 K/uL    MPV 9.8 9.0 - 12.9 fL    Neutrophils-Polys 51.20 44.00 - 72.00 %    Lymphocytes 32.10 22.00 - 41.00 %    Monocytes 12.40 0.00 - 13.40 %    Eosinophils 2.90 0.00 - 6.90 %    Basophils 0.60 0.00 - 1.80 %    Immature Granulocytes 0.80 0.00 - 0.90 %    Nucleated RBC 0.00 /100 WBC    Neutrophils (Absolute) 4.30 1.82 - 7.42 K/uL    Lymphs (Absolute) 2.69 1.00 - 4.80 K/uL    Monos (Absolute) 1.04 (H) 0.00 - 0.85 K/uL    Eos (Absolute) 0.24 0.00 - 0.51 K/uL    Baso (Absolute) 0.05 0.00 - 0.12 K/uL    Immature Granulocytes (abs) 0.07 0.00 - 0.11 K/uL    NRBC (Absolute) 0.00 K/uL    Complete Metabolic Panel (CMP)   Result Value Ref Range    Sodium 134 (L) 135 - 145 mmol/L    Potassium 4.2 3.6 - 5.5 mmol/L    Chloride 99 96 - 112 mmol/L    Co2 26 20 - 33 mmol/L    Anion Gap 9.0 0.0 - 11.9    Glucose 99 65 - 99 mg/dL    Bun 25 (H) 8 - 22 mg/dL    Creatinine 1.83 (H) 0.50 - 1.40 mg/dL    Calcium 9.3 8.5 - 10.5 mg/dL    AST(SGOT) 21 12 - 45 U/L    ALT(SGPT) 19 2 - 50 U/L    Alkaline Phosphatase 67 30 - 99 U/L    Total Bilirubin 0.5 0.1 - 1.5 mg/dL    Albumin 4.4 3.2 - 4.9 g/dL    Total Protein 8.3 (H) 6.0 - 8.2 g/dL    Globulin 3.9 (H) 1.9 - 3.5 g/dL    A-G Ratio 1.1 g/dL   Troponin   Result Value Ref Range    Troponin T 18 6 - 19 ng/L   ESTIMATED GFR   Result Value Ref Range    GFR If African American 45 (A) >60 mL/min/1.73 m 2    GFR If Non  37 (A) >60 mL/min/1.73 m 2   EKG   Result Value Ref Range    Report       [facility] Emergency Dept.    Test Date:  2019-08-15  Pt Name:    PRERNA SOUZA                Department: ER  MRN:        [id number]                      Room:  Gender:     Male                         Technician: [id number]  :        1955                   Requested By:ER TRIAGE PROTOCOL  Order #:    [id number]                    Reading MD: RENATA HAYS. MANDI    Measurements  Intervals                                Axis  Rate:       72                           P:          51  NC:         160                          QRS:        49  QRSD:       76                           T:          60  QT:         408  QTc:        447    Interpretive Statements  SINUS RHYTHM  No previous ECG available for comparison    Electronically Signed On 8- 22:43:07 PDT by RENATA MEZA AMD        All labs reviewed by me.    RADIOLOGY  DX-CHEST-PORTABLE (1 VIEW)   Final Result         1.  No acute cardiopulmonary disease.      NM-CARDIAC STRESS TEST    (Results Pending)   US-RENAL    (Results Pending)     The radiologist's interpretation of all radiological  studies have been reviewed by me.    COURSE & MEDICAL DECISION MAKING  Pertinent Labs & Imaging studies reviewed. (See chart for details)      10:39 PM Patient seen and examined at bedside. The patient presents with muscle cramps, and the differential diagnosis includes but is not limited to dehydration, renal failure, electrolyte abnormality for the muscle cramps and achiness.    The chest to have considered ACS chest wall pain pneumonia, PE, and, dissection per     ordered for EKG, troponin, CMP, CBC with differential, estimated GFR, creatine kinase, and dx-chest to evaluate. Patient will be treated with Aspirin tablet 325 mg, Norco 325 mg, and 1L of NS for his symptoms. I informed the patient that he will need to be admitted.       Patient's labs suggest dehydration and acute kidney injury.  If this is the etiology of his muscle transfer he is given a liter of saline for this.    The patient has some exertional chest pain.  This could be ACS.  EKG and first troponin unremarkable.  He is given aspirin.  Chest x-ray does not show pneumonia pneumothorax.  Clinical history physical exam do not stress a dissection.  I think is likely to have a PE.  He will be admitted for further work-up and treatment.  Care transferred to hospitalist.      10:58 PM I discussed the patient's case and the above findings with Dr. Steel (hospitalist) who agrees to admit the patient.      HYDRATION: Based on the patient's presentation of Dehydration the patient was given IV fluids. IV Hydration was used because oral hydration was not adequate alone. Upon recheck following hydration, the patient was . improved      DISPOSITION:  Patient will be admitted to Dr. Steel in guarded condition.     FINAL IMPRESSION  1. Muscle cramps    2. BHASKAR (acute kidney injury) (HCC)    3. Chest pain, unspecified type          I, Shree Bonner (Scribe), am scribing for, and in the presence of, Ranjith Parker M.D..    Electronically signed by: Shree Bonner  (Scribe), 8/15/2019    IRanjith M.D. personally performed the services described in this documentation, as scribed by Shree Bonner in my presence, and it is both accurate and complete.  C  The note accurately reflects work and decisions made by me.  Ranjith Parker  8/16/2019  12:37 AM

## 2019-08-16 NOTE — ED NOTES
Prograf level added on to purple top in lab.  Test is a send of so may take up to 36 hours for results.

## 2019-08-16 NOTE — ASSESSMENT & PLAN NOTE
Greater than 10 minutes spent with patient smoking cessation counseling. Discussed cardiovascular risk factors of smoking. Nicotine patch ordered.

## 2019-08-16 NOTE — ED NOTES
Med Rec Updated PARTIALLY per Pt at bedside  Allergies Reviewed  No PO ABX last 14 days.    Pt has a Pain Pump in place but does not carry any paperwork regarding Pump Criteria. Pt maintains pump with Dr. Mendes at [facility].    Facility currently closed.

## 2019-08-16 NOTE — PROGRESS NOTES
A/o,respirations are even and unlabored on room air ,assessment completed, vital signs stable, SR  on the monitor, IV fluids running per orders, updated communication board,  poc discussed and understood, verbalized understanding, pt aware NPO for stress test, all questions answered at this time , fall precautions in place, call button within reach, will continue to monitor

## 2019-08-16 NOTE — PROGRESS NOTES
Transported pt from ER to T202 via wheelchair, all belongings and charts with patient, hooked in the monitor. SR

## 2019-08-16 NOTE — ASSESSMENT & PLAN NOTE
Chest pain, exertional upon arrival to the ER   Will admit to telemetery   Serial troponin   Cardiac monitoring   nm stress test in the am

## 2019-08-16 NOTE — DISCHARGE SUMMARY
Discharge Summary    CHIEF COMPLAINT ON ADMISSION  Chief Complaint   Patient presents with   • Sent from Urgent Care     Pt went to urgent care because he had numerous cramps today, developed bilateral chest tightness on his way to the ER from .    • Chest Pain     No longer has chest tightness   • Cramping       Reason for Admission  Sent by MD; Chest Tightness     Admission Date  8/15/2019    CODE STATUS  Full Code    HPI & HOSPITAL COURSE  This is a 64 y.o. male with hyperlipidemia, chronic pain, heartburn, hypertension, liver transplant, admitted with diffuse muscle cramp, first started in his arms, and progressed to the abdomen and lower extremities.  He had been out on the sun all day, with poor oral intake.  He walked to the ED, and developed substernal chest pain.  Initial blood work-up was notable for sodium of 134, and creatinine of 1.83.  First troponin was negative.  Chest x-ray showed nothing acute.  His CPK was mildly elevated. Patient was started on IV fluids.  Further cardiac work-up is pursued.  His subsequent troponins remain flat.  EKG did not show any dynamic ischemic changes.  Nuclear cardiac stress test showed no reversible ischemia.    With IV fluid hydration, his sodium, and renal function have normalized.  He remained hemodynamically stable and afebrile.  His muscle spasms have resolved.    I have personally seen and examined the patient on the day of discharge. With his clinical improvement, he was deemed ready to discharge from the hospital as he did not have any further hospitalization needs. Patient felt comfortable going home. The discharge plan was discussed with the patient, with which he was agreeable to.     Therefore, he is discharged in good and stable condition to home with close outpatient follow-up.        Discharge Date  8/16/2019      FOLLOW UP ITEMS POST DISCHARGE  - Counseled to keep himself well-hydrated all the time, especially if staying under the sun at work.  -  counseled seek immediate medical attention, or return to the ED for recurrent or worsening symptoms.  -Follow-up with PCP.    DISCHARGE DIAGNOSES  Principal Problem (Resolved):    Pain in the chest POA: Yes  Active Problems:    Chronic pain syndrome POA: Yes      Overview: Premorbid.      Left anterior abdominal wall spine stimulator generator is present.      Dilaudid pain pump in place.      4/8 Patient requesting his pump be checked.      Dr. Mendes    Liver transplanted (HCC) POA: Yes    Rhabdomyolysis POA: Yes    Tobacco abuse (Chronic) POA: Yes  Resolved Problems:    Hyponatremia POA: Yes      Overview: 4/5 Salt tabs initiated by Neurosurgery.      4/8 Fluid restriction of 1500mls,.      Gatorade with meals.      Goal Na 135-145.    BHASKAR (acute kidney injury) ([facility]) POA: Yes    Muscle cramps POA: Yes      FOLLOW UP  No future appointments.  Alejandra Sutherland M.D.  [address]  [address]  [facility] [id number]  [phone]      PCP office closed for lunch. Please call to schedule a follow up appointment as soon as possible. Thank you.      MEDICATIONS ON DISCHARGE     Medication List      CONTINUE taking these medications      Instructions   artificial tears Oint ophth ointment  Commonly known as:  EYE LUBRICANT   Place 1 Application in right eye every evening.  Dose:  1 Application     citalopram 20 MG Tabs  Commonly known as:  CELEXA   Take 20 mg by mouth every bedtime.  Dose:  20 mg     lisinopril 10 MG Tabs  Commonly known as:  PRINIVIL   Take 10 mg by mouth every day.  Dose:  10 mg     Pain Pump Odilia  Commonly known as:  patient supplied   by Injection route Continuous. Patient's Pain Pump (placed and maintained as an outpatient)  Medications/concentrations:     Next change: 5/15/18  Continuous infusion rates (Drug/Rate): Hydromorphone/99 mcg/day    Patient activation dose: 10 mcg   Patient activation lockout interval: 1 hour  Maximum activations per day: 7 activations     tacrolimus 1 MG Caps  Commonly known as:  " PROGRAF   Take 2 mg by mouth 2 Times a Day. Indications: Liver Transplant Recipients  Dose:  2 mg            Allergies  Allergies   Allergen Reactions   • Niacin      Passed out.    • Pcn [Penicillins] Anaphylaxis and Swelling   • Latex      Reaction- Itching, Rash   • Morphine Sulfate      Per pt., \"it makes me cranky\"   • Mushroom Extract Complex    • Other Food      Mushrooms   • Penicillins Itching       DIET  Orders Placed This Encounter   Procedures   • Diet Order Cardiac     Standing Status:   Standing     Number of Occurrences:   1     Order Specific Question:   Diet:     Answer:   Cardiac [6]       ACTIVITY  As tolerated.  Weight bearing as tolerated    CONSULTATIONS  none    PROCEDURES  none    LABORATORY  Lab Results   Component Value Date    SODIUM 138 08/16/2019    POTASSIUM 3.9 08/16/2019    CHLORIDE 110 08/16/2019    CO2 21 08/16/2019    GLUCOSE 74 08/16/2019    BUN 21 08/16/2019    CREATININE 1.04 08/16/2019        Lab Results   Component Value Date    WBC 7.7 08/16/2019    HEMOGLOBIN 13.9 (L) 08/16/2019    HEMATOCRIT 41.8 (L) 08/16/2019    PLATELETCT 172 08/16/2019        Total time of the discharge process = 36 minutes.  "

## 2019-08-16 NOTE — DISCHARGE INSTRUCTIONS
Discharge Instructions per Francis Herrera M.D.    - Keep yourself well-hydrated all the time, especially if staying under the sun at work.  - seek immediate medical attention, or return to the ED for recurrent or worsening symptoms.    DIET: regular    ACTIVITY: as tolerated     DIAGNOSIS: muscle cramps, acute renal insufficiency    Discharge Instructions    Discharged to home by car with relative. Discharged via wheelchair, hospital escort: Yes.  Special equipment needed: Not Applicable    Be sure to schedule a follow-up appointment with your primary care doctor or any specialists as instructed.     Discharge Plan:   Diet Plan: Discussed  Activity Level: Discussed  Smoking Cessation Offered: Patient Refused(educated)  Confirmed Follow up Appointment: Patient to Call and Schedule Appointment  Confirmed Symptoms Management: Discussed  Medication Reconciliation Updated: Yes  Influenza Vaccine Indication: Not indicated: Previously immunized this influenza season and > 8 years of age    I understand that a diet low in cholesterol, fat, and sodium is recommended for good health. Unless I have been given specific instructions below for another diet, I accept this instruction as my diet prescription.   Other diet: regular    Special Instructions: None    · Is patient discharged on Warfarin / Coumadin?   No     Depression / Suicide Risk    As you are discharged from this [facility] facility, it is important to learn how to keep safe from harming yourself.    Recognize the warning signs:  · Abrupt changes in personality, positive or negative- including increase in energy   · Giving away possessions  · Change in eating patterns- significant weight changes-  positive or negative  · Change in sleeping patterns- unable to sleep or sleeping all the time   · Unwillingness or inability to communicate  · Depression  · Unusual sadness, discouragement and loneliness  · Talk of wanting to die  · Neglect of personal  appearance   · Rebelliousness- reckless behavior  · Withdrawal from people/activities they love  · Confusion- inability to concentrate     If you or a loved one observes any of these behaviors or has concerns about self-harm, here's what you can do:  · Talk about it- your feelings and reasons for harming yourself  · Remove any means that you might use to hurt yourself (examples: pills, rope, extension cords, firearm)  · Get professional help from the community (Mental Health, Substance Abuse, psychological counseling)  · Do not be alone:Call your Safe Contact- someone whom you trust who will be there for you.  · Call your local CRISIS HOTLINE 776-3577 or 902-479-8828  · Call your local Children's Mobile Crisis Response Team [place] (528) 064-4641 or www.YEVVO  · Call the toll free National Suicide Prevention Hotlines   · National Suicide Prevention Lifeline 445-112-TQJG (4105)  · National Hope Line Network 800-SUICIDE (180-8806)

## 2019-08-16 NOTE — H&P
Hospital Medicine History & Physical Note    Date of Service  8/15/2019    Primary Care Physician  Alejandra Sutherland M.D.    Consultants  none    Code Status  full    Chief Complaint  Chest pain, cramps     History of Presenting Illness  64 y.o. male who presented 8/15/2019 with Past medical history of liver transplant, hyperlipidemia, chronic pain with pain pump in place who presents with diffuse muscle cramps.  This patient has been out of the sun all day today.  He had poor oral intake throughout the day and started developing muscle cramps.  First started in his arms and now progressed to his abdomen and lower extremities.  On arrival to the emergency department he walked from the parking lot to the ER.  Upon walking from the parking lot to the ER he developed substernal chest pressure.  Felt like a squeezing type of pain.  Mild shortness of breath.  Resolved at the time my examination.  He will be admitted to the hospital for further management of his symptoms.    Review of Systems  Review of Systems   Constitutional: Negative for chills and fever.   HENT: Negative for congestion, hearing loss and tinnitus.    Eyes: Negative for blurred vision, double vision and discharge.   Respiratory: Positive for shortness of breath. Negative for cough and hemoptysis.    Cardiovascular: Positive for chest pain. Negative for palpitations and leg swelling.   Gastrointestinal: Positive for abdominal pain. Negative for heartburn, nausea and vomiting.   Genitourinary: Negative for dysuria and flank pain.   Musculoskeletal: Positive for back pain, myalgias and neck pain. Negative for joint pain.   Skin: Negative for rash.   Neurological: Negative for dizziness, sensory change, speech change, focal weakness and weakness.   Endo/Heme/Allergies: Negative for environmental allergies. Does not bruise/bleed easily.   Psychiatric/Behavioral: Negative for depression, hallucinations and substance abuse.       Past Medical History   has a  "past medical history of Arthritis, Cancer (HCC) (2002;2013), Cholesterol blood decreased, Chronic pain, Cold (8/27/2014), Dental disorder, Heart burn, Hepatitis C (1993), Hepatitis C, Hypertension, Indigestion, Liver transplanted (HCC) (2002), Liver transplanted (HCC), Pain, Pain, Renal cancer (HCC), and Renal disorder.    Surgical History   has a past surgical history that includes other (2002); other (1996); other (1994); cervical disk and fusion anterior (12/11/2012); recovery (4/23/2013); lumbar fusion anterior (5/29/2013); evacuation of hematoma (5/31/2013); other (2/28/2014); cath place perm epidural (9/9/2014); pump insert/remove (11/25/2014); exploratory laparotomy (5/31/2013); recovery (11/30/2015); other surgical procedure; nephrectomy radical; other orthopedic surgery; cholecystectomy; and cervical decompression posterior.     Family History  family history includes Hypertension in an other family member.     Social History   reports that he has been smoking cigarettes. He has a 0.50 pack-year smoking history. He has never used smokeless tobacco. He reports that he drinks alcohol. He reports that he does not use drugs.    Allergies  Allergies   Allergen Reactions   • Niacin      Passed out.    • Pcn [Penicillins] Anaphylaxis and Swelling   • Latex      Reaction- Itching, Rash   • Morphine Sulfate      Per pt., \"it makes me cranky\"   • Mushroom Extract Complex    • Other Food      Mushrooms   • Penicillins Itching       Medications  Prior to Admission Medications   Prescriptions Last Dose Informant Patient Reported? Taking?   Multiple Vitamin (MULTI-VITAMINS) Tab   Yes No   Sig: Take 1 Tab by mouth.   Pain Pump (PATIENT SUPPLIED) XX BRANDON  Patient Yes No   Sig: by Injection route Continuous. Patient's Pain Pump (placed and maintained as an outpatient)  Medications/concentrations:     Next change: 5/15/18  Continuous infusion rates (Drug/Rate): Hydromorphone/99 mcg/day    Patient activation dose: 10 mcg "   Patient activation lockout interval: 1 hour  Maximum activations per day: 7 activations   citalopram (CELEXA) 20 MG Tab  Patient Yes No   Sig: Take 20 mg by mouth every bedtime.   lisinopril (PRINIVIL) 10 MG Tab  Patient Yes No   Sig: Take 10 mg by mouth every day.   tacrolimus (PROGRAF) 1 MG Cap  Patient Yes No   Sig: Take 2 mg by mouth every 12 hours.      Facility-Administered Medications: None       Physical Exam  Temp:  [37 °C (98.6 °F)] 37 °C (98.6 °F)  Pulse:  [84] 84  Resp:  [18] 18  BP: (153)/(76) 153/76  SpO2:  [97 %] 97 %    Physical Exam   Constitutional: He is oriented to person, place, and time. He appears well-developed and well-nourished. He appears distressed.   HENT:   Head: Normocephalic and atraumatic.   Dry oral mucosa    Eyes: Pupils are equal, round, and reactive to light. Conjunctivae and EOM are normal.   Neck: Normal range of motion. Neck supple. No JVD present.   Cardiovascular: Normal rate, regular rhythm, normal heart sounds and intact distal pulses.   No murmur heard.  Pulmonary/Chest: Effort normal and breath sounds normal. No respiratory distress. He exhibits no tenderness.   Abdominal: Soft. Bowel sounds are normal. He exhibits no distension. There is no tenderness.   Musculoskeletal: Normal range of motion. He exhibits no edema.   Neurological: He is alert and oriented to person, place, and time. No cranial nerve deficit. He exhibits normal muscle tone.   Skin: Skin is warm and dry. No erythema.   Psychiatric: He has a normal mood and affect. His behavior is normal. Judgment and thought content normal.   Nursing note and vitals reviewed.      Laboratory:  Recent Labs     08/15/19  2037   WBC 8.4   RBC 4.93   HEMOGLOBIN 15.5   HEMATOCRIT 44.5   MCV 90.3   MCH 31.4   MCHC 34.8   RDW 43.6   PLATELETCT 205   MPV 9.8     Recent Labs     08/15/19  2037   SODIUM 134*   POTASSIUM 4.2   CHLORIDE 99   CO2 26   GLUCOSE 99   BUN 25*   CREATININE 1.83*   CALCIUM 9.3     Recent Labs      08/15/19  2037   ALTSGPT 19   ASTSGOT 21   ALKPHOSPHAT 67   TBILIRUBIN 0.5   GLUCOSE 99         No results for input(s): NTPROBNP in the last 72 hours.      Recent Labs     08/15/19  2037   TROPONINT 18       Urinalysis:    No results found     Imaging:  DX-CHEST-PORTABLE (1 VIEW)   Final Result         1.  No acute cardiopulmonary disease.      NM-CARDIAC STRESS TEST    (Results Pending)         Assessment/Plan:  I anticipate this patient is appropriate for observation status at this time.    * Pain in the chest  Assessment & Plan  Chest pain, exertional upon arrival to the ER   Will admit to telemetery   Serial troponin   Cardiac monitoring   nm stress test in the am     Chronic pain syndrome- (present on admission)  Assessment & Plan  Has pain pump in place     Liver transplanted (HCC)- (present on admission)  Assessment & Plan  Resume home prograf      Hyponatremia- (present on admission)  Assessment & Plan  Appears hypovolemic   Cont with IVF for now   montior sodium     Muscle cramps  Assessment & Plan  Suspect due to dehydration   Check CK level   IVF hydration   Has pain pump in place   Will add additional oxycodone for now     BHASKAR (acute kidney injury) (HCC)  Assessment & Plan  Appears hypovolemic from poor oral intake today and being outside all day   Cont with IVF   Check renal function in am   Urine lytes and renal utlrasound     Tobacco abuse- (present on admission)  Assessment & Plan  Greater than 10 minutes spent with patient smoking cessation counseling. Discussed cardiovascular risk factors of smoking. Nicotine patch ordered.       VTE prophylaxis: heparin

## 2019-08-16 NOTE — ASSESSMENT & PLAN NOTE
Appears hypovolemic from poor oral intake today and being outside all day   Cont with IVF   Check renal function in am   Urine lytes and renal utlrasound

## 2019-08-16 NOTE — PROGRESS NOTES
.Patient d/c per MD order.  Pt states understanding of d.c instructions.  Pt given copies of instructions.  Belongings with pt.  VSS.  Pt ambulate to lobby with steady gait.  Pt refused w/c

## 2019-08-16 NOTE — PROGRESS NOTES
Chief Complaint   Patient presents with   • Cramping     in arms        HISTORY OF PRESENT ILLNESS: Patient is a 64 y.o. male with a complex medical history including liver transplant, renal failure, renal cell carcinoma with nephrectomy, and hyponatremia who presents the clinic today with complaints of muscle cramps.  Notes that his symptoms started yesterday with cramps to his right bicep.  Around 2:30 PM today his symptoms significantly worsened with cramps to his hips, abdomen, feet, hands, arms, and legs.  Admits to associated stiff sensation as well.  He denies any fever, chills, chest pain, shortness of breath, syncope.  Admits to feeling some nausea sensation.  He has not taken any medication for symptom relief.  In addition, he does admit to being bit by his dogs on Sunday.  His dogs are vaccinated and his last tetanus was in 2015.    Patient Active Problem List    Diagnosis Date Noted   • Intracranial hematoma following injury (HCC) 04/03/2018     Priority: High   • Closed fracture of multiple ribs of left side 04/01/2018     Priority: High   • Chronic pain syndrome 04/01/2018     Priority: High   • Retroperitoneal bleed 06/01/2013     Priority: High   • Hemorrhagic shock (HCC) 05/31/2013     Priority: High   • Hyponatremia 04/05/2018     Priority: Medium   • No contraindication to deep vein thrombosis (DVT) prophylaxis 04/01/2018     Priority: Medium   • Degeneration of lumbar or lumbosacral intervertebral disc 05/29/2013     Priority: Medium   • Liver transplanted (HCC) 04/23/2013     Priority: Medium   • Alcohol intoxication (HCC) 04/01/2018     Priority: Low   • Chronic hepatitis C virus infection (HCC) 04/01/2018     Priority: Low   • Liver transplant recipient (HCC) 04/01/2018     Priority: Low   • Trauma 04/01/2018     Priority: Low   • H/O unilateral nephrectomy 04/01/2018     Priority: Low   • Lumbosacral spondylosis without myelopathy 09/09/2014   • Tobacco abuse 06/04/2013   • DJD  (degenerative joint disease), lumbosacral 05/30/2013   • Degeneration of cervical intervertebral disc 12/11/2012   • Sepsis associated hypotension (HCC) 03/03/2012   • Tachycardia 03/03/2012   • ARF (acute renal failure) (HCC) 03/03/2012       Allergies:Niacin; Pcn [penicillins]; Latex; Morphine sulfate; Mushroom extract complex; Other food; and Penicillins    Current Outpatient Medications Ordered in Epic   Medication Sig Dispense Refill   • Multiple Vitamin (MULTI-VITAMINS) Tab Take 1 Tab by mouth.     • lisinopril (PRINIVIL) 10 MG Tab Take 10 mg by mouth every day.     • citalopram (CELEXA) 20 MG Tab Take 20 mg by mouth every bedtime.     • tacrolimus (PROGRAF) 1 MG Cap Take 2 mg by mouth every 12 hours.     • Pain Pump (PATIENT SUPPLIED) XX BRANDON by Injection route Continuous. Patient's Pain Pump (placed and maintained as an outpatient)  Medications/concentrations:     Next change: 5/15/18  Continuous infusion rates (Drug/Rate): Hydromorphone/99 mcg/day    Patient activation dose: 10 mcg   Patient activation lockout interval: 1 hour  Maximum activations per day: 7 activations       No current Epic-ordered facility-administered medications on file.        Past Medical History:   Diagnosis Date   • Arthritis     fingers, hands   • Cancer (HCC) 2002;2013    kidney / liver - treated with chemo, & transplant   • Cholesterol blood decreased    • Chronic pain    • Cold 8/27/2014    URI   • Dental disorder     upper  and lower dentures   • Heart burn     under control with meds   • Hepatitis C 1993   • Hepatitis C    • Hypertension    • Indigestion    • Liver transplanted (HCC) 2002   • Liver transplanted (HCC)    • Pain     neck   • Pain     neck and lower back   • Renal cancer (HCC)    • Renal disorder     left nephrectomy; right cryoablation       Social History     Tobacco Use   • Smoking status: Never Smoker   • Smokeless tobacco: Never Used   Substance Use Topics   • Alcohol use: No   • Drug use: No       Family  "Status   Relation Name Status   • Unknown  (Not Specified)     Family History   Problem Relation Age of Onset   • Hypertension Unknown        ROS:  Review of Systems   Constitutional: Negative for fever, chills, weight loss, malaise, and fatigue.   HENT: Negative for ear pain, nosebleeds, congestion, sore throat and neck pain.    Eyes: Negative for vision changes.   Neuro: Positive for dizziness.  Negative for headache, sensory changes, weakness, seizure, LOC.   Cardiovascular: Negative for chest pain, palpitations, orthopnea and leg swelling.   Respiratory: Negative for cough, sputum production, shortness of breath and wheezing.   Gastrointestinal: Positive for nausea.  Negative for abdominal pain, vomiting or diarrhea.   Genitourinary: Negative for dysuria, urgency and frequency.  Musculoskeletal: Positive for myalgias.  Negative for falls, neck pain, back pain, joint pain.  Skin: Positive for recent dog bite.  Negative for rash, diaphoresis.     Exam:  /70   Pulse 73   Temp 36.7 °C (98.1 °F) (Temporal)   Resp 14   Ht 1.676 m (5' 6\")   Wt 73.5 kg (162 lb)   SpO2 97%   General: well-nourished, well-developed male in NAD  Head: normocephalic, atraumatic  Eyes: PERRLA, no conjunctival injection, acuity grossly intact, lids normal.  Ears: normal shape and symmetry, no tenderness, no discharge. External canals are without any significant edema or erythema. Tympanic membranes are without any inflammation, no effusion. Gross auditory acuity is intact.  Nose: symmetrical without tenderness, no discharge.  Mouth/Throat: reasonable hygiene, no erythema, exudates or tonsillar enlargement.  Neck: no masses, range of motion within normal limits, no tracheal deviation. No obvious thyroid enlargement.   Lymph: no cervical adenopathy. No supraclavicular adenopathy.   Neuro: alert and oriented. Cranial nerves 1-12 grossly intact. No sensory deficit.   Cardiovascular: regular rate and rhythm. No edema.  Pulmonary: no " distress. Chest is symmetrical with respiration, no wheezes, crackles, or rhonchi.   Abdomen: soft, non-tender, no guarding, no hepatosplenomegaly.  Musculoskeletal: no clubbing, appropriate muscle tone, gait is stable.  There is muscular tenderness noted to bilateral biceps, arms have full range of motion.  There is no twitching noted.  Skin: warm, dry, intact, no clubbing, no cyanosis, no rashes.   Psych: appropriate mood, affect, judgement.         Assessment/Plan:  1. Muscle cramps             Patient is a very pleasant 64-year-old male who presents the clinic today with muscle cramping.  Upon examination he is without any significant distress.  There is some muscular tenderness noted to his biceps, otherwise his physical examination appears benign.  He has a significantly complex history and feel the patient requires a higher level of care in the ED setting at this time for closer monitoring, stat lab work and/or imaging for further evaluation. This has been discussed with the patient and he states agreement and understanding. The patient is stable to leave [facility] at this time and will go directly to ED without delay.  He is without acute distress upon departure.        Please note that this dictation was created using voice recognition software. I have made every reasonable attempt to correct obvious errors, but I expect that there are errors of grammar and possibly content that I did not discover before finalizing the note.      RICHARD rOdaz.

## 2019-08-16 NOTE — ED NOTES
"Pt ambulated to [facility] with steady gate, RN agree with hortencia note.  Pt states, \"while I was waiting in the lobby I was getting 'Cramps\" all over\"  After clarification, pt referring to muscle spasms on right arm and neck.  Pt unable to describe pain/cramp/muscle spasm clearly.   "

## 2019-08-16 NOTE — ASSESSMENT & PLAN NOTE
Suspect due to dehydration   Check CK level   IVF hydration   Has pain pump in place   Will add additional oxycodone for now

## 2019-08-18 LAB — TACROLIMUS BLD-MCNC: 2.8 NG/ML

## 2019-08-19 ENCOUNTER — PATIENT OUTREACH (OUTPATIENT)
Dept: HEALTH INFORMATION MANAGEMENT | Facility: OTHER | Age: 64
End: 2019-08-19

## 2019-09-15 ENCOUNTER — PATIENT OUTREACH (OUTPATIENT)
Dept: HEALTH INFORMATION MANAGEMENT | Facility: OTHER | Age: 64
End: 2019-09-15

## 2019-09-24 NOTE — PROGRESS NOTES
A 64-year-old male was an emergent admission to [facility] from 8/15/2019 to 8/16/2019 to treat Chest pain, unspecified. IHD did not visit the patient bedside. The patient was discharged Home. The patient was not discharged with any medication orders.     The patient was ordered to follow-up with PCP. Patient declined to follow up with PCP.  The patient has no future appointments scheduled.     IHD followed the patient for a total of 39 days and patient was receptive to IHD services. PPS screening was conducted at 80%.

## 2020-01-20 ENCOUNTER — APPOINTMENT (OUTPATIENT)
Dept: RADIOLOGY | Facility: MEDICAL CENTER | Age: 65
End: 2020-01-20
Attending: INTERNAL MEDICINE
Payer: MEDICARE

## 2020-01-28 ENCOUNTER — HOSPITAL ENCOUNTER (OUTPATIENT)
Dept: LAB | Facility: MEDICAL CENTER | Age: 65
End: 2020-01-28
Attending: INTERNAL MEDICINE
Payer: MEDICARE

## 2020-01-28 ENCOUNTER — HOSPITAL ENCOUNTER (OUTPATIENT)
Dept: RADIOLOGY | Facility: MEDICAL CENTER | Age: 65
End: 2020-01-28
Attending: INTERNAL MEDICINE
Payer: MEDICARE

## 2020-01-28 DIAGNOSIS — Z94.4 LIVER TRANSPLANTED (HCC): ICD-10-CM

## 2020-01-28 LAB
ALBUMIN SERPL BCP-MCNC: 4.1 G/DL (ref 3.2–4.9)
ALBUMIN/GLOB SERPL: 1.1 G/DL
ALP SERPL-CCNC: 69 U/L (ref 30–99)
ALT SERPL-CCNC: 15 U/L (ref 2–50)
ANION GAP SERPL CALC-SCNC: 9 MMOL/L (ref 0–11.9)
AST SERPL-CCNC: 15 U/L (ref 12–45)
BASOPHILS # BLD AUTO: 0.9 % (ref 0–1.8)
BASOPHILS # BLD: 0.08 K/UL (ref 0–0.12)
BILIRUB SERPL-MCNC: 0.6 MG/DL (ref 0.1–1.5)
BUN SERPL-MCNC: 9 MG/DL (ref 8–22)
CALCIUM SERPL-MCNC: 8.9 MG/DL (ref 8.5–10.5)
CHLORIDE SERPL-SCNC: 103 MMOL/L (ref 96–112)
CO2 SERPL-SCNC: 25 MMOL/L (ref 20–33)
CREAT SERPL-MCNC: 0.9 MG/DL (ref 0.5–1.4)
EOSINOPHIL # BLD AUTO: 0.47 K/UL (ref 0–0.51)
EOSINOPHIL NFR BLD: 5.1 % (ref 0–6.9)
ERYTHROCYTE [DISTWIDTH] IN BLOOD BY AUTOMATED COUNT: 43.2 FL (ref 35.9–50)
GLOBULIN SER CALC-MCNC: 3.6 G/DL (ref 1.9–3.5)
GLUCOSE SERPL-MCNC: 95 MG/DL (ref 65–99)
HCT VFR BLD AUTO: 46.5 % (ref 42–52)
HGB BLD-MCNC: 15.6 G/DL (ref 14–18)
LYMPHOCYTES # BLD AUTO: 2.71 K/UL (ref 1–4.8)
LYMPHOCYTES NFR BLD: 29.1 % (ref 22–41)
MANUAL DIFF BLD: NORMAL
MCH RBC QN AUTO: 31.2 PG (ref 27–33)
MCHC RBC AUTO-ENTMCNC: 33.5 G/DL (ref 33.7–35.3)
MCV RBC AUTO: 93 FL (ref 81.4–97.8)
MONOCYTES # BLD AUTO: 1.19 K/UL (ref 0–0.85)
MONOCYTES NFR BLD AUTO: 12.8 % (ref 0–13.4)
MORPHOLOGY BLD-IMP: NORMAL
NEUTROPHILS # BLD AUTO: 4.85 K/UL (ref 1.82–7.42)
NEUTROPHILS NFR BLD: 52.1 % (ref 44–72)
NRBC # BLD AUTO: 0 K/UL
NRBC BLD-RTO: 0 /100 WBC
PLATELET # BLD AUTO: 296 K/UL (ref 164–446)
PLATELET BLD QL SMEAR: NORMAL
PMV BLD AUTO: 9 FL (ref 9–12.9)
POTASSIUM SERPL-SCNC: 4 MMOL/L (ref 3.6–5.5)
PROT SERPL-MCNC: 7.7 G/DL (ref 6–8.2)
RBC # BLD AUTO: 5 M/UL (ref 4.7–6.1)
RBC BLD AUTO: PRESENT
SODIUM SERPL-SCNC: 137 MMOL/L (ref 135–145)
VARIANT LYMPHS BLD QL SMEAR: NORMAL
WBC # BLD AUTO: 9.3 K/UL (ref 4.8–10.8)

## 2020-01-28 PROCEDURE — 80053 COMPREHEN METABOLIC PANEL: CPT

## 2020-01-28 PROCEDURE — 85007 BL SMEAR W/DIFF WBC COUNT: CPT

## 2020-01-28 PROCEDURE — 80197 ASSAY OF TACROLIMUS: CPT

## 2020-01-28 PROCEDURE — 85027 COMPLETE CBC AUTOMATED: CPT

## 2020-01-28 PROCEDURE — 76700 US EXAM ABDOM COMPLETE: CPT

## 2020-01-28 PROCEDURE — 36415 COLL VENOUS BLD VENIPUNCTURE: CPT

## 2020-01-28 PROCEDURE — 87522 HEPATITIS C REVRS TRNSCRPJ: CPT

## 2020-01-30 LAB
HCV RNA SERPL NAA+PROBE-ACNC: NOT DETECTED IU/ML
HCV RNA SERPL NAA+PROBE-LOG IU: NOT DETECTED LOG IU/ML
HCV RNA SERPL QL NAA+PROBE: NOT DETECTED
TACROLIMUS BLD-MCNC: 2.4 NG/ML

## 2020-04-24 ENCOUNTER — HOSPITAL ENCOUNTER (EMERGENCY)
Facility: MEDICAL CENTER | Age: 65
End: 2020-04-25
Attending: EMERGENCY MEDICINE
Payer: MEDICARE

## 2020-04-24 DIAGNOSIS — R00.2 PALPITATIONS: ICD-10-CM

## 2020-04-24 DIAGNOSIS — R07.9 CHEST PAIN, UNSPECIFIED TYPE: ICD-10-CM

## 2020-04-24 PROCEDURE — 93005 ELECTROCARDIOGRAM TRACING: CPT | Performed by: EMERGENCY MEDICINE

## 2020-04-24 PROCEDURE — 99285 EMERGENCY DEPT VISIT HI MDM: CPT

## 2020-04-24 ASSESSMENT — FIBROSIS 4 INDEX: FIB4 SCORE: 0.85

## 2020-04-25 ENCOUNTER — APPOINTMENT (OUTPATIENT)
Dept: RADIOLOGY | Facility: MEDICAL CENTER | Age: 65
End: 2020-04-25
Attending: EMERGENCY MEDICINE
Payer: MEDICARE

## 2020-04-25 VITALS
WEIGHT: 168 LBS | HEART RATE: 74 BPM | DIASTOLIC BLOOD PRESSURE: 59 MMHG | TEMPERATURE: 98.4 F | HEIGHT: 67 IN | BODY MASS INDEX: 26.37 KG/M2 | SYSTOLIC BLOOD PRESSURE: 117 MMHG | OXYGEN SATURATION: 93 % | RESPIRATION RATE: 20 BRPM

## 2020-04-25 LAB
ALBUMIN SERPL BCP-MCNC: 3.9 G/DL (ref 3.2–4.9)
ALBUMIN/GLOB SERPL: 1.2 G/DL
ALP SERPL-CCNC: 59 U/L (ref 30–99)
ALT SERPL-CCNC: 25 U/L (ref 2–50)
ANION GAP SERPL CALC-SCNC: 18 MMOL/L (ref 7–16)
AST SERPL-CCNC: 26 U/L (ref 12–45)
BASOPHILS # BLD AUTO: 0.6 % (ref 0–1.8)
BASOPHILS # BLD: 0.05 K/UL (ref 0–0.12)
BILIRUB SERPL-MCNC: <0.2 MG/DL (ref 0.1–1.5)
BUN SERPL-MCNC: 12 MG/DL (ref 8–22)
CALCIUM SERPL-MCNC: 8.6 MG/DL (ref 8.5–10.5)
CHLORIDE SERPL-SCNC: 100 MMOL/L (ref 96–112)
CO2 SERPL-SCNC: 18 MMOL/L (ref 20–33)
CREAT SERPL-MCNC: 0.9 MG/DL (ref 0.5–1.4)
EKG IMPRESSION: NORMAL
EOSINOPHIL # BLD AUTO: 0.39 K/UL (ref 0–0.51)
EOSINOPHIL NFR BLD: 4.6 % (ref 0–6.9)
ERYTHROCYTE [DISTWIDTH] IN BLOOD BY AUTOMATED COUNT: 43.1 FL (ref 35.9–50)
GLOBULIN SER CALC-MCNC: 3.2 G/DL (ref 1.9–3.5)
GLUCOSE SERPL-MCNC: 120 MG/DL (ref 65–99)
HCT VFR BLD AUTO: 43.3 % (ref 42–52)
HGB BLD-MCNC: 15.3 G/DL (ref 14–18)
IMM GRANULOCYTES # BLD AUTO: 0.03 K/UL (ref 0–0.11)
IMM GRANULOCYTES NFR BLD AUTO: 0.4 % (ref 0–0.9)
LIPASE SERPL-CCNC: 65 U/L (ref 11–82)
LYMPHOCYTES # BLD AUTO: 4.24 K/UL (ref 1–4.8)
LYMPHOCYTES NFR BLD: 49.5 % (ref 22–41)
MCH RBC QN AUTO: 32.3 PG (ref 27–33)
MCHC RBC AUTO-ENTMCNC: 35.3 G/DL (ref 33.7–35.3)
MCV RBC AUTO: 91.4 FL (ref 81.4–97.8)
MONOCYTES # BLD AUTO: 0.69 K/UL (ref 0–0.85)
MONOCYTES NFR BLD AUTO: 8.1 % (ref 0–13.4)
NEUTROPHILS # BLD AUTO: 3.17 K/UL (ref 1.82–7.42)
NEUTROPHILS NFR BLD: 36.8 % (ref 44–72)
NRBC # BLD AUTO: 0 K/UL
NRBC BLD-RTO: 0 /100 WBC
PLATELET # BLD AUTO: 204 K/UL (ref 164–446)
PMV BLD AUTO: 9.6 FL (ref 9–12.9)
POTASSIUM SERPL-SCNC: 3.6 MMOL/L (ref 3.6–5.5)
PROT SERPL-MCNC: 7.1 G/DL (ref 6–8.2)
RBC # BLD AUTO: 4.74 M/UL (ref 4.7–6.1)
SODIUM SERPL-SCNC: 136 MMOL/L (ref 135–145)
TROPONIN T SERPL-MCNC: 14 NG/L (ref 6–19)
TROPONIN T SERPL-MCNC: 16 NG/L (ref 6–19)
WBC # BLD AUTO: 8.6 K/UL (ref 4.8–10.8)

## 2020-04-25 PROCEDURE — 83690 ASSAY OF LIPASE: CPT

## 2020-04-25 PROCEDURE — 80053 COMPREHEN METABOLIC PANEL: CPT

## 2020-04-25 PROCEDURE — 84484 ASSAY OF TROPONIN QUANT: CPT | Mod: 91

## 2020-04-25 PROCEDURE — 36415 COLL VENOUS BLD VENIPUNCTURE: CPT

## 2020-04-25 PROCEDURE — 71045 X-RAY EXAM CHEST 1 VIEW: CPT

## 2020-04-25 PROCEDURE — 93005 ELECTROCARDIOGRAM TRACING: CPT | Performed by: EMERGENCY MEDICINE

## 2020-04-25 PROCEDURE — 85025 COMPLETE CBC W/AUTO DIFF WBC: CPT

## 2020-04-25 ASSESSMENT — HEART SCORE
RISK FACTORS: 1-2 RISK FACTORS
AGE: >64
HEART SCORE: 3
ECG: NORMAL
HISTORY: SLIGHTLY SUSPICIOUS
TROPONIN: LESS THAN OR EQUAL TO NORMAL LIMIT

## 2020-04-25 ASSESSMENT — LIFESTYLE VARIABLES
EVER FELT BAD OR GUILTY ABOUT YOUR DRINKING: NO
TOTAL SCORE: 0
TOTAL SCORE: 0
EVER HAD A DRINK FIRST THING IN THE MORNING TO STEADY YOUR NERVES TO GET RID OF A HANGOVER: NO
HAVE YOU EVER FELT YOU SHOULD CUT DOWN ON YOUR DRINKING: NO
HAVE PEOPLE ANNOYED YOU BY CRITICIZING YOUR DRINKING: NO
CONSUMPTION TOTAL: INCOMPLETE
TOTAL SCORE: 0
DO YOU DRINK ALCOHOL: YES

## 2020-04-25 NOTE — ED PROVIDER NOTES
ED Provider Note    ER PROVIDER NOTE          CHIEF COMPLAINT  Chief Complaint   Patient presents with   • Chest Pressure     Pt states pressure is midline, non radiating, with palpations       HPI  Quirino Ruiz is a 65 y.o. male who presents to the emergency department complaining of intermittent palpitations and chest tightness.  Patient reports he has had symptoms over the last 2 weeks although has not seemed more constant over the last 48 hours.  It is not exertional, does not seem to be positional.  He denies any associated shortness of breath, nausea or diaphoresis.  He denies actual chest pain he states it just occasionally feels tight and feels like he is skipping or stopping a beat.  He denies any leg pain or swelling.  He denies any fevers chills or cough.  No abdominal pain.  No recent travel    No known high risk exposures    REVIEW OF SYSTEMS  Pertinent positives include palpitations, chest tightness. Pertinent negatives include no fevers chills cough or shortness of breath. See HPI for details. All other systems reviewed and are negative.    PAST MEDICAL HISTORY   has a past medical history of Arthritis, Cancer (HCC) (2002;2013), Cholesterol blood decreased, Chronic pain, Cold (8/27/2014), Dental disorder, Heart burn, Hepatitis C (1993), Hepatitis C, Hypertension, Indigestion, Liver transplanted (HCC) (2002), Liver transplanted (HCC), Pain, Pain, Renal cancer (HCC), and Renal disorder.    SURGICAL HISTORY   has a past surgical history that includes other (2002); other (1996); other (1994); cervical disk and fusion anterior (12/11/2012); recovery (4/23/2013); lumbar fusion anterior (5/29/2013); evacuation of hematoma (5/31/2013); other (2/28/2014); cath place perm epidural (9/9/2014); pump insert/remove (11/25/2014); exploratory laparotomy (5/31/2013); recovery (11/30/2015); other surgical procedure; nephrectomy radical; other orthopedic surgery; cholecystectomy; and cervical decompression  posterior.    FAMILY HISTORY  Family History   Problem Relation Age of Onset   • Hypertension Other        SOCIAL HISTORY  Social History     Socioeconomic History   • Marital status:      Spouse name: Not on file   • Number of children: Not on file   • Years of education: Not on file   • Highest education level: Not on file   Occupational History   • Not on file   Social Needs   • Financial resource strain: Not on file   • Food insecurity     Worry: Not on file     Inability: Not on file   • Transportation needs     Medical: Not on file     Non-medical: Not on file   Tobacco Use   • Smoking status: Current Every Day Smoker     Packs/day: 0.50     Years: 1.00     Pack years: 0.50     Types: Cigarettes   • Smokeless tobacco: Never Used   Substance and Sexual Activity   • Alcohol use: Yes     Alcohol/week: 0.6 oz     Types: 1 Cans of beer per week     Frequency: 2-4 times a month   • Drug use: No   • Sexual activity: Not on file   Lifestyle   • Physical activity     Days per week: Not on file     Minutes per session: Not on file   • Stress: Not on file   Relationships   • Social connections     Talks on phone: Not on file     Gets together: Not on file     Attends Tenri[employer] service: Not on file     Active member of club or organization: Not on file     Attends meetings of clubs or organizations: Not on file     Relationship status: Not on file   • Intimate partner violence     Fear of current or ex partner: Not on file     Emotionally abused: Not on file     Physically abused: Not on file     Forced sexual activity: Not on file   Other Topics Concern   • Not on file   Social History Narrative    ** Merged History Encounter **           Social History     Substance and Sexual Activity   Drug Use No       CURRENT MEDICATIONS  Home Medications     Reviewed by Sherine Hernandez R.N. (Registered Nurse) on 04/25/20 at 0001  Med List Status: Partial   Medication Last Dose Status   artificial tears (EYE LUBRICANT) Ointment  "ophth ointment  Active   citalopram (CELEXA) 20 MG Tab  Active   lisinopril (PRINIVIL) 10 MG Tab  Active   Pain Pump (PATIENT SUPPLIED) XX BRANDON  Active   tacrolimus (PROGRAF) 1 MG Cap  Active                ALLERGIES  Allergies   Allergen Reactions   • Niacin      Passed out.    • Pcn [Penicillins] Anaphylaxis and Swelling   • Latex      Reaction- Itching, Rash   • Morphine Sulfate      Per pt., \"it makes me cranky\"   • Mushroom Extract Complex    • Other Food      Mushrooms   • Penicillins Itching       PHYSICAL EXAM  VITAL SIGNS: /59   Pulse 74   Temp 36.9 °C (98.4 °F) (Temporal)   Resp 20   Ht 1.702 m (5' 7\")   Wt 76.2 kg (168 lb)   SpO2 93%   BMI 26.31 kg/m²   Pulse ox interpretation: I interpret this pulse ox as normal.    Constitutional: Alert in no apparent distress.  HENT: No signs of trauma, Bilateral external ears normal, Nose normal.   Eyes: Pupils are equal and reactive, Conjunctiva normal, Non-icteric.   Neck: Normal range of motion, No tenderness, Supple, No stridor.   Lymphatic: No lymphadenopathy noted.   Cardiovascular: Regular rate and rhythm, no murmurs.   Thorax & Lungs: Normal breath sounds, No respiratory distress, No wheezing, No chest tenderness.   Abdomen: Bowel sounds normal, Soft, No tenderness, No masses, No pulsatile masses. No peritoneal signs.  Skin: Warm, Dry, No erythema, No rash.   Back: No bony tenderness, No CVA tenderness.   Extremities: Intact distal pulses, No edema, No tenderness, No cyanosis, Negative Murray's sign.  Musculoskeletal: Good range of motion in all major joints. No tenderness to palpation or major deformities noted.   Neurologic: Alert , Normal motor function, Normal sensory function, No focal deficits noted.   Psychiatric: Affect normal, Judgment normal, Mood normal.     DIAGNOSTIC STUDIES / PROCEDURES  Results for orders placed or performed during the hospital encounter of 04/24/20   CBC WITH DIFFERENTIAL   Result Value Ref Range    WBC 8.6 4.8 - " 10.8 K/uL    RBC 4.74 4.70 - 6.10 M/uL    Hemoglobin 15.3 14.0 - 18.0 g/dL    Hematocrit 43.3 42.0 - 52.0 %    MCV 91.4 81.4 - 97.8 fL    MCH 32.3 27.0 - 33.0 pg    MCHC 35.3 33.7 - 35.3 g/dL    RDW 43.1 35.9 - 50.0 fL    Platelet Count 204 164 - 446 K/uL    MPV 9.6 9.0 - 12.9 fL    Neutrophils-Polys 36.80 (L) 44.00 - 72.00 %    Lymphocytes 49.50 (H) 22.00 - 41.00 %    Monocytes 8.10 0.00 - 13.40 %    Eosinophils 4.60 0.00 - 6.90 %    Basophils 0.60 0.00 - 1.80 %    Immature Granulocytes 0.40 0.00 - 0.90 %    Nucleated RBC 0.00 /100 WBC    Neutrophils (Absolute) 3.17 1.82 - 7.42 K/uL    Lymphs (Absolute) 4.24 1.00 - 4.80 K/uL    Monos (Absolute) 0.69 0.00 - 0.85 K/uL    Eos (Absolute) 0.39 0.00 - 0.51 K/uL    Baso (Absolute) 0.05 0.00 - 0.12 K/uL    Immature Granulocytes (abs) 0.03 0.00 - 0.11 K/uL    NRBC (Absolute) 0.00 K/uL   COMP METABOLIC PANEL   Result Value Ref Range    Sodium 136 135 - 145 mmol/L    Potassium 3.6 3.6 - 5.5 mmol/L    Chloride 100 96 - 112 mmol/L    Co2 18 (L) 20 - 33 mmol/L    Anion Gap 18.0 (H) 7.0 - 16.0    Glucose 120 (H) 65 - 99 mg/dL    Bun 12 8 - 22 mg/dL    Creatinine 0.90 0.50 - 1.40 mg/dL    Calcium 8.6 8.5 - 10.5 mg/dL    AST(SGOT) 26 12 - 45 U/L    ALT(SGPT) 25 2 - 50 U/L    Alkaline Phosphatase 59 30 - 99 U/L    Total Bilirubin <0.2 0.1 - 1.5 mg/dL    Albumin 3.9 3.2 - 4.9 g/dL    Total Protein 7.1 6.0 - 8.2 g/dL    Globulin 3.2 1.9 - 3.5 g/dL    A-G Ratio 1.2 g/dL   LIPASE   Result Value Ref Range    Lipase 65 11 - 82 U/L   TROPONIN   Result Value Ref Range    Troponin T 14 6 - 19 ng/L   ESTIMATED GFR   Result Value Ref Range    GFR If African American >60 >60 mL/min/1.73 m 2    GFR If Non African American >60 >60 mL/min/1.73 m 2   TROPONIN   Result Value Ref Range    Troponin T 16 6 - 19 ng/L   EKG (NOW)   Result Value Ref Range    Report       [facility]    Test Date:  2020-04-25  Pt Name:    PRERNA SOUZA                Department: ER  MRN:         [id number]                      Room:       RD 12  Gender:     Male                         Technician: [id number]  :        [id number]                   Requested By:AIDEN SOLOMON  Order #:    [id number]                    Reading MD: AIDEN SOLOMON MD    Measurements  Intervals                                Axis  Rate:       89                           P:          55  OH:         160                          QRS:        37  QRSD:       82                           T:          61  QT:         372  QTc:        453    Interpretive Statements  SINUS RHYTHM    Compared to ECG 2019 02:09:57  no change  Electronically Signed On 2020 3:10:33 PDT by AIDEN SOLOMON MD           RADIOLOGY  DX-CHEST-PORTABLE (1 VIEW)   Final Result      No acute cardiopulmonary abnormality.        The radiologist's interpretation of all radiological studies have been reviewed by me.    COURSE & MEDICAL DECISION MAKING  Nursing notes, VS, PMSFHx reviewed in chart.    12:00 AM Patient seen and examined at bedside.Ordered for ECG, CBC, CMP, troponin, lipase, chest x-ray to evaluate his symptoms.     Patient reevaluated, updated on results thus far, is comfortable this time, plan for repeat troponin    Reevaluated again, repeat troponin has returned, his chest pain has resolved, will plan for discharge    This patient was cared for during the COVID-19 pandemic.  History and physical exam may be limited/truncated by the inherent challenges of PPE and the need to decrease staff exposure to novel coronavirus.  Some aspects of disease management may be different to protect staff and help slow the spread of disease. I verified that, if possible, the patient was wearing a mask and I was wearing appropriate PPE every time I encountered the patient.     Current [facility] COVID19 protocol followed          Decision Making:  This is a 65 y.o. male presenting with chest pain and palpitations.  ACS is less likely given atypical nature of  pain, ECG with no ischemic changes, negative troponin x2 and recent cardiac work-up that was unremarkable with HEART score of 3. Given these factors as well as patient age, risk of 30 day mortality or MI is low based on multiple studies.  I have called the  to arrange for cardiology follow-up for him  This was discussed with patient and need for follow up was emphasized. PE is less likely given nature of pain not consistent with PE, Wells low risk and not tachycardic or hypoxic. Dissection, aneurysm and pneumothorax are unlikely given the pain is resolved, as well as the nature of the pain  as well as Xray lacking evidence of either. Pneumonia or other infection is less likely given no fever or infectious symptoms as well as no radiographic evidence. Other non emergent causes such as costochondritis, muscle strain, GI causes were also considered  I discussed strict return precautions with pt including shortness of breath, new/persistent/worse pain, syncope, vomiting, fever, palpitations, abdominal pain or any other concerns. Pt understood these well and was instructed to follow up with PCP.   The patient will return for new or worsening symptoms and is stable at the time of discharge.    The patient is referred to a primary physician for blood pressure management, diabetic screening, and for all other preventative health concerns.      DISPOSITION:  Patient will be discharged home in stable condition.    FOLLOW UP:  Alejandra Sutherland M.D.  [address]  [address]  [place] [address]  [phone]    Call       Our  will call you to set up a follow-up appointment with cardiology            OUTPATIENT MEDICATIONS:  New Prescriptions    No medications on file         FINAL IMPRESSION  1. Chest pain, unspecified type    2. Palpitations         The note accurately reflects work and decisions made by me.  Junior Mcnulty M.D.  4/25/2020  3:12 AM

## 2020-04-25 NOTE — DISCHARGE PLANNING
Medical Social Work     SW received a call from the RN requesting SW assistance with a taxi. The pt does not have enough money for a taxi and does not have anyone to pick him up. SW provided a taxi voucher for the pt.     Plan: SW will remain available for pt support.

## 2020-04-25 NOTE — ED NOTES
[employer] established a cab voucher for pt. Pt instructed on use of voucher. .Pt given d/c instructions. Pt able to illustrate understanding as evidence by verbal feed-back of information given. Pt is stable for d/c at present time. Pt has no further concerns at present time. Pt able to ambulate.

## 2020-04-25 NOTE — ED TRIAGE NOTES
"Chief Complaint   Patient presents with   • Chest Pressure     Pt states pressure is midline, non radiating, with palpations      PT BIB [facility] with above complaints. Pt states he has felt palpations on and off for about a week worsening over the past 24 hours.  Pt states chest pressure is non radiating and midline only.  Pt has pain pump in place managed by Dr. Mendes, did not bring device to bolus with him to hospital.      /75   Pulse 91   Resp 18   Ht 1.702 m (5' 7\")   Wt 76.2 kg (168 lb)   SpO2 97%   BMI 26.31 kg/m²     "

## 2020-07-16 ENCOUNTER — HOSPITAL ENCOUNTER (OUTPATIENT)
Dept: LAB | Facility: MEDICAL CENTER | Age: 65
End: 2020-07-16
Attending: FAMILY MEDICINE
Payer: MEDICARE

## 2020-07-16 ENCOUNTER — HOSPITAL ENCOUNTER (OUTPATIENT)
Dept: RADIOLOGY | Facility: MEDICAL CENTER | Age: 65
End: 2020-07-16
Attending: FAMILY MEDICINE
Payer: MEDICARE

## 2020-07-16 DIAGNOSIS — R05.9 COUGH: ICD-10-CM

## 2020-07-16 DIAGNOSIS — Z85.528 PERSONAL HISTORY OF RENAL CANCER: ICD-10-CM

## 2020-07-16 LAB
ALBUMIN SERPL BCP-MCNC: 4.1 G/DL (ref 3.2–4.9)
ALBUMIN/GLOB SERPL: 1.5 G/DL
ALP SERPL-CCNC: 68 U/L (ref 30–99)
ALT SERPL-CCNC: 20 U/L (ref 2–50)
ANION GAP SERPL CALC-SCNC: 13 MMOL/L (ref 7–16)
APPEARANCE UR: CLEAR
AST SERPL-CCNC: 16 U/L (ref 12–45)
BASOPHILS # BLD AUTO: 0.8 % (ref 0–1.8)
BASOPHILS # BLD: 0.06 K/UL (ref 0–0.12)
BILIRUB SERPL-MCNC: 0.3 MG/DL (ref 0.1–1.5)
BILIRUB UR QL STRIP.AUTO: NEGATIVE
BUN SERPL-MCNC: 20 MG/DL (ref 8–22)
CALCIUM SERPL-MCNC: 9 MG/DL (ref 8.5–10.5)
CHLORIDE SERPL-SCNC: 100 MMOL/L (ref 96–112)
CHOLEST SERPL-MCNC: 154 MG/DL (ref 100–199)
CO2 SERPL-SCNC: 22 MMOL/L (ref 20–33)
COLOR UR: YELLOW
CREAT SERPL-MCNC: 1.04 MG/DL (ref 0.5–1.4)
EOSINOPHIL # BLD AUTO: 0.39 K/UL (ref 0–0.51)
EOSINOPHIL NFR BLD: 5.4 % (ref 0–6.9)
ERYTHROCYTE [DISTWIDTH] IN BLOOD BY AUTOMATED COUNT: 42.1 FL (ref 35.9–50)
EST. AVERAGE GLUCOSE BLD GHB EST-MCNC: 111 MG/DL
FASTING STATUS PATIENT QL REPORTED: NORMAL
GLOBULIN SER CALC-MCNC: 2.7 G/DL (ref 1.9–3.5)
GLUCOSE SERPL-MCNC: 108 MG/DL (ref 65–99)
GLUCOSE UR STRIP.AUTO-MCNC: NEGATIVE MG/DL
HBA1C MFR BLD: 5.5 % (ref 0–5.6)
HCT VFR BLD AUTO: 44.7 % (ref 42–52)
HDLC SERPL-MCNC: 32 MG/DL
HGB BLD-MCNC: 15.2 G/DL (ref 14–18)
IMM GRANULOCYTES # BLD AUTO: 0.02 K/UL (ref 0–0.11)
IMM GRANULOCYTES NFR BLD AUTO: 0.3 % (ref 0–0.9)
KETONES UR STRIP.AUTO-MCNC: NEGATIVE MG/DL
LDLC SERPL CALC-MCNC: 77 MG/DL
LEUKOCYTE ESTERASE UR QL STRIP.AUTO: NEGATIVE
LYMPHOCYTES # BLD AUTO: 3.01 K/UL (ref 1–4.8)
LYMPHOCYTES NFR BLD: 41.5 % (ref 22–41)
MAGNESIUM SERPL-MCNC: 1.9 MG/DL (ref 1.5–2.5)
MCH RBC QN AUTO: 31.8 PG (ref 27–33)
MCHC RBC AUTO-ENTMCNC: 34 G/DL (ref 33.7–35.3)
MCV RBC AUTO: 93.5 FL (ref 81.4–97.8)
MICRO URNS: NORMAL
MONOCYTES # BLD AUTO: 0.89 K/UL (ref 0–0.85)
MONOCYTES NFR BLD AUTO: 12.3 % (ref 0–13.4)
NEUTROPHILS # BLD AUTO: 2.88 K/UL (ref 1.82–7.42)
NEUTROPHILS NFR BLD: 39.7 % (ref 44–72)
NITRITE UR QL STRIP.AUTO: NEGATIVE
NRBC # BLD AUTO: 0 K/UL
NRBC BLD-RTO: 0 /100 WBC
PH UR STRIP.AUTO: 5.5 [PH] (ref 5–8)
PLATELET # BLD AUTO: 212 K/UL (ref 164–446)
PMV BLD AUTO: 9.5 FL (ref 9–12.9)
POTASSIUM SERPL-SCNC: 4.2 MMOL/L (ref 3.6–5.5)
PROT SERPL-MCNC: 6.8 G/DL (ref 6–8.2)
PROT UR QL STRIP: NEGATIVE MG/DL
PSA SERPL-MCNC: 1.52 NG/ML (ref 0–4)
RBC # BLD AUTO: 4.78 M/UL (ref 4.7–6.1)
RBC UR QL AUTO: NEGATIVE
SODIUM SERPL-SCNC: 135 MMOL/L (ref 135–145)
SP GR UR STRIP.AUTO: 1.01
TRIGL SERPL-MCNC: 225 MG/DL (ref 0–149)
TSH SERPL DL<=0.005 MIU/L-ACNC: 0.67 UIU/ML (ref 0.38–5.33)
URATE SERPL-MCNC: 10.5 MG/DL (ref 2.5–8.3)
UROBILINOGEN UR STRIP.AUTO-MCNC: 0.2 MG/DL
VIT B12 SERPL-MCNC: 517 PG/ML (ref 211–911)
WBC # BLD AUTO: 7.3 K/UL (ref 4.8–10.8)

## 2020-07-16 PROCEDURE — 84443 ASSAY THYROID STIM HORMONE: CPT

## 2020-07-16 PROCEDURE — 36415 COLL VENOUS BLD VENIPUNCTURE: CPT

## 2020-07-16 PROCEDURE — 84550 ASSAY OF BLOOD/URIC ACID: CPT

## 2020-07-16 PROCEDURE — 82607 VITAMIN B-12: CPT

## 2020-07-16 PROCEDURE — 85025 COMPLETE CBC W/AUTO DIFF WBC: CPT

## 2020-07-16 PROCEDURE — 83735 ASSAY OF MAGNESIUM: CPT

## 2020-07-16 PROCEDURE — 76775 US EXAM ABDO BACK WALL LIM: CPT

## 2020-07-16 PROCEDURE — 84153 ASSAY OF PSA TOTAL: CPT

## 2020-07-16 PROCEDURE — 80053 COMPREHEN METABOLIC PANEL: CPT

## 2020-07-16 PROCEDURE — 86769 SARS-COV-2 COVID-19 ANTIBODY: CPT

## 2020-07-16 PROCEDURE — 71046 X-RAY EXAM CHEST 2 VIEWS: CPT

## 2020-07-16 PROCEDURE — 80061 LIPID PANEL: CPT

## 2020-07-16 PROCEDURE — 83036 HEMOGLOBIN GLYCOSYLATED A1C: CPT

## 2020-07-16 PROCEDURE — 81003 URINALYSIS AUTO W/O SCOPE: CPT

## 2020-07-17 LAB — SARS-COV-2 AB SERPL QL IA: NORMAL

## 2020-08-19 ENCOUNTER — HOSPITAL ENCOUNTER (OUTPATIENT)
Dept: RADIOLOGY | Facility: MEDICAL CENTER | Age: 65
End: 2020-08-19
Attending: INTERNAL MEDICINE
Payer: MEDICARE

## 2020-08-19 DIAGNOSIS — Z94.4 LIVER REPLACED BY TRANSPLANT (HCC): ICD-10-CM

## 2020-08-19 PROCEDURE — 76700 US EXAM ABDOM COMPLETE: CPT

## 2020-12-05 ENCOUNTER — HOSPITAL ENCOUNTER (OUTPATIENT)
Facility: MEDICAL CENTER | Age: 65
DRG: 603 | End: 2020-12-05
Attending: NURSE PRACTITIONER
Payer: MEDICARE

## 2020-12-05 ENCOUNTER — OFFICE VISIT (OUTPATIENT)
Dept: URGENT CARE | Facility: PHYSICIAN GROUP | Age: 65
End: 2020-12-05
Payer: MEDICARE

## 2020-12-05 VITALS
DIASTOLIC BLOOD PRESSURE: 78 MMHG | OXYGEN SATURATION: 97 % | SYSTOLIC BLOOD PRESSURE: 120 MMHG | WEIGHT: 168 LBS | HEIGHT: 67 IN | BODY MASS INDEX: 26.37 KG/M2 | RESPIRATION RATE: 18 BRPM | HEART RATE: 100 BPM | TEMPERATURE: 98.2 F

## 2020-12-05 DIAGNOSIS — L03.211 CELLULITIS OF FACE: ICD-10-CM

## 2020-12-05 DIAGNOSIS — L01.00 IMPETIGO: ICD-10-CM

## 2020-12-05 DIAGNOSIS — L30.9 DERMATITIS: ICD-10-CM

## 2020-12-05 DIAGNOSIS — B00.9 HSV (HERPES SIMPLEX VIRUS) INFECTION: ICD-10-CM

## 2020-12-05 DIAGNOSIS — R31.9 HEMATURIA, UNSPECIFIED TYPE: ICD-10-CM

## 2020-12-05 DIAGNOSIS — M54.50 LUMBAR BACK PAIN: ICD-10-CM

## 2020-12-05 LAB
ALBUMIN SERPL BCP-MCNC: 4.4 G/DL (ref 3.2–4.9)
ALBUMIN/GLOB SERPL: 1.2 G/DL
ALP SERPL-CCNC: 68 U/L (ref 30–99)
ALT SERPL-CCNC: 51 U/L (ref 2–50)
ANION GAP SERPL CALC-SCNC: 11 MMOL/L (ref 7–16)
APPEARANCE UR: CLEAR
AST SERPL-CCNC: 26 U/L (ref 12–45)
BASOPHILS # BLD AUTO: 0.9 % (ref 0–1.8)
BASOPHILS # BLD: 0.06 K/UL (ref 0–0.12)
BILIRUB SERPL-MCNC: 0.4 MG/DL (ref 0.1–1.5)
BILIRUB UR STRIP-MCNC: NEGATIVE MG/DL
BUN SERPL-MCNC: 7 MG/DL (ref 8–22)
CALCIUM SERPL-MCNC: 9.5 MG/DL (ref 8.5–10.5)
CHLORIDE SERPL-SCNC: 101 MMOL/L (ref 96–112)
CO2 SERPL-SCNC: 25 MMOL/L (ref 20–33)
COLOR UR AUTO: YELLOW
CREAT SERPL-MCNC: 0.8 MG/DL (ref 0.5–1.4)
EOSINOPHIL # BLD AUTO: 0.23 K/UL (ref 0–0.51)
EOSINOPHIL NFR BLD: 3.4 % (ref 0–6.9)
ERYTHROCYTE [DISTWIDTH] IN BLOOD BY AUTOMATED COUNT: 47 FL (ref 35.9–50)
GLOBULIN SER CALC-MCNC: 3.6 G/DL (ref 1.9–3.5)
GLUCOSE SERPL-MCNC: 109 MG/DL (ref 65–99)
GLUCOSE UR STRIP.AUTO-MCNC: NEGATIVE MG/DL
HCT VFR BLD AUTO: 51.8 % (ref 42–52)
HGB BLD-MCNC: 17.2 G/DL (ref 14–18)
IMM GRANULOCYTES # BLD AUTO: 0.02 K/UL (ref 0–0.11)
IMM GRANULOCYTES NFR BLD AUTO: 0.3 % (ref 0–0.9)
KETONES UR STRIP.AUTO-MCNC: NEGATIVE MG/DL
LEUKOCYTE ESTERASE UR QL STRIP.AUTO: NEGATIVE
LYMPHOCYTES # BLD AUTO: 1.76 K/UL (ref 1–4.8)
LYMPHOCYTES NFR BLD: 26.1 % (ref 22–41)
MCH RBC QN AUTO: 31.5 PG (ref 27–33)
MCHC RBC AUTO-ENTMCNC: 33.2 G/DL (ref 33.7–35.3)
MCV RBC AUTO: 94.9 FL (ref 81.4–97.8)
MONOCYTES # BLD AUTO: 1.22 K/UL (ref 0–0.85)
MONOCYTES NFR BLD AUTO: 18.1 % (ref 0–13.4)
NEUTROPHILS # BLD AUTO: 3.45 K/UL (ref 1.82–7.42)
NEUTROPHILS NFR BLD: 51.2 % (ref 44–72)
NITRITE UR QL STRIP.AUTO: NEGATIVE
NRBC # BLD AUTO: 0 K/UL
NRBC BLD-RTO: 0 /100 WBC
PH UR STRIP.AUTO: 5.5 [PH] (ref 5–8)
PLATELET # BLD AUTO: 209 K/UL (ref 164–446)
PMV BLD AUTO: 10.1 FL (ref 9–12.9)
POTASSIUM SERPL-SCNC: 4.5 MMOL/L (ref 3.6–5.5)
PROT SERPL-MCNC: 8 G/DL (ref 6–8.2)
PROT UR QL STRIP: NEGATIVE MG/DL
RBC # BLD AUTO: 5.46 M/UL (ref 4.7–6.1)
RBC UR QL AUTO: NORMAL
SODIUM SERPL-SCNC: 137 MMOL/L (ref 135–145)
SP GR UR STRIP.AUTO: 1.01
UROBILINOGEN UR STRIP-MCNC: 0.2 MG/DL
WBC # BLD AUTO: 6.7 K/UL (ref 4.8–10.8)

## 2020-12-05 PROCEDURE — 85025 COMPLETE CBC W/AUTO DIFF WBC: CPT

## 2020-12-05 PROCEDURE — 80053 COMPREHEN METABOLIC PANEL: CPT

## 2020-12-05 PROCEDURE — 81002 URINALYSIS NONAUTO W/O SCOPE: CPT | Performed by: NURSE PRACTITIONER

## 2020-12-05 PROCEDURE — 99214 OFFICE O/P EST MOD 30 MIN: CPT | Performed by: NURSE PRACTITIONER

## 2020-12-05 RX ORDER — CEPHALEXIN 500 MG/1
500 CAPSULE ORAL 4 TIMES DAILY
Qty: 20 CAP | Refills: 0 | Status: ON HOLD | OUTPATIENT
Start: 2020-12-05 | End: 2020-12-10

## 2020-12-05 RX ORDER — VALACYCLOVIR HYDROCHLORIDE 1 G/1
1000 TABLET, FILM COATED ORAL 2 TIMES DAILY
Qty: 14 TAB | Refills: 0 | Status: ON HOLD | OUTPATIENT
Start: 2020-12-05 | End: 2020-12-10 | Stop reason: SDUPTHER

## 2020-12-05 RX ORDER — DOXYCYCLINE 100 MG/1
100 CAPSULE ORAL 2 TIMES DAILY
Qty: 14 CAP | Refills: 0 | Status: ON HOLD | OUTPATIENT
Start: 2020-12-05 | End: 2020-12-10

## 2020-12-05 ASSESSMENT — ENCOUNTER SYMPTOMS
EYE PAIN: 0
EYE REDNESS: 0
SORE THROAT: 0
FEVER: 0
BLURRED VISION: 0
BACK PAIN: 1
DOUBLE VISION: 0
HEADACHES: 0
SHORTNESS OF BREATH: 0
FLANK PAIN: 0

## 2020-12-05 ASSESSMENT — FIBROSIS 4 INDEX: FIB4 SCORE: 1.11

## 2020-12-05 NOTE — PROGRESS NOTES
"  Subjective:     Quirino Ruiz is a 65 y.o. male who presents for Rash (possible shingles, on face. noticed yesterday. also having pain in kidney area. )      Started with a pimple like lesions to face. Tried to popped it, with increased pain, redness, and swelling to face and nose. Left lip swelling. Facial pain 10/10, throbbing. No eye pain or vision changes.  No fever. Lower back pain bilaterally, Started on left, \"where there isn't a kidney, now on right side\". Constant. No hematuria. No difficulty urination. Has chronic back pain. Back pain increased with movement. Also has itchy rash on bilateral sides for a few months. States he has a hx of rashes since his liver transplant. He had planned to see his dermatologist. Liver finction is good, has biannual follow up. On pain pump.   Rash  This is a new problem. The current episode started yesterday. The problem has been rapidly worsening since onset. The affected locations include the face. The rash is characterized by blistering, redness and swelling. It is unknown if there was an exposure to a precipitant. Associated symptoms include facial edema. Pertinent negatives include no congestion, eye pain, fever, shortness of breath or sore throat. Past treatments include nothing.       Past Medical History:   Diagnosis Date   • Arthritis     fingers, hands   • Cancer (HCC) 2002;2013    kidney / liver - treated with chemo, & transplant   • Cholesterol blood decreased    • Chronic pain    • Cold 8/27/2014    URI   • Dental disorder     upper  and lower dentures   • Heart burn     under control with meds   • Hepatitis C 1993   • Hepatitis C    • Hypertension    • Indigestion    • Liver transplanted (HCC) 2002   • Liver transplanted (HCC)    • Pain     neck   • Pain     neck and lower back   • Renal cancer (HCC)    • Renal disorder     left nephrectomy; right cryoablation       Past Surgical History:   Procedure Laterality Date   • RECOVERY  11/30/2015   " Procedure: US-Non Targeted Liver Biopsy-;  Surgeon: [facility];  Location: SURGERY PRE-POST PROC UNIT [facility];  Service:    • PUMP INSERT/REMOVE  11/25/2014    Performed by Jass Mendes M.D. at SURGERY [facility]   • CATH PLACE PERM EPIDURAL  9/9/2014    Performed by Jass Mendes M.D. at SURGERY [facility]   • OTHER  2/28/2014    cryoablation right kidney   • EVACUATION OF HEMATOMA  5/31/2013    Performed by Davis Murray M.D. at SURGERY [facility]   • EXPLORATORY LAPAROTOMY  5/31/2013    Performed by Davis Murray M.D. at SURGERY [facility]   • LUMBAR FUSION ANTERIOR  5/29/2013    Performed by Suman Burks M.D. at SURGERY [facility]   • RECOVERY  4/23/2013    [facility]   • CERVICAL DISK AND FUSION ANTERIOR  12/11/2012    Performed by Suman Burks M.D. at SURGERY [facility]   • OTHER  2002    liver transplant   • OTHER  1996    Left kidney removed   • OTHER  1994    gallbladder removal   • CERVICAL DECOMPRESSION POSTERIOR     • CHOLECYSTECTOMY     • NEPHRECTOMY RADICAL     • OTHER ORTHOPEDIC SURGERY      pain pump   • OTHER SURGICAL PROCEDURE      liver transplant       Social History     Socioeconomic History   • Marital status:      Spouse name: Not on file   • Number of children: Not on file   • Years of education: Not on file   • Highest education level: Not on file   Occupational History   • Not on file   Social Needs   • Financial resource strain: Not on file   • Food insecurity     Worry: Not on file     Inability: Not on file   • Transportation needs     Medical: Not on file     Non-medical: Not on file   Tobacco Use   • Smoking status: Current Every Day Smoker     Packs/day: 0.50     Years: 1.00     Pack years: 0.50     Types: Cigarettes   • Smokeless tobacco: Never Used   Substance and Sexual Activity   • Alcohol use: Yes     Alcohol/week: 0.6 oz     Types: 1 Cans of  "beer per week     Frequency: 2-4 times a month   • Drug use: No   • Sexual activity: Not on file   Lifestyle   • Physical activity     Days per week: Not on file     Minutes per session: Not on file   • Stress: Not on file   Relationships   • Social connections     Talks on phone: Not on file     Gets together: Not on file     Attends Yarsanism service: Not on file     Active member of club or organization: Not on file     Attends meetings of clubs or organizations: Not on file     Relationship status: Not on file   • Intimate partner violence     Fear of current or ex partner: Not on file     Emotionally abused: Not on file     Physically abused: Not on file     Forced sexual activity: Not on file   Other Topics Concern   • Not on file   Social History Narrative    ** Merged History Encounter **             Family History   Problem Relation Age of Onset   • Hypertension Other         Allergies   Allergen Reactions   • Niacin      Passed out.    • Pcn [Penicillins] Anaphylaxis and Swelling   • Latex      Reaction- Itching, Rash   • Morphine Sulfate      Per pt., \"it makes me cranky\"   • Mushroom Extract Complex    • Other Food      Mushrooms   • Penicillins Itching       Review of Systems   Constitutional: Negative for fever.   HENT: Negative for congestion and sore throat.    Eyes: Negative for blurred vision, double vision, pain and redness.   Respiratory: Negative for shortness of breath.    Genitourinary: Negative for dysuria, flank pain and hematuria.   Musculoskeletal: Positive for back pain.   Skin: Positive for itching and rash.        Has hx of cold sores.    Neurological: Negative for headaches.   All other systems reviewed and are negative.       Objective:   /78   Pulse 100   Temp 36.8 °C (98.2 °F) (Temporal)   Resp 18   Ht 1.689 m (5' 6.5\")   Wt 76.2 kg (168 lb)   SpO2 97%   BMI 26.71 kg/m²     Physical Exam  Vitals signs reviewed.   Constitutional:       General: He is not in acute " distress.     Appearance: He is well-developed. He is not toxic-appearing.   HENT:      Head: Normocephalic and atraumatic. No left periorbital erythema.        Right Ear: External ear normal. No swelling or tenderness.      Left Ear: External ear normal. No swelling or tenderness.      Nose: Nasal tenderness present.      Left Sinus: Maxillary sinus tenderness present.      Mouth/Throat:      Lips: Pink. Lesions present.      Mouth: Mucous membranes are moist.      Palate: No lesions.      Pharynx: Oropharynx is clear. Uvula midline.   Eyes:      Extraocular Movements: Extraocular movements intact.      Conjunctiva/sclera: Conjunctivae normal.      Pupils: Pupils are equal, round, and reactive to light.   Neck:      Musculoskeletal: Normal range of motion and neck supple. No neck rigidity.   Cardiovascular:      Rate and Rhythm: Normal rate.   Pulmonary:      Effort: Pulmonary effort is normal.   Abdominal:      Tenderness: There is no right CVA tenderness or left CVA tenderness.      Comments: Pain pump implant LUQ.   Musculoskeletal: Normal range of motion.      Lumbar back: He exhibits tenderness. He exhibits no swelling.      Comments: Bilateral paraspinal lumbar tenderness to palpation. No lesions in area of back pain.    Lymphadenopathy:      Head:      Right side of head: No submental, submandibular, tonsillar, preauricular, posterior auricular or occipital adenopathy.      Left side of head: No submental, submandibular, tonsillar, preauricular, posterior auricular or occipital adenopathy.   Skin:     General: Skin is warm and dry.      Findings: Erythema, lesion and rash present. Rash is crusting and vesicular. Rash is not purpuric.      Comments: Multiple small vesicular lesions left lip. Larger single crusted lesion lip. Scabbed lesion mid septum. Honey colored crusting to lesions on left nose. Extending erythema and induration to left face, across maxillary area. No orbital involvement.    Dry skin,  dermatitis to bilateral flanks/sides, no petechiae.    Neurological:      General: No focal deficit present.      Mental Status: He is alert and oriented to person, place, and time.      GCS: GCS eye subscore is 4. GCS verbal subscore is 5. GCS motor subscore is 6.   Psychiatric:         Mood and Affect: Mood normal.         Speech: Speech normal.         Behavior: Behavior normal.         Thought Content: Thought content normal.         Judgment: Judgment normal.         Assessment/Plan:   1. Cellulitis of face  - CBC WITH DIFFERENTIAL; Future  - doxycycline (MONODOX) 100 MG capsule; Take 1 Cap by mouth 2 times a day for 7 days.  Dispense: 14 Cap; Refill: 0  - cephALEXin (KEFLEX) 500 MG Cap; Take 1 Cap by mouth 4 times a day for 5 days.  Dispense: 20 Cap; Refill: 0    2. Impetigo  - cephALEXin (KEFLEX) 500 MG Cap; Take 1 Cap by mouth 4 times a day for 5 days.  Dispense: 20 Cap; Refill: 0    3. HSV (herpes simplex virus) infection  - valacyclovir (VALTREX) 1 GM Tab; Take 1 Tab by mouth 2 times a day for 7 days.  Dispense: 14 Tab; Refill: 0    4. Dermatitis  - Comp Metabolic Panel; Future  - CBC WITH DIFFERENTIAL; Future    5. Lumbar back pain  - POCT Urinalysis  -Temperature therapy.    6. Hematuria, unspecified type  - Comp Metabolic Panel; Future  - CBC WITH DIFFERENTIAL; Future  - POCT Urinalysis    Other orders  - ALLOPURINOL PO; Take  by mouth.    Results for orders placed or performed in visit on 12/05/20   POCT Urinalysis   Result Value Ref Range    POC Color yellow Negative    POC Appearance clear Negative    POC Leukocyte Esterase negative Negative    POC Nitrites negative Negative    POC Urobiligen 0.2 Negative (0.2) mg/dL    POC Protein negative Negative mg/dL    POC Urine PH 5.5 5.0 - 8.0    POC Blood trace intact Negative    POC Specific Gravity 1.010 <1.005 - >1.030    POC Ketones negative Negative mg/dL    POC Bilirubin negative Negative mg/dL    POC Glucose negative Negative mg/dL   -Trace hematuria,  follow up with PCP.   -Cool bath or compress for pain or itching.  -Strict ER precautions. Follow up emergently for increased signs of infection, eye pain, decreased vision, decreased eye movement, fever.    Discussed lesions likely HSV with presentation on lips and nose, vs shingles. With secondary bacterial infection, Normal renal and liver function on labs from July. Discussed initiating Valacyclovir adjusted slightly to accommodate hx of single kidney. Dual antibiotic coverage due to extensiveness of facial cellulitis. Will follow up with renal and liver function, with pruritic trunk rash. Discussed emergent follow up for any increase in redness or swelling to face to to hx of immunocompromise.    Differential diagnosis, natural history, supportive care, and indications for immediate follow-up discussed.

## 2020-12-05 NOTE — PATIENT INSTRUCTIONS
Cellulitis, Adult    Cellulitis is a skin infection. The infected area is usually warm, red, swollen, and tender. This condition occurs most often in the arms and lower legs. The infection can travel to the muscles, blood, and underlying tissue and become serious. It is very important to get treated for this condition.  What are the causes?  Cellulitis is caused by bacteria. The bacteria enter through a break in the skin, such as a cut, burn, insect bite, open sore, or crack.  What increases the risk?  This condition is more likely to occur in people who:  · Have a weak body defense system (immune system).  · Have open wounds on the skin, such as cuts, burns, bites, and scrapes. Bacteria can enter the body through these open wounds.  · Are older than 60 years of age.  · Have diabetes.  · Have a type of long-lasting (chronic) liver disease (cirrhosis) or kidney disease.  · Are obese.  · Have a skin condition such as:  ? Itchy rash (eczema).  ? Slow movement of blood in the veins (venous stasis).  ? Fluid buildup below the skin (edema).  · Have had radiation therapy.  · Use IV drugs.  What are the signs or symptoms?  Symptoms of this condition include:  · Redness, streaking, or spotting on the skin.  · Swollen area of the skin.  · Tenderness or pain when an area of the skin is touched.  · Warm skin.  · A fever.  · Chills.  · Blisters.  How is this diagnosed?  This condition is diagnosed based on a medical history and physical exam. You may also have tests, including:  · Blood tests.  · Imaging tests.  How is this treated?  Treatment for this condition may include:  · Medicines, such as antibiotic medicines or medicines to treat allergies (antihistamines).  · Supportive care, such as rest and application of cold or warm cloths (compresses) to the skin.  · Hospital care, if the condition is severe.  The infection usually starts to get better within 1-2 days of treatment.  Follow these instructions at  home:    Medicines  · Take over-the-counter and prescription medicines only as told by your health care provider.  · If you were prescribed an antibiotic medicine, take it as told by your health care provider. Do not stop taking the antibiotic even if you start to feel better.  General instructions  · Drink enough fluid to keep your urine pale yellow.  · Do not touch or rub the infected area.  · Raise (elevate) the infected area above the level of your heart while you are sitting or lying down.  · Apply warm or cold compresses to the affected area as told by your health care provider.  · Keep all follow-up visits as told by your health care provider. This is important. These visits let your health care provider make sure a more serious infection is not developing.  Contact a health care provider if:  · You have a fever.  · Your symptoms do not begin to improve within 1-2 days of starting treatment.  · Your bone or joint underneath the infected area becomes painful after the skin has healed.  · Your infection returns in the same area or another area.  · You notice a swollen bump in the infected area.  · You develop new symptoms.  · You have a general ill feeling (malaise) with muscle aches and pains.  Get help right away if:  · Your symptoms get worse.  · You feel very sleepy.  · You develop vomiting or diarrhea that persists.  · You notice red streaks coming from the infected area.  · Your red area gets larger or turns dark in color.  These symptoms may represent a serious problem that is an emergency. Do not wait to see if the symptoms will go away. Get medical help right away. Call your local emergency services (911 in the [place] Do not drive yourself to the hospital.  Summary  · Cellulitis is a skin infection. This condition occurs most often in the arms and lower legs.  · Treatment for this condition may include medicines, such as antibiotic medicines or antihistamines.  · Take over-the-counter and prescription  medicines only as told by your health care provider. If you were prescribed an antibiotic medicine, do not stop taking the antibiotic even if you start to feel better.  · Contact a health care provider if your symptoms do not begin to improve within 1-2 days of starting treatment or your symptoms get worse.  · Keep all follow-up visits as told by your health care provider. This is important. These visits let your health care provider make sure that a more serious infection is not developing.  This information is not intended to replace advice given to you by your health care provider. Make sure you discuss any questions you have with your health care provider.  Document Released: 09/27/2006 Document Revised: 05/09/2019 Document Reviewed: 05/09/2019  [employer] Patient Education © 2020 [employer]      Cold Sore    A cold sore, also called a fever blister, is a small, fluid-filled sore that forms inside the mouth or on the lips, gums, nose, chin, or cheeks. Cold sores can spread to other parts of the body, such as the eyes or fingers. In some people who have other medical conditions, cold sores can spread to multiple other body sites, including the genitals.  Cold sores can spread from person to person (are contagious) until the sores crust over completely. Most cold sores go away within 2 weeks.  What are the causes?  Cold sores are caused by an infection from a common type of herpes simplex virus (HSV-1). HSV-1 is closely related to the HSV-2virus, which is the virus that causes genital herpes, but these viruses are not the same. Once a person is infected with HSV-1, the virus remains permanently in the body.  HSV-1 is spread from person to person through close contact, such as through kissing, touching the affected area, or sharing personal items such as lip balm, razors, a drinking glass, or eating utensils.  What increases the risk?  You are more likely to develop this condition if you:  · Are tired, stressed,  or sick.  · Are menstruating.  · Are pregnant.  · Take certain medicines.  · Are exposed to cold weather or too much sun.  What are the signs or symptoms?  Symptoms of a cold sore outbreak go through different stages. These are the stages of a cold sore:  · Tingling, itching, or burning is felt 1-2 days before the outbreak.  · Fluid-filled blisters appear on the lips, inside the mouth, on the nose, or on the cheeks.  · The blisters start to ooze clear fluid.  · The blisters dry up, and a yellow crust appears in their place.  · The crust falls off.  In some cases, other symptoms can develop during a cold sore outbreak. These can include:  · Fever.  · Sore throat.  · Headache.  · Muscle aches.  · Swollen neck glands.  How is this diagnosed?  This condition is diagnosed based on your medical history and a physical exam. Your health care provider may do a blood test or may swab some fluid from your sore and then examine the swab in the lab.  How is this treated?  There is no cure for cold sores or HSV-1. There is also no vaccine for HSV-1. Most cold sores go away on their own without treatment within 2 weeks. Medicines cannot make the infection go away, but your health care provider may prescribe medicines to:  · Help relieve some of the pain associated with the sores.  · Work to stop the virus from multiplying.  · Shorten healing time.  Medicines may be in the form of creams, gels, pills, or a shot.  Follow these instructions at home:  Medicines  · Take or apply over-the-counter and prescription medicines only as told by your health care provider.  · Use a cotton-tip swab to apply creams or gels to your sores.  · Ask your health care provider if you can take lysine supplements. Research has found that lysine may help heal the cold sore faster and prevent outbreaks.  Sore care    · Do not touch the sores or pick the scabs.  · Wash your hands often. Do not touch your eyes without washing your hands first.  · Keep the  sores clean and dry.  · If directed, apply ice to the sores:  ? Put ice in a plastic bag.  ? Place a towel between your skin and the bag.  ? Leave the ice on for 20 minutes, 2-3 times a day.  Eating and drinking  · Eat a soft, bland diet. Avoid eating hot, cold, or salty foods.  · Use a straw if it hurts to drink out of a glass.  · Eat foods that are rich in lysine, such as meat, fish, and dairy products.  · Avoid sugary foods, chocolates, nuts, and grains. These foods are rich in a nutrient called arginine, which can cause the virus to multiply.  Lifestyle  · Do not kiss, have oral sex, or share personal items until your sores heal.  · Stress, poor sleep, and being out in the sun can trigger outbreaks. Make sure you:  ? Do activities that help you relax, such as deep breathing exercises or meditation.  ? Get enough sleep.  ? Apply sunscreen on your lips before you go out in the sun.  Contact a health care provider if:  · You have symptoms for more than 2 weeks.  · You have pus coming from the sores.  · You have redness that is spreading.  · You have pain or irritation in your eye.  · You get sores on your genitals.  · Your sores do not heal within 2 weeks.  · You have frequent cold sore outbreaks.  Get help right away if you have:  · A fever and your symptoms suddenly get worse.  · A headache and confusion.  · Fatigue or loss of appetite.  · A stiff neck or sensitivity to light.  Summary  · A cold sore, also called a fever blister, is a small, fluid-filled sore that forms inside the mouth or on the lips, gums, nose, chin, or cheeks.  · Most cold sores go away on their own without treatment within 2 weeks. Your health care provider may prescribe medicines to help relieve some of the pain, work to stop the virus from multiplying, and shorten healing time.  · Wash your hands often. Do not touch your eyes without washing your hands first.  · Do not kiss, have oral sex, or share personal items until your sores  heal.  · Contact a health care provider if your sores do not heal within 2 weeks.  This information is not intended to replace advice given to you by your health care provider. Make sure you discuss any questions you have with your health care provider.  Document Released: 12/15/2001 Document Revised: 04/08/2020 Document Reviewed: 05/20/2019  [employer] Patient Education © 2020 [employer]      Atopic Dermatitis  Atopic dermatitis is a skin disorder that causes inflammation of the skin. This is the most common type of eczema. Eczema is a group of skin conditions that cause the skin to be itchy, red, and swollen. This condition is generally worse during the cooler winter months and often improves during the warm summer months. Symptoms can vary from person to person.  Atopic dermatitis usually starts showing signs in infancy and can last through adulthood. This condition cannot be passed from one person to another (non-contagious), but it is more common in families. Atopic dermatitis may not always be present. When it is present, it is called a flare-up.  What are the causes?  The exact cause of this condition is not known. Flare-ups of the condition may be triggered by:  · Contact with something that you are sensitive or allergic to.  · Stress.  · Certain foods.  · Extremely hot or cold weather.  · Harsh chemicals and soaps.  · Dry air.  · Chlorine.  What increases the risk?  This condition is more likely to develop in people who have a personal history or family history of eczema, allergies, asthma, or hay fever.  What are the signs or symptoms?  Symptoms of this condition include:  · Dry, scaly skin.  · Red, itchy rash.  · Itchiness, which can be severe. This may occur before the skin rash. This can make sleeping difficult.  · Skin thickening and cracking that can occur over time.  How is this diagnosed?  This condition is diagnosed based on your symptoms, a medical history, and a physical exam.  How is this  treated?  There is no cure for this condition, but symptoms can usually be controlled. Treatment focuses on:  · Controlling the itchiness and scratching. You may be given medicines, such as antihistamines or steroid creams.  · Limiting exposure to things that you are sensitive or allergic to (allergens).  · Recognizing situations that cause stress and developing a plan to manage stress.  If your atopic dermatitis does not get better with medicines, or if it is all over your body (widespread), a treatment using a specific type of light (phototherapy) may be used.  Follow these instructions at home:  Skin care    · Keep your skin well-moisturized. Doing this seals in moisture and helps to prevent dryness.  ? Use unscented lotions that have petroleum in them.  ? Avoid lotions that contain alcohol or water. They can dry the skin.  · Keep baths or showers short (less than 5 minutes) in warm water. Do not use hot water.  ? Use mild, unscented cleansers for bathing. Avoid soap and bubble bath.  ? Apply a moisturizer to your skin right after a bath or shower.  · Do not apply anything to your skin without checking with your health care provider.  General instructions  · Dress in clothes made of cotton or cotton blends. Dress lightly because heat increases itchiness.  · When washing your clothes, rinse your clothes twice so all of the soap is removed.  · Avoid any triggers that can cause a flare-up.  · Try to manage your stress.  · Keep your fingernails cut short.  · Avoid scratching. Scratching makes the rash and itchiness worse. It may also result in a skin infection (impetigo) due to a break in the skin caused by scratching.  · Take or apply over-the-counter and prescription medicines only as told by your health care provider.  · Keep all follow-up visits as told by your health care provider. This is important.  · Do not be around people who have cold sores or fever blisters. If you get the infection, it may cause your  atopic dermatitis to worsen.  Contact a health care provider if:  · Your itchiness interferes with sleep.  · Your rash gets worse or it is not better within one week of starting treatment.  · You have a fever.  · You have a rash flare-up after having contact with someone who has cold sores or fever blisters.  Get help right away if:  · You develop pus or soft yellow scabs in the rash area.  Summary  · This condition causes a red rash and itchy, dry, scaly skin.  · Treatment focuses on controlling the itchiness and scratching, limiting exposure to things that you are sensitive or allergic to (allergens), recognizing situations that cause stress, and developing a plan to manage stress.  · Keep your skin well-moisturized.  · Keep baths or showers shorter than 5 minutes and use warm water. Do not use hot water.  This information is not intended to replace advice given to you by your health care provider. Make sure you discuss any questions you have with your health care provider.  Document Released: 12/15/2001 Document Revised: 04/07/2020 Document Reviewed: 01/19/2018  [employer] Patient Education © 2020 [employer]      Shingles    Shingles, which is also known as herpes zoster, is an infection that causes a painful skin rash and fluid-filled blisters. It is caused by a virus.  Shingles only develops in people who:  · Have had chickenpox.  · Have been given a medicine to protect against chickenpox (have been vaccinated). Shingles is rare in this group.  What are the causes?  Shingles is caused by varicella-zoster virus (VZV). This is the same virus that causes chickenpox. After a person is exposed to VZV, the virus stays in the body in an inactive (dormant) state. Shingles develops if the virus is reactivated. This can happen many years after the first (initial) exposure to VZV. It is not known what causes this virus to be reactivated.  What increases the risk?  People who have had chickenpox or received the chickenpox  vaccine are at risk for shingles. Shingles infection is more common in people who:  · Are older than age 60.  · Have a weakened disease-fighting system (immune system), such as people with:  ? HIV.  ? AIDS.  ? Cancer.  · Are taking medicines that weaken the immune system, such as transplant medicines.  · Are experiencing a lot of stress.  What are the signs or symptoms?  Early symptoms of this condition include itching, tingling, and pain in an area on your skin. Pain may be described as burning, stabbing, or throbbing.  A few days or weeks after early symptoms start, a painful red rash appears. The rash is usually on one side of the body and has a band-like or belt-like pattern. The rash eventually turns into fluid-filled blisters that break open, change into scabs, and dry up in about 2-3 weeks.  At any time during the infection, you may also develop:  · A fever.  · Chills.  · A headache.  · An upset stomach.  How is this diagnosed?  This condition is diagnosed with a skin exam. Skin or fluid samples may be taken from the blisters before a diagnosis is made. These samples are examined under a microscope or sent to a lab for testing.  How is this treated?  The rash may last for several weeks. There is not a specific cure for this condition. Your health care provider will probably prescribe medicines to help you manage pain, recover more quickly, and avoid long-term problems. Medicines may include:  · Antiviral drugs.  · Anti-inflammatory drugs.  · Pain medicines.  · Anti-itching medicines (antihistamines).  If the area involved is on your face, you may be referred to a specialist, such as an eye doctor (ophthalmologist) or an ear, nose, and throat (ENT) doctor (otolaryngologist) to help you avoid eye problems, chronic pain, or disability.  Follow these instructions at home:  Medicines  · Take over-the-counter and prescription medicines only as told by your health care provider.  · Apply an anti-itch cream or  numbing cream to the affected area as told by your health care provider.  Relieving itching and discomfort    · Apply cold, wet cloths (cold compresses) to the area of the rash or blisters as told by your health care provider.  · Cool baths can be soothing. Try adding baking soda or dry oatmeal to the water to reduce itching. Do not bathe in hot water.  Blister and rash care  · Keep your rash covered with a loose bandage (dressing). Wear loose-fitting clothing to help ease the pain of material rubbing against the rash.  · Keep your rash and blisters clean by washing the area with mild soap and cool water as told by your health care provider.  · Check your rash every day for signs of infection. Check for:  ? More redness, swelling, or pain.  ? Fluid or blood.  ? Warmth.  ? Pus or a bad smell.  · Do not scratch your rash or pick at your blisters. To help avoid scratching:  ? Keep your fingernails clean and cut short.  ? Wear gloves or mittens while you sleep, if scratching is a problem.  General instructions  · Rest as told by your health care provider.  · Keep all follow-up visits as told by your health care provider. This is important.  · Wash your hands often with soap and water. If soap and water are not available, use hand . Doing this lowers your chance of getting a bacterial skin infection.  · Before your blisters change into scabs, your shingles infection can cause chickenpox in people who have never had it or have never been vaccinated against it. To prevent this from happening, avoid contact with other people, especially:  ? Babies.  ? Pregnant women.  ? Children who have eczema.  ? Elderly people who have transplants.  ? People who have chronic illnesses, such as cancer or AIDS.  Contact a health care provider if:  · Your pain is not relieved with prescribed medicines.  · Your pain does not get better after the rash heals.  · You have signs of infection in the rash area, such as:  ? More redness,  swelling, or pain around the rash.  ? Fluid or blood coming from the rash.  ? The rash area feeling warm to the touch.  ? Pus or a bad smell coming from the rash.  Get help right away if:  · The rash is on your face or nose.  · You have facial pain, pain around your eye area, or loss of feeling on one side of your face.  · You have difficulty seeing.  · You have ear pain or have ringing in your ear.  · You have a loss of taste.  · Your condition gets worse.  Summary  · Shingles, which is also known as herpes zoster, is an infection that causes a painful skin rash and fluid-filled blisters.  · This condition is diagnosed with a skin exam. Skin or fluid samples may be taken from the blisters and examined before the diagnosis is made.  · Keep your rash covered with a loose bandage (dressing). Wear loose-fitting clothing to help ease the pain of material rubbing against the rash.  · Before your blisters change into scabs, your shingles infection can cause chickenpox in people who have never had it or have never been vaccinated against it.  This information is not intended to replace advice given to you by your health care provider. Make sure you discuss any questions you have with your health care provider.  Document Released: 12/18/2006 Document Revised: 04/10/2020 Document Reviewed: 08/22/2018  [employer]vier Patient Education © 2020 [employer]

## 2020-12-06 ENCOUNTER — APPOINTMENT (OUTPATIENT)
Dept: RADIOLOGY | Facility: MEDICAL CENTER | Age: 65
DRG: 603 | End: 2020-12-06
Attending: EMERGENCY MEDICINE
Payer: MEDICARE

## 2020-12-06 ENCOUNTER — HOSPITAL ENCOUNTER (INPATIENT)
Facility: MEDICAL CENTER | Age: 65
LOS: 4 days | DRG: 603 | End: 2020-12-10
Attending: EMERGENCY MEDICINE | Admitting: HOSPITALIST
Payer: MEDICARE

## 2020-12-06 DIAGNOSIS — L03.211 CELLULITIS OF FACE: ICD-10-CM

## 2020-12-06 DIAGNOSIS — B00.9 HSV (HERPES SIMPLEX VIRUS) INFECTION: ICD-10-CM

## 2020-12-06 DIAGNOSIS — Z94.4 LIVER TRANSPLANT RECIPIENT (HCC): ICD-10-CM

## 2020-12-06 DIAGNOSIS — L03.211 FACIAL CELLULITIS: ICD-10-CM

## 2020-12-06 PROBLEM — I10 HTN (HYPERTENSION): Status: ACTIVE | Noted: 2020-12-06

## 2020-12-06 PROBLEM — F32.A DEPRESSION: Status: ACTIVE | Noted: 2020-12-06

## 2020-12-06 LAB
ALBUMIN SERPL BCP-MCNC: 4.3 G/DL (ref 3.2–4.9)
ALBUMIN/GLOB SERPL: 1.2 G/DL
ALP SERPL-CCNC: 73 U/L (ref 30–99)
ALT SERPL-CCNC: 52 U/L (ref 2–50)
ANION GAP SERPL CALC-SCNC: 9 MMOL/L (ref 7–16)
AST SERPL-CCNC: 27 U/L (ref 12–45)
BASOPHILS # BLD AUTO: 1.1 % (ref 0–1.8)
BASOPHILS # BLD: 0.06 K/UL (ref 0–0.12)
BILIRUB SERPL-MCNC: 0.4 MG/DL (ref 0.1–1.5)
BUN SERPL-MCNC: 8 MG/DL (ref 8–22)
CALCIUM SERPL-MCNC: 9.2 MG/DL (ref 8.5–10.5)
CHLORIDE SERPL-SCNC: 105 MMOL/L (ref 96–112)
CO2 SERPL-SCNC: 24 MMOL/L (ref 20–33)
COVID ORDER STATUS COVID19: NORMAL
CREAT SERPL-MCNC: 0.79 MG/DL (ref 0.5–1.4)
EOSINOPHIL # BLD AUTO: 0.12 K/UL (ref 0–0.51)
EOSINOPHIL NFR BLD: 2.1 % (ref 0–6.9)
ERYTHROCYTE [DISTWIDTH] IN BLOOD BY AUTOMATED COUNT: 47.5 FL (ref 35.9–50)
GLOBULIN SER CALC-MCNC: 3.6 G/DL (ref 1.9–3.5)
GLUCOSE SERPL-MCNC: 110 MG/DL (ref 65–99)
HCT VFR BLD AUTO: 52.3 % (ref 42–52)
HGB BLD-MCNC: 17.2 G/DL (ref 14–18)
IMM GRANULOCYTES # BLD AUTO: 0.03 K/UL (ref 0–0.11)
IMM GRANULOCYTES NFR BLD AUTO: 0.5 % (ref 0–0.9)
LACTATE BLD-SCNC: 1.5 MMOL/L (ref 0.5–2)
LYMPHOCYTES # BLD AUTO: 1.59 K/UL (ref 1–4.8)
LYMPHOCYTES NFR BLD: 27.9 % (ref 22–41)
MCH RBC QN AUTO: 30.9 PG (ref 27–33)
MCHC RBC AUTO-ENTMCNC: 32.9 G/DL (ref 33.7–35.3)
MCV RBC AUTO: 94.1 FL (ref 81.4–97.8)
MONOCYTES # BLD AUTO: 1.19 K/UL (ref 0–0.85)
MONOCYTES NFR BLD AUTO: 20.9 % (ref 0–13.4)
NEUTROPHILS # BLD AUTO: 2.71 K/UL (ref 1.82–7.42)
NEUTROPHILS NFR BLD: 47.5 % (ref 44–72)
NRBC # BLD AUTO: 0 K/UL
NRBC BLD-RTO: 0 /100 WBC
PLATELET # BLD AUTO: 208 K/UL (ref 164–446)
PMV BLD AUTO: 9.9 FL (ref 9–12.9)
POTASSIUM SERPL-SCNC: 4 MMOL/L (ref 3.6–5.5)
PROT SERPL-MCNC: 7.9 G/DL (ref 6–8.2)
RBC # BLD AUTO: 5.56 M/UL (ref 4.7–6.1)
SARS-COV-2 RNA RESP QL NAA+PROBE: NOTDETECTED
SODIUM SERPL-SCNC: 138 MMOL/L (ref 135–145)
SPECIMEN SOURCE: NORMAL
WBC # BLD AUTO: 5.7 K/UL (ref 4.8–10.8)

## 2020-12-06 PROCEDURE — A9270 NON-COVERED ITEM OR SERVICE: HCPCS | Performed by: HOSPITALIST

## 2020-12-06 PROCEDURE — 96365 THER/PROPH/DIAG IV INF INIT: CPT

## 2020-12-06 PROCEDURE — 83605 ASSAY OF LACTIC ACID: CPT

## 2020-12-06 PROCEDURE — 87040 BLOOD CULTURE FOR BACTERIA: CPT

## 2020-12-06 PROCEDURE — 85025 COMPLETE CBC W/AUTO DIFF WBC: CPT

## 2020-12-06 PROCEDURE — 96372 THER/PROPH/DIAG INJ SC/IM: CPT

## 2020-12-06 PROCEDURE — 99223 1ST HOSP IP/OBS HIGH 75: CPT | Mod: AI | Performed by: HOSPITALIST

## 2020-12-06 PROCEDURE — 770001 HCHG ROOM/CARE - MED/SURG/GYN PRIV*

## 2020-12-06 PROCEDURE — 87186 SC STD MICRODIL/AGAR DIL: CPT

## 2020-12-06 PROCEDURE — 87077 CULTURE AEROBIC IDENTIFY: CPT

## 2020-12-06 PROCEDURE — C9803 HOPD COVID-19 SPEC COLLECT: HCPCS | Performed by: HOSPITALIST

## 2020-12-06 PROCEDURE — 87147 CULTURE TYPE IMMUNOLOGIC: CPT

## 2020-12-06 PROCEDURE — 700111 HCHG RX REV CODE 636 W/ 250 OVERRIDE (IP): Performed by: EMERGENCY MEDICINE

## 2020-12-06 PROCEDURE — U0003 INFECTIOUS AGENT DETECTION BY NUCLEIC ACID (DNA OR RNA); SEVERE ACUTE RESPIRATORY SYNDROME CORONAVIRUS 2 (SARS-COV-2) (CORONAVIRUS DISEASE [COVID-19]), AMPLIFIED PROBE TECHNIQUE, MAKING USE OF HIGH THROUGHPUT TECHNOLOGIES AS DESCRIBED BY CMS-2020-01-R: HCPCS

## 2020-12-06 PROCEDURE — 700111 HCHG RX REV CODE 636 W/ 250 OVERRIDE (IP): Performed by: HOSPITALIST

## 2020-12-06 PROCEDURE — 96366 THER/PROPH/DIAG IV INF ADDON: CPT

## 2020-12-06 PROCEDURE — 87070 CULTURE OTHR SPECIMN AEROBIC: CPT

## 2020-12-06 PROCEDURE — 87205 SMEAR GRAM STAIN: CPT

## 2020-12-06 PROCEDURE — 99291 CRITICAL CARE FIRST HOUR: CPT

## 2020-12-06 PROCEDURE — 700102 HCHG RX REV CODE 250 W/ 637 OVERRIDE(OP): Performed by: HOSPITALIST

## 2020-12-06 PROCEDURE — 70487 CT MAXILLOFACIAL W/DYE: CPT

## 2020-12-06 PROCEDURE — 700101 HCHG RX REV CODE 250: Performed by: EMERGENCY MEDICINE

## 2020-12-06 PROCEDURE — 96375 TX/PRO/DX INJ NEW DRUG ADDON: CPT

## 2020-12-06 PROCEDURE — 96376 TX/PRO/DX INJ SAME DRUG ADON: CPT

## 2020-12-06 PROCEDURE — 700105 HCHG RX REV CODE 258: Performed by: EMERGENCY MEDICINE

## 2020-12-06 PROCEDURE — 700117 HCHG RX CONTRAST REV CODE 255: Performed by: EMERGENCY MEDICINE

## 2020-12-06 PROCEDURE — 80053 COMPREHEN METABOLIC PANEL: CPT

## 2020-12-06 PROCEDURE — 96367 TX/PROPH/DG ADDL SEQ IV INF: CPT

## 2020-12-06 RX ORDER — ONDANSETRON 2 MG/ML
4 INJECTION INTRAMUSCULAR; INTRAVENOUS ONCE
Status: ACTIVE | OUTPATIENT
Start: 2020-12-06 | End: 2020-12-07

## 2020-12-06 RX ORDER — AMOXICILLIN 250 MG
2 CAPSULE ORAL 2 TIMES DAILY
Status: DISCONTINUED | OUTPATIENT
Start: 2020-12-06 | End: 2020-12-10 | Stop reason: HOSPADM

## 2020-12-06 RX ORDER — LISINOPRIL 10 MG/1
10 TABLET ORAL DAILY
Status: DISCONTINUED | OUTPATIENT
Start: 2020-12-07 | End: 2020-12-10 | Stop reason: HOSPADM

## 2020-12-06 RX ORDER — PROPARACAINE HYDROCHLORIDE 5 MG/ML
1 SOLUTION/ DROPS OPHTHALMIC ONCE
Status: DISPENSED | OUTPATIENT
Start: 2020-12-06 | End: 2020-12-07

## 2020-12-06 RX ORDER — PROPARACAINE HYDROCHLORIDE 5 MG/ML
1 SOLUTION/ DROPS OPHTHALMIC ONCE
Status: COMPLETED | OUTPATIENT
Start: 2020-12-06 | End: 2020-12-06

## 2020-12-06 RX ORDER — TACROLIMUS 1 MG/1
2 CAPSULE ORAL 2 TIMES DAILY
Status: DISCONTINUED | OUTPATIENT
Start: 2020-12-06 | End: 2020-12-10 | Stop reason: HOSPADM

## 2020-12-06 RX ORDER — CITALOPRAM 40 MG/1
20 TABLET ORAL
Status: DISCONTINUED | OUTPATIENT
Start: 2020-12-06 | End: 2020-12-10 | Stop reason: HOSPADM

## 2020-12-06 RX ORDER — ONDANSETRON 2 MG/ML
4 INJECTION INTRAMUSCULAR; INTRAVENOUS ONCE
Status: COMPLETED | OUTPATIENT
Start: 2020-12-06 | End: 2020-12-06

## 2020-12-06 RX ORDER — OXYCODONE HYDROCHLORIDE 5 MG/1
10 TABLET ORAL
Status: DISCONTINUED | OUTPATIENT
Start: 2020-12-06 | End: 2020-12-10 | Stop reason: HOSPADM

## 2020-12-06 RX ORDER — ALLOPURINOL 100 MG/1
100 TABLET ORAL
Status: DISCONTINUED | OUTPATIENT
Start: 2020-12-06 | End: 2020-12-10 | Stop reason: HOSPADM

## 2020-12-06 RX ORDER — OXYCODONE HYDROCHLORIDE 5 MG/1
5 TABLET ORAL
Status: DISCONTINUED | OUTPATIENT
Start: 2020-12-06 | End: 2020-12-10 | Stop reason: HOSPADM

## 2020-12-06 RX ORDER — HYDROMORPHONE HYDROCHLORIDE 1 MG/ML
1 INJECTION, SOLUTION INTRAMUSCULAR; INTRAVENOUS; SUBCUTANEOUS
Status: DISCONTINUED | OUTPATIENT
Start: 2020-12-06 | End: 2020-12-08

## 2020-12-06 RX ORDER — HYDROMORPHONE HYDROCHLORIDE 1 MG/ML
0.5 INJECTION, SOLUTION INTRAMUSCULAR; INTRAVENOUS; SUBCUTANEOUS ONCE
Status: COMPLETED | OUTPATIENT
Start: 2020-12-06 | End: 2020-12-06

## 2020-12-06 RX ORDER — ALLOPURINOL 100 MG/1
100 TABLET ORAL
COMMUNITY

## 2020-12-06 RX ORDER — IBUPROFEN 800 MG/1
800 TABLET ORAL 3 TIMES DAILY
Status: DISCONTINUED | OUTPATIENT
Start: 2020-12-06 | End: 2020-12-07

## 2020-12-06 RX ORDER — BISACODYL 10 MG
10 SUPPOSITORY, RECTAL RECTAL
Status: DISCONTINUED | OUTPATIENT
Start: 2020-12-06 | End: 2020-12-10 | Stop reason: HOSPADM

## 2020-12-06 RX ORDER — POLYETHYLENE GLYCOL 3350 17 G/17G
1 POWDER, FOR SOLUTION ORAL
Status: DISCONTINUED | OUTPATIENT
Start: 2020-12-06 | End: 2020-12-10 | Stop reason: HOSPADM

## 2020-12-06 RX ORDER — SODIUM CHLORIDE 9 MG/ML
1000 INJECTION, SOLUTION INTRAVENOUS ONCE
Status: COMPLETED | OUTPATIENT
Start: 2020-12-06 | End: 2020-12-06

## 2020-12-06 RX ORDER — ACETAMINOPHEN 325 MG/1
650 TABLET ORAL EVERY 6 HOURS
Status: DISCONTINUED | OUTPATIENT
Start: 2020-12-06 | End: 2020-12-10 | Stop reason: HOSPADM

## 2020-12-06 RX ADMIN — CEFTRIAXONE SODIUM 2 G: 2 INJECTION, POWDER, FOR SOLUTION INTRAMUSCULAR; INTRAVENOUS at 11:33

## 2020-12-06 RX ADMIN — ENOXAPARIN SODIUM 40 MG: 40 INJECTION SUBCUTANEOUS at 17:53

## 2020-12-06 RX ADMIN — ACETAMINOPHEN 650 MG: 325 TABLET, FILM COATED ORAL at 23:27

## 2020-12-06 RX ADMIN — ACETAMINOPHEN 650 MG: 325 TABLET, FILM COATED ORAL at 17:52

## 2020-12-06 RX ADMIN — TACROLIMUS 2 MG: 1 CAPSULE ORAL at 20:04

## 2020-12-06 RX ADMIN — ALLOPURINOL 100 MG: 100 TABLET ORAL at 21:17

## 2020-12-06 RX ADMIN — PROPARACAINE HYDROCHLORIDE 1 DROP: 5 SOLUTION/ DROPS OPHTHALMIC at 11:32

## 2020-12-06 RX ADMIN — SODIUM CHLORIDE 1000 ML: 9 INJECTION, SOLUTION INTRAVENOUS at 13:00

## 2020-12-06 RX ADMIN — OXYCODONE HYDROCHLORIDE 10 MG: 5 TABLET ORAL at 17:53

## 2020-12-06 RX ADMIN — FLUORESCEIN SODIUM 1 MG: 1 STRIP OPHTHALMIC at 13:00

## 2020-12-06 RX ADMIN — HYDROMORPHONE HYDROCHLORIDE 0.5 MG: 1 INJECTION, SOLUTION INTRAMUSCULAR; INTRAVENOUS; SUBCUTANEOUS at 15:10

## 2020-12-06 RX ADMIN — IOHEXOL 80 ML: 350 INJECTION, SOLUTION INTRAVENOUS at 11:04

## 2020-12-06 RX ADMIN — ONDANSETRON 4 MG: 2 INJECTION INTRAMUSCULAR; INTRAVENOUS at 10:36

## 2020-12-06 RX ADMIN — CITALOPRAM HYDROBROMIDE 20 MG: 40 TABLET ORAL at 20:08

## 2020-12-06 RX ADMIN — OXYCODONE HYDROCHLORIDE 5 MG: 5 TABLET ORAL at 14:25

## 2020-12-06 RX ADMIN — HYDROMORPHONE HYDROCHLORIDE 0.5 MG: 1 INJECTION, SOLUTION INTRAMUSCULAR; INTRAVENOUS; SUBCUTANEOUS at 10:36

## 2020-12-06 RX ADMIN — VANCOMYCIN HYDROCHLORIDE 1750 MG: 500 INJECTION, POWDER, LYOPHILIZED, FOR SOLUTION INTRAVENOUS at 12:34

## 2020-12-06 RX ADMIN — HYDROMORPHONE HYDROCHLORIDE 1 MG: 1 INJECTION, SOLUTION INTRAMUSCULAR; INTRAVENOUS; SUBCUTANEOUS at 20:04

## 2020-12-06 RX ADMIN — OXYCODONE HYDROCHLORIDE 10 MG: 5 TABLET ORAL at 23:27

## 2020-12-06 ASSESSMENT — FIBROSIS 4 INDEX
FIB4 SCORE: 1.19
FIB4 SCORE: 1.15

## 2020-12-06 NOTE — H&P
Hospital Medicine History & Physical Note    Date of Service  12/6/2020    Primary Care Physician  Alejandra Sutherland M.D.    Consultants  None    Code Status  Full Code    Chief Complaint  Chief Complaint   Patient presents with   • Facial Swelling     left sided, around cheek and eye   • Sent from Urgent Care       History of Presenting Illness  65 y.o. male who presented 12/6/2020 with cellulitis. He describes a pimple on his nose several days ago which he tried to pop unsuccessfully but feels the contents may have extravasated into his soft tissues instead of popping externally.  This was initially evaluated 1 day prior to arrival at urgent care where he was felt to have possibly a herpetic lesion versus early cellulitis.  He was prescribed both antiviral and antibiotics to cover either etiology, unfortunately symptoms have progressed, redness swelling and pain are now involving both the lower lid of the eye and adjacent to the upper lid.  He has been told yesterday to urgent care that were this to happen he should present to the emergency department for emergent treatment.  Acuity is acute, severity is severe, location is musculoskeletal, timing is irrelevant, no particular alleviating factors, aggravating factors may include his immunosuppression from tacrolimus which he is adherent with for his history of liver transplant, onset is abrupt.    Review of Systems  All systems reviewed and negative except as noted per above.    Past Medical History   has a past medical history of Arthritis, Cancer (HCC) (2002;2013), Cholesterol blood decreased, Chronic pain, Cold (8/27/2014), Dental disorder, Heart burn, Hepatitis C (1993), Hepatitis C, Hypertension, Indigestion, Liver transplanted (HCC) (2002), Liver transplanted (HCC), Pain, Pain, Renal cancer (HCC), and Renal disorder.    Surgical History   has a past surgical history that includes other (2002); other (1996); other (1994); cervical disk and fusion anterior  "(12/11/2012); recovery (4/23/2013); lumbar fusion anterior (5/29/2013); evacuation of hematoma (5/31/2013); other (2/28/2014); cath place perm epidural (9/9/2014); pump insert/remove (11/25/2014); exploratory laparotomy (5/31/2013); recovery (11/30/2015); other surgical procedure; nephrectomy radical; other orthopedic surgery; cholecystectomy; and cervical decompression posterior.     Family History  family history includes Hypertension in an other family member.     Social History   reports that he has been smoking cigarettes. He has a 0.50 pack-year smoking history. He has never used smokeless tobacco. He reports current alcohol use of about 0.6 oz of alcohol per week. He reports that he does not use drugs.    Allergies  Allergies   Allergen Reactions   • Niacin      Passed out.    • Latex Rash     Reaction- Itching, Rash   • Pcn [Penicillins] Anaphylaxis and Swelling     Patient has tolerated cefazolin in past   • Penicillins Itching and Vomiting     Has tolerated cefazolin   • Morphine Sulfate      Per pt., \"it makes me cranky\"   • Mushroom Extract Complex    • Other Food      Mushrooms       Medications  Prior to Admission Medications   Prescriptions Last Dose Informant Patient Reported? Taking?   ALLOPURINOL PO   Yes No   Sig: Take  by mouth.   Pain Pump (PATIENT SUPPLIED) XX BRANDON  Patient Yes No   Sig: by Injection route Continuous. Patient's Pain Pump (placed and maintained as an outpatient)  Medications/concentrations:     Next change: 5/15/18  Continuous infusion rates (Drug/Rate): Hydromorphone/99 mcg/day    Patient activation dose: 10 mcg   Patient activation lockout interval: 1 hour  Maximum activations per day: 7 activations   artificial tears (EYE LUBRICANT) Ointment ophth ointment  Patient Yes No   Sig: Place 1 Application in right eye every evening.   cephALEXin (KEFLEX) 500 MG Cap   No No   Sig: Take 1 Cap by mouth 4 times a day for 5 days.   citalopram (CELEXA) 20 MG Tab  Patient Yes No   Sig: " Take 20 mg by mouth every bedtime.   doxycycline (MONODOX) 100 MG capsule   No No   Sig: Take 1 Cap by mouth 2 times a day for 7 days.   lisinopril (PRINIVIL) 10 MG Tab  Patient Yes No   Sig: Take 10 mg by mouth every day.   tacrolimus (PROGRAF) 1 MG Cap  Patient Yes No   Sig: Take 2 mg by mouth 2 Times a Day. Indications: Liver Transplant Recipients   valacyclovir (VALTREX) 1 GM Tab   No No   Sig: Take 1 Tab by mouth 2 times a day for 7 days.      Facility-Administered Medications: None       Physical Exam  Temp:  [37.5 °C (99.5 °F)] 37.5 °C (99.5 °F)  Pulse:  [] 83  Resp:  [16] 16  BP: (149-158)/(72-95) 158/72  SpO2:  [94 %-96 %] 94 %    General: No acute distress  HEENT atraumatic, normocephalic, pupils equal round reactive to light.  Rubor, calor, dolor extending from the nose up to the left infraorbital eye and maxilla.  Neck: No JVD  Chest: Respirations are unlabored  Cardiac: Physiologic S1 and S2  Abdomen: Soft, nontender, nondistended  Extremities: Without clubbing, cyanosis or edema  Neuro: Cranial nerves II through XII are grossly intact.  Psych: No anxiety, judgement intact.        Laboratory:  Recent Labs     12/05/20  1156 12/06/20  1040   WBC 6.7 5.7   RBC 5.46 5.56   HEMOGLOBIN 17.2 17.2   HEMATOCRIT 51.8 52.3*   MCV 94.9 94.1   MCH 31.5 30.9   MCHC 33.2* 32.9*   RDW 47.0 47.5   PLATELETCT 209 208   MPV 10.1 9.9     Recent Labs     12/05/20  1156 12/06/20  1040   SODIUM 137 138   POTASSIUM 4.5 4.0   CHLORIDE 101 105   CO2 25 24   GLUCOSE 109* 110*   BUN 7* 8   CREATININE 0.80 0.79   CALCIUM 9.5 9.2     Recent Labs     12/05/20  1156 12/06/20  1040   ALTSGPT 51* 52*   ASTSGOT 26 27   ALKPHOSPHAT 68 73   TBILIRUBIN 0.4 0.4   GLUCOSE 109* 110*         No results for input(s): NTPROBNP in the last 72 hours.      No results for input(s): TROPONINT in the last 72 hours.    Imaging:  CT-MAXILLOFACIAL WITH PLUS RECONS   Final Result      1.  Soft tissue swelling and enhancement of the nose and anterior  aspect of the nasal septum consistent with cellulitis. No abscess identified.      2.  Minimal extension of inflammation into the left maxillary soft tissue region.      3.  No evidence of acute or chronic sinusitis.      4.  No maxillofacial bone fracture identified.            Assessment/Plan:  I anticipate this patient will require at least two midnights for appropriate medical management, necessitating inpatient admission.    Cellulitis of face  Assessment & Plan  Continue parenteral antibiotics, follow blood cultures, patient is not septic at this juncture.  Infection is close to critical organs including eye, and requires immediate attention, cannot be safely managed in a lower acuity care setting.  Does not appear herpetic to my eye, will hold deviously prescribed valacyclovir at this juncture.    Chronic pain syndrome- (present on admission)  Assessment & Plan  Dilaudid pain pump in place, I anticipate that he will be somewhat opiate tolerant, will start him on the standard pain protocol, titrate as needed.    HTN (hypertension)  Assessment & Plan  Continue home dose lisinopril with hold parameters.    Depression  Assessment & Plan  Continue home dose citalopram.    Liver transplant recipient (HCC)- (present on admission)  Assessment & Plan  Continue home dose tacrolimus, no indication for rechecking levels at this time.  Consider infectious disease consult if infection does not rapidly clear.

## 2020-12-06 NOTE — ASSESSMENT & PLAN NOTE
Failed Keflex and Doxycycline outpatient   Tzanck smear reportedly obtained as an outpatient, lesions now scabbed over  Acyclovir started for empiric coverage - no eye involvement  Clinically improving  CT maxillofacial negative for abscess, most consistent with cellulitis   Wound cultures growing mssa - vancomycin stopped  Continue ceftriaxone  Continue to follow   If he continues to improve, will d/c home in am

## 2020-12-06 NOTE — DISCHARGE PLANNING
Care Transition Team Assessment  Met with patient with assessment questions. Demographic sheet info verified. PCP is Alejandra Sutherland. Patient states he lives alone, has daughter, Diana listed as contact and brother Bello, patient states they may be able to assist if needed.  Had [facility] in the past, does not have oxygen at home or use any DME.       Information Source  Orientation : Oriented x 4  Information Given By: Patient  Informant's Name: Quirino  Who is responsible for making decisions for patient? : Patient    Readmission Evaluation  Is this a readmission?: No    Elopement Risk  Legal Hold: No    Discharge Preparedness  What is your plan after discharge?: Uncertain - pending medical team collaboration  What are your discharge supports?: Child, Sibling  Prior Functional Level: Independent with Activities of Daily Living    Functional Assesment  Prior Functional Level: Independent with Activities of Daily Living    Finances  Financial Barriers to Discharge: No  Prescription Coverage: Yes    Domestic Abuse  Have you ever been the victim of abuse or violence?: No    Psychological Assessment  Newly Diagnosed Illness: No    Discharge Risks or Barriers  Discharge risks or barriers?: Lives alone, no community support    Anticipated Discharge Information  Discharge Disposition: Still a Patient (30)  Discharge Address: [address]  Discharge Contact Phone Number: [phone]

## 2020-12-06 NOTE — PROGRESS NOTES
"Pharmacy Kinetics 65 y.o. male on vancomycin day # 1 2020    New start. Received loading dose of 1750 mg IV at 1230  Provider specified end date: tbd    Indication for Treatment: SSTI    Pertinent history per medical record: Admitted on 2020 for facial swelling. Patient sent from urgent care for swelling around cheek and eye. Patient started on antiviral and antibiotics to cover potential for herpetic lesions as well however, redness, swelling and pain have worsened. Face noted as having possible abscess with drainage. Patient with a hx of liver transplant and is on tacrolimus. Empiric abx initiated.     Other antibiotics: ceftriaxone 2 g IV q24hr    Allergies: Niacin, Latex, Pcn [penicillins], Penicillins, Morphine sulfate, Mushroom extract complex, and Other food     List concerns for renal function: none    Pertinent cultures to date:    facial wound culture in process   blood cultures in process    MRSA nares swab if pneumonia is a concern (ordered/positive/negative/n-a): n/a    Recent Labs     20  1156 20  1040   WBC 6.7 5.7   NEUTSPOLYS 51.20 47.50     Recent Labs     20  1156 20  1040   BUN 7* 8   CREATININE 0.80 0.79   ALBUMIN 4.4 4.3     No results for input(s): VANCOTROUGH, VANCOPEAK, VANCORANDOM in the last 72 hours.No intake or output data in the 24 hours ending 20 1540   /72   Pulse 83   Temp 37.5 °C (99.5 °F) (Temporal)   Resp 16   Ht 1.676 m (5' 6\")   Wt 70 kg (154 lb 5.2 oz)   SpO2 94%  Temp (24hrs), Av.5 °C (99.5 °F), Min:37.5 °C (99.5 °F), Max:37.5 °C (99.5 °F)      A/P   1. Vancomycin dose change: 1000 mg IV q12hr  2. Next vancomycin level: 2-3 days or sooner if clinically indicated (not ordered)  3. Goal trough: 10-15 mcg/ml  4. Comments: patient started on empiric abx for facial cellulitis with possible early abscess formation. Patient is afebrile and HD stable. No hx of vanco dosing at facility to assess clearance. Minimal concerns " for accumulation. Plan for level in 2-3 days or sooner based on clinical picture. Will adjust levels based on levels and cultures.    Sofia SerranoD

## 2020-12-06 NOTE — ED NOTES
Med rec partially completed. Pt does not have have printout with pain pump settings. MD's office is closed for the weekend- will follow up with office on Monday

## 2020-12-06 NOTE — ED NOTES
Pt medicated with pain medication per MD order. Pt stating that the oral pain medication is not going to help, pt made aware that he can have IV pain medications one hour post the oral dose if his pain is not yet relieved, pt unhappy with this but is agreeable.

## 2020-12-06 NOTE — ASSESSMENT & PLAN NOTE
Has a chronic Dilaudid pain pump which is in use  Transition additional meds to oral as tolerated  Gabapentin added

## 2020-12-06 NOTE — ED PROVIDER NOTES
ED Provider Note    Scribed for Carol Montague M.D. by Beronica Townsend. 12/6/2020  10:00 AM    Primary care provider: Alejandra Sutherland M.D.  Means of arrival: Walk in  History obtained from: Patient  History limited by: None  CHIEF COMPLAINT  •  Facial Swelling    HPI  Quirino Ruiz is a 65 y.o. male who presents to the ED for facial swelling with an onset of three days. Symptoms initially developed with a pimple like lesion to his upper lip near his nose. He attempted to express the lesion with no success. He gradually developed facial swelling to his nose and upper lip. He was evaluated at urgent care on 12/05/20 for this complaint. He was diagnosed with cellulitis and given prescriptions for Doxycycline and Keflex. He presents today for increased swelling and pain now extending to his left cheek and left eyelid. He complains of mild eye pain. No alleviating factors were reported. Negative for fevers, chills, vision changes, nausea, vomiting or diarrhea. Patient has a pain pump administering Dilaudid which is not improving his current pain. No prior history of diabetes.       REVIEW OF SYSTEMS  See HPI for further details.  Positive for facial pain and swelling.  Negative for fever, chills, nausea, vomiting, muscle aches/body aches, cough, runny nose, sore throat.  All other systems are negative.      PAST MEDICAL HISTORY  Past Medical History:   Diagnosis Date   • Arthritis     fingers, hands   • Cancer (HCC) 2002;2013    kidney / liver - treated with chemo, & transplant   • Cholesterol blood decreased    • Chronic pain    • Cold 8/27/2014    URI   • Dental disorder     upper  and lower dentures   • Heart burn     under control with meds   • Hepatitis C 1993   • Hepatitis C    • Hypertension    • Indigestion    • Liver transplanted (HCC) 2002   • Liver transplanted (HCC)    • Pain     neck   • Pain     neck and lower back   • Renal cancer (HCC)    • Renal disorder     left nephrectomy; right cryoablation        FAMILY HISTORY  Family History   Problem Relation Age of Onset   • Hypertension Other        SOCIAL HISTORY  Social History     Socioeconomic History   • Marital status:    Occupational History   Tobacco Use   • Smoking status: Current Every Day Smoker     Packs/day: 0.50     Years: 1.00     Pack years: 0.50     Types: Cigarettes   • Smokeless tobacco: Never Used   Substance and Sexual Activity   • Alcohol use: Yes     Alcohol/week: 0.6 oz     Types: 1 Cans of beer per week     Frequency: 2-4 times a month   • Drug use: No       SURGICAL HISTORY  Past Surgical History:   Procedure Laterality Date   • RECOVERY  11/30/2015    Procedure: US-Non Targeted Liver Biopsy-;  Surgeon: [facility];  [place] [facility] [place]:    • PUMP INSERT/REMOVE  11/25/2014    Performed by Jass Mendes M.D. at SURGERY [facility]   • CATH PLACE PERM EPIDURAL  9/9/2014    Performed by Jass Mendes M.D. at [facility]   • OTHER  2/28/2014    cryoablation right kidney   • EVACUATION OF HEMATOMA  5/31/2013    Performed by Davis Murray M.D. at SURGERY [facility]   • EXPLORATORY LAPAROTOMY  5/31/2013    Performed by Davis Murray M.D. at SURGERY [facility]   • LUMBAR FUSION ANTERIOR  5/29/2013    Performed by Suman Burks M.D. at SURGERY [facility]   • RECOVERY  4/23/2013    Performed by [facility] at [facility] DAY [facility]   • CERVICAL DISK AND FUSION ANTERIOR  12/11/2012    Performed by Suman Burks M.D. at SURGERY [facility]   • OTHER  2002    liver transplant   • OTHER  1996    Left kidney removed   • OTHER  1994    gallbladder removal   • CERVICAL DECOMPRESSION POSTERIOR     • CHOLECYSTECTOMY     • NEPHRECTOMY RADICAL     • OTHER ORTHOPEDIC SURGERY      pain pump   • OTHER SURGICAL PROCEDURE      liver transplant       CURRENT MEDICATIONS  Home Medications    **Home medications have not yet been reviewed  "for this encounter**         ALLERGIES  Allergies   Allergen Reactions   • Niacin      Passed out.    • Latex Rash     Reaction- Itching, Rash   • Pcn [Penicillins] Anaphylaxis and Swelling     Patient has tolerated cefazolin in past   • Penicillins Itching and Vomiting     Has tolerated cefazolin   • Morphine Sulfate      Per pt., \"it makes me cranky\"   • Mushroom Extract Complex    • Other Food      Mushrooms       PHYSICAL EXAM  VITAL SIGNS: /95   Pulse (!) 115   Temp 37.5 °C (99.5 °F) (Temporal)   Resp 16   Wt 72.2 kg (159 lb 2.8 oz)   SpO2 96%   BMI 25.31 kg/m²        Constitutional: Well developed, well nourished; mild o acute distress; Non-toxic appearance.   HENT: Normocephalic, atraumatic; Bilateral external ears normal; Oropharynx exam was deferred due to COVID-19 outbreak.  Patient has induration, swelling, erythema and tenderness to the left upper lip, the midportion and left side of the nose, the left cheek and maxilla.  The swelling and redness also extends up to the tissues of the lower eye.  The left upper eyelid is starting to get a little bit swollen.  No obvious vesicles.  Eyes: PERRL, EOMI, Conjunctiva normal without injection or purulent material/mattering.  Fluorescein staining is negative for any dye uptake.  . Conjunctiva is clear, no dendritic lesion.   Neck:  Supple, nontender midline; No stridor; No nuchal rigidity.    Thorax & Lungs: No respiratory distress.  Clear to auscultation bilaterally without wheezes rales or rhonchi.  Heart is regular rate and rhythm without murmurs rubs or gallops.  Abdomen: Abdomen is soft.  There is a pain pump located in the left lower abdomen.  Skin: Good color; warm and dry without rash or petechia.  Musculoskeletal: Good range of motion in all major joints. No major deformities noted.   Neurologic: Alert & oriented x 4, clear speech.        LABS/RADIOLOGY/PROCEDURES  Results for orders placed or performed during the hospital encounter of " 12/06/20   CBC WITH DIFFERENTIAL   Result Value Ref Range    WBC 5.7 4.8 - 10.8 K/uL    RBC 5.56 4.70 - 6.10 M/uL    Hemoglobin 17.2 14.0 - 18.0 g/dL    Hematocrit 52.3 (H) 42.0 - 52.0 %    MCV 94.1 81.4 - 97.8 fL    MCH 30.9 27.0 - 33.0 pg    MCHC 32.9 (L) 33.7 - 35.3 g/dL    RDW 47.5 35.9 - 50.0 fL    Platelet Count 208 164 - 446 K/uL    MPV 9.9 9.0 - 12.9 fL    Neutrophils-Polys 47.50 44.00 - 72.00 %    Lymphocytes 27.90 22.00 - 41.00 %    Monocytes 20.90 (H) 0.00 - 13.40 %    Eosinophils 2.10 0.00 - 6.90 %    Basophils 1.10 0.00 - 1.80 %    Immature Granulocytes 0.50 0.00 - 0.90 %    Nucleated RBC 0.00 /100 WBC    Neutrophils (Absolute) 2.71 1.82 - 7.42 K/uL    Lymphs (Absolute) 1.59 1.00 - 4.80 K/uL    Monos (Absolute) 1.19 (H) 0.00 - 0.85 K/uL    Eos (Absolute) 0.12 0.00 - 0.51 K/uL    Baso (Absolute) 0.06 0.00 - 0.12 K/uL    Immature Granulocytes (abs) 0.03 0.00 - 0.11 K/uL    NRBC (Absolute) 0.00 K/uL   COMP METABOLIC PANEL   Result Value Ref Range    Sodium 138 135 - 145 mmol/L    Potassium 4.0 3.6 - 5.5 mmol/L    Chloride 105 96 - 112 mmol/L    Co2 24 20 - 33 mmol/L    Anion Gap 9.0 7.0 - 16.0    Glucose 110 (H) 65 - 99 mg/dL    Bun 8 8 - 22 mg/dL    Creatinine 0.79 0.50 - 1.40 mg/dL    Calcium 9.2 8.5 - 10.5 mg/dL    AST(SGOT) 27 12 - 45 U/L    ALT(SGPT) 52 (H) 2 - 50 U/L    Alkaline Phosphatase 73 30 - 99 U/L    Total Bilirubin 0.4 0.1 - 1.5 mg/dL    Albumin 4.3 3.2 - 4.9 g/dL    Total Protein 7.9 6.0 - 8.2 g/dL    Globulin 3.6 (H) 1.9 - 3.5 g/dL    A-G Ratio 1.2 g/dL   LACTIC ACID   Result Value Ref Range    Lactic Acid 1.5 0.5 - 2.0 mmol/L   ESTIMATED GFR   Result Value Ref Range    GFR If African American >60 >60 mL/min/1.73 m 2    GFR If Non African American >60 >60 mL/min/1.73 m 2          CT-MAXILLOFACIAL WITH PLUS RECONS   Final Result      1.  Soft tissue swelling and enhancement of the nose and anterior aspect of the nasal septum consistent with cellulitis. No abscess identified.      2.   Minimal extension of inflammation into the left maxillary soft tissue region.      3.  No evidence of acute or chronic sinusitis.      4.  No maxillofacial bone fracture identified.          COURSE & MEDICAL DECISION MAKING  Pertinent Labs & Imaging studies reviewed. (See chart for details)    Reviewed patient's old medical records which showed an urgent care visit yesterday, 12/05/20, for a one day old rash described as a pimple like lesion to his face.     10:00 AM - Patient seen and examined at bedside. Discussed plan of care, including completing imaging to rule out an abscess. Patient agrees to the plan of care. The patient will be medicated with Dilaudid 0.5 mg IV Zofran 4 mg IV, Rocephin 2 g IV and Vancomycin 1750 mg IV. Ordered for CT maxilla and laboratory testing to evaluate his symptoms.     11:04 AM - Per nursing staff, facial pain improved with administered Dilaudid. Eye pain remains persistent however. Plan to treat with Proparacaine ophthalmic drops.    12:35 PM - Per nursing staff, the Proparacaine improved his pain. He is requesting it be administered once more. Patient complained of significant nausea after Rocephin was started. Vancomycin is running.    12:41 PM - Spoke with pharmacy who recommended the patient remain on a single daily dose of antibiotics versus three times daily upon discharge.     12:42 PM - Nausea treated with Zofran 4 mg IV. Patient will be resuscitated with intravenous fluids. HYDRATION: Based on the patient's presentation of Other Infection/cellulitis the patient was given IV fluids. IV Hydration was used because oral hydration was not as rapid as required. Upon recheck following hydration, the patient was improved     1:00 PM - Paged Hospitalist.    1:16 PM - Spoke with Dr. Overton, Hospitalist, regarding the patient's presenting symptoms and diagnostic results. They will assess the patient for hospitalization.     1:44 PM - Reevaluated at bedside. Fluorescein administered. No  evidence of fluorescein uptake. Conjunctiva is clear, no dendritic lesion.  Plan for admission for intravenous fluids was discussed with the patient.     2:31 PM - Patient complains of persistent pain. He will be treated with 0.5 mg Dilaudid IV.        Patient presents to the ER complaining of worsening left facial cellulitis despite taking Keflex for the last 1 day.  Patient reports that several days ago he initially noticed what he thought was a pimple right near his nose and the upper lip region.  He states the swelling and pain and started to spread to his nose, and then to his cheek.  He went to urgent care and was placed on Keflex.  He is taken several doses of Keflex but despite the antibiotic the swelling, pain and redness is now extending up towards the left eye.  No fevers or chills.  No nausea or vomiting.  The patient is not septic or toxic here in the ER.  His white count is minimally elevated.  He was given Rocephin and vancomycin.  He complained of some eye pain but I think that is related to the swelling around the tissues of the eye.  He does not have a ana lilia orbital cellulitis.  Most of the cellulitis is in the nose and the maxillary region.  There is no dye uptake with fluorescein staining.  There are no vesicles on the skin.  No dendritic lesion on the cornea.  At this time no evidence for shingles.  There is nothing drainable either clinically or radiographically.  No obvious abscess at this time.  Patient has requested pain medication on multiple occasions.  He has a Dilaudid pain pump in place.  He says it only helps his back pain and does not help any other pain.  For this reason he requests IV Dilaudid.  Patient will need to be hospitalized for his cellulitis which is failing outpatient management.  I spoke with Dr. Overton, hospitalist on-call, he will kindly evaluate the patient hospitalization.      DISPOSITION  Patient will be hospitalized by Dr. Overton, Hospitalist, in stable condition.        FINAL IMPRESSION  1. Facial cellulitis Acute          This dictation has been created using voice recognition software. The accuracy of the dictation is limited by the abilities of the software. I expect there may be some errors of grammar and possibly content. I made every attempt to manually correct the errors within my dictation. However, errors related to voice recognition software may still exist and should be interpreted within the appropriate context.     Beronica MADDOX (Scribe), am scribing for, and in the presence of, Carol Montague M.D..    Electronically signed by: Beronica Townsend (Scribe), 12/6/2020    Carol MADDOX M.D. personally performed the services described in this documentation, as scribed by Beronica Townsend in my presence, and it is both accurate and complete. C.     The note accurately reflects work and decisions made by me.  Carol Montague M.D.  12/6/2020  3:05 PM

## 2020-12-06 NOTE — ED TRIAGE NOTES
Quirino Ruiz  Chief Complaint   Patient presents with   • Facial Swelling     left sided, around cheek and eye   • Sent from Urgent Care     Pt ambulatory to triage with above complaint.  VSS, no acute distress.   Pt states that he was seen at  yesterday and was started on abx, and told to come to ER if redness spread.  Pt states noticed what he thought was a pimple by his nose on Thursday and then the redness started.   Pt/staff masked and in appropriate PPE during encounter.   Pt returned to lobby, educated on triage process, and to inform staff of any changes or concerns.

## 2020-12-07 LAB
ANION GAP SERPL CALC-SCNC: 9 MMOL/L (ref 7–16)
BUN SERPL-MCNC: 10 MG/DL (ref 8–22)
CALCIUM SERPL-MCNC: 8.5 MG/DL (ref 8.5–10.5)
CHLORIDE SERPL-SCNC: 105 MMOL/L (ref 96–112)
CO2 SERPL-SCNC: 27 MMOL/L (ref 20–33)
CREAT SERPL-MCNC: 0.86 MG/DL (ref 0.5–1.4)
GLUCOSE SERPL-MCNC: 94 MG/DL (ref 65–99)
GRAM STN SPEC: NORMAL
POTASSIUM SERPL-SCNC: 4.5 MMOL/L (ref 3.6–5.5)
SIGNIFICANT IND 70042: NORMAL
SITE SITE: NORMAL
SODIUM SERPL-SCNC: 141 MMOL/L (ref 135–145)
SOURCE SOURCE: NORMAL

## 2020-12-07 PROCEDURE — 96372 THER/PROPH/DIAG INJ SC/IM: CPT

## 2020-12-07 PROCEDURE — 770001 HCHG ROOM/CARE - MED/SURG/GYN PRIV*

## 2020-12-07 PROCEDURE — 80048 BASIC METABOLIC PNL TOTAL CA: CPT

## 2020-12-07 PROCEDURE — 700102 HCHG RX REV CODE 250 W/ 637 OVERRIDE(OP): Performed by: HOSPITALIST

## 2020-12-07 PROCEDURE — 700111 HCHG RX REV CODE 636 W/ 250 OVERRIDE (IP): Performed by: HOSPITALIST

## 2020-12-07 PROCEDURE — 96376 TX/PRO/DX INJ SAME DRUG ADON: CPT

## 2020-12-07 PROCEDURE — 700105 HCHG RX REV CODE 258: Performed by: HOSPITALIST

## 2020-12-07 PROCEDURE — 96375 TX/PRO/DX INJ NEW DRUG ADDON: CPT

## 2020-12-07 PROCEDURE — A9270 NON-COVERED ITEM OR SERVICE: HCPCS | Performed by: HOSPITALIST

## 2020-12-07 PROCEDURE — 99232 SBSQ HOSP IP/OBS MODERATE 35: CPT | Performed by: INTERNAL MEDICINE

## 2020-12-07 PROCEDURE — 96366 THER/PROPH/DIAG IV INF ADDON: CPT

## 2020-12-07 RX ADMIN — ENOXAPARIN SODIUM 40 MG: 40 INJECTION SUBCUTANEOUS at 05:36

## 2020-12-07 RX ADMIN — VANCOMYCIN HYDROCHLORIDE 1000 MG: 500 INJECTION, POWDER, LYOPHILIZED, FOR SOLUTION INTRAVENOUS at 01:12

## 2020-12-07 RX ADMIN — HYDROMORPHONE HYDROCHLORIDE 1 MG: 1 INJECTION, SOLUTION INTRAMUSCULAR; INTRAVENOUS; SUBCUTANEOUS at 16:31

## 2020-12-07 RX ADMIN — OXYCODONE HYDROCHLORIDE 10 MG: 5 TABLET ORAL at 16:32

## 2020-12-07 RX ADMIN — OXYCODONE HYDROCHLORIDE 10 MG: 5 TABLET ORAL at 04:02

## 2020-12-07 RX ADMIN — HYDROMORPHONE HYDROCHLORIDE 1 MG: 1 INJECTION, SOLUTION INTRAMUSCULAR; INTRAVENOUS; SUBCUTANEOUS at 13:18

## 2020-12-07 RX ADMIN — ALLOPURINOL 100 MG: 100 TABLET ORAL at 20:52

## 2020-12-07 RX ADMIN — TACROLIMUS 2 MG: 1 CAPSULE ORAL at 05:36

## 2020-12-07 RX ADMIN — HYDROMORPHONE HYDROCHLORIDE 1 MG: 1 INJECTION, SOLUTION INTRAMUSCULAR; INTRAVENOUS; SUBCUTANEOUS at 22:34

## 2020-12-07 RX ADMIN — OXYCODONE HYDROCHLORIDE 10 MG: 5 TABLET ORAL at 21:17

## 2020-12-07 RX ADMIN — ACETAMINOPHEN 650 MG: 325 TABLET, FILM COATED ORAL at 12:20

## 2020-12-07 RX ADMIN — IBUPROFEN 800 MG: 200 TABLET, FILM COATED ORAL at 05:35

## 2020-12-07 RX ADMIN — TACROLIMUS 2 MG: 1 CAPSULE ORAL at 21:13

## 2020-12-07 RX ADMIN — HYDROMORPHONE HYDROCHLORIDE 1 MG: 1 INJECTION, SOLUTION INTRAMUSCULAR; INTRAVENOUS; SUBCUTANEOUS at 09:00

## 2020-12-07 RX ADMIN — ACETAMINOPHEN 650 MG: 325 TABLET, FILM COATED ORAL at 05:35

## 2020-12-07 RX ADMIN — DOCUSATE SODIUM 50 MG AND SENNOSIDES 8.6 MG 2 TABLET: 8.6; 5 TABLET, FILM COATED ORAL at 18:30

## 2020-12-07 RX ADMIN — HYDROMORPHONE HYDROCHLORIDE 1 MG: 1 INJECTION, SOLUTION INTRAMUSCULAR; INTRAVENOUS; SUBCUTANEOUS at 19:22

## 2020-12-07 RX ADMIN — VANCOMYCIN HYDROCHLORIDE 1000 MG: 500 INJECTION, POWDER, LYOPHILIZED, FOR SOLUTION INTRAVENOUS at 13:20

## 2020-12-07 RX ADMIN — LISINOPRIL 10 MG: 10 TABLET ORAL at 05:36

## 2020-12-07 RX ADMIN — HYDROMORPHONE HYDROCHLORIDE 1 MG: 1 INJECTION, SOLUTION INTRAMUSCULAR; INTRAVENOUS; SUBCUTANEOUS at 01:12

## 2020-12-07 RX ADMIN — IBUPROFEN 800 MG: 200 TABLET, FILM COATED ORAL at 12:20

## 2020-12-07 RX ADMIN — CITALOPRAM HYDROBROMIDE 20 MG: 40 TABLET ORAL at 20:39

## 2020-12-07 RX ADMIN — ACETAMINOPHEN 650 MG: 325 TABLET, FILM COATED ORAL at 18:30

## 2020-12-07 RX ADMIN — HYDROMORPHONE HYDROCHLORIDE 1 MG: 1 INJECTION, SOLUTION INTRAMUSCULAR; INTRAVENOUS; SUBCUTANEOUS at 05:34

## 2020-12-07 RX ADMIN — OXYCODONE HYDROCHLORIDE 5 MG: 5 TABLET ORAL at 12:20

## 2020-12-07 RX ADMIN — CEFTRIAXONE 2 G: 2 INJECTION, POWDER, FOR SOLUTION INTRAMUSCULAR; INTRAVENOUS at 05:35

## 2020-12-07 ASSESSMENT — ENCOUNTER SYMPTOMS
FOCAL WEAKNESS: 0
EYE REDNESS: 0
DOUBLE VISION: 0
SENSORY CHANGE: 0
PALPITATIONS: 0
SPEECH CHANGE: 0
CHILLS: 0
SORE THROAT: 0
PHOTOPHOBIA: 0
BLURRED VISION: 0
HEADACHES: 1
FLANK PAIN: 0
DIAPHORESIS: 0
SINUS PAIN: 0
WEIGHT LOSS: 0
EYE DISCHARGE: 0
VOMITING: 0
ABDOMINAL PAIN: 0
FEVER: 0
NAUSEA: 0
EYE PAIN: 1

## 2020-12-07 ASSESSMENT — LIFESTYLE VARIABLES
EVER FELT BAD OR GUILTY ABOUT YOUR DRINKING: NO
TOTAL SCORE: 0
ALCOHOL_USE: NO
ON A TYPICAL DAY WHEN YOU DRINK ALCOHOL HOW MANY DRINKS DO YOU HAVE: 0
HAVE YOU EVER FELT YOU SHOULD CUT DOWN ON YOUR DRINKING: NO
HAVE PEOPLE ANNOYED YOU BY CRITICIZING YOUR DRINKING: NO
TOTAL SCORE: 0
ALCOHOL_USE: NO
HOW MANY TIMES IN THE PAST YEAR HAVE YOU HAD 5 OR MORE DRINKS IN A DAY: 0
ALCOHOL_USE: NO
EVER HAD A DRINK FIRST THING IN THE MORNING TO STEADY YOUR NERVES TO GET RID OF A HANGOVER: NO
CONSUMPTION TOTAL: NEGATIVE
TOTAL SCORE: 0
AVERAGE NUMBER OF DAYS PER WEEK YOU HAVE A DRINK CONTAINING ALCOHOL: 0

## 2020-12-07 ASSESSMENT — PAIN DESCRIPTION - PAIN TYPE
TYPE: ACUTE PAIN
TYPE: ACUTE PAIN;CHRONIC PAIN
TYPE: ACUTE PAIN
TYPE: ACUTE PAIN
TYPE: OTHER (COMMENT)

## 2020-12-07 ASSESSMENT — COGNITIVE AND FUNCTIONAL STATUS - GENERAL
SUGGESTED CMS G CODE MODIFIER MOBILITY: CH
MOBILITY SCORE: 24
DAILY ACTIVITIY SCORE: 24
SUGGESTED CMS G CODE MODIFIER DAILY ACTIVITY: CH

## 2020-12-07 ASSESSMENT — PATIENT HEALTH QUESTIONNAIRE - PHQ9
2. FEELING DOWN, DEPRESSED, IRRITABLE, OR HOPELESS: NOT AT ALL
SUM OF ALL RESPONSES TO PHQ9 QUESTIONS 1 AND 2: 0
1. LITTLE INTEREST OR PLEASURE IN DOING THINGS: NOT AT ALL
2. FEELING DOWN, DEPRESSED, IRRITABLE, OR HOPELESS: NOT AT ALL
SUM OF ALL RESPONSES TO PHQ9 QUESTIONS 1 AND 2: 0
1. LITTLE INTEREST OR PLEASURE IN DOING THINGS: NOT AT ALL

## 2020-12-07 ASSESSMENT — FIBROSIS 4 INDEX: FIB4 SCORE: 1.19

## 2020-12-07 NOTE — PROGRESS NOTES
"Hospital Medicine Daily Progress Note    Date of Service  12/7/2020    Chief Complaint  65 y.o. male admitted 12/6/2020 with facial cellulitis     Hospital Course  65 y.o. male who presented 12/6/2020 with facial cellulitis. He was previously seen by urgent care and started on Keflex and doxycycline, but reports worsening of symptoms. He was admitted for failed outpatient therapy and started on IV antibiotics.     Interval Problem Update  Reports 9/10 pain to his nose and left periorbital. Describes pain as \"pulsing\" and \"throbbing\". Denies vision changes. CT maxillofacial negative for abscess. I noted on pruritic,crusted lesions to left nostril. ? Impetigo . Wound cx growing GPC.    Consultants/Specialty  None     Code Status  Full Code    Disposition  Continue inpatient     Review of Systems  Review of Systems   Constitutional: Positive for malaise/fatigue. Negative for chills, diaphoresis, fever and weight loss.   HENT: Negative for congestion, ear pain, nosebleeds, sinus pain, sore throat and tinnitus.    Eyes: Positive for pain. Negative for blurred vision, double vision, photophobia, discharge and redness.   Cardiovascular: Negative for chest pain and palpitations.   Gastrointestinal: Negative for abdominal pain, nausea and vomiting.   Genitourinary: Negative for dysuria, flank pain, frequency, hematuria and urgency.   Skin: Positive for itching (bilateral flanks ).   Neurological: Positive for headaches. Negative for sensory change, speech change and focal weakness.        Physical Exam  Temp:  [36.8 °C (98.3 °F)] 36.8 °C (98.3 °F)  Pulse:  [68-92] 69  BP: (125-181)/(65-92) 142/73  SpO2:  [86 %-97 %] 95 %    Physical Exam  Vitals signs and nursing note reviewed.   HENT:      Head: Left periorbital erythema present.      Nose:      Comments: Crusted lesions inside left nostril and on left side of nasal bridge       Mouth/Throat:      Lips: Pink.      Mouth: Mucous membranes are moist. No angioedema.      " Tongue: No lesions. Tongue does not deviate from midline.      Palate: No lesions.      Pharynx: Oropharynx is clear.      Comments: Swelling, erythema and tenderness to the left upper lip.   Eyes:      General: No visual field deficit or scleral icterus.        Left eye: No discharge.   Cardiovascular:      Rate and Rhythm: Normal rate.      Pulses: Normal pulses.   Pulmonary:      Effort: Pulmonary effort is normal.   Abdominal:      General: There is distension.      Palpations: Abdomen is soft.      Tenderness: There is no abdominal tenderness. There is no guarding.      Comments: Pain pump to LLQ . Nontender and no evidence of infection     Musculoskeletal:      Right lower leg: No edema.      Left lower leg: No edema.   Skin:     General: Skin is warm and dry.   Neurological:      Mental Status: He is alert and oriented to person, place, and time.   Psychiatric:         Mood and Affect: Mood normal.         Thought Content: Thought content normal.         Judgment: Judgment normal.         Fluids  No intake or output data in the 24 hours ending 12/07/20 0954    Laboratory  Recent Labs     12/05/20  1156 12/06/20  1040   WBC 6.7 5.7   RBC 5.46 5.56   HEMOGLOBIN 17.2 17.2   HEMATOCRIT 51.8 52.3*   MCV 94.9 94.1   MCH 31.5 30.9   MCHC 33.2* 32.9*   RDW 47.0 47.5   PLATELETCT 209 208   MPV 10.1 9.9     Recent Labs     12/05/20  1156 12/06/20  1040   SODIUM 137 138   POTASSIUM 4.5 4.0   CHLORIDE 101 105   CO2 25 24   GLUCOSE 109* 110*   BUN 7* 8   CREATININE 0.80 0.79   CALCIUM 9.5 9.2                   Imaging  CT-MAXILLOFACIAL WITH PLUS RECONS   Final Result      1.  Soft tissue swelling and enhancement of the nose and anterior aspect of the nasal septum consistent with cellulitis. No abscess identified.      2.  Minimal extension of inflammation into the left maxillary soft tissue region.      3.  No evidence of acute or chronic sinusitis.      4.  No maxillofacial bone fracture identified.            Assessment/Plan  * Cellulitis of face  Assessment & Plan  Failed Keflex and Doxycycline outpatient   CT maxillofacial negative for abscess, most consistent with cellulitis   Continue IV antibiotics and adjust according to cultures   Preliminary wound cx growing GPC , continue to follow for final result   Preliminary blood cx negative, continue to follow for final result        Chronic pain syndrome- (present on admission)  Assessment & Plan  Dilaudid pain pump in place  Continue PRN pain medications for breakthrough pain     HTN (hypertension)  Assessment & Plan  Continue home dose lisinopril with hold parameters.    Depression  Assessment & Plan  Continue home dose citalopram.    Liver transplant recipient (HCC)- (present on admission)  Assessment & Plan  H/o liver transplant in 2002   Continue home dose tacrolimus, no indication for rechecking levels at this time.    Consider infectious disease consult if infection does not rapidly clear.       VTE prophylaxis: Lovenox

## 2020-12-07 NOTE — ED NOTES
Pt requesting his Allopurinol 100mg that he takes at bedtime. RN notified Dr. Overton of request.

## 2020-12-07 NOTE — PROGRESS NOTES
"Pharmacy Kinetics 65 y.o. male on vancomycin day # 2 2020    Currently on Vancomycin 1000 mg IV q12hr (15mg/kg)    Indication for Treatment: Facial cellulitis     Pertinent history per medical record: Admitted on 2020 for facial swelling. Patient sent from urgent care for swelling around cheek and eye. Patient started on antiviral and antibiotics to cover potential for herpetic lesions as well however, redness, swelling and pain have worsened. Face noted as having possible abscess with drainage. Patient with a hx of liver transplant and is on tacrolimus. Empiric abx initiated.     Other antibiotics: ceftriaxone     Allergies: Niacin, Latex, Pcn [penicillins], Penicillins, Morphine sulfate, Mushroom extract complex, and Other food     List concerns for renal function: age, concurrent prograf, ibuprofen    Pertinent cultures to date:   20 blood and wound cultures NGTD    Recent Labs     20  1156 20  1040   WBC 6.7 5.7   NEUTSPOLYS 51.20 47.50     Recent Labs     20  1156 20  1040   BUN 7* 8   CREATININE 0.80 0.79   ALBUMIN 4.4 4.3     No results for input(s): VANCOTROUGH, VANCOPEAK, VANCORANDOM in the last 72 hours.No intake or output data in the 24 hours ending 20 1316   /73   Pulse 77   Temp 36.8 °C (98.3 °F)   Resp 16   Ht 1.676 m (5' 6\")   Wt 70 kg (154 lb 5.2 oz)   SpO2 (!) 85%  Temp (24hrs), Av.8 °C (98.3 °F), Min:36.8 °C (98.3 °F), Max:36.8 °C (98.3 °F)      A/P   1. Vancomycin dose change: n/a  2. Next vancomycin level:  @1230 - prior to the 5th dose.  3. Goal trough: 10-15 mcg/ml - target higher end of goal   4. Comments: Renal function stable, monitor renal daily while on vanco due to the above. Pharmacy will follow. Narrow therapy as able.    Reyes Canchola, SofiaD, BCPS    "

## 2020-12-07 NOTE — PROGRESS NOTES
Report received and assumed care of patient. Patient resting on gurney with no distress noted. Call bell in reach.

## 2020-12-07 NOTE — PROGRESS NOTES
Attending Hospitalist today is JESÚS Hernandez starting at 0700. Please call this Physician for orders, updates and questions.

## 2020-12-08 LAB
ANION GAP SERPL CALC-SCNC: 7 MMOL/L (ref 7–16)
BACTERIA WND AEROBE CULT: ABNORMAL
BACTERIA WND AEROBE CULT: ABNORMAL
BUN SERPL-MCNC: 10 MG/DL (ref 8–22)
CALCIUM SERPL-MCNC: 8.9 MG/DL (ref 8.5–10.5)
CHLORIDE SERPL-SCNC: 104 MMOL/L (ref 96–112)
CO2 SERPL-SCNC: 26 MMOL/L (ref 20–33)
CREAT SERPL-MCNC: 0.75 MG/DL (ref 0.5–1.4)
GLUCOSE SERPL-MCNC: 92 MG/DL (ref 65–99)
GRAM STN SPEC: ABNORMAL
POTASSIUM SERPL-SCNC: 4 MMOL/L (ref 3.6–5.5)
SIGNIFICANT IND 70042: ABNORMAL
SITE SITE: ABNORMAL
SODIUM SERPL-SCNC: 137 MMOL/L (ref 135–145)
SOURCE SOURCE: ABNORMAL

## 2020-12-08 PROCEDURE — A9270 NON-COVERED ITEM OR SERVICE: HCPCS | Performed by: HOSPITALIST

## 2020-12-08 PROCEDURE — 80048 BASIC METABOLIC PNL TOTAL CA: CPT

## 2020-12-08 PROCEDURE — 700105 HCHG RX REV CODE 258: Performed by: HOSPITALIST

## 2020-12-08 PROCEDURE — 700102 HCHG RX REV CODE 250 W/ 637 OVERRIDE(OP): Performed by: HOSPITALIST

## 2020-12-08 PROCEDURE — 770001 HCHG ROOM/CARE - MED/SURG/GYN PRIV*

## 2020-12-08 PROCEDURE — 700111 HCHG RX REV CODE 636 W/ 250 OVERRIDE (IP): Performed by: HOSPITALIST

## 2020-12-08 PROCEDURE — 700101 HCHG RX REV CODE 250: Performed by: HOSPITALIST

## 2020-12-08 PROCEDURE — 99232 SBSQ HOSP IP/OBS MODERATE 35: CPT | Performed by: HOSPITALIST

## 2020-12-08 RX ORDER — ACYCLOVIR 200 MG/1
200 CAPSULE ORAL
Status: DISCONTINUED | OUTPATIENT
Start: 2020-12-08 | End: 2020-12-10 | Stop reason: HOSPADM

## 2020-12-08 RX ORDER — HYDROMORPHONE HYDROCHLORIDE 1 MG/ML
0.5 INJECTION, SOLUTION INTRAMUSCULAR; INTRAVENOUS; SUBCUTANEOUS EVERY 4 HOURS PRN
Status: DISCONTINUED | OUTPATIENT
Start: 2020-12-08 | End: 2020-12-09

## 2020-12-08 RX ORDER — GABAPENTIN 100 MG/1
100 CAPSULE ORAL 2 TIMES DAILY
Status: DISCONTINUED | OUTPATIENT
Start: 2020-12-08 | End: 2020-12-10 | Stop reason: HOSPADM

## 2020-12-08 RX ADMIN — ACYCLOVIR 200 MG: 200 CAPSULE ORAL at 16:42

## 2020-12-08 RX ADMIN — TACROLIMUS 2 MG: 1 CAPSULE ORAL at 20:11

## 2020-12-08 RX ADMIN — ACETAMINOPHEN 650 MG: 325 TABLET, FILM COATED ORAL at 00:05

## 2020-12-08 RX ADMIN — ACETAMINOPHEN 650 MG: 325 TABLET, FILM COATED ORAL at 06:00

## 2020-12-08 RX ADMIN — ACYCLOVIR 200 MG: 200 CAPSULE ORAL at 12:32

## 2020-12-08 RX ADMIN — OXYCODONE HYDROCHLORIDE 10 MG: 5 TABLET ORAL at 04:07

## 2020-12-08 RX ADMIN — DOCUSATE SODIUM 50 MG AND SENNOSIDES 8.6 MG 2 TABLET: 8.6; 5 TABLET, FILM COATED ORAL at 17:17

## 2020-12-08 RX ADMIN — HYDROMORPHONE HYDROCHLORIDE 0.5 MG: 1 INJECTION, SOLUTION INTRAMUSCULAR; INTRAVENOUS; SUBCUTANEOUS at 17:11

## 2020-12-08 RX ADMIN — HYDROMORPHONE HYDROCHLORIDE 1 MG: 1 INJECTION, SOLUTION INTRAMUSCULAR; INTRAVENOUS; SUBCUTANEOUS at 02:10

## 2020-12-08 RX ADMIN — OXYCODONE HYDROCHLORIDE 10 MG: 5 TABLET ORAL at 18:51

## 2020-12-08 RX ADMIN — OXYCODONE HYDROCHLORIDE 10 MG: 5 TABLET ORAL at 14:43

## 2020-12-08 RX ADMIN — HYDROMORPHONE HYDROCHLORIDE 0.5 MG: 1 INJECTION, SOLUTION INTRAMUSCULAR; INTRAVENOUS; SUBCUTANEOUS at 12:40

## 2020-12-08 RX ADMIN — DOCUSATE SODIUM 50 MG AND SENNOSIDES 8.6 MG 2 TABLET: 8.6; 5 TABLET, FILM COATED ORAL at 04:07

## 2020-12-08 RX ADMIN — GABAPENTIN 100 MG: 100 CAPSULE ORAL at 11:22

## 2020-12-08 RX ADMIN — HYDROMORPHONE HYDROCHLORIDE 0.5 MG: 1 INJECTION, SOLUTION INTRAMUSCULAR; INTRAVENOUS; SUBCUTANEOUS at 22:12

## 2020-12-08 RX ADMIN — POLYETHYLENE GLYCOL 3350 1 PACKET: 17 POWDER, FOR SOLUTION ORAL at 20:20

## 2020-12-08 RX ADMIN — ENOXAPARIN SODIUM 40 MG: 40 INJECTION SUBCUTANEOUS at 04:07

## 2020-12-08 RX ADMIN — LISINOPRIL 10 MG: 10 TABLET ORAL at 04:07

## 2020-12-08 RX ADMIN — TACROLIMUS 2 MG: 1 CAPSULE ORAL at 07:57

## 2020-12-08 RX ADMIN — ACYCLOVIR 200 MG: 200 CAPSULE ORAL at 20:12

## 2020-12-08 RX ADMIN — GABAPENTIN 100 MG: 100 CAPSULE ORAL at 17:17

## 2020-12-08 RX ADMIN — CEFTRIAXONE 2 G: 2 INJECTION, POWDER, FOR SOLUTION INTRAMUSCULAR; INTRAVENOUS at 05:47

## 2020-12-08 RX ADMIN — CITALOPRAM HYDROBROMIDE 20 MG: 40 TABLET ORAL at 20:12

## 2020-12-08 RX ADMIN — OXYCODONE HYDROCHLORIDE 10 MG: 5 TABLET ORAL at 11:23

## 2020-12-08 RX ADMIN — ACETAMINOPHEN 650 MG: 325 TABLET, FILM COATED ORAL at 17:17

## 2020-12-08 RX ADMIN — ALLOPURINOL 100 MG: 100 TABLET ORAL at 20:11

## 2020-12-08 RX ADMIN — VANCOMYCIN HYDROCHLORIDE 1000 MG: 500 INJECTION, POWDER, LYOPHILIZED, FOR SOLUTION INTRAVENOUS at 00:05

## 2020-12-08 RX ADMIN — OXYCODONE HYDROCHLORIDE 10 MG: 5 TABLET ORAL at 08:21

## 2020-12-08 RX ADMIN — ACETAMINOPHEN 650 MG: 325 TABLET, FILM COATED ORAL at 11:22

## 2020-12-08 RX ADMIN — HYDROMORPHONE HYDROCHLORIDE 1 MG: 1 INJECTION, SOLUTION INTRAMUSCULAR; INTRAVENOUS; SUBCUTANEOUS at 09:15

## 2020-12-08 RX ADMIN — HYDROMORPHONE HYDROCHLORIDE 1 MG: 1 INJECTION, SOLUTION INTRAMUSCULAR; INTRAVENOUS; SUBCUTANEOUS at 05:47

## 2020-12-08 ASSESSMENT — ENCOUNTER SYMPTOMS
PALPITATIONS: 0
ABDOMINAL PAIN: 0
BRUISES/BLEEDS EASILY: 0
WEIGHT LOSS: 0
NEUROLOGICAL NEGATIVE: 1
BLURRED VISION: 0
CARDIOVASCULAR NEGATIVE: 1
BACK PAIN: 1
SORE THROAT: 0
WEAKNESS: 0
CHILLS: 0
HEADACHES: 0
EYES NEGATIVE: 1
NAUSEA: 0
COUGH: 0
MYALGIAS: 0
CONSTITUTIONAL NEGATIVE: 1
SHORTNESS OF BREATH: 0
FEVER: 0
DIAPHORESIS: 0
FOCAL WEAKNESS: 0
DEPRESSION: 0
RESPIRATORY NEGATIVE: 1
DIZZINESS: 0
GASTROINTESTINAL NEGATIVE: 1
VOMITING: 0

## 2020-12-08 ASSESSMENT — LIFESTYLE VARIABLES: SUBSTANCE_ABUSE: 1

## 2020-12-08 ASSESSMENT — PAIN DESCRIPTION - PAIN TYPE: TYPE: ACUTE PAIN

## 2020-12-09 ENCOUNTER — HOME HEALTH ADMISSION (OUTPATIENT)
Dept: HOME HEALTH SERVICES | Facility: HOME HEALTHCARE | Age: 65
End: 2020-12-09
Payer: MEDICARE

## 2020-12-09 PROCEDURE — 700111 HCHG RX REV CODE 636 W/ 250 OVERRIDE (IP): Performed by: HOSPITALIST

## 2020-12-09 PROCEDURE — 99232 SBSQ HOSP IP/OBS MODERATE 35: CPT | Performed by: HOSPITALIST

## 2020-12-09 PROCEDURE — 770001 HCHG ROOM/CARE - MED/SURG/GYN PRIV*

## 2020-12-09 PROCEDURE — 700102 HCHG RX REV CODE 250 W/ 637 OVERRIDE(OP): Performed by: HOSPITALIST

## 2020-12-09 PROCEDURE — A9270 NON-COVERED ITEM OR SERVICE: HCPCS | Performed by: HOSPITALIST

## 2020-12-09 RX ORDER — CARBOXYMETHYLCELLULOSE SODIUM 5 MG/ML
1 SOLUTION/ DROPS OPHTHALMIC PRN
Status: DISCONTINUED | OUTPATIENT
Start: 2020-12-09 | End: 2020-12-10 | Stop reason: HOSPADM

## 2020-12-09 RX ORDER — HYDROMORPHONE HYDROCHLORIDE 1 MG/ML
0.5 INJECTION, SOLUTION INTRAMUSCULAR; INTRAVENOUS; SUBCUTANEOUS EVERY 6 HOURS PRN
Status: DISCONTINUED | OUTPATIENT
Start: 2020-12-09 | End: 2020-12-10 | Stop reason: HOSPADM

## 2020-12-09 RX ADMIN — OXYCODONE HYDROCHLORIDE 10 MG: 5 TABLET ORAL at 11:12

## 2020-12-09 RX ADMIN — ALLOPURINOL 100 MG: 100 TABLET ORAL at 20:20

## 2020-12-09 RX ADMIN — ACETAMINOPHEN 650 MG: 325 TABLET, FILM COATED ORAL at 17:46

## 2020-12-09 RX ADMIN — ACYCLOVIR 200 MG: 200 CAPSULE ORAL at 11:09

## 2020-12-09 RX ADMIN — TACROLIMUS 2 MG: 1 CAPSULE ORAL at 17:46

## 2020-12-09 RX ADMIN — DOCUSATE SODIUM 50 MG AND SENNOSIDES 8.6 MG 2 TABLET: 8.6; 5 TABLET, FILM COATED ORAL at 17:46

## 2020-12-09 RX ADMIN — OXYCODONE HYDROCHLORIDE 10 MG: 5 TABLET ORAL at 05:49

## 2020-12-09 RX ADMIN — CITALOPRAM HYDROBROMIDE 20 MG: 40 TABLET ORAL at 20:19

## 2020-12-09 RX ADMIN — CARBOXYMETHYLCELLULOSE SODIUM 1 DROP: 5 SOLUTION/ DROPS OPHTHALMIC at 17:47

## 2020-12-09 RX ADMIN — ACYCLOVIR 200 MG: 200 CAPSULE ORAL at 23:51

## 2020-12-09 RX ADMIN — ACYCLOVIR 200 MG: 200 CAPSULE ORAL at 08:37

## 2020-12-09 RX ADMIN — HYDROMORPHONE HYDROCHLORIDE 0.5 MG: 1 INJECTION, SOLUTION INTRAMUSCULAR; INTRAVENOUS; SUBCUTANEOUS at 14:16

## 2020-12-09 RX ADMIN — CARBOXYMETHYLCELLULOSE SODIUM 1 DROP: 5 SOLUTION/ DROPS OPHTHALMIC at 16:31

## 2020-12-09 RX ADMIN — GABAPENTIN 100 MG: 100 CAPSULE ORAL at 17:46

## 2020-12-09 RX ADMIN — ENOXAPARIN SODIUM 40 MG: 40 INJECTION SUBCUTANEOUS at 05:50

## 2020-12-09 RX ADMIN — OXYCODONE HYDROCHLORIDE 10 MG: 5 TABLET ORAL at 00:45

## 2020-12-09 RX ADMIN — LISINOPRIL 10 MG: 10 TABLET ORAL at 05:49

## 2020-12-09 RX ADMIN — TACROLIMUS 2 MG: 1 CAPSULE ORAL at 05:49

## 2020-12-09 RX ADMIN — OXYCODONE HYDROCHLORIDE 10 MG: 5 TABLET ORAL at 18:39

## 2020-12-09 RX ADMIN — HYDROMORPHONE HYDROCHLORIDE 0.5 MG: 1 INJECTION, SOLUTION INTRAMUSCULAR; INTRAVENOUS; SUBCUTANEOUS at 08:38

## 2020-12-09 RX ADMIN — ACYCLOVIR 200 MG: 200 CAPSULE ORAL at 14:17

## 2020-12-09 RX ADMIN — ACETAMINOPHEN 650 MG: 325 TABLET, FILM COATED ORAL at 11:09

## 2020-12-09 RX ADMIN — ACETAMINOPHEN 650 MG: 325 TABLET, FILM COATED ORAL at 05:49

## 2020-12-09 RX ADMIN — DOCUSATE SODIUM 50 MG AND SENNOSIDES 8.6 MG 2 TABLET: 8.6; 5 TABLET, FILM COATED ORAL at 05:49

## 2020-12-09 RX ADMIN — ACYCLOVIR 200 MG: 200 CAPSULE ORAL at 00:44

## 2020-12-09 RX ADMIN — ACYCLOVIR 200 MG: 200 CAPSULE ORAL at 18:39

## 2020-12-09 RX ADMIN — ACETAMINOPHEN 650 MG: 325 TABLET, FILM COATED ORAL at 23:51

## 2020-12-09 RX ADMIN — GABAPENTIN 100 MG: 100 CAPSULE ORAL at 05:50

## 2020-12-09 RX ADMIN — HYDROMORPHONE HYDROCHLORIDE 0.5 MG: 1 INJECTION, SOLUTION INTRAMUSCULAR; INTRAVENOUS; SUBCUTANEOUS at 02:15

## 2020-12-09 RX ADMIN — HYDROMORPHONE HYDROCHLORIDE 0.5 MG: 1 INJECTION, SOLUTION INTRAMUSCULAR; INTRAVENOUS; SUBCUTANEOUS at 20:15

## 2020-12-09 RX ADMIN — ACETAMINOPHEN 650 MG: 325 TABLET, FILM COATED ORAL at 00:44

## 2020-12-09 RX ADMIN — OXYCODONE HYDROCHLORIDE 10 MG: 5 TABLET ORAL at 21:48

## 2020-12-09 RX ADMIN — OXYCODONE HYDROCHLORIDE 10 MG: 5 TABLET ORAL at 15:54

## 2020-12-09 RX ADMIN — CEFTRIAXONE 2 G: 2 INJECTION, POWDER, FOR SOLUTION INTRAMUSCULAR; INTRAVENOUS at 05:49

## 2020-12-09 ASSESSMENT — ENCOUNTER SYMPTOMS
CHILLS: 0
FEVER: 0
GASTROINTESTINAL NEGATIVE: 1
DEPRESSION: 0
VOMITING: 0
NEUROLOGICAL NEGATIVE: 1
BRUISES/BLEEDS EASILY: 0
RESPIRATORY NEGATIVE: 1
DIZZINESS: 0
ABDOMINAL PAIN: 0
BACK PAIN: 1
WEIGHT LOSS: 0
FOCAL WEAKNESS: 0
WEAKNESS: 0
PALPITATIONS: 0
BLURRED VISION: 0
MYALGIAS: 0
SORE THROAT: 0
NAUSEA: 0
DIAPHORESIS: 0
SHORTNESS OF BREATH: 0
COUGH: 0
HEADACHES: 0
CARDIOVASCULAR NEGATIVE: 1
CONSTITUTIONAL NEGATIVE: 1
EYES NEGATIVE: 1

## 2020-12-09 ASSESSMENT — FIBROSIS 4 INDEX: FIB4 SCORE: 1.19

## 2020-12-09 ASSESSMENT — LIFESTYLE VARIABLES: SUBSTANCE_ABUSE: 1

## 2020-12-09 NOTE — DISCHARGE PLANNING
We are currently verifying MD acceptance of your patient. This will be resolved ASAP. If you want this escalated for a faster response please call [phone] and press option #2. Thank you for your patience.  Respectfully,   [facility]

## 2020-12-09 NOTE — FACE TO FACE
Face to Face Supporting Documentation - Home Health    The encounter with this patient was in whole or in part the primary reason for home health admission.    Date of encounter:   Patient:                    MRN:                       YOB: 2021  Quirino Ruiz  [id number]  1955     Home health to see patient for:  Skilled Nursing care for assessment, interventions & education    Skilled need for:  Recent Deterioration of Health Status cellulitis    Skilled nursing interventions to include:  Comment: exam, vitals    Homebound status evidenced by:  Needs the assistance of another person in order to leave the home. Leaving home requires a considerable and taxing effort. There is a normal inability to leave the home.    Community Physician to provide follow up care: Alejandra Sutherland M.D.     Optional Interventions? No      I certify the face to face encounter for this home health care referral meets the CMS requirements and the encounter/clinical assessment with the patient was, in whole, or in part, for the medical condition(s) listed above, which is the primary reason for home health care. Based on my clinical findings: the service(s) are medically necessary, support the need for home health care, and the homebound criteria are met.  I certify that this patient has had a face to face encounter by myself.  Demetria Sanders M.D. - NPI: [id number]

## 2020-12-09 NOTE — DISCHARGE PLANNING
Hospital Care Management Discharge Planning       Anticipated Discharge Disposition:   · Home with Home Health     Action:   · This RN CM met with the patient at the bedside to discuss home health   · Pt chose [facility]  · Verbal consent received to send referral   · Choice form faxed to [facility] He, at extension [phone]  · Fax receipt received    · Ginger updated      Barriers to Discharge:   · HH acceptance   · Medical clearance      Plan:   · F/U with [facility]   · Hospital Care Management Team to continue to provide support services and assistance with discharge planning as needed.

## 2020-12-09 NOTE — PROGRESS NOTES
Pt asking for dilaudid. Informed patient that medication not available and has been requested from pharmacy. Pt offered oxycontin in its place until dilaudid could be received by pharmacy. Pt would not answer me but is very angry. When I attempted to re-educate patient about plan and other options, patient would not listen and continued to be angry and will not listen to other options.     VIDHI

## 2020-12-09 NOTE — PROGRESS NOTES
[facility] Medicine Daily Progress Note    Date of Service  12/9/2020    Chief Complaint  65 y.o. male admitted 12/6/2020 with facial swelling    Hospital Course  Patient is a 65 year old male with history of renal cell cancer, hepatitis C and a lliver transplant who presented to the ER with a 1-2 day history of facial infection.  He states he developed a pimple on his nose that he tried to pop and he developed increasing pain, swelling and redness in the area that spread to the soft tissue surrounding his eyes.    Interval Problem Update  Reportedly very aggressive last night, security was called, behavior and mood much improved today, he is calm and appropriate, his facial swelling is much improved, no visual sx or eye pain.  He reports facial pain has improved, currently 4/10, chronic back pain stable, he agrees to taper down off of iv prn pain meds  axox3  Ros otherwise negative  Discussed with nursing    Consultants/Specialty      Code Status  Full Code    Disposition  Home tomorrow if stable    Review of Systems  Review of Systems   Constitutional: Negative.  Negative for chills, diaphoresis, fever, malaise/fatigue and weight loss.   HENT: Negative.  Negative for sore throat.    Eyes: Negative.  Negative for blurred vision.   Respiratory: Negative.  Negative for cough and shortness of breath.    Cardiovascular: Negative.  Negative for chest pain, palpitations and leg swelling.   Gastrointestinal: Negative.  Negative for abdominal pain, nausea and vomiting.   Genitourinary: Negative.  Negative for dysuria.   Musculoskeletal: Positive for back pain. Negative for myalgias.        Facial pain   Skin: Negative.  Negative for itching and rash.   Neurological: Negative.  Negative for dizziness, focal weakness, weakness and headaches.   Endo/Heme/Allergies: Negative.  Does not bruise/bleed easily.   Psychiatric/Behavioral: Positive for substance abuse (suspected opiate dependence). Negative for depression and suicidal  ideas.   All other systems reviewed and are negative.       Physical Exam  Temp:  [35.9 °C (96.7 °F)-36.7 °C (98 °F)] 35.9 °C (96.7 °F)  Pulse:  [79-89] 89  Resp:  [18-20] 18  BP: (130-160)/(84-86) 130/84  SpO2:  [92 %-94 %] 94 %    Physical Exam  Vitals signs and nursing note reviewed. Exam conducted with a chaperone present.   Constitutional:       General: He is not in acute distress.     Appearance: Normal appearance. He is not diaphoretic.   HENT:      Head: Normocephalic.      Nose: Nose normal.      Mouth/Throat:      Mouth: Mucous membranes are moist.   Eyes:      Pupils: Pupils are equal, round, and reactive to light.   Cardiovascular:      Rate and Rhythm: Normal rate and regular rhythm.      Pulses: Normal pulses.      Heart sounds: Normal heart sounds.   Pulmonary:      Effort: Pulmonary effort is normal.      Breath sounds: Normal breath sounds.   Abdominal:      General: Abdomen is flat. Bowel sounds are normal.      Palpations: Abdomen is soft.   Musculoskeletal: Normal range of motion.         General: No swelling or deformity.   Skin:     General: Skin is warm and dry.      Capillary Refill: Capillary refill takes less than 2 seconds.      Comments: Scabbed lesions around mouth  Mild erythema, edema of face/ nose - reportedly much improved   Neurological:      General: No focal deficit present.      Mental Status: He is alert and oriented to person, place, and time.      Cranial Nerves: No cranial nerve deficit.   Psychiatric:         Mood and Affect: Mood normal.         Behavior: Behavior normal.         Fluids    Intake/Output Summary (Last 24 hours) at 12/9/2020 1143  Last data filed at 12/9/2020 0300  Gross per 24 hour   Intake --   Output 600 ml   Net -600 ml       Laboratory      Recent Labs     12/07/20  1330 12/08/20  0723   SODIUM 141 137   POTASSIUM 4.5 4.0   CHLORIDE 105 104   CO2 27 26   GLUCOSE 94 92   BUN 10 10   CREATININE 0.86 0.75   CALCIUM 8.5 8.9                    Imaging  CT-MAXILLOFACIAL WITH PLUS RECONS   Final Result      1.  Soft tissue swelling and enhancement of the nose and anterior aspect of the nasal septum consistent with cellulitis. No abscess identified.      2.  Minimal extension of inflammation into the left maxillary soft tissue region.      3.  No evidence of acute or chronic sinusitis.      4.  No maxillofacial bone fracture identified.           Assessment/Plan  * Cellulitis of face  Assessment & Plan  Failed Keflex and Doxycycline outpatient   Tzanck smear reportedly obtained as an outpatient, lesions now scabbed over  Acyclovir started for empiric coverage - no eye involvement  Clinically improving  CT maxillofacial negative for abscess, most consistent with cellulitis   Wound cultures growing mssa - vancomycin stopped  Continue ceftriaxone  Continue to follow   If he continues to improve, will d/c home in am    Chronic pain syndrome- (present on admission)  Assessment & Plan  Has a chronic Dilaudid pain pump which is in use  Transition additional meds to oral as tolerated  Gabapentin added    HTN (hypertension)  Assessment & Plan  Continue home dose lisinopril with hold parameters.    Depression  Assessment & Plan  Continue home dose citalopram.    Liver transplant recipient (HCC)- (present on admission)  Assessment & Plan  H/o liver transplant in 2002   Continue home dose tacrolimus         VTE prophylaxis: lovenox

## 2020-12-09 NOTE — PROGRESS NOTES
Myself and MIKAYLA Tavarez were in the process of going into patient's room together to give medication. Before I could pull dilaudid from [facility] pt came out in the [place] yelling and accusing staff of laughing at him. Pt is not responding to de-escalation. Security and charge nurse requested to bedside.

## 2020-12-09 NOTE — DISCHARGE PLANNING
Received Choice form at [phone]  Agency/Facility Name: [facility]  Referral sent per Choice form at [phone]

## 2020-12-09 NOTE — DISCHARGE PLANNING
ATTN: Case Management  RE: Referral for Home Health    As of 12/9/2020, we have accepted the Home Health referral for the patient listed above.    A [facility] clinician will be out to see the patient within 48 hours. If you have any questions or concerns regarding the patient’s transition to Home Health, please do not hesitate to contact us at [phone].      We look forward to collaborating with you,  [facility] Home Health Team

## 2020-12-10 VITALS
TEMPERATURE: 97.2 F | BODY MASS INDEX: 24.22 KG/M2 | DIASTOLIC BLOOD PRESSURE: 73 MMHG | SYSTOLIC BLOOD PRESSURE: 120 MMHG | HEIGHT: 67 IN | WEIGHT: 154.32 LBS | RESPIRATION RATE: 18 BRPM | HEART RATE: 74 BPM | OXYGEN SATURATION: 92 %

## 2020-12-10 PROCEDURE — 700111 HCHG RX REV CODE 636 W/ 250 OVERRIDE (IP): Performed by: HOSPITALIST

## 2020-12-10 PROCEDURE — A9270 NON-COVERED ITEM OR SERVICE: HCPCS | Performed by: HOSPITALIST

## 2020-12-10 PROCEDURE — 99239 HOSP IP/OBS DSCHRG MGMT >30: CPT | Performed by: HOSPITALIST

## 2020-12-10 PROCEDURE — 700102 HCHG RX REV CODE 250 W/ 637 OVERRIDE(OP): Performed by: HOSPITALIST

## 2020-12-10 RX ORDER — GABAPENTIN 100 MG/1
100 CAPSULE ORAL 2 TIMES DAILY
Qty: 90 CAP | Refills: 0 | Status: SHIPPED | OUTPATIENT
Start: 2020-12-10 | End: 2021-05-06

## 2020-12-10 RX ORDER — CEFDINIR 300 MG/1
300 CAPSULE ORAL 2 TIMES DAILY
Qty: 14 CAP | Refills: 0 | Status: SHIPPED | OUTPATIENT
Start: 2020-12-10 | End: 2020-12-17

## 2020-12-10 RX ORDER — VALACYCLOVIR HYDROCHLORIDE 1 G/1
1000 TABLET, FILM COATED ORAL 2 TIMES DAILY
Qty: 14 TAB | Refills: 1 | Status: SHIPPED | OUTPATIENT
Start: 2020-12-10 | End: 2020-12-17

## 2020-12-10 RX ADMIN — ACYCLOVIR 200 MG: 200 CAPSULE ORAL at 10:43

## 2020-12-10 RX ADMIN — DOCUSATE SODIUM 50 MG AND SENNOSIDES 8.6 MG 2 TABLET: 8.6; 5 TABLET, FILM COATED ORAL at 04:26

## 2020-12-10 RX ADMIN — ACETAMINOPHEN 650 MG: 325 TABLET, FILM COATED ORAL at 04:25

## 2020-12-10 RX ADMIN — HYDROMORPHONE HYDROCHLORIDE 0.5 MG: 1 INJECTION, SOLUTION INTRAMUSCULAR; INTRAVENOUS; SUBCUTANEOUS at 08:41

## 2020-12-10 RX ADMIN — ACYCLOVIR 200 MG: 200 CAPSULE ORAL at 07:46

## 2020-12-10 RX ADMIN — GABAPENTIN 100 MG: 100 CAPSULE ORAL at 04:25

## 2020-12-10 RX ADMIN — ENOXAPARIN SODIUM 40 MG: 40 INJECTION SUBCUTANEOUS at 04:27

## 2020-12-10 RX ADMIN — TACROLIMUS 2 MG: 1 CAPSULE ORAL at 04:25

## 2020-12-10 RX ADMIN — CEFTRIAXONE 2 G: 2 INJECTION, POWDER, FOR SOLUTION INTRAMUSCULAR; INTRAVENOUS at 04:26

## 2020-12-10 RX ADMIN — HYDROMORPHONE HYDROCHLORIDE: 1 INJECTION, SOLUTION INTRAMUSCULAR; INTRAVENOUS; SUBCUTANEOUS at 02:25

## 2020-12-10 RX ADMIN — OXYCODONE HYDROCHLORIDE 10 MG: 5 TABLET ORAL at 04:26

## 2020-12-10 RX ADMIN — OXYCODONE HYDROCHLORIDE 10 MG: 5 TABLET ORAL at 10:43

## 2020-12-10 RX ADMIN — OXYCODONE HYDROCHLORIDE 10 MG: 5 TABLET ORAL at 00:41

## 2020-12-10 RX ADMIN — ACETAMINOPHEN 650 MG: 325 TABLET, FILM COATED ORAL at 12:26

## 2020-12-10 RX ADMIN — OXYCODONE HYDROCHLORIDE 10 MG: 5 TABLET ORAL at 07:46

## 2020-12-10 NOTE — PROGRESS NOTES
Report received from night shift RN. Assumed patient care. No signs of distress at this time. Safety precautions in place. Call light and personal belongings within reach. Educated patient to use call light if assistance is needed. Will continue to monitor.

## 2020-12-10 NOTE — DISCHARGE INSTRUCTIONS
Discharge Instructions    Discharged to home by car with friend. Discharged via wheelchair, hospital escort: Yes.  Special equipment needed: Not Applicable    Be sure to schedule a follow-up appointment with your primary care doctor or any specialists as instructed.     Discharge Plan:   Influenza Vaccine Indication: Patient Refuses(flu shot already taken prior to admission)    I understand that a diet low in cholesterol, fat, and sodium is recommended for good health. Unless I have been given specific instructions below for another diet, I accept this instruction as my diet prescription.   Other diet:     Special Instructions: None    · Is patient discharged on Warfarin / Coumadin?   No     Depression / Suicide Risk    As you are discharged from this [facility] facility, it is important to learn how to keep safe from harming yourself.    Recognize the warning signs:  · Abrupt changes in personality, positive or negative- including increase in energy   · Giving away possessions  · Change in eating patterns- significant weight changes-  positive or negative  · Change in sleeping patterns- unable to sleep or sleeping all the time   · Unwillingness or inability to communicate  · Depression  · Unusual sadness, discouragement and loneliness  · Talk of wanting to die  · Neglect of personal appearance   · Rebelliousness- reckless behavior  · Withdrawal from people/activities they love  · Confusion- inability to concentrate     If you or a loved one observes any of these behaviors or has concerns about self-harm, here's what you can do:  · Talk about it- your feelings and reasons for harming yourself  · Remove any means that you might use to hurt yourself (examples: pills, rope, extension cords, firearm)  · Get professional help from the community (Mental Health, Substance Abuse, psychological counseling)  · Do not be alone:Call your Safe Contact- someone whom you trust who will be there for you.  · Call your local  CRISIS HOTLINE 008-8451 or 287-142-1120  · Call your local Children's Mobile Crisis Response Team [place] (154) 665-1286 or www.Playthe.net  · Call the toll free National Suicide Prevention Hotlines   · National Suicide Prevention Lifeline 664-590-OMVU (9286)  · National Comedy.[employer] Line Network 800-SUICIDE (041-9822)    Cellulitis, Adult    Cellulitis is a skin infection. The infected area is usually warm, red, swollen, and tender. This condition occurs most often in the arms and lower legs. The infection can travel to the muscles, blood, and underlying tissue and become serious. It is very important to get treated for this condition.  What are the causes?  Cellulitis is caused by bacteria. The bacteria enter through a break in the skin, such as a cut, burn, insect bite, open sore, or crack.  What increases the risk?  This condition is more likely to occur in people who:  · Have a weak body defense system (immune system).  · Have open wounds on the skin, such as cuts, burns, bites, and scrapes. Bacteria can enter the body through these open wounds.  · Are older than 60 years of age.  · Have diabetes.  · Have a type of long-lasting (chronic) liver disease (cirrhosis) or kidney disease.  · Are obese.  · Have a skin condition such as:  ? Itchy rash (eczema).  ? Slow movement of blood in the veins (venous stasis).  ? Fluid buildup below the skin (edema).  · Have had radiation therapy.  · Use IV drugs.  What are the signs or symptoms?  Symptoms of this condition include:  · Redness, streaking, or spotting on the skin.  · Swollen area of the skin.  · Tenderness or pain when an area of the skin is touched.  · Warm skin.  · A fever.  · Chills.  · Blisters.  How is this diagnosed?  This condition is diagnosed based on a medical history and physical exam. You may also have tests, including:  · Blood tests.  · Imaging tests.  How is this treated?  Treatment for this condition may include:  · Medicines, such as antibiotic  medicines or medicines to treat allergies (antihistamines).  · Supportive care, such as rest and application of cold or warm cloths (compresses) to the skin.  · Hospital care, if the condition is severe.  The infection usually starts to get better within 1-2 days of treatment.  Follow these instructions at home:    Medicines  · Take over-the-counter and prescription medicines only as told by your health care provider.  · If you were prescribed an antibiotic medicine, take it as told by your health care provider. Do not stop taking the antibiotic even if you start to feel better.  General instructions  · Drink enough fluid to keep your urine pale yellow.  · Do not touch or rub the infected area.  · Raise (elevate) the infected area above the level of your heart while you are sitting or lying down.  · Apply warm or cold compresses to the affected area as told by your health care provider.  · Keep all follow-up visits as told by your health care provider. This is important. These visits let your health care provider make sure a more serious infection is not developing.  Contact a health care provider if:  · You have a fever.  · Your symptoms do not begin to improve within 1-2 days of starting treatment.  · Your bone or joint underneath the infected area becomes painful after the skin has healed.  · Your infection returns in the same area or another area.  · You notice a swollen bump in the infected area.  · You develop new symptoms.  · You have a general ill feeling (malaise) with muscle aches and pains.  Get help right away if:  · Your symptoms get worse.  · You feel very sleepy.  · You develop vomiting or diarrhea that persists.  · You notice red streaks coming from the infected area.  · Your red area gets larger or turns dark in color.  These symptoms may represent a serious problem that is an emergency. Do not wait to see if the symptoms will go away. Get medical help right away. Call your local emergency services  (911 in the [place] Do not drive yourself to the hospital.  Summary  · Cellulitis is a skin infection. This condition occurs most often in the arms and lower legs.  · Treatment for this condition may include medicines, such as antibiotic medicines or antihistamines.  · Take over-the-counter and prescription medicines only as told by your health care provider. If you were prescribed an antibiotic medicine, do not stop taking the antibiotic even if you start to feel better.  · Contact a health care provider if your symptoms do not begin to improve within 1-2 days of starting treatment or your symptoms get worse.  · Keep all follow-up visits as told by your health care provider. This is important. These visits let your health care provider make sure that a more serious infection is not developing.  This information is not intended to replace advice given to you by your health care provider. Make sure you discuss any questions you have with your health care provider.  Document Released: 09/27/2006 Document Revised: 05/09/2019 Document Reviewed: 05/09/2019  Elsevier Patient Education © 2020 [employer] Inc[employer]

## 2020-12-10 NOTE — DISCHARGE SUMMARY
Discharge Summary    CHIEF COMPLAINT ON ADMISSION  Chief Complaint   Patient presents with   • Facial Swelling     left sided, around cheek and eye   • Sent from Urgent Care       Reason for Admission  Sent by urgent care/facial pain     Admission Date  12/6/2020    CODE STATUS  Full Code    HPI & HOSPITAL COURSE  Patient is a 65 year old male with history of renal cell cancer, hepatitis C and a lliver transplant who presented to the ER with a 1-2 day history of facial infection.  He states he developed a pimple on his nose that he tried to pop and he developed increasing pain, swelling and redness in the area that spread to the soft tissue surrounding his eyes. On admission, he clinically had evidence of shingles with a secondary bacterial cellulitis.  He had no opthalamic involvement.  HIs wound culture grew MSSA and clinically he responded well to oral acyclovir and ceftriaxone.  He will complete his course of medications orally.  He will be discharged with home health and agrees to close follow up with his pcp.    Therefore, he is discharged in fair and stable condition to home with close outpatient follow-up and organized health care.    The patient met 2-midnight criteria for an inpatient stay at the time of discharge.    Discharge Date  12/10/20    FOLLOW UP ITEMS POST DISCHARGE  pcp    DISCHARGE DIAGNOSES  Principal Problem:    Cellulitis of face POA: Unknown  Active Problems:    Chronic pain syndrome POA: Yes      Overview: Premorbid.      Left anterior abdominal wall spine stimulator generator is present.      Dilaudid pain pump in place.      4/8 Patient requesting his pump be checked.      Dr. Mendes    Depression POA: Unknown    HTN (hypertension) POA: Unknown    Liver transplant recipient (HCC) POA: Yes      Overview: Premorbid.      4/1 Prograf resumed.  Resolved Problems:    * No resolved hospital problems. *      FOLLOW UP  No future appointments.  Alejandra Sutherland M.D.  [address]  [address]  [address]  [facility] [address]  [phone]    In 1 week        MEDICATIONS ON DISCHARGE     Medication List      START taking these medications      Instructions   cefdinir 300 MG Caps  Commonly known as: OMNICEF   Take 1 Cap by mouth 2 times a day for 7 days.  Dose: 300 mg     gabapentin 100 MG Caps  Commonly known as: NEURONTIN   Take 1 Cap by mouth 2 Times a Day.  Dose: 100 mg        CONTINUE taking these medications      Instructions   allopurinol 100 MG Tabs  Commonly known as: ZYLOPRIM   Take 100 mg by mouth every bedtime.  Dose: 100 mg     citalopram 20 MG Tabs  Commonly known as: CeleXA   Take 20 mg by mouth every bedtime.  Dose: 20 mg     lisinopril 10 MG Tabs  Commonly known as: PRINIVIL   Take 10 mg by mouth every morning. Indications: High Blood Pressure Disorder  Dose: 10 mg     Pain Pump Odilia  Commonly known as: patient supplied   Inject  as directed continuous. Patient's Pain Pump (placed and maintained as an outpatient)  Medications/concentrations: Hydromorphone 400.0 mcg/mL  Route: Intrathecally  Date of Placement: Unknown  Last changed: 9/17/2020  Continuous infusion rates (Drug/Rate):  Hydromorphone 99.97 mcg / 24 hours (4.17 mcg / hr)  Patient activation dose: Hydromorphone 9.69 mcg  Maximum daily activation dose: Hydromorphone 138.16 mcg  Patient activation lockout interval: 1 hour  Maximum activations per day: 4     tacrolimus 1 MG Caps  Commonly known as: PROGRAF   Take 2 mg by mouth 2 Times a Day. Indications: Liver Transplant Recipients  Dose: 2 mg     valacyclovir 1 GM Tabs  Commonly known as: VALTREX   Take 1 Tab by mouth 2 times a day for 7 days.  Dose: 1,000 mg        STOP taking these medications    cephALEXin 500 MG Caps  Commonly known as: KEFLEX     doxycycline 100 MG capsule  Commonly known as: MONODOX            Allergies  Allergies   Allergen Reactions   • Niacin      Passed out.    • Latex Rash     Reaction- Itching, Rash   • Pcn [Penicillins] Anaphylaxis and Swelling     Patient has  "tolerated cefazolin in past   • Penicillins Itching and Vomiting     Has tolerated cefazolin   • Morphine Sulfate      Per pt., \"it makes me cranky\"   • Mushroom Extract Complex    • Other Food      Mushrooms       DIET  Orders Placed This Encounter   Procedures   • Diet Order Diet: Regular     Standing Status:   Standing     Number of Occurrences:   1     Order Specific Question:   Diet:     Answer:   Regular [1]       ACTIVITY  As tolerated.  Weight bearing as tolerated    CONSULTATIONS      PROCEDURES  CT-MAXILLOFACIAL WITH PLUS RECONS   Final Result      1.  Soft tissue swelling and enhancement of the nose and anterior aspect of the nasal septum consistent with cellulitis. No abscess identified.      2.  Minimal extension of inflammation into the left maxillary soft tissue region.      3.  No evidence of acute or chronic sinusitis.      4.  No maxillofacial bone fracture identified.          LABORATORY  Lab Results   Component Value Date    SODIUM 137 12/08/2020    POTASSIUM 4.0 12/08/2020    CHLORIDE 104 12/08/2020    CO2 26 12/08/2020    GLUCOSE 92 12/08/2020    BUN 10 12/08/2020    CREATININE 0.75 12/08/2020        Lab Results   Component Value Date    WBC 5.7 12/06/2020    HEMOGLOBIN 17.2 12/06/2020    HEMATOCRIT 52.3 (H) 12/06/2020    PLATELETCT 208 12/06/2020        Total time of the discharge process exceeds 45 minutes.  "

## 2020-12-11 ENCOUNTER — PATIENT OUTREACH (OUTPATIENT)
Dept: HEALTH INFORMATION MANAGEMENT | Facility: OTHER | Age: 65
End: 2020-12-11

## 2020-12-11 LAB
BACTERIA BLD CULT: NORMAL
BACTERIA BLD CULT: NORMAL
SIGNIFICANT IND 70042: NORMAL
SIGNIFICANT IND 70042: NORMAL
SITE SITE: NORMAL
SITE SITE: NORMAL
SOURCE SOURCE: NORMAL
SOURCE SOURCE: NORMAL

## 2020-12-12 ENCOUNTER — HOME CARE VISIT (OUTPATIENT)
Dept: HOME HEALTH SERVICES | Facility: HOME HEALTHCARE | Age: 65
End: 2020-12-12
Payer: MEDICARE

## 2020-12-12 VITALS
OXYGEN SATURATION: 98 % | DIASTOLIC BLOOD PRESSURE: 80 MMHG | TEMPERATURE: 97.9 F | SYSTOLIC BLOOD PRESSURE: 138 MMHG | HEART RATE: 18 BPM | BODY MASS INDEX: 26.37 KG/M2 | WEIGHT: 168 LBS | HEIGHT: 67 IN | RESPIRATION RATE: 18 BRPM

## 2020-12-12 PROCEDURE — 665001 SOC-HOME HEALTH

## 2020-12-12 PROCEDURE — G0493 RN CARE EA 15 MIN HH/HOSPICE: HCPCS

## 2020-12-12 ASSESSMENT — FIBROSIS 4 INDEX: FIB4 SCORE: 1.19

## 2020-12-12 NOTE — PROGRESS NOTES
Community Health Worker Intake    • Social determinates of health intake completed.   • Identified barriers to: financial insecurity.  • Contact information provided to Quirino Ruiz   • Outpatient assessment completed.  • Did the patient receive medications post discharge: Yes    CHW Goldy reached out to pt via TC to f/u after d/c and introduce CCM/GSC services. Pt accepted GSC services. Referral sent. He indicated having some financial insecurity and difficulty paying for his rent/utility at the moment and could benefit from Rent assistance and utility assistance. CHW will mail applications. He also mentioned a very unpleasant experience he had at [facility] and was provided with [employer]'s Compliance Hotline to file a formal complaint. Pt was able to  all of his medications from [facility], no side-effects so far. He indicated having a good support system outside of the hospital consisting of his family. Pt lives alone and indicated not needing additional resources/services at this time other than what has been provided.     Plan: warm hand-off to [facility].

## 2020-12-14 ENCOUNTER — ANTICOAGULATION MONITORING (OUTPATIENT)
Dept: MEDICAL GROUP | Facility: PHYSICIAN GROUP | Age: 65
End: 2020-12-14

## 2020-12-14 ASSESSMENT — ENCOUNTER SYMPTOMS
NAUSEA: DENIES
VOMITING: DENIES
DEBILITATING PAIN: 1

## 2020-12-14 ASSESSMENT — PATIENT HEALTH QUESTIONNAIRE - PHQ9
1. LITTLE INTEREST OR PLEASURE IN DOING THINGS: 00
CLINICAL INTERPRETATION OF PHQ2 SCORE: 0
2. FEELING DOWN, DEPRESSED, IRRITABLE, OR HOPELESS: 00

## 2020-12-14 NOTE — PROGRESS NOTES
Medication chart review for [facility] services    PCP:  Alejandra Sutherland M.D.  [address]  [place] [address]  Fax: [phone]    Current medication list     Current Outpatient Medications:   •  non-formulary med, 1 Capsule, Oral, DAILY  •  valacyclovir, 1,000 mg, Oral, BID  •  gabapentin, 100 mg, Oral, BID  •  cefdinir, 300 mg, Oral, BID  •  allopurinol, 100 mg, Oral, QHS  •  lisinopril, 10 mg, Oral, QAM  •  Pain Pump, Inject  as directed continuous. Patient's Pain Pump (placed and maintained as an outpatient) Medications/concentrations: Hydromorphone 400.0 mcg/mL Route: Intrathecally Date of Placement: Unknown Last changed: 9/17/2020 Continuous infusion rates (Drug/Rate): Hydromorphone 99.97 mcg / 24 hours (4.17 mcg / hr) Patient activation dose: Hydromorphone 9.69 mcg Maximum daily activation dose: Hydromorphone 138.16 mcg Patient activation lockout interval: 1 hour Maximum activations per day: 4  •  citalopram, 20 mg, Oral, QHS  •  tacrolimus, 2 mg, Oral, BID    Medications on discharge summary that are not on their home medication list (or the dosing interval differs from the discharge summary)     none       Medications on home medication list not listed on their discharge summary     The only medication on his medication list that was not on his discharge summary is a nutraceutical hair product.     Labs / Images Reviewed:     Lab Results   Component Value Date/Time    SODIUM 137 12/08/2020 07:23 AM    POTASSIUM 4.0 12/08/2020 07:23 AM    CHLORIDE 104 12/08/2020 07:23 AM    CO2 26 12/08/2020 07:23 AM    GLUCOSE 92 12/08/2020 07:23 AM    BUN 10 12/08/2020 07:23 AM    CREATININE 0.75 12/08/2020 07:23 AM      Lab Results   Component Value Date/Time    ALKPHOSPHAT 73 12/06/2020 10:40 AM    ASTSGOT 27 12/06/2020 10:40 AM    ALTSGPT 52 (H) 12/06/2020 10:40 AM    TBILIRUBIN 0.4 12/06/2020 10:40 AM    ALBUMIN 4.3 12/06/2020 10:40 AM        Assessment and Plan:   Allergy/Contraindication: To  penicillins  Appears to have tolerated cephalosporins in the past and is currently tolerating cefdinir.      Booker Herrera, PharmD, MS, BCACP, [facility]  Phone [phone] fax [phone]    This note was created using voice recognition software (Dragon). The accuracy of the dictation is limited by the abilities of the software. I have reviewed the note prior to signing, however some errors in grammar and context are still possible. If you have any questions related to this note please do not hesitate to contact our office.

## 2020-12-15 ENCOUNTER — HOME CARE VISIT (OUTPATIENT)
Dept: HOME HEALTH SERVICES | Facility: HOME HEALTHCARE | Age: 65
End: 2020-12-15
Payer: MEDICARE

## 2020-12-15 PROCEDURE — G0299 HHS/HOSPICE OF RN EA 15 MIN: HCPCS

## 2020-12-18 ENCOUNTER — HOME CARE VISIT (OUTPATIENT)
Dept: HOME HEALTH SERVICES | Facility: HOME HEALTHCARE | Age: 65
End: 2020-12-18
Payer: MEDICARE

## 2020-12-18 VITALS
DIASTOLIC BLOOD PRESSURE: 80 MMHG | SYSTOLIC BLOOD PRESSURE: 122 MMHG | OXYGEN SATURATION: 98 % | TEMPERATURE: 97.5 F | HEART RATE: 78 BPM | RESPIRATION RATE: 16 BRPM

## 2020-12-18 PROCEDURE — G0299 HHS/HOSPICE OF RN EA 15 MIN: HCPCS

## 2020-12-18 ASSESSMENT — ENCOUNTER SYMPTOMS
MUSCLE WEAKNESS: 1
DEBILITATING PAIN: 1

## 2020-12-18 ASSESSMENT — FIBROSIS 4 INDEX: FIB4 SCORE: 1.19

## 2020-12-20 VITALS
SYSTOLIC BLOOD PRESSURE: 120 MMHG | DIASTOLIC BLOOD PRESSURE: 80 MMHG | WEIGHT: 165.3 LBS | RESPIRATION RATE: 17 BRPM | TEMPERATURE: 97.8 F | OXYGEN SATURATION: 98 % | HEART RATE: 85 BPM | BODY MASS INDEX: 25.89 KG/M2

## 2020-12-20 SDOH — ECONOMIC STABILITY: HOUSING INSECURITY: HOME SAFETY: HOME IS DIRTY WITH DOG FECES -SUPERVISOR  AWARE

## 2020-12-20 ASSESSMENT — ENCOUNTER SYMPTOMS: DEBILITATING PAIN: 1

## 2020-12-21 ENCOUNTER — HOME CARE VISIT (OUTPATIENT)
Dept: HOME HEALTH SERVICES | Facility: HOME HEALTHCARE | Age: 65
End: 2020-12-21
Payer: MEDICARE

## 2020-12-21 ASSESSMENT — ACTIVITIES OF DAILY LIVING (ADL): OASIS_M1830: 05

## 2020-12-22 ENCOUNTER — HOME CARE VISIT (OUTPATIENT)
Dept: HOME HEALTH SERVICES | Facility: HOME HEALTHCARE | Age: 65
End: 2020-12-22
Payer: MEDICARE

## 2020-12-22 VITALS
HEART RATE: 93 BPM | DIASTOLIC BLOOD PRESSURE: 80 MMHG | RESPIRATION RATE: 17 BRPM | SYSTOLIC BLOOD PRESSURE: 135 MMHG | OXYGEN SATURATION: 98 % | TEMPERATURE: 98.5 F

## 2020-12-22 PROCEDURE — G0299 HHS/HOSPICE OF RN EA 15 MIN: HCPCS

## 2020-12-22 PROCEDURE — G0155 HHCP-SVS OF CSW,EA 15 MIN: HCPCS

## 2020-12-22 ASSESSMENT — ENCOUNTER SYMPTOMS: DEBILITATING PAIN: 1

## 2020-12-23 ASSESSMENT — SOCIAL DETERMINANTS OF HEALTH (SDOH)
HAS ADEQUATE INCOME: Y
IS CONCERNED ABOUT INCOME: Y

## 2020-12-26 ENCOUNTER — HOME CARE VISIT (OUTPATIENT)
Dept: HOME HEALTH SERVICES | Facility: HOME HEALTHCARE | Age: 65
End: 2020-12-26
Payer: MEDICARE

## 2020-12-26 PROCEDURE — G0299 HHS/HOSPICE OF RN EA 15 MIN: HCPCS

## 2020-12-28 ENCOUNTER — HOME CARE VISIT (OUTPATIENT)
Dept: HOME HEALTH SERVICES | Facility: HOME HEALTHCARE | Age: 65
End: 2020-12-28
Payer: MEDICARE

## 2020-12-28 VITALS
TEMPERATURE: 97.8 F | OXYGEN SATURATION: 98 % | RESPIRATION RATE: 16 BRPM | DIASTOLIC BLOOD PRESSURE: 70 MMHG | SYSTOLIC BLOOD PRESSURE: 132 MMHG | HEART RATE: 80 BPM

## 2020-12-28 ASSESSMENT — ENCOUNTER SYMPTOMS: DEBILITATING PAIN: 1

## 2020-12-29 ASSESSMENT — ACTIVITIES OF DAILY LIVING (ADL)
OASIS_M1830: 00
HOME_HEALTH_OASIS: 00

## 2020-12-29 ASSESSMENT — PATIENT HEALTH QUESTIONNAIRE - PHQ9: CLINICAL INTERPRETATION OF PHQ2 SCORE: 0

## 2021-01-29 ENCOUNTER — HOSPITAL ENCOUNTER (EMERGENCY)
Facility: MEDICAL CENTER | Age: 66
End: 2021-01-29
Attending: EMERGENCY MEDICINE | Admitting: EMERGENCY MEDICINE
Payer: MEDICARE

## 2021-01-29 VITALS
OXYGEN SATURATION: 97 % | BODY MASS INDEX: 26.89 KG/M2 | RESPIRATION RATE: 18 BRPM | TEMPERATURE: 98.1 F | WEIGHT: 171.3 LBS | SYSTOLIC BLOOD PRESSURE: 156 MMHG | DIASTOLIC BLOOD PRESSURE: 89 MMHG | HEART RATE: 78 BPM | HEIGHT: 67 IN

## 2021-01-29 DIAGNOSIS — S61.210A LACERATION OF RIGHT INDEX FINGER WITHOUT FOREIGN BODY WITHOUT DAMAGE TO NAIL, INITIAL ENCOUNTER: ICD-10-CM

## 2021-01-29 PROCEDURE — 304999 HCHG REPAIR-SIMPLE/INTERMED LEVEL 1

## 2021-01-29 PROCEDURE — 99283 EMERGENCY DEPT VISIT LOW MDM: CPT

## 2021-01-29 PROCEDURE — A9270 NON-COVERED ITEM OR SERVICE: HCPCS | Performed by: EMERGENCY MEDICINE

## 2021-01-29 PROCEDURE — 700101 HCHG RX REV CODE 250: Performed by: EMERGENCY MEDICINE

## 2021-01-29 PROCEDURE — 303747 HCHG EXTRA SUTURE

## 2021-01-29 PROCEDURE — 700102 HCHG RX REV CODE 250 W/ 637 OVERRIDE(OP): Performed by: EMERGENCY MEDICINE

## 2021-01-29 PROCEDURE — A9270 NON-COVERED ITEM OR SERVICE: HCPCS

## 2021-01-29 PROCEDURE — 700102 HCHG RX REV CODE 250 W/ 637 OVERRIDE(OP)

## 2021-01-29 RX ORDER — CEPHALEXIN 500 MG/1
500 CAPSULE ORAL ONCE
Status: COMPLETED | OUTPATIENT
Start: 2021-01-29 | End: 2021-01-29

## 2021-01-29 RX ORDER — LIDOCAINE HYDROCHLORIDE 20 MG/ML
20 INJECTION, SOLUTION INFILTRATION; PERINEURAL ONCE
Status: COMPLETED | OUTPATIENT
Start: 2021-01-29 | End: 2021-01-29

## 2021-01-29 RX ORDER — HYDROCODONE BITARTRATE AND ACETAMINOPHEN 5; 325 MG/1; MG/1
1 TABLET ORAL ONCE
Status: COMPLETED | OUTPATIENT
Start: 2021-01-29 | End: 2021-01-29

## 2021-01-29 RX ORDER — HYDROCODONE BITARTRATE AND ACETAMINOPHEN 5; 325 MG/1; MG/1
1 TABLET ORAL EVERY 4 HOURS PRN
Qty: 5 TAB | Refills: 0 | Status: SHIPPED | OUTPATIENT
Start: 2021-01-29 | End: 2021-01-30

## 2021-01-29 RX ORDER — CEPHALEXIN 500 MG/1
500 CAPSULE ORAL 4 TIMES DAILY
Qty: 40 CAP | Refills: 0 | Status: SHIPPED | OUTPATIENT
Start: 2021-01-29 | End: 2021-02-05

## 2021-01-29 RX ADMIN — LIDOCAINE HYDROCHLORIDE 20 ML: 20 INJECTION, SOLUTION INFILTRATION; PERINEURAL at 19:00

## 2021-01-29 RX ADMIN — HYDROCODONE BITARTRATE AND ACETAMINOPHEN 1 TABLET: 5; 325 TABLET ORAL at 19:34

## 2021-01-29 RX ADMIN — CEPHALEXIN 500 MG: 500 CAPSULE ORAL at 19:34

## 2021-01-29 ASSESSMENT — FIBROSIS 4 INDEX: FIB4 SCORE: 1.19

## 2021-01-30 NOTE — ED TRIAGE NOTES
64 y/o male ambulatory to triage with c/o laceration to his right index finger. Pt states he was using a saw today and lacerated his finger. Pt states he first went to urgent care and was told that the finger needed to be evaluated here, possible tendon exposure.

## 2021-01-30 NOTE — ED NOTES
Antibiotic ointment applied. Dressing and bandaging done. Medicated. Patient discharged with prescriptions and instruction. Verbalized understanding.

## 2021-01-30 NOTE — ED PROVIDER NOTES
ED Provider Note    Scribed for Dorita Mercer M.D. by Olga Mckeon. 1/29/2021, 5:58 PM.    Primary Care Provider: Alejandra Sutherland M.D.  Means of arrival: Walk-in  History obtained from: Patient  History limited by: None    CHIEF COMPLAINT  Chief Complaint   Patient presents with   • T-5000 Lacerations     This patient was cared for during the COVID-19 pandemic. History and physical exam may be limited/truncated by the inherent challenges of PPE and the need to decrease staff exposure to novel coronavirus. Some aspects of disease management may be different to protect staff and help slow the spread of disease. I verified that, if possible, the patient was wearing a mask and I was wearing appropriate PPE every time I encountered the patient.     HPI  Quirino Ruiz is a 65 y.o. male who presents to the Emergency Department for evaluation of a laceration on his right index finger. The patient was using a circular saw when he cut his finger. The patient first went to Urgent Care but was sent here, citing possible tendon exposure. Patient is ambidextrous.He reports medication allergies to penicillins and morphine. He reports feeling sick and having hives after taking penicillins. Patient reports taking keflex previously without incident. The patient has a history of liver transplant.     REVIEW OF SYSTEMS  Pertinent positives include right index finger laceration.     PAST MEDICAL HISTORY   has a past medical history of Arthritis, Back pain, Cancer (HCC) (2002;2013), Cholesterol blood decreased, Chronic pain, Cold (8/27/2014), Dental disorder, Heart burn, Hepatitis C (1993), Hepatitis C, Hypertension, Indigestion, Liver transplanted (HCC) (2002), Liver transplanted (HCC), Pain, Pain, Renal cancer (HCC), and Renal disorder.    SOCIAL HISTORY  Social History     Tobacco Use   • Smoking status: Current Every Day Smoker     Packs/day: 0.50     Years: 1.00     Pack years: 0.50     Types: Cigarettes  "  • Smokeless tobacco: Never Used   Substance Use Topics   • Alcohol use: Yes     Alcohol/week: 0.6 oz     Types: 1 Cans of beer per week     Frequency: 2-4 times a month   • Drug use: No      Social History     Substance and Sexual Activity   Drug Use No     SURGICAL HISTORY   has a past surgical history that includes other (2002); other (1996); other (1994); cervical disk and fusion anterior (12/11/2012); recovery (4/23/2013); lumbar fusion anterior (5/29/2013); evacuation of hematoma (5/31/2013); other (2/28/2014); cath place perm epidural (9/9/2014); pump insert/remove (11/25/2014); exploratory laparotomy (5/31/2013); recovery (11/30/2015); other surgical procedure; nephrectomy radical; other orthopedic surgery; cholecystectomy; and cervical decompression posterior.     CURRENT MEDICATIONS  Home Medications    **Home medications have not yet been reviewed for this encounter**       ALLERGIES  Allergies   Allergen Reactions   • Niacin      Passed out.    • Latex Rash     Reaction- Itching, Rash   • Pcn [Penicillins] Anaphylaxis and Swelling     Patient has tolerated cefazolin in past   • Penicillins Itching and Vomiting     Has tolerated cefazolin   • Morphine Sulfate      Per pt., \"it makes me cranky\"   • Mushroom Extract Complex    • Other Food      Mushrooms     PHYSICAL EXAM  VITAL SIGNS: BP (!) 163/96   Pulse 96   Temp 36.5 °C (97.7 °F) (Temporal)   Resp 18   Ht 1.702 m (5' 7\")   Wt 77.7 kg (171 lb 4.8 oz)   SpO2 97%   BMI 26.83 kg/m²   Constitutional: Alert in no apparent distress. Well appearing.  HENT: Normocephalic, Atraumatic, Bilateral external ears normal. Nose normal.   Eyes:  Conjunctiva normal, non-icteric.   Lungs: Non-labored respirations.  Skin: Warm, Dry, No erythema, No rash.   Neurologic: Alert, Grossly non-focal.   Psychiatric: Affect normal, Judgment normal, Mood normal, Appears appropriate and not intoxicated.   Extremities: 4 cm laceration to the ulnar surface of the right index " finger. Visible tendon but tendon function is intact. Sensation at the distal tip of the finger is intact.     Laceration Repair Procedure Note    Indication: Laceration    Procedure: The patient was placed in the appropriate position and anesthesia around the laceration was obtained by infiltration using 2% Lidocaine without epinephrine. The area was then cleansed with betadine and draped in a sterile fashion. The laceration was closed with 4-0 Ethilon using interrupted sutures. There were no additional lacerations requiring repair. The wound area was then dressed with a sterile dressing.      Total repaired wound length: 4 cm.     Other Items: Suture count: 6    The patient tolerated the procedure well.    Complications: None    COURSE & MEDICAL DECISION MAKING  Pertinent Labs & Imaging studies reviewed. (See chart for details)    5:58 PM - Patient seen and examined at bedside.     6:32 PM - Patient will be treated with hydrocodone-acetaminophen 325 mg tablet for his symptoms.     7:05 PM - I performed a laceration repair procedure at this time.     7:17 PM - Patient will be treated with cephalexin 500 mg capsule.     7:28 PM - Discussed discharge prescriptions at this time. I discussed plan of discharge and strict return precautions for any new or worsening symptoms. The patient verbalizes agreement and understands.    HTN/IDDM FOLLOW UP:  The patient has known hypertension and is being followed by their primary care doctor    Decision Making:  This is a 65 y.o. year old who presents with a laceration to his right second digit.  There is a visible tendon however the tendon function is intact he has full flexion and extension of his second digit at both PIP and DIP joints.  His laceration was.  He will be given Keflex for prophylaxis against infection.  He will be discharged.    The patient was informed of their blood pressure exceeding 120/80 and I have asked them to follow up with their primary care physician to  discuss further monitoring and possible treatment.     The patient will not drink alcohol nor drive with prescribed medications. The patient will return for new or worsening symptoms and is stable at the time of discharge. Patient was given return precautions. Patient and/or family member verbalizes understanding and will comply.    In prescribing controlled substances to this patient, I certify that I have obtained and reviewed the medical history of Quirino Ruiz. I have also made a good dimitrios effort to obtain applicable records from other providers who have treated the patient and records did not demonstrate any increased risk of substance abuse that would prevent me from prescribing controlled substances.     I have conducted a physical exam and documented it. I have reviewed Mr. Ruiz’s prescription history as maintained by the [place] Prescription Monitoring Program.     I have assessed the patient’s risk for abuse, dependency, and addiction using the validated Opioid Risk Tool available at https://www.mdcalc.com/ooyqyt-fhwz-giaj-ort-narcotic-abuse.     Given the above, I believe the benefits of controlled substance therapy outweigh the risks. The reasons for prescribing controlled substances include non-narcotic, oral analgesic alternatives have been inadequate for pain control. Accordingly, I have discussed the risk and benefits, treatment plan, and alternative therapies with the patient.     DISPOSITION:  Patient will be discharged home in stable condition.    FOLLOW UP:  Alejandra Sutherland M.D.  [address]  [address]  [place] [facility] [address]  [phone]      As needed    Davis Elam M.D.  [address] [facility] [address]  [phone]      ortho follow up as needed    [facility], Emergency Dept  [address] [id number] [phone]    Return for worsening redness, swelling, purulent drainage or other concerns.    OUTPATIENT MEDICATIONS:  New Prescriptions     CEPHALEXIN (KEFLEX) 500 MG CAP    Take 1 Cap by mouth 4 times a day for 7 days.    HYDROCODONE-ACETAMINOPHEN (NORCO) 5-325 MG TAB PER TABLET    Take 1 Tab by mouth every four hours as needed for up to 1 day.     FINAL IMPRESSION  1. Laceration of right index finger without foreign body without damage to nail, initial encounter       Laceration repair procedure performed by ERP, as outlined above.     This dictation has been created using voice recognition software and/or scribes. The accuracy of the dictation is limited by the abilities of the software and the expertise of the scribes. I expect there may be some errors of grammar and possibly content. I made every attempt to manually correct the errors within my dictation. However, errors related to voice recognition software and/or scribes may still exist and should be interpreted within the appropriate context.    IOlga (Scribe), am scribing for, and in the presence of, Dorita Mercer M.D..    Electronically signed by: Olga Mckeon (Johnie), 1/29/2021    IDorita M.D. personally performed the services described in this documentation, as scribed by Olga Mckeon in my presence, and it is both accurate and complete.    E.    The note accurately reflects work and decisions made by me.  Dorita Mercer M.D.  1/29/2021  9:51 PM

## 2021-03-03 DIAGNOSIS — Z23 NEED FOR VACCINATION: ICD-10-CM

## 2021-03-24 ENCOUNTER — IMMUNIZATION (OUTPATIENT)
Dept: FAMILY PLANNING/WOMEN'S HEALTH CLINIC | Facility: IMMUNIZATION CENTER | Age: 66
End: 2021-03-24
Attending: INTERNAL MEDICINE
Payer: MEDICARE

## 2021-03-24 DIAGNOSIS — Z23 ENCOUNTER FOR VACCINATION: Primary | ICD-10-CM

## 2021-03-24 DIAGNOSIS — Z23 NEED FOR VACCINATION: ICD-10-CM

## 2021-03-24 PROCEDURE — 0001A PFIZER SARS-COV-2 VACCINE: CPT

## 2021-03-24 PROCEDURE — 91300 PFIZER SARS-COV-2 VACCINE: CPT

## 2021-04-15 ENCOUNTER — IMMUNIZATION (OUTPATIENT)
Dept: FAMILY PLANNING/WOMEN'S HEALTH CLINIC | Facility: IMMUNIZATION CENTER | Age: 66
End: 2021-04-15
Attending: INTERNAL MEDICINE
Payer: MEDICARE

## 2021-04-15 DIAGNOSIS — Z23 ENCOUNTER FOR VACCINATION: Primary | ICD-10-CM

## 2021-04-15 PROCEDURE — 91300 PFIZER SARS-COV-2 VACCINE: CPT | Performed by: INTERNAL MEDICINE

## 2021-04-15 PROCEDURE — 0002A PFIZER SARS-COV-2 VACCINE: CPT | Performed by: INTERNAL MEDICINE

## 2021-04-27 ENCOUNTER — PATIENT MESSAGE (OUTPATIENT)
Dept: HEALTH INFORMATION MANAGEMENT | Facility: OTHER | Age: 66
End: 2021-04-27

## 2021-04-27 ENCOUNTER — TELEPHONE (OUTPATIENT)
Dept: CARDIOLOGY | Facility: MEDICAL CENTER | Age: 66
End: 2021-04-27

## 2021-04-27 NOTE — TELEPHONE ENCOUNTER
Called patient to see if he has seen a cardiologist in the past to get records prior to upcoming appointment with [facility].     He denies any cardiac testing done outside of [facility]. Confirmed appointment date and time.

## 2021-05-06 ENCOUNTER — PATIENT OUTREACH (OUTPATIENT)
Dept: HEALTH INFORMATION MANAGEMENT | Facility: OTHER | Age: 66
End: 2021-05-06

## 2021-05-06 ENCOUNTER — PATIENT MESSAGE (OUTPATIENT)
Dept: HEALTH INFORMATION MANAGEMENT | Facility: OTHER | Age: 66
End: 2021-05-06

## 2021-05-06 ENCOUNTER — OFFICE VISIT (OUTPATIENT)
Dept: CARDIOLOGY | Facility: MEDICAL CENTER | Age: 66
End: 2021-05-06
Payer: MEDICARE

## 2021-05-06 VITALS
OXYGEN SATURATION: 97 % | SYSTOLIC BLOOD PRESSURE: 122 MMHG | WEIGHT: 167.2 LBS | BODY MASS INDEX: 26.24 KG/M2 | HEART RATE: 94 BPM | RESPIRATION RATE: 16 BRPM | HEIGHT: 67 IN | DIASTOLIC BLOOD PRESSURE: 74 MMHG

## 2021-05-06 DIAGNOSIS — R00.2 PALPITATIONS: ICD-10-CM

## 2021-05-06 DIAGNOSIS — I10 ESSENTIAL HYPERTENSION: ICD-10-CM

## 2021-05-06 DIAGNOSIS — Z90.5 H/O UNILATERAL NEPHRECTOMY: ICD-10-CM

## 2021-05-06 DIAGNOSIS — Z94.4 LIVER TRANSPLANTED (HCC): ICD-10-CM

## 2021-05-06 DIAGNOSIS — R07.89 CHEST PRESSURE: ICD-10-CM

## 2021-05-06 DIAGNOSIS — Z72.0 TOBACCO ABUSE: ICD-10-CM

## 2021-05-06 PROCEDURE — 93000 ELECTROCARDIOGRAM COMPLETE: CPT | Performed by: INTERNAL MEDICINE

## 2021-05-06 PROCEDURE — 99204 OFFICE O/P NEW MOD 45 MIN: CPT | Performed by: INTERNAL MEDICINE

## 2021-05-06 ASSESSMENT — FIBROSIS 4 INDEX: FIB4 SCORE: 1.21

## 2021-05-06 NOTE — PROGRESS NOTES
Chief Complaint   Patient presents with   • Palpitations       Subjective:   Quirino Ruiz is a 66 y.o. male who presents today for initial consultation regarding palpitations and chest discomfort. He has a history of liver transplantation 2002 on chronic immunosuppression therapy as well as liver cancer prior to his transplant without recurrence. He has hypertension and hyperlipidemia. Blood pressures well controlled. He notes palpitations that are occasional and occur in groups of 3 and were caught by EMS and there is some indication these were PVCs. They went away and have not recurred. He also noted some chest discomfort characterized as pressure while this was happening. 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 He smokes and is down to half pack a day trying to quit, does not drink or do drugs. No family history precocious CAD    Past Medical History:   Diagnosis Date   • Arthritis     fingers, hands   • Back pain    • Cancer (HCC) 2002;2013    kidney / liver - treated with chemo, & transplant   • Cholesterol blood decreased    • Chronic pain    • Cold 8/27/2014    URI   • Dental disorder     upper  and lower dentures   • Heart burn     under control with meds   • Hepatitis C 1993   • Hepatitis C    • Hypertension    • Indigestion    • Liver transplanted (HCC) 2002   • Liver transplanted (HCC)    • Pain     neck   • Pain     neck and lower back   • Renal cancer (HCC)    • Renal disorder     left nephrectomy; right cryoablation     Past Surgical History:   Procedure Laterality Date   • RECOVERY  11/30/2015    Procedure: US-Non Targeted Liver Biopsy-;  Surgeon: [facility];  Location: SURGERY PRE-POST PROC UNIT [facility];  Service:    • PUMP INSERT/REMOVE  11/25/2014    Performed by Jass Mendes M.D. at SURGERY [facility]   • CATH PLACE PERM EPIDURAL  9/9/2014    Performed by Jass Mendes M.D. at SURGERY  [facility] ORS   • OTHER  2/28/2014    cryoablation right kidney   • EVACUATION OF HEMATOMA  5/31/2013    Performed by Davis Murray M.D. at SURGERY [facility] ORS   • EXPLORATORY LAPAROTOMY  5/31/2013    Performed by Davis Murray M.D. at SURGERY [facility] ORS   • LUMBAR FUSION ANTERIOR  5/29/2013    Performed by Suman Burks M.D. at SURGERY [facility] ORS   • RECOVERY  4/23/2013    Performed by -Recovery Surgery at SURGERY SAME DAY [facility]   • CERVICAL DISK AND FUSION ANTERIOR  12/11/2012    Performed by Suman Burks M.D. at SURGERY [facility] ORS   • OTHER  2002    liver transplant   • OTHER  1996    Left kidney removed   • OTHER  1994    gallbladder removal   • CERVICAL DECOMPRESSION POSTERIOR     • CHOLECYSTECTOMY     • NEPHRECTOMY RADICAL     • OTHER ORTHOPEDIC SURGERY      pain pump   • OTHER SURGICAL PROCEDURE      liver transplant     Family History   Problem Relation Age of Onset   • Hypertension Other      Social History     Socioeconomic History   • Marital status:      Spouse name: Not on file   • Number of children: Not on file   • Years of education: Not on file   • Highest education level: Not on file   Occupational History   • Not on file   Tobacco Use   • Smoking status: Current Every Day Smoker     Packs/day: 0.50     Years: 52.00     Pack years: 26.00     Types: Cigarettes   • Smokeless tobacco: Never Used   Substance and Sexual Activity   • Alcohol use: Yes     Alcohol/week: 0.6 oz     Types: 1 Cans of beer per week   • Drug use: No   • Sexual activity: Not on file   Other Topics Concern   • Not on file   Social History Narrative    ** Merged History Encounter **          Social Determinants of Health     Financial Resource Strain:    • Difficulty of Paying Living Expenses:    Food Insecurity:    • Worried About Running Out of Food in the Last Year:    • Ran Out of Food in the Last Year:    Transportation Needs:    • Lack of Transportation (Medical):    •  "Lack of Transportation (Non-Medical):    Physical Activity:    • Days of Exercise per Week:    • Minutes of Exercise per Session:    Stress:    • Feeling of Stress :    Social Connections:    • Frequency of Communication with Friends and Family:    • Frequency of Social Gatherings with Friends and Family:    • Attends [employer] Services:    • Active Member of Clubs or Organizations:    • Attends Club or Organization Meetings:    • Marital Status:    Intimate Partner Violence:    • Fear of Current or Ex-Partner:    • Emotionally Abused:    • Physically Abused:    • Sexually Abused:      Allergies   Allergen Reactions   • Niacin      Passed out.    • Latex Rash     Reaction- Itching, Rash   • Pcn [Penicillins] Anaphylaxis and Swelling     Patient has tolerated cefazolin in past   • Penicillins Itching and Vomiting     Has tolerated cefazolin   • Morphine Sulfate      Per pt., \"it makes me cranky\"   • Mushroom Extract Complex    • Other Food      Mushrooms     Outpatient Encounter Medications as of 5/6/2021   Medication Sig Dispense Refill   • non-formulary med Take 1 Capsule by mouth every day. REVIFOL HEALTHY HAIR     • allopurinol (ZYLOPRIM) 100 MG Tab Take 100 mg by mouth every bedtime.     • lisinopril (PRINIVIL) 10 MG Tab Take 10 mg by mouth every morning. Indications: High Blood Pressure Disorder     • Pain Pump (PATIENT SUPPLIED) XX BRANDON Inject  as directed continuous. Patient's Pain Pump (placed and maintained as an outpatient)  Medications/concentrations: Hydromorphone 400.0 mcg/mL  Route: Intrathecally  Date of Placement: Unknown  Last changed: 9/17/2020  Continuous infusion rates (Drug/Rate):  Hydromorphone 99.97 mcg / 24 hours (4.17 mcg / hr)  Patient activation dose: Hydromorphone 9.69 mcg  Maximum daily activation dose: Hydromorphone 138.16 mcg  Patient activation lockout interval: 1 hour  Maximum activations per day: 4     • tacrolimus (PROGRAF) 1 MG Cap Take 2 mg by mouth 2 Times a Day. Indications: " "Liver Transplant Recipients     • oxyCODONE-acetaminophen (PERCOCET-10)  MG Tab Take 1 Tab by mouth every four hours as needed for Severe Pain. order at home     • gabapentin (NEURONTIN) 100 MG Cap Take 1 Cap by mouth 2 Times a Day. (Patient not taking: Reported on 5/6/2021) 90 Cap 0   • citalopram (CELEXA) 20 MG Tab Take 20 mg by mouth every bedtime.       No facility-administered encounter medications on file as of 5/6/2021.     Review of Systems   All other systems reviewed and are negative.       Objective:   /74 (BP Location: Left arm, Patient Position: Sitting, BP Cuff Size: Adult)   Pulse 94   Resp 16   Ht 1.689 m (5' 6.5\")   Wt 75.8 kg (167 lb 3.2 oz)   SpO2 97%   BMI 26.58 kg/m²     Physical Exam   Constitutional: He is oriented to person, place, and time. He appears well-developed and well-nourished. No distress.   HENT:   Head: Normocephalic and atraumatic.   Right Ear: External ear normal.   Left Ear: External ear normal.   Eyes: Pupils are equal, round, and reactive to light. Conjunctivae and EOM are normal. Right eye exhibits no discharge. Left eye exhibits no discharge. No scleral icterus.   Neck: No JVD present. No tracheal deviation present. No thyromegaly present.   Cardiovascular: Normal rate, regular rhythm and intact distal pulses. PMI is not displaced. Exam reveals no gallop and no friction rub.   No murmur heard.  Pulses:       Carotid pulses are 2+ on the right side and 2+ on the left side.       Radial pulses are 2+ on the left side.        Popliteal pulses are 2+ on the right side and 2+ on the left side.        Dorsalis pedis pulses are 2+ on the right side and 2+ on the left side.        Posterior tibial pulses are 2+ on the right side and 2+ on the left side.   Pulmonary/Chest: Effort normal and breath sounds normal. No respiratory distress. He has no wheezes. He has no rales. He exhibits no tenderness.   Abdominal: Soft. Bowel sounds are normal. He exhibits no " distension. There is no abdominal tenderness.   Musculoskeletal:         General: No tenderness, deformity or edema. Normal range of motion.      Cervical back: Normal range of motion and neck supple.   Neurological: He is alert and oriented to person, place, and time. No cranial nerve deficit (cranial nerves II through XII grossly intact). Coordination normal.   Skin: Skin is warm and dry. No rash noted. He is not diaphoretic. No erythema. No pallor.   Psychiatric: He has a normal mood and affect. His behavior is normal. Thought content normal.   Vitals reviewed.    LABS:  Lab Results   Component Value Date/Time    CHOLSTRLTOT 154 07/16/2020 07:23 AM    LDL 77 07/16/2020 07:23 AM    HDL 32 (A) 07/16/2020 07:23 AM    TRIGLYCERIDE 225 (H) 07/16/2020 07:23 AM       Lab Results   Component Value Date/Time    WBC 5.7 12/06/2020 10:40 AM    RBC 5.56 12/06/2020 10:40 AM    HEMOGLOBIN 17.2 12/06/2020 10:40 AM    HEMATOCRIT 52.3 (H) 12/06/2020 10:40 AM    MCV 94.1 12/06/2020 10:40 AM    NEUTSPOLYS 47.50 12/06/2020 10:40 AM    LYMPHOCYTES 27.90 12/06/2020 10:40 AM    MONOCYTES 20.90 (H) 12/06/2020 10:40 AM    EOSINOPHILS 2.10 12/06/2020 10:40 AM    BASOPHILS 1.10 12/06/2020 10:40 AM    HYPOCHROMIA 1+ 03/05/2014 03:49 PM    ANISOCYTOSIS 1+ 06/04/2013 03:13 AM     Lab Results   Component Value Date/Time    SODIUM 137 12/08/2020 07:23 AM    POTASSIUM 4.0 12/08/2020 07:23 AM    CHLORIDE 104 12/08/2020 07:23 AM    CO2 26 12/08/2020 07:23 AM    GLUCOSE 92 12/08/2020 07:23 AM    BUN 10 12/08/2020 07:23 AM    CREATININE 0.75 12/08/2020 07:23 AM     Lab Results   Component Value Date    HBA1C 5.5 07/16/2020      Lab Results   Component Value Date/Time    ALKPHOSPHAT 73 12/06/2020 10:40 AM    ASTSGOT 27 12/06/2020 10:40 AM    ALTSGPT 52 (H) 12/06/2020 10:40 AM    TBILIRUBIN 0.4 12/06/2020 10:40 AM      Lab Results   Component Value Date/Time    BNPBTYPENAT 8 03/02/2012 09:39 PM      No results found for: TSH  Lab Results   Component  Value Date/Time    PROTHROMBTM 13.1 04/03/2018 08:53 PM    INR 1.02 04/03/2018 08:53 PM        EKG (5/6/2021):  I have personally reviewed the EKG this visit and discussed with the patient.  Sinus rhythm NORMAL ECG.      Assessment:     1. Palpitations  EKG    NM-CARDIAC STRESS TEST    EC-ECHOCARDIOGRAM COMPLETE W/O CONT   2. Chest pressure  NM-CARDIAC STRESS TEST    EC-ECHOCARDIOGRAM COMPLETE W/O CONT   3. Tobacco abuse     4. Liver transplanted (HCC)     5. Essential hypertension     6. H/O unilateral nephrectomy         Medical Decision Making:  Today's Assessment / Status / Plan:     Doing well palpitations have improved may be representative of benign PVCs but does notice chest pressure and has risk factors for coronary artery disease. He is endeavoring to quit smoking and I encouraged him to discuss strategies today. I recommend exercise treadmill stress test converted to pharmacologic if he cannot completed with MPI and an echocardiogram for his palpitations. He will follow-up after testing.

## 2021-05-06 NOTE — PROGRESS NOTES
Outcome:   Called PT to schedule KATLYN.    Scheduled Wednesday, May 12, 2021  1:00 pm (1 hour)  Dr. Pineda - [address] [facility]        Attempt # 1

## 2021-05-12 LAB — EKG IMPRESSION: NORMAL

## 2021-05-20 ENCOUNTER — APPOINTMENT (OUTPATIENT)
Dept: RADIOLOGY | Facility: MEDICAL CENTER | Age: 66
End: 2021-05-20
Attending: INTERNAL MEDICINE
Payer: MEDICARE

## 2021-05-31 ENCOUNTER — HOSPITAL ENCOUNTER (OUTPATIENT)
Dept: LAB | Facility: MEDICAL CENTER | Age: 66
End: 2021-05-31
Attending: FAMILY MEDICINE
Payer: MEDICARE

## 2021-05-31 LAB
ALBUMIN SERPL BCP-MCNC: 4.4 G/DL (ref 3.2–4.9)
ALBUMIN/GLOB SERPL: 1.3 G/DL
ALP SERPL-CCNC: 63 U/L (ref 30–99)
ALT SERPL-CCNC: 38 U/L (ref 2–50)
ANION GAP SERPL CALC-SCNC: 11 MMOL/L (ref 7–16)
APPEARANCE UR: CLEAR
AST SERPL-CCNC: 22 U/L (ref 12–45)
BACTERIA #/AREA URNS HPF: NEGATIVE /HPF
BASOPHILS # BLD AUTO: 0.8 % (ref 0–1.8)
BASOPHILS # BLD: 0.08 K/UL (ref 0–0.12)
BILIRUB SERPL-MCNC: 0.5 MG/DL (ref 0.1–1.5)
BILIRUB UR QL STRIP.AUTO: NEGATIVE
BUN SERPL-MCNC: 13 MG/DL (ref 8–22)
CALCIUM SERPL-MCNC: 9.6 MG/DL (ref 8.4–10.2)
CHLORIDE SERPL-SCNC: 102 MMOL/L (ref 96–112)
CHOLEST SERPL-MCNC: 212 MG/DL (ref 100–199)
CO2 SERPL-SCNC: 26 MMOL/L (ref 20–33)
COLOR UR: YELLOW
CREAT SERPL-MCNC: 0.86 MG/DL (ref 0.5–1.4)
EOSINOPHIL # BLD AUTO: 0.29 K/UL (ref 0–0.51)
EOSINOPHIL NFR BLD: 2.8 % (ref 0–6.9)
EPI CELLS #/AREA URNS HPF: ABNORMAL /HPF
ERYTHROCYTE [DISTWIDTH] IN BLOOD BY AUTOMATED COUNT: 42.9 FL (ref 35.9–50)
EST. AVERAGE GLUCOSE BLD GHB EST-MCNC: 108 MG/DL
FASTING STATUS PATIENT QL REPORTED: NORMAL
GLOBULIN SER CALC-MCNC: 3.5 G/DL (ref 1.9–3.5)
GLUCOSE SERPL-MCNC: 109 MG/DL (ref 65–99)
GLUCOSE UR STRIP.AUTO-MCNC: NEGATIVE MG/DL
HBA1C MFR BLD: 5.4 % (ref 4–5.6)
HCT VFR BLD AUTO: 49 % (ref 42–52)
HDLC SERPL-MCNC: 49 MG/DL
HGB BLD-MCNC: 16.4 G/DL (ref 14–18)
HYALINE CASTS #/AREA URNS LPF: ABNORMAL /LPF
IMM GRANULOCYTES # BLD AUTO: 0.07 K/UL (ref 0–0.11)
IMM GRANULOCYTES NFR BLD AUTO: 0.7 % (ref 0–0.9)
KETONES UR STRIP.AUTO-MCNC: NEGATIVE MG/DL
LDLC SERPL CALC-MCNC: 125 MG/DL
LEUKOCYTE ESTERASE UR QL STRIP.AUTO: NEGATIVE
LYMPHOCYTES # BLD AUTO: 3.81 K/UL (ref 1–4.8)
LYMPHOCYTES NFR BLD: 36.4 % (ref 22–41)
MCH RBC QN AUTO: 31.1 PG (ref 27–33)
MCHC RBC AUTO-ENTMCNC: 33.5 G/DL (ref 33.7–35.3)
MCV RBC AUTO: 93 FL (ref 81.4–97.8)
MICRO URNS: ABNORMAL
MONOCYTES # BLD AUTO: 1.15 K/UL (ref 0–0.85)
MONOCYTES NFR BLD AUTO: 11 % (ref 0–13.4)
MUCOUS THREADS #/AREA URNS HPF: ABNORMAL /HPF
NEUTROPHILS # BLD AUTO: 5.07 K/UL (ref 1.82–7.42)
NEUTROPHILS NFR BLD: 48.3 % (ref 44–72)
NITRITE UR QL STRIP.AUTO: NEGATIVE
NRBC # BLD AUTO: 0 K/UL
NRBC BLD-RTO: 0 /100 WBC
PH UR STRIP.AUTO: 5.5 [PH] (ref 5–8)
PLATELET # BLD AUTO: 271 K/UL (ref 164–446)
PMV BLD AUTO: 9.2 FL (ref 9–12.9)
POTASSIUM SERPL-SCNC: 4.3 MMOL/L (ref 3.6–5.5)
PROT SERPL-MCNC: 7.9 G/DL (ref 6–8.2)
PROT UR QL STRIP: NEGATIVE MG/DL
PSA SERPL-MCNC: 1.59 NG/ML (ref 0–4)
RBC # BLD AUTO: 5.27 M/UL (ref 4.7–6.1)
RBC # URNS HPF: ABNORMAL /HPF
RBC UR QL AUTO: ABNORMAL
SODIUM SERPL-SCNC: 139 MMOL/L (ref 135–145)
SP GR UR STRIP.AUTO: 1.01
TRIGL SERPL-MCNC: 192 MG/DL (ref 0–149)
TSH SERPL DL<=0.005 MIU/L-ACNC: 1.67 UIU/ML (ref 0.38–5.33)
WBC # BLD AUTO: 10.5 K/UL (ref 4.8–10.8)
WBC #/AREA URNS HPF: ABNORMAL /HPF

## 2021-05-31 PROCEDURE — 83036 HEMOGLOBIN GLYCOSYLATED A1C: CPT

## 2021-05-31 PROCEDURE — 36415 COLL VENOUS BLD VENIPUNCTURE: CPT

## 2021-05-31 PROCEDURE — 80061 LIPID PANEL: CPT

## 2021-05-31 PROCEDURE — 84153 ASSAY OF PSA TOTAL: CPT

## 2021-05-31 PROCEDURE — 84443 ASSAY THYROID STIM HORMONE: CPT

## 2021-05-31 PROCEDURE — 85025 COMPLETE CBC W/AUTO DIFF WBC: CPT

## 2021-05-31 PROCEDURE — 80053 COMPREHEN METABOLIC PANEL: CPT

## 2021-05-31 PROCEDURE — 81001 URINALYSIS AUTO W/SCOPE: CPT

## 2021-06-03 ENCOUNTER — HOSPITAL ENCOUNTER (OUTPATIENT)
Dept: RADIOLOGY | Facility: MEDICAL CENTER | Age: 66
End: 2021-06-03
Attending: INTERNAL MEDICINE
Payer: MEDICARE

## 2021-06-03 DIAGNOSIS — R00.2 PALPITATIONS: ICD-10-CM

## 2021-06-03 DIAGNOSIS — R07.89 CHEST PRESSURE: ICD-10-CM

## 2021-06-03 PROCEDURE — 93018 CV STRESS TEST I&R ONLY: CPT | Performed by: INTERNAL MEDICINE

## 2021-06-03 PROCEDURE — A9502 TC99M TETROFOSMIN: HCPCS

## 2021-06-03 PROCEDURE — 78452 HT MUSCLE IMAGE SPECT MULT: CPT | Mod: 26 | Performed by: INTERNAL MEDICINE

## 2021-06-03 PROCEDURE — 700111 HCHG RX REV CODE 636 W/ 250 OVERRIDE (IP)

## 2021-06-03 RX ORDER — REGADENOSON 0.08 MG/ML
INJECTION, SOLUTION INTRAVENOUS
Status: COMPLETED
Start: 2021-06-03 | End: 2021-06-03

## 2021-06-03 RX ORDER — REGADENOSON 0.08 MG/ML
0.4 INJECTION, SOLUTION INTRAVENOUS ONCE
Status: COMPLETED | OUTPATIENT
Start: 2021-06-03 | End: 2021-06-03

## 2021-06-03 RX ADMIN — REGADENOSON 0.4 MG: 0.08 INJECTION, SOLUTION INTRAVENOUS at 10:25

## 2021-07-01 ENCOUNTER — HOSPITAL ENCOUNTER (OUTPATIENT)
Dept: RADIOLOGY | Facility: MEDICAL CENTER | Age: 66
End: 2021-07-01
Attending: FAMILY MEDICINE
Payer: MEDICARE

## 2021-07-01 DIAGNOSIS — N50.89 SCROTAL MASS: ICD-10-CM

## 2021-07-01 PROCEDURE — 76870 US EXAM SCROTUM: CPT

## 2021-07-05 ENCOUNTER — HOSPITAL ENCOUNTER (OUTPATIENT)
Dept: CARDIOLOGY | Facility: MEDICAL CENTER | Age: 66
End: 2021-07-05
Attending: INTERNAL MEDICINE
Payer: MEDICARE

## 2021-07-05 DIAGNOSIS — R00.2 PALPITATIONS: ICD-10-CM

## 2021-07-05 DIAGNOSIS — R07.89 CHEST PRESSURE: ICD-10-CM

## 2021-07-05 PROCEDURE — 93306 TTE W/DOPPLER COMPLETE: CPT

## 2021-07-06 LAB
LV EJECT FRACT  99904: 60
LV EJECT FRACT MOD 2C 99903: 60.4
LV EJECT FRACT MOD 4C 99902: 62.81
LV EJECT FRACT MOD BP 99901: 61.55

## 2021-07-06 PROCEDURE — 93306 TTE W/DOPPLER COMPLETE: CPT | Mod: 26 | Performed by: INTERNAL MEDICINE

## 2021-07-14 ENCOUNTER — HOSPITAL ENCOUNTER (OUTPATIENT)
Dept: RADIOLOGY | Facility: MEDICAL CENTER | Age: 66
End: 2021-07-14
Attending: PAIN MEDICINE
Payer: MEDICARE

## 2021-07-14 ENCOUNTER — HOSPITAL ENCOUNTER (OUTPATIENT)
Dept: LAB | Facility: MEDICAL CENTER | Age: 66
End: 2021-07-14
Attending: INTERNAL MEDICINE
Payer: MEDICARE

## 2021-07-14 ENCOUNTER — HOSPITAL ENCOUNTER (OUTPATIENT)
Dept: RADIOLOGY | Facility: MEDICAL CENTER | Age: 66
End: 2021-07-14
Attending: FAMILY MEDICINE
Payer: MEDICARE

## 2021-07-14 DIAGNOSIS — R06.02 BREATH, SHORTNESS: ICD-10-CM

## 2021-07-14 DIAGNOSIS — M54.12 CERVICAL RADICULITIS: ICD-10-CM

## 2021-07-14 PROCEDURE — 36415 COLL VENOUS BLD VENIPUNCTURE: CPT

## 2021-07-14 PROCEDURE — 80197 ASSAY OF TACROLIMUS: CPT

## 2021-07-14 PROCEDURE — 71046 X-RAY EXAM CHEST 2 VIEWS: CPT

## 2021-07-14 PROCEDURE — 72040 X-RAY EXAM NECK SPINE 2-3 VW: CPT

## 2021-07-16 LAB — TACROLIMUS BLD-MCNC: 7.4 NG/ML

## 2021-07-27 ENCOUNTER — HOSPITAL ENCOUNTER (OUTPATIENT)
Dept: RADIOLOGY | Facility: MEDICAL CENTER | Age: 66
End: 2021-07-27
Attending: PAIN MEDICINE
Payer: MEDICARE

## 2021-07-27 DIAGNOSIS — M54.12 BRACHIAL NEURITIS: ICD-10-CM

## 2021-07-27 PROCEDURE — 72141 MRI NECK SPINE W/O DYE: CPT | Mod: MH

## 2021-08-13 ENCOUNTER — OFFICE VISIT (OUTPATIENT)
Dept: CARDIOLOGY | Facility: MEDICAL CENTER | Age: 66
End: 2021-08-13
Payer: MEDICARE

## 2021-08-13 VITALS
DIASTOLIC BLOOD PRESSURE: 84 MMHG | OXYGEN SATURATION: 100 % | SYSTOLIC BLOOD PRESSURE: 150 MMHG | HEIGHT: 67 IN | BODY MASS INDEX: 26.06 KG/M2 | HEART RATE: 88 BPM | WEIGHT: 166 LBS

## 2021-08-13 DIAGNOSIS — I49.3 PVC'S (PREMATURE VENTRICULAR CONTRACTIONS): ICD-10-CM

## 2021-08-13 PROCEDURE — 99213 OFFICE O/P EST LOW 20 MIN: CPT | Performed by: INTERNAL MEDICINE

## 2021-08-13 RX ORDER — FOLIC ACID 0.8 MG
TABLET ORAL 2 TIMES DAILY
COMMUNITY
End: 2021-09-16

## 2021-08-13 ASSESSMENT — FIBROSIS 4 INDEX: FIB4 SCORE: 0.88

## 2021-08-13 NOTE — PROGRESS NOTES
Chief Complaint   Patient presents with   • Palpitations     follow up       Subjective:   Quirino Ruiz is a 66 y.o. male who presents today for initial consultation regarding palpitations and chest discomfort. He has a history of liver transplantation 2002 on chronic immunosuppression therapy as well as liver cancer prior to his transplant without recurrence. He has hypertension and hyperlipidemia. Blood pressures well controlled. He notes palpitations that are occasional and occur in groups of 3 and were caught by EMS and there is some indication these were PVCs. They went away and have not recurred. He also noted some chest discomfort characterized as pressure while this was happening. 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 He smokes and is down to half pack a day trying to quit, does not drink or do drugs. No family history precocious CAD    Returns with improvement of his PVCs occasionally present normal echo and normal stress.    Past Medical History:   Diagnosis Date   • Arthritis     fingers, hands   • Back pain    • Cancer (HCC) 2002;2013    kidney / liver - treated with chemo, & transplant   • Cholesterol blood decreased    • Chronic pain    • Cold 8/27/2014    URI   • Dental disorder     upper  and lower dentures   • Heart burn     under control with meds   • Hepatitis C 1993   • Hepatitis C    • Hypertension    • Indigestion    • Liver transplanted (HCC) 2002   • Liver transplanted (HCC)    • Pain     neck   • Pain     neck and lower back   • Renal cancer (HCC)    • Renal disorder     left nephrectomy; right cryoablation     Past Surgical History:   Procedure Laterality Date   • RECOVERY  11/30/2015    Procedure: US-Non Targeted Liver Biopsy-;  Surgeon: [facility];  Location: SURGERY [facility] [place] [facility];  Service:    • PUMP INSERT/REMOVE  11/25/2014    Performed by Jass Mendes M.D. at SURGERY  [facility] ORS   • CATH PLACE PERM EPIDURAL  9/9/2014    Performed by Jass Mendes M.D. at SURGERY [facility]   • OTHER  2/28/2014    cryoablation right kidney   • EVACUATION OF HEMATOMA  5/31/2013    Performed by Davis Murray M.D. at SURGERY [facility] ORS   • EXPLORATORY LAPAROTOMY  5/31/2013    Performed by Davis Murray M.D. at SURGERY [facility] ORS   • LUMBAR FUSION ANTERIOR  5/29/2013    Performed by Suman Burks M.D. at SURGERY [facility] ORS   • RECOVERY  4/23/2013    Performed by -Recovery Surgery at SURGERY SAME DAY [facility]   • CERVICAL DISK AND FUSION ANTERIOR  12/11/2012    Performed by Suman Burks M.D. at SURGERY [facility]   • OTHER  2002    liver transplant   • OTHER  1996    Left kidney removed   • OTHER  1994    gallbladder removal   • CERVICAL DECOMPRESSION POSTERIOR     • CHOLECYSTECTOMY     • NEPHRECTOMY RADICAL     • OTHER ORTHOPEDIC SURGERY      pain pump   • OTHER SURGICAL PROCEDURE      liver transplant     Family History   Problem Relation Age of Onset   • Hypertension Other      Social History     Socioeconomic History   • Marital status:      Spouse name: Not on file   • Number of children: Not on file   • Years of education: Not on file   • Highest education level: Not on file   Occupational History   • Not on file   Tobacco Use   • Smoking status: Current Every Day Smoker     Packs/day: 0.50     Years: 52.00     Pack years: 26.00     Types: Cigarettes   • Smokeless tobacco: Never Used   Vaping Use   • Vaping Use: Never used   Substance and Sexual Activity   • Alcohol use: Yes     Alcohol/week: 0.6 oz     Types: 1 Cans of beer per week   • Drug use: No   • Sexual activity: Not on file   Other Topics Concern   • Not on file   Social History Narrative    ** Merged History Encounter **          Social Determinants of Health     Financial Resource Strain:    • Difficulty of Paying Living Expenses:    Food Insecurity:    • Worried About  "Running Out of Food in the Last Year:    • Ran Out of Food in the Last Year:    Transportation Needs:    • Lack of Transportation (Medical):    • Lack of Transportation (Non-Medical):    Physical Activity:    • Days of Exercise per Week:    • Minutes of Exercise per Session:    Stress:    • Feeling of Stress :    Social Connections:    • Frequency of Communication with Friends and Family:    • Frequency of Social Gatherings with Friends and Family:    • Attends Yarsani Services:    • Active Member of Clubs or Organizations:    • Attends Club or Organization Meetings:    • Marital Status:    Intimate Partner Violence:    • Fear of Current or Ex-Partner:    • Emotionally Abused:    • Physically Abused:    • Sexually Abused:      Allergies   Allergen Reactions   • Niacin      Passed out.    • Latex Rash     Reaction- Itching, Rash   • Pcn [Penicillins] Anaphylaxis and Swelling     Patient has tolerated cefazolin in past   • Penicillins Itching and Vomiting     Has tolerated cefazolin   • Morphine Sulfate      Per pt., \"it makes me cranky\"   • Mushroom Extract Complex    • Other Food      Mushrooms     Outpatient Encounter Medications as of 8/13/2021   Medication Sig Dispense Refill   • Magnesium 500 MG Cap Take  by mouth 2 times a day.     • allopurinol (ZYLOPRIM) 100 MG Tab Take 100 mg by mouth every bedtime.     • lisinopril (PRINIVIL) 10 MG Tab Take 10 mg by mouth every morning. Indications: High Blood Pressure Disorder     • Pain Pump (PATIENT SUPPLIED) XX BRANDON Inject  as directed continuous. Patient's Pain Pump (placed and maintained as an outpatient)  Medications/concentrations: Hydromorphone 400.0 mcg/mL  Route: Intrathecally  Date of Placement: Unknown  Last changed: 9/17/2020  Continuous infusion rates (Drug/Rate):  Hydromorphone 99.97 mcg / 24 hours (4.17 mcg / hr)  Patient activation dose: Hydromorphone 9.69 mcg  Maximum daily activation dose: Hydromorphone 138.16 mcg  Patient activation lockout interval: " "1 hour  Maximum activations per day: 4     • tacrolimus (PROGRAF) 1 MG Cap Take 2 mg by mouth 2 Times a Day. Indications: Liver Transplant Recipients     • oxyCODONE-acetaminophen (PERCOCET-10)  MG Tab Take 1 Tab by mouth every four hours as needed for Severe Pain. order at home (Patient not taking: Reported on 8/13/2021)     • non-formulary med Take 1 Capsule by mouth every day. REVIFOL HEALTHY HAIR (Patient not taking: Reported on 8/13/2021)       No facility-administered encounter medications on file as of 8/13/2021.     Review of Systems   All other systems reviewed and are negative.       Objective:   /84 (BP Location: Left arm, Patient Position: Sitting)   Pulse 88   Ht 1.702 m (5' 7\")   Wt 75.3 kg (166 lb)   SpO2 100%   BMI 26.00 kg/m²     Physical Exam   Constitutional: He is oriented to person, place, and time. He appears well-developed. No distress.   HENT:   Head: Normocephalic and atraumatic.   Right Ear: External ear normal.   Left Ear: External ear normal.   Eyes: Pupils are equal, round, and reactive to light. Conjunctivae are normal. Right eye exhibits no discharge. Left eye exhibits no discharge. No scleral icterus.   Neck: No JVD present. No tracheal deviation present. No thyromegaly present.   Cardiovascular: Normal rate and regular rhythm. PMI is not displaced. Exam reveals no gallop and no friction rub.   No murmur heard.  Pulses:       Carotid pulses are 2+ on the right side and 2+ on the left side.       Radial pulses are 2+ on the left side.        Popliteal pulses are 2+ on the right side and 2+ on the left side.        Dorsalis pedis pulses are 2+ on the right side and 2+ on the left side.        Posterior tibial pulses are 2+ on the right side and 2+ on the left side.   Pulmonary/Chest: Effort normal and breath sounds normal. No respiratory distress. He has no wheezes. He has no rales. He exhibits no tenderness.   Abdominal: Soft. Bowel sounds are normal. He exhibits no " distension. There is no abdominal tenderness.   Musculoskeletal:         General: No tenderness or deformity. Normal range of motion.      Cervical back: Normal range of motion and neck supple.   Neurological: He is alert and oriented to person, place, and time. No cranial nerve deficit (cranial nerves II through XII grossly intact). Coordination normal.   Skin: Skin is warm and dry. No rash noted. He is not diaphoretic. No erythema. No pallor.   Psychiatric: His behavior is normal. Thought content normal.   Vitals reviewed.    LABS:  Lab Results   Component Value Date/Time    CHOLSTRLTOT 212 (H) 05/31/2021 09:12 AM     (H) 05/31/2021 09:12 AM    HDL 49 05/31/2021 09:12 AM    TRIGLYCERIDE 192 (H) 05/31/2021 09:12 AM       Lab Results   Component Value Date/Time    WBC 10.5 05/31/2021 09:12 AM    RBC 5.27 05/31/2021 09:12 AM    HEMOGLOBIN 16.4 05/31/2021 09:12 AM    HEMATOCRIT 49.0 05/31/2021 09:12 AM    MCV 93.0 05/31/2021 09:12 AM    NEUTSPOLYS 48.30 05/31/2021 09:12 AM    LYMPHOCYTES 36.40 05/31/2021 09:12 AM    MONOCYTES 11.00 05/31/2021 09:12 AM    EOSINOPHILS 2.80 05/31/2021 09:12 AM    BASOPHILS 0.80 05/31/2021 09:12 AM    HYPOCHROMIA 1+ 03/05/2014 03:49 PM    ANISOCYTOSIS 1+ 06/04/2013 03:13 AM     Lab Results   Component Value Date/Time    SODIUM 139 05/31/2021 09:12 AM    POTASSIUM 4.3 05/31/2021 09:12 AM    CHLORIDE 102 05/31/2021 09:12 AM    CO2 26 05/31/2021 09:12 AM    GLUCOSE 109 (H) 05/31/2021 09:12 AM    BUN 13 05/31/2021 09:12 AM    CREATININE 0.86 05/31/2021 09:12 AM     Lab Results   Component Value Date    HBA1C 5.4 05/31/2021      Lab Results   Component Value Date/Time    ALKPHOSPHAT 63 05/31/2021 09:12 AM    ASTSGOT 22 05/31/2021 09:12 AM    ALTSGPT 38 05/31/2021 09:12 AM    TBILIRUBIN 0.5 05/31/2021 09:12 AM      Lab Results   Component Value Date/Time    BNPBTYPENAT 8 03/02/2012 09:39 PM      No results found for: TSH  Lab Results   Component Value Date/Time    PROTHROMBTM 13.1  04/03/2018 08:53 PM    INR 1.02 04/03/2018 08:53 PM        EKG (5/6/2021):  I have personally reviewed the EKG this visit and discussed with the patient.  Sinus rhythm NORMAL ECG.  Stress test and echocardiogram reviewed as above.    Assessment:     1. PVC's (premature ventricular contractions)         Medical Decision Making:  Today's Assessment / Status / Plan:     Benign PVCs.  Offered further suppression with Lopressor.  He feels reassurance at this point would be sufficient and would like to manage this expectantly.  Recommend he quit smoking and follow healthful dietary and lifestyle measures and primary prevention measures with his PCP.  Follow-up with cardiology as needed.

## 2021-09-16 ENCOUNTER — HOSPITAL ENCOUNTER (OUTPATIENT)
Dept: RADIOLOGY | Facility: MEDICAL CENTER | Age: 66
End: 2021-09-16
Attending: PAIN MEDICINE | Admitting: PAIN MEDICINE
Payer: MEDICARE

## 2021-09-16 ENCOUNTER — PRE-ADMISSION TESTING (OUTPATIENT)
Dept: ADMISSIONS | Facility: MEDICAL CENTER | Age: 66
End: 2021-09-16
Attending: PAIN MEDICINE
Payer: MEDICARE

## 2021-09-16 DIAGNOSIS — Z01.811 PRE-OPERATIVE RESPIRATORY EXAMINATION: ICD-10-CM

## 2021-09-16 DIAGNOSIS — Z01.812 PRE-OPERATIVE LABORATORY EXAMINATION: ICD-10-CM

## 2021-09-16 LAB
ALBUMIN SERPL BCP-MCNC: 4.3 G/DL (ref 3.2–4.9)
ALBUMIN/GLOB SERPL: 1.3 G/DL
ALP SERPL-CCNC: 64 U/L (ref 30–99)
ALT SERPL-CCNC: 27 U/L (ref 2–50)
ANION GAP SERPL CALC-SCNC: 10 MMOL/L (ref 7–16)
APPEARANCE UR: CLEAR
APTT PPP: 28.1 SEC (ref 24.7–36)
AST SERPL-CCNC: 21 U/L (ref 12–45)
BASOPHILS # BLD AUTO: 0.7 % (ref 0–1.8)
BASOPHILS # BLD: 0.06 K/UL (ref 0–0.12)
BILIRUB SERPL-MCNC: 0.3 MG/DL (ref 0.1–1.5)
BILIRUB UR QL STRIP.AUTO: NEGATIVE
BUN SERPL-MCNC: 10 MG/DL (ref 8–22)
CALCIUM SERPL-MCNC: 9 MG/DL (ref 8.4–10.2)
CHLORIDE SERPL-SCNC: 103 MMOL/L (ref 96–112)
CO2 SERPL-SCNC: 24 MMOL/L (ref 20–33)
COLOR UR: YELLOW
CREAT SERPL-MCNC: 0.8 MG/DL (ref 0.5–1.4)
EOSINOPHIL # BLD AUTO: 0.37 K/UL (ref 0–0.51)
EOSINOPHIL NFR BLD: 4.5 % (ref 0–6.9)
ERYTHROCYTE [DISTWIDTH] IN BLOOD BY AUTOMATED COUNT: 43.6 FL (ref 35.9–50)
GLOBULIN SER CALC-MCNC: 3.3 G/DL (ref 1.9–3.5)
GLUCOSE SERPL-MCNC: 96 MG/DL (ref 65–99)
GLUCOSE UR STRIP.AUTO-MCNC: NEGATIVE MG/DL
HCT VFR BLD AUTO: 48.7 % (ref 42–52)
HGB BLD-MCNC: 16.3 G/DL (ref 14–18)
HYALINE CASTS #/AREA URNS LPF: ABNORMAL /LPF
IMM GRANULOCYTES # BLD AUTO: 0.05 K/UL (ref 0–0.11)
IMM GRANULOCYTES NFR BLD AUTO: 0.6 % (ref 0–0.9)
INR PPP: 1.06 (ref 0.87–1.13)
KETONES UR STRIP.AUTO-MCNC: NEGATIVE MG/DL
LEUKOCYTE ESTERASE UR QL STRIP.AUTO: NEGATIVE
LYMPHOCYTES # BLD AUTO: 2.88 K/UL (ref 1–4.8)
LYMPHOCYTES NFR BLD: 35.1 % (ref 22–41)
MCH RBC QN AUTO: 32 PG (ref 27–33)
MCHC RBC AUTO-ENTMCNC: 33.5 G/DL (ref 33.7–35.3)
MCV RBC AUTO: 95.5 FL (ref 81.4–97.8)
MICRO URNS: ABNORMAL
MONOCYTES # BLD AUTO: 0.86 K/UL (ref 0–0.85)
MONOCYTES NFR BLD AUTO: 10.5 % (ref 0–13.4)
MUCOUS THREADS #/AREA URNS HPF: ABNORMAL /HPF
NEUTROPHILS # BLD AUTO: 3.99 K/UL (ref 1.82–7.42)
NEUTROPHILS NFR BLD: 48.6 % (ref 44–72)
NITRITE UR QL STRIP.AUTO: NEGATIVE
NRBC # BLD AUTO: 0 K/UL
NRBC BLD-RTO: 0 /100 WBC
PH UR STRIP.AUTO: 5.5 [PH] (ref 5–8)
PLATELET # BLD AUTO: 222 K/UL (ref 164–446)
PMV BLD AUTO: 8.9 FL (ref 9–12.9)
POTASSIUM SERPL-SCNC: 4.3 MMOL/L (ref 3.6–5.5)
PROT SERPL-MCNC: 7.6 G/DL (ref 6–8.2)
PROT UR QL STRIP: NEGATIVE MG/DL
PROTHROMBIN TIME: 13 SEC (ref 12–14.6)
RBC # BLD AUTO: 5.1 M/UL (ref 4.7–6.1)
RBC # URNS HPF: ABNORMAL /HPF
RBC UR QL AUTO: ABNORMAL
SARS-COV+SARS-COV-2 AG RESP QL IA.RAPID: NOTDETECTED
SODIUM SERPL-SCNC: 137 MMOL/L (ref 135–145)
SP GR UR STRIP.AUTO: 1.01
SPECIMEN SOURCE: NORMAL
WBC # BLD AUTO: 8.2 K/UL (ref 4.8–10.8)

## 2021-09-16 PROCEDURE — 71046 X-RAY EXAM CHEST 2 VIEWS: CPT

## 2021-09-16 PROCEDURE — 80053 COMPREHEN METABOLIC PANEL: CPT

## 2021-09-16 PROCEDURE — 87086 URINE CULTURE/COLONY COUNT: CPT

## 2021-09-16 PROCEDURE — 85025 COMPLETE CBC W/AUTO DIFF WBC: CPT

## 2021-09-16 PROCEDURE — 85730 THROMBOPLASTIN TIME PARTIAL: CPT

## 2021-09-16 PROCEDURE — 36415 COLL VENOUS BLD VENIPUNCTURE: CPT

## 2021-09-16 PROCEDURE — 85610 PROTHROMBIN TIME: CPT

## 2021-09-16 PROCEDURE — 81001 URINALYSIS AUTO W/SCOPE: CPT

## 2021-09-16 PROCEDURE — 87426 SARSCOV CORONAVIRUS AG IA: CPT

## 2021-09-16 RX ORDER — HYDROCORTISONE ACETATE 25 MG/1
25 SUPPOSITORY RECTAL PRN
COMMUNITY
Start: 2021-08-16 | End: 2022-03-03

## 2021-09-16 RX ORDER — TAMSULOSIN HYDROCHLORIDE 0.4 MG/1
0.4 CAPSULE ORAL
COMMUNITY
End: 2022-06-24

## 2021-09-16 RX ORDER — CEPHALEXIN 750 MG/1
750 CAPSULE ORAL
COMMUNITY
End: 2022-03-03

## 2021-09-16 ASSESSMENT — FIBROSIS 4 INDEX: FIB4 SCORE: 0.88

## 2021-09-16 NOTE — PREPROCEDURE INSTRUCTIONS
Hx and meds reviewed, pre op instructions given, handouts reviewed, questions answered.  Pt instructed to continue regularly prescribed medications through day before surgery.Per anesthesia protocol pt instructed to take these medications with a sip of water the day of surgery- prograf and keflex. Anesthesia fasting guidelines reviewed with pt. Covid testing performed today, vaccination complete, aware to continue wearing a mask per mandate. Hx sent to Dr Melgar to review. Dr Mendes's office notified pt may not have a  to be with him after surgery, although he will have a  home.

## 2021-09-16 NOTE — PREPROCEDURE INSTRUCTIONS
Dr Melgar's response-  I do not see anything here that we could acutely optimize. Obviously his lab work should include liver function and kidney function tests with electrolyte panel. Otherwise not much to add here. My colleague will reassess this patient clinically and see whether this case can proceed on the day of surgery. Thank you.  Gus Melgar M.D.  [facility]  Dr Melgar made aware CMP done today, some results may not be available until tomorrow.

## 2021-09-17 ENCOUNTER — ANESTHESIA EVENT (OUTPATIENT)
Dept: SURGERY | Facility: MEDICAL CENTER | Age: 66
End: 2021-09-17
Payer: MEDICARE

## 2021-09-17 ENCOUNTER — ANESTHESIA (OUTPATIENT)
Dept: SURGERY | Facility: MEDICAL CENTER | Age: 66
End: 2021-09-17
Payer: MEDICARE

## 2021-09-17 ENCOUNTER — HOSPITAL ENCOUNTER (OUTPATIENT)
Facility: MEDICAL CENTER | Age: 66
End: 2021-09-17
Attending: PAIN MEDICINE | Admitting: PAIN MEDICINE
Payer: MEDICARE

## 2021-09-17 VITALS
DIASTOLIC BLOOD PRESSURE: 81 MMHG | OXYGEN SATURATION: 94 % | HEART RATE: 91 BPM | RESPIRATION RATE: 16 BRPM | BODY MASS INDEX: 26.06 KG/M2 | HEIGHT: 67 IN | SYSTOLIC BLOOD PRESSURE: 151 MMHG | WEIGHT: 166.01 LBS | TEMPERATURE: 97.2 F

## 2021-09-17 PROCEDURE — 160035 HCHG PACU - 1ST 60 MINS PHASE I: Performed by: PAIN MEDICINE

## 2021-09-17 PROCEDURE — 160041 HCHG SURGERY MINUTES - EA ADDL 1 MIN LEVEL 4: Performed by: PAIN MEDICINE

## 2021-09-17 PROCEDURE — 700102 HCHG RX REV CODE 250 W/ 637 OVERRIDE(OP): Performed by: ANESTHESIOLOGY

## 2021-09-17 PROCEDURE — 160029 HCHG SURGERY MINUTES - 1ST 30 MINS LEVEL 4: Performed by: PAIN MEDICINE

## 2021-09-17 PROCEDURE — A9270 NON-COVERED ITEM OR SERVICE: HCPCS | Performed by: ANESTHESIOLOGY

## 2021-09-17 PROCEDURE — 700101 HCHG RX REV CODE 250: Performed by: PAIN MEDICINE

## 2021-09-17 PROCEDURE — 700111 HCHG RX REV CODE 636 W/ 250 OVERRIDE (IP): Performed by: ANESTHESIOLOGY

## 2021-09-17 PROCEDURE — 502575 HCHG PACK, BREAST: Performed by: PAIN MEDICINE

## 2021-09-17 PROCEDURE — 502000 HCHG MISC OR IMPLANTS RC 0278: Performed by: PAIN MEDICINE

## 2021-09-17 PROCEDURE — 160025 RECOVERY II MINUTES (STATS): Performed by: PAIN MEDICINE

## 2021-09-17 PROCEDURE — 160048 HCHG OR STATISTICAL LEVEL 1-5: Performed by: PAIN MEDICINE

## 2021-09-17 PROCEDURE — 160002 HCHG RECOVERY MINUTES (STAT): Performed by: PAIN MEDICINE

## 2021-09-17 PROCEDURE — 700105 HCHG RX REV CODE 258: Performed by: PAIN MEDICINE

## 2021-09-17 PROCEDURE — C1772 INFUSION PUMP, PROGRAMMABLE: HCPCS | Performed by: PAIN MEDICINE

## 2021-09-17 PROCEDURE — 160009 HCHG ANES TIME/MIN: Performed by: PAIN MEDICINE

## 2021-09-17 PROCEDURE — 700111 HCHG RX REV CODE 636 W/ 250 OVERRIDE (IP): Performed by: PAIN MEDICINE

## 2021-09-17 PROCEDURE — C1755 CATHETER, INTRASPINAL: HCPCS | Performed by: PAIN MEDICINE

## 2021-09-17 PROCEDURE — 160046 HCHG PACU - 1ST 60 MINS PHASE II: Performed by: PAIN MEDICINE

## 2021-09-17 PROCEDURE — 501838 HCHG SUTURE GENERAL: Performed by: PAIN MEDICINE

## 2021-09-17 PROCEDURE — 160036 HCHG PACU - EA ADDL 30 MINS PHASE I: Performed by: PAIN MEDICINE

## 2021-09-17 DEVICE — IMPLANTABLE DEVICE: Type: IMPLANTABLE DEVICE | Status: FUNCTIONAL

## 2021-09-17 RX ORDER — OXYCODONE HCL 5 MG/5 ML
10 SOLUTION, ORAL ORAL
Status: COMPLETED | OUTPATIENT
Start: 2021-09-17 | End: 2021-09-17

## 2021-09-17 RX ORDER — DIPHENHYDRAMINE HYDROCHLORIDE 50 MG/ML
12.5 INJECTION INTRAMUSCULAR; INTRAVENOUS
Status: DISCONTINUED | OUTPATIENT
Start: 2021-09-17 | End: 2021-09-17 | Stop reason: HOSPADM

## 2021-09-17 RX ORDER — SODIUM CHLORIDE, SODIUM LACTATE, POTASSIUM CHLORIDE, CALCIUM CHLORIDE 600; 310; 30; 20 MG/100ML; MG/100ML; MG/100ML; MG/100ML
INJECTION, SOLUTION INTRAVENOUS CONTINUOUS
Status: DISCONTINUED | OUTPATIENT
Start: 2021-09-17 | End: 2021-09-17 | Stop reason: HOSPADM

## 2021-09-17 RX ORDER — BUPIVACAINE HYDROCHLORIDE AND EPINEPHRINE 5; 5 MG/ML; UG/ML
INJECTION, SOLUTION EPIDURAL; INTRACAUDAL; PERINEURAL
Status: DISCONTINUED | OUTPATIENT
Start: 2021-09-17 | End: 2021-09-17 | Stop reason: HOSPADM

## 2021-09-17 RX ORDER — PHENYLEPHRINE HCL IN 0.9% NACL 0.5 MG/5ML
SYRINGE (ML) INTRAVENOUS PRN
Status: DISCONTINUED | OUTPATIENT
Start: 2021-09-17 | End: 2021-09-17 | Stop reason: SURG

## 2021-09-17 RX ORDER — DEXAMETHASONE SODIUM PHOSPHATE 4 MG/ML
INJECTION, SOLUTION INTRA-ARTICULAR; INTRALESIONAL; INTRAMUSCULAR; INTRAVENOUS; SOFT TISSUE PRN
Status: DISCONTINUED | OUTPATIENT
Start: 2021-09-17 | End: 2021-09-17 | Stop reason: SURG

## 2021-09-17 RX ORDER — OXYCODONE HCL 5 MG/5 ML
5 SOLUTION, ORAL ORAL
Status: COMPLETED | OUTPATIENT
Start: 2021-09-17 | End: 2021-09-17

## 2021-09-17 RX ORDER — CEFAZOLIN SODIUM 1 G/3ML
INJECTION, POWDER, FOR SOLUTION INTRAMUSCULAR; INTRAVENOUS PRN
Status: DISCONTINUED | OUTPATIENT
Start: 2021-09-17 | End: 2021-09-17 | Stop reason: SURG

## 2021-09-17 RX ORDER — LABETALOL HYDROCHLORIDE 5 MG/ML
5 INJECTION, SOLUTION INTRAVENOUS
Status: DISCONTINUED | OUTPATIENT
Start: 2021-09-17 | End: 2021-09-17 | Stop reason: HOSPADM

## 2021-09-17 RX ORDER — HYDRALAZINE HYDROCHLORIDE 20 MG/ML
5 INJECTION INTRAMUSCULAR; INTRAVENOUS
Status: DISCONTINUED | OUTPATIENT
Start: 2021-09-17 | End: 2021-09-17 | Stop reason: HOSPADM

## 2021-09-17 RX ORDER — METOPROLOL TARTRATE 1 MG/ML
1 INJECTION, SOLUTION INTRAVENOUS
Status: DISCONTINUED | OUTPATIENT
Start: 2021-09-17 | End: 2021-09-17 | Stop reason: HOSPADM

## 2021-09-17 RX ORDER — ONDANSETRON 2 MG/ML
4 INJECTION INTRAMUSCULAR; INTRAVENOUS
Status: DISCONTINUED | OUTPATIENT
Start: 2021-09-17 | End: 2021-09-17 | Stop reason: HOSPADM

## 2021-09-17 RX ORDER — ONDANSETRON 2 MG/ML
INJECTION INTRAMUSCULAR; INTRAVENOUS PRN
Status: DISCONTINUED | OUTPATIENT
Start: 2021-09-17 | End: 2021-09-17 | Stop reason: SURG

## 2021-09-17 RX ORDER — VANCOMYCIN HYDROCHLORIDE 1 G/20ML
INJECTION, POWDER, LYOPHILIZED, FOR SOLUTION INTRAVENOUS
Status: COMPLETED | OUTPATIENT
Start: 2021-09-17 | End: 2021-09-17

## 2021-09-17 RX ORDER — HALOPERIDOL 5 MG/ML
1 INJECTION INTRAMUSCULAR
Status: DISCONTINUED | OUTPATIENT
Start: 2021-09-17 | End: 2021-09-17 | Stop reason: HOSPADM

## 2021-09-17 RX ORDER — HYDROMORPHONE HYDROCHLORIDE 1 MG/ML
0.2 INJECTION, SOLUTION INTRAMUSCULAR; INTRAVENOUS; SUBCUTANEOUS
Status: DISCONTINUED | OUTPATIENT
Start: 2021-09-17 | End: 2021-09-17 | Stop reason: HOSPADM

## 2021-09-17 RX ORDER — HYDROMORPHONE HYDROCHLORIDE 1 MG/ML
0.1 INJECTION, SOLUTION INTRAMUSCULAR; INTRAVENOUS; SUBCUTANEOUS
Status: DISCONTINUED | OUTPATIENT
Start: 2021-09-17 | End: 2021-09-17 | Stop reason: HOSPADM

## 2021-09-17 RX ORDER — MIDAZOLAM HYDROCHLORIDE 1 MG/ML
INJECTION INTRAMUSCULAR; INTRAVENOUS PRN
Status: DISCONTINUED | OUTPATIENT
Start: 2021-09-17 | End: 2021-09-17 | Stop reason: SURG

## 2021-09-17 RX ORDER — HYDROMORPHONE HYDROCHLORIDE 1 MG/ML
0.4 INJECTION, SOLUTION INTRAMUSCULAR; INTRAVENOUS; SUBCUTANEOUS
Status: DISCONTINUED | OUTPATIENT
Start: 2021-09-17 | End: 2021-09-17 | Stop reason: HOSPADM

## 2021-09-17 RX ADMIN — FENTANYL CITRATE 50 MCG: 50 INJECTION, SOLUTION INTRAMUSCULAR; INTRAVENOUS at 10:53

## 2021-09-17 RX ADMIN — ONDANSETRON 4 MG: 2 INJECTION INTRAMUSCULAR; INTRAVENOUS at 11:20

## 2021-09-17 RX ADMIN — Medication 100 MCG: at 11:07

## 2021-09-17 RX ADMIN — OXYCODONE HYDROCHLORIDE 10 MG: 5 SOLUTION ORAL at 11:55

## 2021-09-17 RX ADMIN — FENTANYL CITRATE 100 MCG: 50 INJECTION, SOLUTION INTRAMUSCULAR; INTRAVENOUS at 10:46

## 2021-09-17 RX ADMIN — FENTANYL CITRATE 50 MCG: 50 INJECTION, SOLUTION INTRAMUSCULAR; INTRAVENOUS at 11:53

## 2021-09-17 RX ADMIN — CEFAZOLIN 2 G: 330 INJECTION, POWDER, FOR SOLUTION INTRAMUSCULAR; INTRAVENOUS at 10:47

## 2021-09-17 RX ADMIN — Medication 200 MCG: at 11:20

## 2021-09-17 RX ADMIN — DEXAMETHASONE SODIUM PHOSPHATE 4 MG: 4 INJECTION, SOLUTION INTRAMUSCULAR; INTRAVENOUS at 10:42

## 2021-09-17 RX ADMIN — PROPOFOL 200 MG: 10 INJECTION, EMULSION INTRAVENOUS at 10:46

## 2021-09-17 RX ADMIN — MIDAZOLAM HYDROCHLORIDE 2 MG: 1 INJECTION, SOLUTION INTRAMUSCULAR; INTRAVENOUS at 10:40

## 2021-09-17 RX ADMIN — SODIUM CHLORIDE, POTASSIUM CHLORIDE, SODIUM LACTATE AND CALCIUM CHLORIDE: 600; 310; 30; 20 INJECTION, SOLUTION INTRAVENOUS at 10:22

## 2021-09-17 RX ADMIN — Medication 100 MCG: at 10:51

## 2021-09-17 ASSESSMENT — PAIN SCALES - GENERAL: PAIN_LEVEL: 0

## 2021-09-17 ASSESSMENT — PAIN DESCRIPTION - PAIN TYPE
TYPE: SURGICAL PAIN
TYPE: SURGICAL PAIN
TYPE: ACUTE PAIN;CHRONIC PAIN
TYPE: SURGICAL PAIN
TYPE: SURGICAL PAIN

## 2021-09-17 ASSESSMENT — FIBROSIS 4 INDEX: FIB4 SCORE: 1.22

## 2021-09-17 NOTE — OP REPORT
DATE OF SERVICE:  09/17/2021     NAME OF PROCEDURE:  Revision Medtronic intrathecal infusion pump.     PREPROCEDURAL DIAGNOSES:  The patient with chronic low back pain, bilateral   radicular symptoms chronic with Medtronic intrathecal infusion pump, now with   depleted battery requiring pump revision.     POSTPROCEDURAL DIAGNOSES:  The patient with chronic low back pain, bilateral   radicular symptoms chronic with Medtronic intrathecal infusion pump, now with   depleted battery requiring pump revision.     SURGEON:  Jass Mendes MD     ANESTHESIA:  General.     ANESTHESIOLOGIST:  Sukhjinder Castillo DO     DESCRIPTION OF PROCEDURE:  The patient was brought into the preoperative   holding area.  The procedure itself and possible complications had been   thoroughly discussed.  He understood and accepted the risks.  He was then   brought into the operating room and placed in a prone position.  General   anesthesia was induced by Dr. Castillo.  The left lower quadrant was sterilely   prepped.  The patient was sterilely draped.  A longitudinal incision was made   above the pump after 10 mL of 0.5% bupivacaine and 1:200,000 epinephrine was   infiltrated over the incision line.  Electrocautery was used to staunch the   bleeding.  The pump was then evacuated from the pocket.  Upon evacuating the   pump, it was noted that there was fraying of the catheter approximately 5-6 cm   lateral to the sutureless connector site of the pump.  It was elected to   replace this sutureless connector.  Electrocautery was used to staunch the   bleeding as the part of the Silastic pouch was removed to identify the   sutureless connector to the catheter.  This was ligated.  A new sutureless   connector was placed connecting the catheter to the sutureless connector and   the sutureless connector to  the pump.  A 24-gauge Conrad needle was placed in   the access port.  CSF was freely aspirated at 2 mL.  ____ the old pump had   been removed, the  new pump had been refilled and was connected to this and   implanted into the pocket after a gram of vancomycin powder was placed into   the pocket.  The pocket was then sutured closed using 0 Ethibond, 3-0 Vicryl   and staples on the skin.  The patient will be programmed to stay at his   regular dose rate with no changes.  He will be scheduled to follow up in our   office Monday morning to evaluate wound site and evaluate his overall pain   therapy.           ______________________________  MD COOKIE Bess/JOVANA/ANANYA    DD:  09/17/2021 11:41  DT:  09/17/2021 12:04    Job#:  [id number]

## 2021-09-17 NOTE — OR NURSING
1255 Arrived to phase II via gurney. Pt up to bathroom to void. Voided q.s. u/o. Steady gait. No c/o lightheadedness. Pt states good pain control. Dressing to surgical site CDI. No redness, warmth or swelling noted. Friend at bedside.     1321 Pt met criteria for discharge. DC instructions given to pt and friend, both s.u. and agreement with poc. Dr. Mendes sent a pain script to [facility], pt and friend state understanding. Medtronic kit and remote sent home with pt. Transfer safely to vehicle via wheelchair with CNA.

## 2021-09-17 NOTE — ANESTHESIA PREPROCEDURE EVALUATION
Relevant Problems   CARDIAC   (positive) HTN (hypertension)         (positive) ARF (acute renal failure) (HCC)   (positive) Cancer of kidney (HCC)   (positive) Chronic hepatitis C virus infection (HCC)       Physical Exam    Airway   Mallampati: II  TM distance: >3 FB  Neck ROM: full       Cardiovascular - normal exam  Rhythm: regular  Rate: normal  (-) murmur     Dental - normal exam           Pulmonary - normal exam  Breath sounds clear to auscultation     Abdominal    Neurological - normal exam                 Anesthesia Plan    ASA 3       Plan - general       Airway plan will be ETT          Induction: intravenous    Postoperative Plan: Postoperative administration of opioids is intended.    Pertinent diagnostic labs and testing reviewed    Informed Consent:    Anesthetic plan and risks discussed with patient.    Use of blood products discussed with: patient whom consented to blood products.

## 2021-09-17 NOTE — ANESTHESIA TIME REPORT
Anesthesia Start and Stop Event Times     Date Time Event    9/17/2021 1032 Ready for Procedure     1040 Anesthesia Start     1139 Anesthesia Stop        Responsible Staff  09/17/21    Name Role Begin End    Sukhjinder Castillo D.O. Anesth 1040 1139        Preop Diagnosis (Free Text):  Pre-op Diagnosis     LUMBAR RADICULOPATHY, CHRONIC        Preop Diagnosis (Codes):  Diagnosis Information     Diagnosis Code(s): Lumbar radiculopathy, chronic [M54.16]        Post op Diagnosis  Lumbar radiculopathy, chronic      Premium Reason  Non-Premium    Comments:

## 2021-09-17 NOTE — OR NURSING
1135: Patient arrived from OR via gurney.  LLQ dressing CDI. Ice pack placed    Sedation/Resp Status: Spontaneous eye opening. Respirations spontaneous and non-labored.    1145: Cont stable, no changes. Pulled pain meds per mar for 8/10 reported pain to [facility].    1148: Report to Mary DEGROOT

## 2021-09-17 NOTE — ANESTHESIA PROCEDURE NOTES
Airway  Performed by: Sukhjinder Castillo D.O.  Authorized by: Sukhjinder Castillo D.O.     Location:  OR  Urgency:  Elective  Indications for Airway Management:  Anesthesia      Spontaneous Ventilation: absent    Sedation Level:  Deep  Preoxygenated: Yes    Final Airway Type:  Supraglottic airway  Final Supraglottic Airway:  Standard LMA    SGA Size:  4  Cuff Pressure (cm H2O):  30  Number of Attempts at Approach:  1

## 2021-09-17 NOTE — OR NURSING
1150 assumed pt care, medicated for c/o pain    1205 pain pump rep here educating pt on use of pump    1220 states pain level tolerable now, taking po fluids    1240 pt asking about post op pain rx, none noted on chart, called Dr Mendes office, message left for Dr Mendes    1065 transferred to phase 2 in stable condition via [facility] w/cna    1300 Dr Mendes office called, pain rx called to [facility] in [place]

## 2021-09-17 NOTE — OR NURSING
0948: Brought patient back to pre-op and assumed care.  1029: Patient allergies and NPO status verified, home medication reconciliation completed and belongings secured. Patient verbalizes understanding of pain scale, expected course of stay and plan of care. Surgical site verified with patient. IV access established. Sequentials placed on legs.

## 2021-09-17 NOTE — OR SURGEON
Immediate Post OP Note    * No Diagnosis Codes entered *  Depleted medtronic pump battery    Post-Op Diagnosis Codes:     * Lumbar radiculopathy, chronic [M54.16]      Procedure(s):  REVISION, INSERTION, OR REPLACEMENT, INTRATHECAL ANALGESIC PUMP - REPLACEMENT - Wound Class: Clean    Surgeon(s):  Jass Mendes M.D.    Anesthesiologist/Type of Anesthesia:  Anesthesiologist: Sukhjinder Castillo D.O./General    Surgical Staff:  Circulator: Loren Kohler R.N.  Scrub Person: Jai Copeland    Specimens removed if any:  * No specimens in log *    Estimated Blood Loss: <20cc    Findings: None    Complications: None        9/17/2021 11:32 AM Jass Mendes M.D.

## 2021-09-17 NOTE — DISCHARGE INSTRUCTIONS
ACTIVITY: Rest and take it easy for the first 24 hours.  A responsible adult is recommended to remain with you during that time.  It is normal to feel sleepy.  We encourage you to not do anything that requires balance, judgment or coordination.    MILD FLU-LIKE SYMPTOMS ARE NORMAL. YOU MAY EXPERIENCE GENERALIZED MUSCLE ACHES, THROAT IRRITATION, HEADACHE AND/OR SOME NAUSEA.    FOR 24 HOURS DO NOT:  Drive, operate machinery or run household appliances.  Drink beer or alcoholic beverages.   Make important decisions or sign legal documents.    SPECIAL INSTRUCTIONS:     DIET: To avoid nausea, slowly advance diet as tolerated, avoiding spicy or greasy foods for the first day.  Add more substantial food to your diet according to your physician's instructions.  Babies can be fed formula or breast milk as soon as they are hungry.  INCREASE FLUIDS AND FIBER TO AVOID CONSTIPATION.    SURGICAL DRESSING/BATHING: keep dressing clean and dry until otherwise instructed by surgeon.    FOLLOW-UP APPOINTMENT:  As scheduled.    You should CALL YOUR PHYSICIAN if you develop:  Fever greater than 101 degrees F.  Pain not relieved by medication, or persistent nausea or vomiting.  Excessive bleeding (blood soaking through dressing) or unexpected drainage from the wound.  Extreme redness or swelling around the incision site, drainage of pus or foul smelling drainage.  Inability to urinate or empty your bladder within 8 hours.  Problems with breathing or chest pain.    You should call 911 if you develop problems with breathing or chest pain.  If you are unable to contact your doctor or surgical center, you should go to the nearest emergency room or urgent care center.  Physician's telephone #: [phone]    If any questions arise, call your doctor.  If your doctor is not available, please feel free to call the Surgical Center at [phone]. The Contact Center is open Monday through Friday 7AM to 5PM and may speak to a nurse at  [phone], or toll free at [phone].     A registered nurse may call you a few days after your surgery to see how you are doing after your procedure.    MEDICATIONS: Resume taking daily medication.  Take prescribed pain medication with food.  If no medication is prescribed, you may take non-aspirin pain medication if needed.  PAIN MEDICATION CAN BE VERY CONSTIPATING.  Take a stool softener or laxative such as senokot, pericolace, or milk of magnesia if needed.    Prescription given for ______________.  Last pain medication given at 11:55am.    If your physician has prescribed pain medication that includes Acetaminophen (Tylenol), do not take additional Acetaminophen (Tylenol) while taking the prescribed medication.    Depression / Suicide Risk    As you are discharged from this [facility] facility, it is important to learn how to keep safe from harming yourself.    Recognize the warning signs:  · Abrupt changes in personality, positive or negative- including increase in energy   · Giving away possessions  · Change in eating patterns- significant weight changes-  positive or negative  · Change in sleeping patterns- unable to sleep or sleeping all the time   · Unwillingness or inability to communicate  · Depression  · Unusual sadness, discouragement and loneliness  · Talk of wanting to die  · Neglect of personal appearance   · Rebelliousness- reckless behavior  · Withdrawal from people/activities they love  · Confusion- inability to concentrate     If you or a loved one observes any of these behaviors or has concerns about self-harm, here's what you can do:  · Talk about it- your feelings and reasons for harming yourself  · Remove any means that you might use to hurt yourself (examples: pills, rope, extension cords, firearm)  · Get professional help from the community (Mental Health, Substance Abuse, psychological counseling)  · Do not be alone:Call your Safe Contact- someone whom you trust who will be  there for you.  · Call your local CRISIS HOTLINE 573-9101 or 644-700-6969  · Call your local Children's Mobile Crisis Response Team [place] (291) 062-5133 or www.[employer]  · Call the toll free National Suicide Prevention Hotlines   · National Suicide Prevention Lifeline 919-053-CFBS (0035)  · National [place] Line Network 800-SUICIDE (782-7359)

## 2021-09-17 NOTE — ANESTHESIA POSTPROCEDURE EVALUATION
Patient: Quirino Ruiz    Procedure Summary     Date: 09/17/21 Room / Location:  OR  / SURGERY [facility]    Anesthesia Start: 1040 Anesthesia Stop: 1139    Procedure: REVISION, INSERTION, OR REPLACEMENT, INTRATHECAL ANALGESIC PUMP - REPLACEMENT (Left Abdomen) Diagnosis:       Lumbar radiculopathy, chronic      (LUMBAR RADICULOPATHY, CHRONIC)    Surgeons: Jass Mendes M.D. Responsible Provider: Sukhjinder Castillo D.O.    Anesthesia Type: general ASA Status: 3          Final Anesthesia Type: general  Last vitals  BP   Blood Pressure : 144/80    Temp   36.3 °C (97.3 °F)    Pulse   97   Resp   16    SpO2   98 %      Anesthesia Post Evaluation    Pain score: 0                No complications documented.     Nurse Pain Score: 6 (NPRS)

## 2021-09-18 LAB
BACTERIA UR CULT: NORMAL
SIGNIFICANT IND 70042: NORMAL
SITE SITE: NORMAL
SOURCE SOURCE: NORMAL

## 2021-11-16 ENCOUNTER — APPOINTMENT (RX ONLY)
Dept: URBAN - METROPOLITAN AREA CLINIC 22 | Facility: CLINIC | Age: 66
Setting detail: DERMATOLOGY
End: 2021-11-16

## 2021-11-16 DIAGNOSIS — L259 CONTACT DERMATITIS AND OTHER ECZEMA, UNSPECIFIED CAUSE: ICD-10-CM

## 2021-11-16 DIAGNOSIS — Z86.007 PERSONAL HISTORY OF IN-SITU NEOPLASM OF SKIN: ICD-10-CM

## 2021-11-16 PROBLEM — Z85.828 PERSONAL HISTORY OF OTHER MALIGNANT NEOPLASM OF SKIN: Status: ACTIVE | Noted: 2021-11-16

## 2021-11-16 PROBLEM — L30.8 OTHER SPECIFIED DERMATITIS: Status: ACTIVE | Noted: 2021-11-16

## 2021-11-16 PROBLEM — D48.5 NEOPLASM OF UNCERTAIN BEHAVIOR OF SKIN: Status: ACTIVE | Noted: 2021-11-16

## 2021-11-16 PROCEDURE — ? BIOPSY BY SHAVE METHOD

## 2021-11-16 PROCEDURE — 11102 TANGNTL BX SKIN SINGLE LES: CPT

## 2021-11-16 PROCEDURE — 99214 OFFICE O/P EST MOD 30 MIN: CPT | Mod: 25

## 2021-11-16 PROCEDURE — ? PRESCRIPTION

## 2021-11-16 PROCEDURE — ? ADDITIONAL NOTES

## 2021-11-16 PROCEDURE — ? COUNSELING

## 2021-11-16 RX ORDER — TRIAMCINOLONE ACETONIDE 1 MG/G
1 OINTMENT TOPICAL BID
Qty: 453.6 | Refills: 1 | Status: ERX | COMMUNITY
Start: 2021-11-16

## 2021-11-16 RX ADMIN — TRIAMCINOLONE ACETONIDE 1: 1 OINTMENT TOPICAL at 00:00

## 2021-11-16 ASSESSMENT — LOCATION SIMPLE DESCRIPTION DERM
LOCATION SIMPLE: RIGHT LOWER LID TARSAL MARGIN
LOCATION SIMPLE: CHEST
LOCATION SIMPLE: RIGHT ANTERIOR NECK

## 2021-11-16 ASSESSMENT — LOCATION DETAILED DESCRIPTION DERM
LOCATION DETAILED: RIGHT TARSAL MARGIN OF THE INFERIOR EYELID
LOCATION DETAILED: RIGHT INFERIOR LATERAL NECK
LOCATION DETAILED: LEFT MEDIAL SUPERIOR CHEST

## 2021-11-16 ASSESSMENT — LOCATION ZONE DERM
LOCATION ZONE: EYELID
LOCATION ZONE: NECK
LOCATION ZONE: TRUNK

## 2021-11-16 NOTE — PROCEDURE: ADDITIONAL NOTES
Additional Notes: RTC if not improving/resolving with tac
Render Risk Assessment In Note?: no
Detail Level: Detailed

## 2021-12-14 ENCOUNTER — RX ONLY (OUTPATIENT)
Age: 66
Setting detail: RX ONLY
End: 2021-12-14

## 2021-12-14 RX ORDER — CEPHALEXIN 500 MG/1
4 CAPSULES CAPSULE ORAL ONCE
Qty: 4 | Refills: 0 | Status: ERX | COMMUNITY
Start: 2021-12-14

## 2021-12-21 ENCOUNTER — APPOINTMENT (RX ONLY)
Dept: URBAN - METROPOLITAN AREA CLINIC 22 | Facility: CLINIC | Age: 66
Setting detail: DERMATOLOGY
End: 2021-12-21

## 2021-12-21 DIAGNOSIS — Z71.89 OTHER SPECIFIED COUNSELING: ICD-10-CM

## 2021-12-21 DIAGNOSIS — D18.0 HEMANGIOMA: ICD-10-CM

## 2021-12-21 DIAGNOSIS — D22 MELANOCYTIC NEVI: ICD-10-CM

## 2021-12-21 DIAGNOSIS — L81.4 OTHER MELANIN HYPERPIGMENTATION: ICD-10-CM

## 2021-12-21 DIAGNOSIS — L82.1 OTHER SEBORRHEIC KERATOSIS: ICD-10-CM

## 2021-12-21 DIAGNOSIS — Z86.007 PERSONAL HISTORY OF IN-SITU NEOPLASM OF SKIN: ICD-10-CM

## 2021-12-21 DIAGNOSIS — L72.8 OTHER FOLLICULAR CYSTS OF THE SKIN AND SUBCUTANEOUS TISSUE: ICD-10-CM

## 2021-12-21 PROBLEM — D48.5 NEOPLASM OF UNCERTAIN BEHAVIOR OF SKIN: Status: ACTIVE | Noted: 2021-12-21

## 2021-12-21 PROBLEM — D22.5 MELANOCYTIC NEVI OF TRUNK: Status: ACTIVE | Noted: 2021-12-21

## 2021-12-21 PROBLEM — Z85.828 PERSONAL HISTORY OF OTHER MALIGNANT NEOPLASM OF SKIN: Status: ACTIVE | Noted: 2021-12-21

## 2021-12-21 PROBLEM — D18.01 HEMANGIOMA OF SKIN AND SUBCUTANEOUS TISSUE: Status: ACTIVE | Noted: 2021-12-21

## 2021-12-21 PROCEDURE — ? COUNSELING

## 2021-12-21 PROCEDURE — 11102 TANGNTL BX SKIN SINGLE LES: CPT

## 2021-12-21 PROCEDURE — ? BIOPSY BY SHAVE METHOD

## 2021-12-21 PROCEDURE — 99213 OFFICE O/P EST LOW 20 MIN: CPT | Mod: 25

## 2021-12-21 PROCEDURE — ? SUNSCREEN RECOMMENDATIONS

## 2021-12-21 ASSESSMENT — LOCATION DETAILED DESCRIPTION DERM
LOCATION DETAILED: STERNUM
LOCATION DETAILED: LEFT INFERIOR UPPER BACK
LOCATION DETAILED: LEFT PROXIMAL DORSAL FOREARM
LOCATION DETAILED: RIGHT MEDIAL INFERIOR CHEST
LOCATION DETAILED: RIGHT TARSAL MARGIN OF THE INFERIOR EYELID
LOCATION DETAILED: RIGHT SUPERIOR LATERAL MIDBACK

## 2021-12-21 ASSESSMENT — LOCATION SIMPLE DESCRIPTION DERM
LOCATION SIMPLE: LEFT FOREARM
LOCATION SIMPLE: RIGHT LOWER LID TARSAL MARGIN
LOCATION SIMPLE: CHEST
LOCATION SIMPLE: RIGHT LOWER BACK
LOCATION SIMPLE: LEFT UPPER BACK

## 2021-12-21 ASSESSMENT — LOCATION ZONE DERM
LOCATION ZONE: EYELID
LOCATION ZONE: TRUNK
LOCATION ZONE: ARM

## 2022-01-12 ENCOUNTER — APPOINTMENT (RX ONLY)
Dept: URBAN - METROPOLITAN AREA CLINIC 22 | Facility: CLINIC | Age: 67
Setting detail: DERMATOLOGY
End: 2022-01-12

## 2022-01-12 PROBLEM — C44.329 SQUAMOUS CELL CARCINOMA OF SKIN OF OTHER PARTS OF FACE: Status: ACTIVE | Noted: 2022-01-12

## 2022-01-12 PROCEDURE — ? DIAGNOSIS COMMENT

## 2022-01-12 PROCEDURE — ? MOHS SURGERY

## 2022-01-12 PROCEDURE — 17311 MOHS 1 STAGE H/N/HF/G: CPT

## 2022-01-12 PROCEDURE — 12052 INTMD RPR FACE/MM 2.6-5.0 CM: CPT

## 2022-01-12 NOTE — PROCEDURE: DIAGNOSIS COMMENT
Render Risk Assessment In Note?: no
Detail Level: Detailed
Comment: Arising beneath a seborrheic keratosis

## 2022-01-12 NOTE — PROCEDURE: MOHS SURGERY
Mohs Case Number: [id number]
Previous Accession (Optional): [id number]
Biopsy Photograph Reviewed: Yes
Referring Physician (Optional): Amaris Mejia PA-C
Consent Type: Consent 1 (Standard)
Eye Shield Used: No
Surgeon Performing Repair (Optional): Cecilio De Paz M.D.
Initial Size Of Lesion: 0.6
X Size Of Lesion In Cm (Optional): 0.4
Number Of Stages: 1
Primary Defect Length In Cm (Final Defect Size - Required For Flaps/Grafts): 1.1
Repair Type: Intermediate Layered Repair
Oculoplastic Surgeon Procedure Text (A): After obtaining clear surgical margins the patient was sent to oculoplastics for surgical repair.  The patient understands they will receive post-surgical care and follow-up from the referring physician's office.
Otolaryngologist Procedure Text (A): After obtaining clear surgical margins the patient was sent to otolaryngology for surgical repair.  The patient understands they will receive post-surgical care and follow-up from the referring physician's office.
Plastic Surgeon Procedure Text (A): After obtaining clear surgical margins the patient was sent to plastics for surgical repair.  The patient understands they will receive post-surgical care and follow-up from the referring physician's office.
Mid-Level Procedure Text (A): After obtaining clear surgical margins the patient was sent to a mid-level provider for surgical repair.  The patient understands they will receive post-surgical care and follow-up from the mid-level provider.
Provider Procedure Text (A): After obtaining clear surgical margins the defect was repaired by another provider.
Asc Procedure Text (A): After obtaining clear surgical margins the patient was sent to an ASC for surgical repair.  The patient understands they will receive post-surgical care and follow-up from the ASC physician.
Simple / Intermediate / Complex Repair - Final Wound Length In Cm: 4.2
Suturegard Retention Suture: 2-0 Nylon
Retention Suture Bite Size: 3 mm
Length To Time In Minutes Device Was In Place: 10
Undermining Type: Entire Wound
Debridement Text: The wound edges were debrided prior to proceeding with the closure to facilitate wound healing.
Helical Rim Text: The closure involved the helical rim.
Vermilion Border Text: The closure involved the vermilion border.
Nostril Rim Text: The closure involved the nostril rim.
Retention Suture Text: Retention sutures were placed to support the closure and prevent dehiscence.
Secondary Defect Length In Cm (Required For Flaps): 0
Area H Indication Text: Tumors in this location are included in Area H (eyelids, eyebrows, nose, lips, chin, ear, pre-auricular, post-auricular, temple, genitalia, hands, feet, ankles and areola).  Tissue conservation is critical in these anatomic locations.
Area M Indication Text: Tumors in this location are included in Area M (cheek, forehead, scalp, neck, jawline and pretibial skin).  Mohs surgery is indicated for tumors in these anatomic locations.
Area L Indication Text: Tumors in this location are included in Area L (trunk and extremities).  Mohs surgery is indicated for larger tumors, or tumors with aggressive histologic features, in these anatomic locations.
Tumor Debulked?: curette
Depth Of Tumor Invasion (For Histology): tumor not visualized (deep and peripheral margins are clear of tumor)
Surgical Defect Width In Cm (Optional): 1.0
Special Stains Stage 1 - Results: Base On Clearance Noted Above
Stage 2: Additional Anesthesia Type: 1% lidocaine with epinephrine and a 1:10 solution of 8.4% sodium bicarbonate
Stage 4: Additional Anesthesia Type: 1% lidocaine with epinephrine
Include Tumor Staging In Mohs Note?: Please Select the Appropriate Response
Staging Info: By selecting yes to the question above you will include information on AJCC 8 tumor staging in your Mohs note. Information on tumor staging will be automatically added for SCCs on the head and neck. AJCC 8 includes tumor size, tumor depth, perineural involvement and bone invasion.
Tumor Depth: Less than 6mm from granular layer and no invasion beyond the subcutaneous fat
Medical Necessity Statement: Based on my medical judgement, Mohs surgery is the most appropriate treatment for this cancer compared to other treatments.
Alternatives Discussed Intro (Do Not Add Period): I discussed alternative treatments to Mohs surgery and specifically discussed the risks and benefits of
Consent 1/Introductory Paragraph: The rationale for Mohs was explained to the patient and consent was obtained. The risks, benefits and alternatives to therapy were discussed in detail. Specifically, the risks of infection, scarring, bleeding, prolonged wound healing, incomplete removal, allergy to anesthesia, nerve injury and recurrence were addressed. Prior to the procedure, the treatment site was clearly identified and confirmed by the patient. All components of Universal Protocol/PAUSE Rule completed.
Consent 2/Introductory Paragraph: Mohs surgery was explained to the patient and consent was obtained. The risks, benefits and alternatives to therapy were discussed in detail. Specifically, the risks of infection, scarring, bleeding, prolonged wound healing, incomplete removal, allergy to anesthesia, nerve injury and recurrence were addressed. Prior to the procedure, the treatment site was clearly identified and confirmed by the patient. All components of Universal Protocol/PAUSE Rule completed.
Consent 3/Introductory Paragraph: I gave the patient a chance to ask questions they had about the procedure.  Following this I explained the Mohs procedure and consent was obtained. The risks, benefits and alternatives to therapy were discussed in detail. Specifically, the risks of infection, scarring, bleeding, prolonged wound healing, incomplete removal, allergy to anesthesia, nerve injury and recurrence were addressed. Prior to the procedure, the treatment site was clearly identified and confirmed by the patient. All components of Universal Protocol/PAUSE Rule completed.
Consent (Temporal Branch)/Introductory Paragraph: The rationale for Mohs was explained to the patient and consent was obtained. The risks, benefits and alternatives to therapy were discussed in detail. Specifically, the risks of damage to the temporal branch of the facial nerve, infection, scarring, bleeding, prolonged wound healing, incomplete removal, allergy to anesthesia, and recurrence were addressed. Prior to the procedure, the treatment site was clearly identified and confirmed by the patient. All components of Universal Protocol/PAUSE Rule completed.
Consent (Marginal Mandibular)/Introductory Paragraph: The rationale for Mohs was explained to the patient and consent was obtained. The risks, benefits and alternatives to therapy were discussed in detail. Specifically, the risks of damage to the marginal mandibular branch of the facial nerve, infection, scarring, bleeding, prolonged wound healing, incomplete removal, allergy to anesthesia, and recurrence were addressed. Prior to the procedure, the treatment site was clearly identified and confirmed by the patient. All components of Universal Protocol/PAUSE Rule completed.
Consent (Spinal Accessory)/Introductory Paragraph: The rationale for Mohs was explained to the patient and consent was obtained. The risks, benefits and alternatives to therapy were discussed in detail. Specifically, the risks of damage to the spinal accessory nerve, infection, scarring, bleeding, prolonged wound healing, incomplete removal, allergy to anesthesia, and recurrence were addressed. Prior to the procedure, the treatment site was clearly identified and confirmed by the patient. All components of Universal Protocol/PAUSE Rule completed.
Consent (Near Eyelid Margin)/Introductory Paragraph: The rationale for Mohs was explained to the patient and consent was obtained. The risks, benefits and alternatives to therapy were discussed in detail. Specifically, the risks of ectropion or eyelid deformity, infection, scarring, bleeding, prolonged wound healing, incomplete removal, allergy to anesthesia, nerve injury and recurrence were addressed. Prior to the procedure, the treatment site was clearly identified and confirmed by the patient. All components of Universal Protocol/PAUSE Rule completed.
Consent (Ear)/Introductory Paragraph: The rationale for Mohs was explained to the patient and consent was obtained. The risks, benefits and alternatives to therapy were discussed in detail. Specifically, the risks of ear deformity, infection, scarring, bleeding, prolonged wound healing, incomplete removal, allergy to anesthesia, nerve injury and recurrence were addressed. Prior to the procedure, the treatment site was clearly identified and confirmed by the patient. All components of Universal Protocol/PAUSE Rule completed.
Consent (Nose)/Introductory Paragraph: The rationale for Mohs was explained to the patient and consent was obtained. The risks, benefits and alternatives to therapy were discussed in detail. Specifically, the risks of nasal deformity, changes in the flow of air through the nose, infection, scarring, bleeding, prolonged wound healing, incomplete removal, allergy to anesthesia, nerve injury and recurrence were addressed. Prior to the procedure, the treatment site was clearly identified and confirmed by the patient. All components of Universal Protocol/PAUSE Rule completed.
Consent (Lip)/Introductory Paragraph: The rationale for Mohs was explained to the patient and consent was obtained. The risks, benefits and alternatives to therapy were discussed in detail. Specifically, the risks of lip deformity, changes in the oral aperture, infection, scarring, bleeding, prolonged wound healing, incomplete removal, allergy to anesthesia, nerve injury and recurrence were addressed. Prior to the procedure, the treatment site was clearly identified and confirmed by the patient. All components of Universal Protocol/PAUSE Rule completed.
Consent (Scalp)/Introductory Paragraph: The rationale for Mohs was explained to the patient and consent was obtained. The risks, benefits and alternatives to therapy were discussed in detail. Specifically, the risks of changes in hair growth pattern secondary to repair, infection, scarring, bleeding, prolonged wound healing, incomplete removal, allergy to anesthesia, nerve injury and recurrence were addressed. Prior to the procedure, the treatment site was clearly identified and confirmed by the patient. All components of Universal Protocol/PAUSE Rule completed.
Detail Level: Detailed
Postop Diagnosis: same
Anesthesia Volume In Cc: 9
Additional Anesthesia Volume In Cc: 6
Hemostasis: Electrodesiccation
Estimated Blood Loss (Cc): minimal
Repair Anesthesia Method: local infiltration
Undermining Location (Optional): in the deep fat
Brow Lift Text: A midfrontal incision was made medially to the defect to allow access to the tissues just superior to the left eyebrow. Following careful dissection inferiorly in a supraperiosteal plane to the level of the left eyebrow, several 3-0 monocryl sutures were used to resuspend the eyebrow orbicularis oculi muscular unit to the superior frontal bone periosteum. This resulted in an appropriate reapproximation of static eyebrow symmetry and correction of the left brow ptosis.
Deep Sutures: 4-0 Maxon
Epidermal Sutures: 6-0 Surgipro
Epidermal Closure: running
Suturegard Intro: Intraoperative tissue expansion was performed, utilizing the SUTUREGARD device, in order to reduce wound tension.
Suturegard Body: The suture ends were repeatedly re-tightened and re-clamped to achieve the desired tissue expansion.
Hemigard Intro: Due to skin fragility and wound tension, it was decided to use HEMIGARD adhesive retention suture devices to permit a linear closure. The skin was cleaned and dried for a 6cm distance away from the wound. Excessive hair, if present, was removed to allow for adhesion.
Hemigard Postcare Instructions: The HEMIGARD strips are to remain completely dry for at least 5-7 days.
Donor Site Anesthesia Type: same as repair anesthesia
Epidermal Closure Graft Donor Site (Optional): simple interrupted
Graft Donor Site Bandage (Optional-Leave Blank If You Don't Want In Note): Steri-strips and a pressure bandage were applied to the donor site.
Closure 2 Information: This tab is for additional flaps and grafts, including complex repair and grafts and complex repair and flaps. You can also specify a different location for the additional defect, if the location is the same you do not need to select a new one. We will insert the automated text for the repair you select below just as we do for solitary flaps and grafts. Please note that at this time if you select a location with a different insurance zone you will need to override the ICD10 and CPT if appropriate.
Closure 3 Information: This tab is for additional flaps and grafts above and beyond our usual structured repairs.  Please note if you enter information here it will not currently bill and you will need to add the billing information manually.
Wound Care: Petrolatum
Dressing: pressure dressing with telfa
Dressing (No Sutures): dry sterile dressing
Suture Removal: 7 days
Unna Boot Text: An Unna boot was placed to help immobilize the limb and facilitate more rapid healing.
Home Suture Removal Text: Patient was provided instructions on removing sutures and will remove their sutures at home.  If they have any questions or difficulties they will call the office.
Post-Care Instructions: I reviewed with the patient in detail post-care instructions. Patient is not to engage in any heavy lifting, exercise, or swimming for the next 14 days. Should the patient develop any fevers, chills, bleeding, severe pain patient will contact the office immediately.
Pain Refusal Text: I offered to prescribe pain medication but the patient refused to take this medication.
Mauc Instructions: By selecting yes to the question below the MAUC number will be added into the note.  This will be calculated automatically based on the diagnosis chosen, the size entered, the body zone selected (H,M,L) and the specific indications you chose. You will also have the option to override the Mohs AUC if you disagree with the automatically calculated number and this option is found in the Case Summary tab.
Where Do You Want The Question To Include Opioid Counseling Located?: Case Summary Tab
Eye Protection Verbiage: Before proceeding with the stage, a plastic scleral shield was inserted. The globe was anesthetized with a few drops of 1% lidocaine with 1:100,000 epinephrine. Then, an appropriate sized scleral shield was chosen and coated with lacrilube ointment. The shield was gently inserted and left in place for the duration of each stage. After the stage was completed, the shield was gently removed.
Mohs Method Verbiage: An incision at a 45 degree angle following the standard Mohs approach was done and the specimen was harvested as a microscopic controlled layer.
Surgeon/Pathologist Verbiage (Will Incorporate Name Of Surgeon From Intro If Not Blank): operated in two distinct and integrated capacities as the surgeon and pathologist.
Mohs Histo Method Verbiage: Each section was then chromacoded and processed in the Mohs lab using the Mohs protocol and submitted for frozen section.
Subsequent Stages Histo Method Verbiage: Using a similar technique to that described above, a thin layer of tissue was removed from all areas where tumor was visible on the previous stage.  The tissue was again oriented, mapped, dyed, and processed as above.
Mohs Rapid Report Verbiage: The area of clinically evident tumor was marked with skin marking ink and appropriately hatched.  The initial incision was made following the Mohs approach through the skin.  The specimen was taken to the lab, divided into the necessary number of pieces, chromacoded and processed according to the Mohs protocol.  This was repeated in successive stages until a tumor free defect was achieved.
Complex Repair Preamble Text (Leave Blank If You Do Not Want): Extensive wide undermining was performed.
Intermediate Repair Preamble Text (Leave Blank If You Do Not Want): Undermining was performed with blunt dissection.
Non-Graft Cartilage Fenestration Text: The cartilage was fenestrated with a 2mm punch biopsy to help facilitate healing.
Graft Cartilage Fenestration Text: The cartilage was fenestrated with a 2mm punch biopsy to help facilitate graft survival and healing.
Secondary Intention Text (Leave Blank If You Do Not Want): The defect will heal with secondary intention.
No Repair - Repaired With Adjacent Surgical Defect Text (Leave Blank If You Do Not Want): After obtaining clear surgical margins the defect was repaired concurrently with another surgical defect which was in close approximation.
Adjacent Tissue Transfer Text: The defect edges were debeveled with a #15 scalpel blade.  Given the location of the defect and the proximity to free margins an adjacent tissue transfer was deemed most appropriate.  Using a sterile surgical marker, an appropriate flap was drawn incorporating the defect and placing the expected incisions within the relaxed skin tension lines where possible.    The area thus outlined was incised deep to adipose tissue with a #15 scalpel blade.  The skin margins were undermined to an appropriate distance in all directions utilizing iris scissors.
Advancement Flap (Single) Text: The defect edges were debeveled with a #15 scalpel blade.  Given the location of the defect and the proximity to free margins a single advancement flap was deemed most appropriate.  Using a sterile surgical marker, an appropriate advancement flap was drawn incorporating the defect and placing the expected incisions within the relaxed skin tension lines where possible.    The area thus outlined was incised deep to adipose tissue with a #15 scalpel blade.  The skin margins were undermined to an appropriate distance in all directions utilizing iris scissors.
Advancement Flap (Double) Text: The defect edges were debeveled with a #15 scalpel blade.  Given the location of the defect and the proximity to free margins a double advancement flap was deemed most appropriate.  Using a sterile surgical marker, the appropriate advancement flaps were drawn incorporating the defect and placing the expected incisions within the relaxed skin tension lines where possible.    The area thus outlined was incised deep to adipose tissue with a #15 scalpel blade.  The skin margins were undermined to an appropriate distance in all directions utilizing iris scissors.
Burow's Advancement Flap Text: The defect edges were debeveled with a #15 scalpel blade.  Given the location of the defect and the proximity to free margins a Burow's advancement flap was deemed most appropriate.  Using a sterile surgical marker, the appropriate advancement flap was drawn incorporating the defect and placing the expected incisions within the relaxed skin tension lines where possible.    The area thus outlined was incised deep to adipose tissue with a #15 scalpel blade.  The skin margins were undermined to an appropriate distance in all directions utilizing iris scissors.
Chonodrocutaneous Helical Advancement Flap Text: The defect edges were debeveled with a #15 scalpel blade.  Given the location of the defect and the proximity to free margins a chondrocutaneous helical advancement flap was deemed most appropriate.  Using a sterile surgical marker, the appropriate advancement flap was drawn incorporating the defect and placing the expected incisions within the relaxed skin tension lines where possible.    The area thus outlined was incised deep to adipose tissue with a #15 scalpel blade.  The skin margins were undermined to an appropriate distance in all directions utilizing iris scissors.
Crescentic Advancement Flap Text: The defect edges were debeveled with a #15 scalpel blade.  Given the location of the defect and the proximity to free margins a crescentic advancement flap was deemed most appropriate.  Using a sterile surgical marker, the appropriate advancement flap was drawn incorporating the defect and placing the expected incisions within the relaxed skin tension lines where possible.    The area thus outlined was incised deep to adipose tissue with a #15 scalpel blade.  The skin margins were undermined to an appropriate distance in all directions utilizing iris scissors.
A-T Advancement Flap Text: The defect edges were debeveled with a #15 scalpel blade.  Given the location of the defect, shape of the defect and the proximity to free margins an A-T advancement flap was deemed most appropriate.  Using a sterile surgical marker, an appropriate advancement flap was drawn incorporating the defect and placing the expected incisions within the relaxed skin tension lines where possible.    The area thus outlined was incised deep to adipose tissue with a #15 scalpel blade.  The skin margins were undermined to an appropriate distance in all directions utilizing iris scissors.
O-T Advancement Flap Text: The defect edges were debeveled with a #15 scalpel blade.  Given the location of the defect, shape of the defect and the proximity to free margins an O-T advancement flap was deemed most appropriate.  Using a sterile surgical marker, an appropriate advancement flap was drawn incorporating the defect and placing the expected incisions within the relaxed skin tension lines where possible.    The area thus outlined was incised deep to adipose tissue with a #15 scalpel blade.  The skin margins were undermined to an appropriate distance in all directions utilizing iris scissors.
O-L Flap Text: The defect edges were debeveled with a #15 scalpel blade.  Given the location of the defect, shape of the defect and the proximity to free margins an O-L flap was deemed most appropriate.  Using a sterile surgical marker, an appropriate advancement flap was drawn incorporating the defect and placing the expected incisions within the relaxed skin tension lines where possible.    The area thus outlined was incised deep to adipose tissue with a #15 scalpel blade.  The skin margins were undermined to an appropriate distance in all directions utilizing iris scissors.
O-Z Flap Text: The defect edges were debeveled with a #15 scalpel blade.  Given the location of the defect, shape of the defect and the proximity to free margins an O-Z flap was deemed most appropriate.  Using a sterile surgical marker, an appropriate transposition flap was drawn incorporating the defect and placing the expected incisions within the relaxed skin tension lines where possible. The area thus outlined was incised deep to adipose tissue with a #15 scalpel blade.  The skin margins were undermined to an appropriate distance in all directions utilizing iris scissors.
Double O-Z Flap Text: The defect edges were debeveled with a #15 scalpel blade.  Given the location of the defect, shape of the defect and the proximity to free margins a Double O-Z flap was deemed most appropriate.  Using a sterile surgical marker, an appropriate transposition flap was drawn incorporating the defect and placing the expected incisions within the relaxed skin tension lines where possible. The area thus outlined was incised deep to adipose tissue with a #15 scalpel blade.  The skin margins were undermined to an appropriate distance in all directions utilizing iris scissors.
V-Y Flap Text: The defect edges were debeveled with a #15 scalpel blade.  Given the location of the defect, shape of the defect and the proximity to free margins a V-Y flap was deemed most appropriate.  Using a sterile surgical marker, an appropriate advancement flap was drawn incorporating the defect and placing the expected incisions within the relaxed skin tension lines where possible.    The area thus outlined was incised deep to adipose tissue with a #15 scalpel blade.  The skin margins were undermined to an appropriate distance in all directions utilizing iris scissors.
Advancement-Rotation Flap Text: The defect edges were debeveled with a #15 scalpel blade.  Given the location of the defect, shape of the defect and the proximity to free margins an advancement-rotation flap was deemed most appropriate.  Using a sterile surgical marker, an appropriate flap was drawn incorporating the defect and placing the expected incisions within the relaxed skin tension lines where possible. The area thus outlined was incised deep to adipose tissue with a #15 scalpel blade.  The skin margins were undermined to an appropriate distance in all directions utilizing iris scissors.
Mercedes Flap Text: The defect edges were debeveled with a #15 scalpel blade.  Given the location of the defect, shape of the defect and the proximity to free margins a Mercedes flap was deemed most appropriate.  Using a sterile surgical marker, an appropriate advancement flap was drawn incorporating the defect and placing the expected incisions within the relaxed skin tension lines where possible. The area thus outlined was incised deep to adipose tissue with a #15 scalpel blade.  The skin margins were undermined to an appropriate distance in all directions utilizing iris scissors.
Modified Advancement Flap Text: The defect edges were debeveled with a #15 scalpel blade.  Given the location of the defect, shape of the defect and the proximity to free margins a modified advancement flap was deemed most appropriate.  Using a sterile surgical marker, an appropriate advancement flap was drawn incorporating the defect and placing the expected incisions within the relaxed skin tension lines where possible.    The area thus outlined was incised deep to adipose tissue with a #15 scalpel blade.  The skin margins were undermined to an appropriate distance in all directions utilizing iris scissors.
Mucosal Advancement Flap Text: Given the location of the defect, shape of the defect and the proximity to free margins a mucosal advancement flap was deemed most appropriate. Incisions were made with a 15 blade scalpel in the appropriate fashion along the cutaneous vermilion border and the mucosal lip. The remaining actinically damaged mucosal tissue was excised.  The mucosal advancement flap was then elevated to the gingival sulcus with care taken to preserve the neurovascular structures and advanced into the primary defect. Care was taken to ensure that precise realignment of the vermilion border was achieved.
Peng Advancement Flap Text: The defect edges were debeveled with a #15 scalpel blade.  Given the location of the defect, shape of the defect and the proximity to free margins a Peng advancement flap was deemed most appropriate.  Using a sterile surgical marker, an appropriate advancement flap was drawn incorporating the defect and placing the expected incisions within the relaxed skin tension lines where possible. The area thus outlined was incised deep to adipose tissue with a #15 scalpel blade.  The skin margins were undermined to an appropriate distance in all directions utilizing iris scissors.
Hatchet Flap Text: The defect edges were debeveled with a #15 scalpel blade.  Given the location of the defect, shape of the defect and the proximity to free margins a hatchet flap was deemed most appropriate.  Using a sterile surgical marker, an appropriate hatchet flap was drawn incorporating the defect and placing the expected incisions within the relaxed skin tension lines where possible.    The area thus outlined was incised deep to adipose tissue with a #15 scalpel blade.  The skin margins were undermined to an appropriate distance in all directions utilizing iris scissors.
Rotation Flap Text: The defect edges were debeveled with a #15 scalpel blade.  Given the location of the defect, shape of the defect and the proximity to free margins a rotation flap was deemed most appropriate.  Using a sterile surgical marker, an appropriate rotation flap was drawn incorporating the defect and placing the expected incisions within the relaxed skin tension lines where possible.    The area thus outlined was incised deep to adipose tissue with a #15 scalpel blade.  The skin margins were undermined to an appropriate distance in all directions utilizing iris scissors.
Spiral Flap Text: The defect edges were debeveled with a #15 scalpel blade.  Given the location of the defect, shape of the defect and the proximity to free margins a spiral flap was deemed most appropriate.  Using a sterile surgical marker, an appropriate rotation flap was drawn incorporating the defect and placing the expected incisions within the relaxed skin tension lines where possible. The area thus outlined was incised deep to adipose tissue with a #15 scalpel blade.  The skin margins were undermined to an appropriate distance in all directions utilizing iris scissors.
Staged Advancement Flap Text: The defect edges were debeveled with a #15 scalpel blade.  Given the location of the defect, shape of the defect and the proximity to free margins a staged advancement flap was deemed most appropriate.  Using a sterile surgical marker, an appropriate advancement flap was drawn incorporating the defect and placing the expected incisions within the relaxed skin tension lines where possible. The area thus outlined was incised deep to adipose tissue with a #15 scalpel blade.  The skin margins were undermined to an appropriate distance in all directions utilizing iris scissors.
Star Wedge Flap Text: The defect edges were debeveled with a #15 scalpel blade.  Given the location of the defect, shape of the defect and the proximity to free margins a star wedge flap was deemed most appropriate.  Using a sterile surgical marker, an appropriate rotation flap was drawn incorporating the defect and placing the expected incisions within the relaxed skin tension lines where possible. The area thus outlined was incised deep to adipose tissue with a #15 scalpel blade.  The skin margins were undermined to an appropriate distance in all directions utilizing iris scissors.
Transposition Flap Text: The defect edges were debeveled with a #15 scalpel blade.  Given the location of the defect and the proximity to free margins a transposition flap was deemed most appropriate.  Using a sterile surgical marker, an appropriate transposition flap was drawn incorporating the defect.    The area thus outlined was incised deep to adipose tissue with a #15 scalpel blade.  The skin margins were undermined to an appropriate distance in all directions utilizing iris scissors.
Muscle Hinge Flap Text: The defect edges were debeveled with a #15 scalpel blade.  Given the size, depth and location of the defect and the proximity to free margins a muscle hinge flap was deemed most appropriate.  Using a sterile surgical marker, an appropriate hinge flap was drawn incorporating the defect. The area thus outlined was incised with a #15 scalpel blade.  The skin margins were undermined to an appropriate distance in all directions utilizing iris scissors.
Mustarde Flap Text: The defect edges were debeveled with a #15 scalpel blade.  Given the size, depth and location of the defect and the proximity to free margins a Mustarde flap was deemed most appropriate.  Using a sterile surgical marker, an appropriate flap was drawn incorporating the defect. The area thus outlined was incised with a #15 scalpel blade.  The skin margins were undermined to an appropriate distance in all directions utilizing iris scissors.
Nasal Turnover Hinge Flap Text: The defect edges were debeveled with a #15 scalpel blade.  Given the size, depth, location of the defect and the defect being full thickness a nasal turnover hinge flap was deemed most appropriate.  Using a sterile surgical marker, an appropriate hinge flap was drawn incorporating the defect. The area thus outlined was incised with a #15 scalpel blade. The flap was designed to recreate the nasal mucosal lining and the alar rim. The skin margins were undermined to an appropriate distance in all directions utilizing iris scissors.
Nasalis-Muscle-Based Myocutaneous Island Pedicle Flap Text: Using a #15 blade, an incision was made around the donor flap to the level of the nasalis muscle. Wide lateral undermining was then performed in both the subcutaneous plane above the nasalis muscle, and in a submuscular plane just above periosteum. This allowed the formation of a free nasalis muscle axial pedicle (based on the angular artery) which was still attached to the actual cutaneous flap, increasing its mobility and vascular viability. Hemostasis was obtained with pinpoint electrocoagulation. The flap was mobilized into position and the pivotal anchor points positioned and stabilized with buried interrupted sutures. Subcutaneous and dermal tissues were closed in a multilayered fashion with sutures. Tissue redundancies were excised, and the epidermal edges were apposed without significant tension and sutured with sutures.
Orbicularis Oris Muscle Flap Text: The defect edges were debeveled with a #15 scalpel blade.  Given that the defect affected the competency of the oral sphincter an orbicularis oris muscle flap was deemed most appropriate to restore this competency and normal muscle function.  Using a sterile surgical marker, an appropriate flap was drawn incorporating the defect. The area thus outlined was incised with a #15 scalpel blade.
Melolabial Transposition Flap Text: The defect edges were debeveled with a #15 scalpel blade.  Given the location of the defect and the proximity to free margins a melolabial flap was deemed most appropriate.  Using a sterile surgical marker, an appropriate melolabial transposition flap was drawn incorporating the defect.    The area thus outlined was incised deep to adipose tissue with a #15 scalpel blade.  The skin margins were undermined to an appropriate distance in all directions utilizing iris scissors.
Rhombic Flap Text: The defect edges were debeveled with a #15 scalpel blade.  Given the location of the defect and the proximity to free margins a rhombic flap was deemed most appropriate.  Using a sterile surgical marker, an appropriate rhombic flap was drawn incorporating the defect.    The area thus outlined was incised deep to adipose tissue with a #15 scalpel blade.  The skin margins were undermined to an appropriate distance in all directions utilizing iris scissors.
Rhomboid Transposition Flap Text: The defect edges were debeveled with a #15 scalpel blade.  Given the location of the defect and the proximity to free margins a rhomboid transposition flap was deemed most appropriate.  Using a sterile surgical marker, an appropriate rhomboid flap was drawn incorporating the defect.    The area thus outlined was incised deep to adipose tissue with a #15 scalpel blade.  The skin margins were undermined to an appropriate distance in all directions utilizing iris scissors.
Bi-Rhombic Flap Text: The defect edges were debeveled with a #15 scalpel blade.  Given the location of the defect and the proximity to free margins a bi-rhombic flap was deemed most appropriate.  Using a sterile surgical marker, an appropriate rhombic flap was drawn incorporating the defect. The area thus outlined was incised deep to adipose tissue with a #15 scalpel blade.  The skin margins were undermined to an appropriate distance in all directions utilizing iris scissors.
Helical Rim Advancement Flap Text: The defect edges were debeveled with a #15 blade scalpel.  Given the location of the defect and the proximity to free margins (helical rim) a double helical rim advancement flap was deemed most appropriate.  Using a sterile surgical marker, the appropriate advancement flaps were drawn incorporating the defect and placing the expected incisions between the helical rim and antihelix where possible.  The area thus outlined was incised through and through with a #15 scalpel blade.  With a skin hook and iris scissors, the flaps were gently and sharply undermined and freed up.
Bilateral Helical Rim Advancement Flap Text: The defect edges were debeveled with a #15 blade scalpel.  Given the location of the defect and the proximity to free margins (helical rim) a bilateral helical rim advancement flap was deemed most appropriate.  Using a sterile surgical marker, the appropriate advancement flaps were drawn incorporating the defect and placing the expected incisions between the helical rim and antihelix where possible.  The area thus outlined was incised through and through with a #15 scalpel blade.  With a skin hook and iris scissors, the flaps were gently and sharply undermined and freed up.
Ear Star Wedge Flap Text: The defect edges were debeveled with a #15 blade scalpel.  Given the location of the defect and the proximity to free margins (helical rim) an ear star wedge flap was deemed most appropriate.  Using a sterile surgical marker, the appropriate flap was drawn incorporating the defect and placing the expected incisions between the helical rim and antihelix where possible.  The area thus outlined was incised through and through with a #15 scalpel blade.
Banner Transposition Flap Text: The defect edges were debeveled with a #15 scalpel blade.  Given the location of the defect and the proximity to free margins a Banner transposition flap was deemed most appropriate.  Using a sterile surgical marker, an appropriate flap drawn around the defect. The area thus outlined was incised deep to adipose tissue with a #15 scalpel blade.  The skin margins were undermined to an appropriate distance in all directions utilizing iris scissors.
Bilobed Flap Text: The defect edges were debeveled with a #15 scalpel blade.  Given the location of the defect and the proximity to free margins a bilobe flap was deemed most appropriate.  Using a sterile surgical marker, an appropriate bilobe flap drawn around the defect.    The area thus outlined was incised deep to adipose tissue with a #15 scalpel blade.  The skin margins were undermined to an appropriate distance in all directions utilizing iris scissors.
Bilobed Transposition Flap Text: The defect edges were debeveled with a #15 scalpel blade.  Given the location of the defect and the proximity to free margins a bilobed transposition flap was deemed most appropriate.  Using a sterile surgical marker, an appropriate bilobe flap drawn around the defect.    The area thus outlined was incised deep to adipose tissue with a #15 scalpel blade.  The skin margins were undermined to an appropriate distance in all directions utilizing iris scissors.
Trilobed Flap Text: The defect edges were debeveled with a #15 scalpel blade.  Given the location of the defect and the proximity to free margins a trilobed flap was deemed most appropriate.  Using a sterile surgical marker, an appropriate trilobed flap drawn around the defect.    The area thus outlined was incised deep to adipose tissue with a #15 scalpel blade.  The skin margins were undermined to an appropriate distance in all directions utilizing iris scissors.
Dorsal Nasal Flap Text: The defect edges were debeveled with a #15 scalpel blade.  Given the location of the defect and the proximity to free margins a dorsal nasal flap was deemed most appropriate.  Using a sterile surgical marker, an appropriate dorsal nasal flap was drawn around the defect.    The area thus outlined was incised deep to adipose tissue with a #15 scalpel blade.  The skin margins were undermined to an appropriate distance in all directions utilizing iris scissors.
Island Pedicle Flap Text: The defect edges were debeveled with a #15 scalpel blade.  Given the location of the defect, shape of the defect and the proximity to free margins an island pedicle advancement flap was deemed most appropriate.  Using a sterile surgical marker, an appropriate advancement flap was drawn incorporating the defect, outlining the appropriate donor tissue and placing the expected incisions within the relaxed skin tension lines where possible.    The area thus outlined was incised deep to adipose tissue with a #15 scalpel blade.  The skin margins were undermined to an appropriate distance in all directions around the primary defect and laterally outward around the island pedicle utilizing iris scissors.  There was minimal undermining beneath the pedicle flap.
Island Pedicle Flap With Canthal Suspension Text: The defect edges were debeveled with a #15 scalpel blade.  Given the location of the defect, shape of the defect and the proximity to free margins an island pedicle advancement flap was deemed most appropriate.  Using a sterile surgical marker, an appropriate advancement flap was drawn incorporating the defect, outlining the appropriate donor tissue and placing the expected incisions within the relaxed skin tension lines where possible. The area thus outlined was incised deep to adipose tissue with a #15 scalpel blade.  The skin margins were undermined to an appropriate distance in all directions around the primary defect and laterally outward around the island pedicle utilizing iris scissors.  There was minimal undermining beneath the pedicle flap. A suspension suture was placed in the canthal tendon to prevent tension and prevent ectropion.
Alar Island Pedicle Flap Text: The defect edges were debeveled with a #15 scalpel blade.  Given the location of the defect, shape of the defect and the proximity to the alar rim an island pedicle advancement flap was deemed most appropriate.  Using a sterile surgical marker, an appropriate advancement flap was drawn incorporating the defect, outlining the appropriate donor tissue and placing the expected incisions within the nasal ala running parallel to the alar rim. The area thus outlined was incised with a #15 scalpel blade.  The skin margins were undermined minimally to an appropriate distance in all directions around the primary defect and laterally outward around the island pedicle utilizing iris scissors.  There was minimal undermining beneath the pedicle flap.
Double Island Pedicle Flap Text: The defect edges were debeveled with a #15 scalpel blade.  Given the location of the defect, shape of the defect and the proximity to free margins a double island pedicle advancement flap was deemed most appropriate.  Using a sterile surgical marker, an appropriate advancement flap was drawn incorporating the defect, outlining the appropriate donor tissue and placing the expected incisions within the relaxed skin tension lines where possible.    The area thus outlined was incised deep to adipose tissue with a #15 scalpel blade.  The skin margins were undermined to an appropriate distance in all directions around the primary defect and laterally outward around the island pedicle utilizing iris scissors.  There was minimal undermining beneath the pedicle flap.
Island Pedicle Flap-Requiring Vessel Identification Text: The defect edges were debeveled with a #15 scalpel blade.  Given the location of the defect, shape of the defect and the proximity to free margins an island pedicle advancement flap was deemed most appropriate.  Using a sterile surgical marker, an appropriate advancement flap was drawn, based on the axial vessel mentioned above, incorporating the defect, outlining the appropriate donor tissue and placing the expected incisions within the relaxed skin tension lines where possible.    The area thus outlined was incised deep to adipose tissue with a #15 scalpel blade.  The skin margins were undermined to an appropriate distance in all directions around the primary defect and laterally outward around the island pedicle utilizing iris scissors.  There was minimal undermining beneath the pedicle flap.
Keystone Flap Text: The defect edges were debeveled with a #15 scalpel blade.  Given the location of the defect, shape of the defect a keystone flap was deemed most appropriate.  Using a sterile surgical marker, an appropriate keystone flap was drawn incorporating the defect, outlining the appropriate donor tissue and placing the expected incisions within the relaxed skin tension lines where possible. The area thus outlined was incised deep to adipose tissue with a #15 scalpel blade.  The skin margins were undermined to an appropriate distance in all directions around the primary defect and laterally outward around the flap utilizing iris scissors.
O-T Plasty Text: The defect edges were debeveled with a #15 scalpel blade.  Given the location of the defect, shape of the defect and the proximity to free margins an O-T plasty was deemed most appropriate.  Using a sterile surgical marker, an appropriate O-T plasty was drawn incorporating the defect and placing the expected incisions within the relaxed skin tension lines where possible.    The area thus outlined was incised deep to adipose tissue with a #15 scalpel blade.  The skin margins were undermined to an appropriate distance in all directions utilizing iris scissors.
O-Z Plasty Text: The defect edges were debeveled with a #15 scalpel blade.  Given the location of the defect, shape of the defect and the proximity to free margins an O-Z plasty (double transposition flap) was deemed most appropriate.  Using a sterile surgical marker, the appropriate transposition flaps were drawn incorporating the defect and placing the expected incisions within the relaxed skin tension lines where possible.    The area thus outlined was incised deep to adipose tissue with a #15 scalpel blade.  The skin margins were undermined to an appropriate distance in all directions utilizing iris scissors.  Hemostasis was achieved with electrocautery.  The flaps were then transposed into place, one clockwise and the other counterclockwise, and anchored with interrupted buried subcutaneous sutures.
Double O-Z Plasty Text: The defect edges were debeveled with a #15 scalpel blade.  Given the location of the defect, shape of the defect and the proximity to free margins a Double O-Z plasty (double transposition flap) was deemed most appropriate.  Using a sterile surgical marker, the appropriate transposition flaps were drawn incorporating the defect and placing the expected incisions within the relaxed skin tension lines where possible. The area thus outlined was incised deep to adipose tissue with a #15 scalpel blade.  The skin margins were undermined to an appropriate distance in all directions utilizing iris scissors.  Hemostasis was achieved with electrocautery.  The flaps were then transposed into place, one clockwise and the other counterclockwise, and anchored with interrupted buried subcutaneous sutures.
V-Y Plasty Text: The defect edges were debeveled with a #15 scalpel blade.  Given the location of the defect, shape of the defect and the proximity to free margins an V-Y advancement flap was deemed most appropriate.  Using a sterile surgical marker, an appropriate advancement flap was drawn incorporating the defect and placing the expected incisions within the relaxed skin tension lines where possible.    The area thus outlined was incised deep to adipose tissue with a #15 scalpel blade.  The skin margins were undermined to an appropriate distance in all directions utilizing iris scissors.
H Plasty Text: Given the location of the defect, shape of the defect and the proximity to free margins a H-plasty was deemed most appropriate for repair.  Using a sterile surgical marker, the appropriate advancement arms of the H-plasty were drawn incorporating the defect and placing the expected incisions within the relaxed skin tension lines where possible. The area thus outlined was incised deep to adipose tissue with a #15 scalpel blade. The skin margins were undermined to an appropriate distance in all directions utilizing iris scissors.  The opposing advancement arms were then advanced into place in opposite direction and anchored with interrupted buried subcutaneous sutures.
W Plasty Text: The lesion was extirpated to the level of the fat with a #15 scalpel blade.  Given the location of the defect, shape of the defect and the proximity to free margins a W-plasty was deemed most appropriate for repair.  Using a sterile surgical marker, the appropriate transposition arms of the W-plasty were drawn incorporating the defect and placing the expected incisions within the relaxed skin tension lines where possible.    The area thus outlined was incised deep to adipose tissue with a #15 scalpel blade.  The skin margins were undermined to an appropriate distance in all directions utilizing iris scissors.  The opposing transposition arms were then transposed into place in opposite direction and anchored with interrupted buried subcutaneous sutures.
Z Plasty Text: The lesion was extirpated to the level of the fat with a #15 scalpel blade.  Given the location of the defect, shape of the defect and the proximity to free margins a Z-plasty was deemed most appropriate for repair.  Using a sterile surgical marker, the appropriate transposition arms of the Z-plasty were drawn incorporating the defect and placing the expected incisions within the relaxed skin tension lines where possible.    The area thus outlined was incised deep to adipose tissue with a #15 scalpel blade.  The skin margins were undermined to an appropriate distance in all directions utilizing iris scissors.  The opposing transposition arms were then transposed into place in opposite direction and anchored with interrupted buried subcutaneous sutures.
Zygomaticofacial Flap Text: Given the location of the defect, shape of the defect and the proximity to free margins a zygomaticofacial flap was deemed most appropriate for repair.  Using a sterile surgical marker, the appropriate flap was drawn incorporating the defect and placing the expected incisions within the relaxed skin tension lines where possible. The area thus outlined was incised deep to adipose tissue with a #15 scalpel blade with preservation of a vascular pedicle.  The skin margins were undermined to an appropriate distance in all directions utilizing iris scissors.  The flap was then placed into the defect and anchored with interrupted buried subcutaneous sutures.
Cheek Interpolation Flap Text: A decision was made to reconstruct the defect utilizing an interpolation axial flap and a staged reconstruction.  A telfa template was made of the defect.  This telfa template was then used to outline the Cheek Interpolation flap.  The donor area for the pedicle flap was then injected with anesthesia.  The flap was excised through the skin and subcutaneous tissue down to the layer of the underlying musculature.  The interpolation flap was carefully excised within this deep plane to maintain its blood supply.  The edges of the donor site were undermined.   The donor site was closed in a primary fashion.  The pedicle was then rotated into position and sutured.  Once the tube was sutured into place, adequate blood supply was confirmed with blanching and refill.  The pedicle was then wrapped with xeroform gauze and dressed appropriately with a telfa and gauze bandage to ensure continued blood supply and protect the attached pedicle.
Cheek-To-Nose Interpolation Flap Text: A decision was made to reconstruct the defect utilizing an interpolation axial flap and a staged reconstruction.  A telfa template was made of the defect.  This telfa template was then used to outline the Cheek-To-Nose Interpolation flap.  The donor area for the pedicle flap was then injected with anesthesia.  The flap was excised through the skin and subcutaneous tissue down to the layer of the underlying musculature.  The interpolation flap was carefully excised within this deep plane to maintain its blood supply.  The edges of the donor site were undermined.   The donor site was closed in a primary fashion.  The pedicle was then rotated into position and sutured.  Once the tube was sutured into place, adequate blood supply was confirmed with blanching and refill.  The pedicle was then wrapped with xeroform gauze and dressed appropriately with a telfa and gauze bandage to ensure continued blood supply and protect the attached pedicle.
Interpolation Flap Text: A decision was made to reconstruct the defect utilizing an interpolation axial flap and a staged reconstruction.  A telfa template was made of the defect.  This telfa template was then used to outline the interpolation flap.  The donor area for the pedicle flap was then injected with anesthesia.  The flap was excised through the skin and subcutaneous tissue down to the layer of the underlying musculature.  The interpolation flap was carefully excised within this deep plane to maintain its blood supply.  The edges of the donor site were undermined.   The donor site was closed in a primary fashion.  The pedicle was then rotated into position and sutured.  Once the tube was sutured into place, adequate blood supply was confirmed with blanching and refill.  The pedicle was then wrapped with xeroform gauze and dressed appropriately with a telfa and gauze bandage to ensure continued blood supply and protect the attached pedicle.
Melolabial Interpolation Flap Text: A decision was made to reconstruct the defect utilizing an interpolation axial flap and a staged reconstruction.  A telfa template was made of the defect.  This telfa template was then used to outline the melolabial interpolation flap.  The donor area for the pedicle flap was then injected with anesthesia.  The flap was excised through the skin and subcutaneous tissue down to the layer of the underlying musculature.  The pedicle flap was carefully excised within this deep plane to maintain its blood supply.  The edges of the donor site were undermined.   The donor site was closed in a primary fashion.  The pedicle was then rotated into position and sutured.  Once the tube was sutured into place, adequate blood supply was confirmed with blanching and refill.  The pedicle was then wrapped with xeroform gauze and dressed appropriately with a telfa and gauze bandage to ensure continued blood supply and protect the attached pedicle.
Mastoid Interpolation Flap Text: A decision was made to reconstruct the defect utilizing an interpolation axial flap and a staged reconstruction.  A telfa template was made of the defect.  This telfa template was then used to outline the mastoid interpolation flap.  The donor area for the pedicle flap was then injected with anesthesia.  The flap was excised through the skin and subcutaneous tissue down to the layer of the underlying musculature.  The pedicle flap was carefully excised within this deep plane to maintain its blood supply.  The edges of the donor site were undermined.   The donor site was closed in a primary fashion.  The pedicle was then rotated into position and sutured.  Once the tube was sutured into place, adequate blood supply was confirmed with blanching and refill.  The pedicle was then wrapped with xeroform gauze and dressed appropriately with a telfa and gauze bandage to ensure continued blood supply and protect the attached pedicle.
Posterior Auricular Interpolation Flap Text: A decision was made to reconstruct the defect utilizing an interpolation axial flap and a staged reconstruction.  A telfa template was made of the defect.  This telfa template was then used to outline the posterior auricular interpolation flap.  The donor area for the pedicle flap was then injected with anesthesia.  The flap was excised through the skin and subcutaneous tissue down to the layer of the underlying musculature.  The pedicle flap was carefully excised within this deep plane to maintain its blood supply.  The edges of the donor site were undermined.   The donor site was closed in a primary fashion.  The pedicle was then rotated into position and sutured.  Once the tube was sutured into place, adequate blood supply was confirmed with blanching and refill.  The pedicle was then wrapped with xeroform gauze and dressed appropriately with a telfa and gauze bandage to ensure continued blood supply and protect the attached pedicle.
Paramedian Forehead Flap Text: A decision was made to reconstruct the defect utilizing an interpolation axial flap and a staged reconstruction.  A telfa template was made of the defect.  This telfa template was then used to outline the paramedian forehead pedicle flap.  The donor area for the pedicle flap was then injected with anesthesia.  The flap was excised through the skin and subcutaneous tissue down to the layer of the underlying musculature.  The pedicle flap was carefully excised within this deep plane to maintain its blood supply.  The edges of the donor site were undermined.   The donor site was closed in a primary fashion.  The pedicle was then rotated into position and sutured.  Once the tube was sutured into place, adequate blood supply was confirmed with blanching and refill.  The pedicle was then wrapped with xeroform gauze and dressed appropriately with a telfa and gauze bandage to ensure continued blood supply and protect the attached pedicle.
Cheiloplasty (Less Than 50%) Text: A decision was made to reconstruct the defect with a  cheiloplasty.  The defect was undermined extensively.  Additional obicularis oris muscle was excised with a 15 blade scalpel.  The defect was converted into a full thickness wedge, of less than 50% of the vertical height of the lip, to facilite a better cosmetic result.  Small vessels were then tied off with 5-0 monocyrl. The obicularis oris, superficial fascia, adipose and dermis were then reapproximated.  After the deeper layers were approximated the epidermis was reapproximated with particular care given to realign the vermilion border.
Cheiloplasty (Complex) Text: A decision was made to reconstruct the defect with a  cheiloplasty.  The defect was undermined extensively.  Additional obicularis oris muscle was excised with a 15 blade scalpel.  The defect was converted into a full thickness wedge to facilite a better cosmetic result.  Small vessels were then tied off with 5-0 monocyrl. The obicularis oris, superficial fascia, adipose and dermis were then reapproximated.  After the deeper layers were approximated the epidermis was reapproximated with particular care given to realign the vermilion border.
Ear Wedge Repair Text: A wedge excision was completed by carrying down an excision through the full thickness of the ear and cartilage with an inward facing Burow's triangle. The wound was then closed in a layered fashion.
Full Thickness Lip Wedge Repair (Flap) Text: Given the location of the defect and the proximity to free margins a full thickness wedge repair was deemed most appropriate.  Using a sterile surgical marker, the appropriate repair was drawn incorporating the defect and placing the expected incisions perpendicular to the vermilion border.  The vermilion border was also meticulously outlined to ensure appropriate reapproximation during the repair.  The area thus outlined was incised through and through with a #15 scalpel blade.  The muscularis and dermis were reaproximated with deep sutures following hemostasis. Care was taken to realign the vermilion border before proceeding with the superficial closure.  Once the vermilion was realigned the superfical and mucosal closure was finished.
Ftsg Text: The defect edges were debeveled with a #15 scalpel blade.  Given the location of the defect, shape of the defect and the proximity to free margins a full thickness skin graft was deemed most appropriate.  Using a sterile surgical marker, the primary defect shape was transferred to the donor site. The area thus outlined was incised deep to adipose tissue with a #15 scalpel blade.  The harvested graft was then trimmed of adipose tissue until only dermis and epidermis was left.  The skin margins of the secondary defect were undermined to an appropriate distance in all directions utilizing iris scissors.  The secondary defect was closed with interrupted buried subcutaneous sutures.  The skin edges were then re-apposed with running  sutures.  The skin graft was then placed in the primary defect and oriented appropriately.
Split-Thickness Skin Graft Text: The defect edges were debeveled with a #15 scalpel blade.  Given the location of the defect, shape of the defect and the proximity to free margins a split thickness skin graft was deemed most appropriate.  Using a sterile surgical marker, the primary defect shape was transferred to the donor site. The split thickness graft was then harvested.  The skin graft was then placed in the primary defect and oriented appropriately.
Burow's Graft Text: The defect edges were debeveled with a #15 scalpel blade.  Given the location of the defect, shape of the defect, the proximity to free margins and the presence of a standing cone deformity a Burow's skin graft was deemed most appropriate. The standing cone was removed and this tissue was then trimmed to the shape of the primary defect. The adipose tissue was also removed until only dermis and epidermis were left.  The skin margins of the secondary defect were undermined to an appropriate distance in all directions utilizing iris scissors.  The secondary defect was closed with interrupted buried subcutaneous sutures.  The skin edges were then re-apposed with running  sutures.  The skin graft was then placed in the primary defect and oriented appropriately.
Cartilage Graft Text: The defect edges were debeveled with a #15 scalpel blade.  Given the location of the defect, shape of the defect, the fact the defect involved a full thickness cartilage defect a cartilage graft was deemed most appropriate.  An appropriate donor site was identified, cleansed, and anesthetized. The cartilage graft was then harvested and transferred to the recipient site, oriented appropriately and then sutured into place.  The secondary defect was then repaired using a primary closure.
Composite Graft Text: The defect edges were debeveled with a #15 scalpel blade.  Given the location of the defect, shape of the defect, the proximity to free margins and the fact the defect was full thickness a composite graft was deemed most appropriate.  The defect was outline and then transferred to the donor site.  A full thickness graft was then excised from the donor site. The graft was then placed in the primary defect, oriented appropriately and then sutured into place.  The secondary defect was then repaired using a primary closure.
Epidermal Autograft Text: The defect edges were debeveled with a #15 scalpel blade.  Given the location of the defect, shape of the defect and the proximity to free margins an epidermal autograft was deemed most appropriate.  Using a sterile surgical marker, the primary defect shape was transferred to the donor site. The epidermal graft was then harvested.  The skin graft was then placed in the primary defect and oriented appropriately.
Dermal Autograft Text: The defect edges were debeveled with a #15 scalpel blade.  Given the location of the defect, shape of the defect and the proximity to free margins a dermal autograft was deemed most appropriate.  Using a sterile surgical marker, the primary defect shape was transferred to the donor site. The area thus outlined was incised deep to adipose tissue with a #15 scalpel blade.  The harvested graft was then trimmed of adipose and epidermal tissue until only dermis was left.  The skin graft was then placed in the primary defect and oriented appropriately.
Skin Substitute Text: The defect edges were debeveled with a #15 scalpel blade.  Given the location of the defect, shape of the defect and the proximity to free margins a skin substitute graft was deemed most appropriate.  The graft material was trimmed to fit the size of the defect. The graft was then placed in the primary defect and oriented appropriately.
Tissue Cultured Epidermal Autograft Text: The defect edges were debeveled with a #15 scalpel blade.  Given the location of the defect, shape of the defect and the proximity to free margins a tissue cultured epidermal autograft was deemed most appropriate.  The graft was then trimmed to fit the size of the defect.  The graft was then placed in the primary defect and oriented appropriately.
Xenograft Text: The defect edges were debeveled with a #15 scalpel blade.  Given the location of the defect, shape of the defect and the proximity to free margins a xenograft was deemed most appropriate.  The graft was then trimmed to fit the size of the defect.  The graft was then placed in the primary defect and oriented appropriately.
Purse String (Simple) Text: Given the location of the defect and the characteristics of the surrounding skin a purse string closure was deemed most appropriate.  Undermining was performed circumfirentially around the surgical defect.  A purse string suture was then placed and tightened.
Purse String (Intermediate) Text: Given the location of the defect and the characteristics of the surrounding skin a purse string intermediate closure was deemed most appropriate.  Undermining was performed circumfirentially around the surgical defect.  A purse string suture was then placed and tightened.
Partial Purse String (Simple) Text: Given the location of the defect and the characteristics of the surrounding skin a simple purse string closure was deemed most appropriate.  Undermining was performed circumfirentially around the surgical defect.  A purse string suture was then placed and tightened. Wound tension only allowed a partial closure of the circular defect.
Partial Purse String (Intermediate) Text: Given the location of the defect and the characteristics of the surrounding skin an intermediate purse string closure was deemed most appropriate.  Undermining was performed circumfirentially around the surgical defect.  A purse string suture was then placed and tightened. Wound tension only allowed a partial closure of the circular defect.
Localized Dermabrasion With Wire Brush Text: The patient was draped in routine manner.  Localized dermabrasion using 3 x 17 mm wire brush was performed in routine manner to papillary dermis. This spot dermabrasion is being performed to complete skin cancer reconstruction. It also will eliminate the other sun damaged precancerous cells that are known to be part of the regional effect of a lifetime's worth of sun exposure. This localized dermabrasion is therapeutic and should not be considered cosmetic in any regard.
Tarsorrhaphy Text: A tarsorrhaphy was performed using Frost sutures.
Complex Repair And Flap Additional Text (Will Appearing After The Standard Complex Repair Text): The complex repair was not sufficient to completely close the primary defect. The remaining additional defect was repaired with the flap mentioned below.
Complex Repair And Graft Additional Text (Will Appearing After The Standard Complex Repair Text): The complex repair was not sufficient to completely close the primary defect. The remaining additional defect was repaired with the graft mentioned below.
Manual Repair Warning Statement: We plan on removing the manually selected variable below in favor of our much easier automatic structured text blocks found in the previous tab. We decided to do this to help make the flow better and give you the full power of structured data. Manual selection is never going to be ideal in our platform and I would encourage you to avoid using manual selection from this point on, especially since I will be sunsetting this feature. It is important that you do one of two things with the customized text below. First, you can save all of the text in a word file so you can have it for future reference. Second, transfer the text to the appropriate area in the Library tab. Lastly, if there is a flap or graft type which we do not have you need to let us know right away so I can add it in before the variable is hidden. No need to panic, we plan to give you roughly 6 months to make the change.
Same Histology In Subsequent Stages Text: The pattern and morphology of the tumor is as described in the first stage.
No Residual Tumor Seen Histology Text: There were no malignant cells seen in the sections examined.
Inflammation Suggestive Of Cancer Camouflage Histology Text: There was a dense lymphocytic infiltrate which prevented adequate histologic evaluation of adjacent structures.
Bcc Histology Text: There were numerous aggregates of basaloid cells.
Bcc Infiltrative Histology Text: There were numerous aggregates of basaloid cells demonstrating an infiltrative pattern.
Mart-1 - Positive Histology Text: MART-1 staining demonstrates areas of higher density and clustering of melanocytes with Pagetoid spread upwards within the epidermis. The surgical margins are positive for tumor cells.
Mart-1 - Negative Histology Text: MART-1 staining demonstrates a normal density and pattern of melanocytes along the dermal-epidermal junction. The surgical margins are negative for tumor cells.
Information: Selecting Yes will display possible errors in your note based on the variables you have selected. This validation is only offered as a suggestion for you. PLEASE NOTE THAT THE VALIDATION TEXT WILL BE REMOVED WHEN YOU FINALIZE YOUR NOTE. IF YOU WANT TO FAX A PRELIMINARY NOTE YOU WILL NEED TO TOGGLE THIS TO 'NO' IF YOU DO NOT WANT IT IN YOUR FAXED NOTE.

## 2022-01-19 ENCOUNTER — APPOINTMENT (RX ONLY)
Dept: URBAN - METROPOLITAN AREA CLINIC 22 | Facility: CLINIC | Age: 67
Setting detail: DERMATOLOGY
End: 2022-01-19

## 2022-01-19 DIAGNOSIS — Z48.817 ENCOUNTER FOR SURGICAL AFTERCARE FOLLOWING SURGERY ON THE SKIN AND SUBCUTANEOUS TISSUE: ICD-10-CM

## 2022-01-19 PROCEDURE — ? POST-OP WOUND EVALUATION

## 2022-01-19 ASSESSMENT — LOCATION SIMPLE DESCRIPTION DERM: LOCATION SIMPLE: RIGHT CHEEK

## 2022-01-19 ASSESSMENT — LOCATION DETAILED DESCRIPTION DERM: LOCATION DETAILED: RIGHT SUPERIOR LATERAL MALAR CHEEK

## 2022-01-19 ASSESSMENT — LOCATION ZONE DERM: LOCATION ZONE: FACE

## 2022-01-19 NOTE — PROCEDURE: POST-OP WOUND EVALUATION
Detail Level: Detailed
Add 13524 Cpt? (Important Note: In 2017 The Use Of 08234 Is Being Tracked By Cms To Determine Future Global Period Reimbursement For Global Periods): yes
Wound Evaluated By (Optional): Cecilio De Paz MD
Wound Diameter In Cm(Optional): 0

## 2022-01-20 ENCOUNTER — APPOINTMENT (RX ONLY)
Dept: URBAN - METROPOLITAN AREA CLINIC 22 | Facility: CLINIC | Age: 67
Setting detail: DERMATOLOGY
End: 2022-01-20

## 2022-01-20 DIAGNOSIS — Z48.817 ENCOUNTER FOR SURGICAL AFTERCARE FOLLOWING SURGERY ON THE SKIN AND SUBCUTANEOUS TISSUE: ICD-10-CM

## 2022-01-20 PROCEDURE — 99024 POSTOP FOLLOW-UP VISIT: CPT

## 2022-01-20 PROCEDURE — ? POST-OP WOUND EVALUATION

## 2022-01-20 ASSESSMENT — LOCATION ZONE DERM: LOCATION ZONE: FACE

## 2022-01-20 ASSESSMENT — LOCATION SIMPLE DESCRIPTION DERM: LOCATION SIMPLE: RIGHT CHEEK

## 2022-01-20 ASSESSMENT — LOCATION DETAILED DESCRIPTION DERM: LOCATION DETAILED: RIGHT SUPERIOR LATERAL MALAR CHEEK

## 2022-01-20 NOTE — PROCEDURE: POST-OP WOUND EVALUATION
Detail Level: Detailed
Add 58909 Cpt? (Important Note: In 2017 The Use Of 66409 Is Being Tracked By Cms To Determine Future Global Period Reimbursement For Global Periods): yes
Wound Evaluated By (Optional): Cecilio De Paz MD
Wound Diameter In Cm(Optional): 0
Wound Crusting?: clean
Sutures?: intact
Wound Edema?: mild
Wound Color?: pink
Any New Or Residual Neoplasm?: No

## 2022-02-01 ENCOUNTER — HOSPITAL ENCOUNTER (OUTPATIENT)
Dept: RADIOLOGY | Facility: MEDICAL CENTER | Age: 67
End: 2022-02-01
Attending: FAMILY MEDICINE
Payer: MEDICARE

## 2022-02-01 ENCOUNTER — HOSPITAL ENCOUNTER (OUTPATIENT)
Dept: LAB | Facility: MEDICAL CENTER | Age: 67
End: 2022-02-01
Attending: FAMILY MEDICINE
Payer: MEDICARE

## 2022-02-01 ENCOUNTER — HOSPITAL ENCOUNTER (OUTPATIENT)
Dept: LAB | Facility: MEDICAL CENTER | Age: 67
End: 2022-02-01
Attending: INTERNAL MEDICINE
Payer: MEDICARE

## 2022-02-01 DIAGNOSIS — R05.1 ACUTE COUGH: ICD-10-CM

## 2022-02-01 LAB
ALBUMIN SERPL BCP-MCNC: 4.5 G/DL (ref 3.2–4.9)
ALBUMIN SERPL BCP-MCNC: 4.5 G/DL (ref 3.2–4.9)
ALBUMIN/GLOB SERPL: 1.2 G/DL
ALBUMIN/GLOB SERPL: 1.2 G/DL
ALP SERPL-CCNC: 65 U/L (ref 30–99)
ALP SERPL-CCNC: 65 U/L (ref 30–99)
ALT SERPL-CCNC: 47 U/L (ref 2–50)
ALT SERPL-CCNC: 48 U/L (ref 2–50)
ANION GAP SERPL CALC-SCNC: 14 MMOL/L (ref 7–16)
ANION GAP SERPL CALC-SCNC: 17 MMOL/L (ref 7–16)
AST SERPL-CCNC: 16 U/L (ref 12–45)
AST SERPL-CCNC: 20 U/L (ref 12–45)
BASOPHILS # BLD AUTO: 0.7 % (ref 0–1.8)
BASOPHILS # BLD AUTO: 0.7 % (ref 0–1.8)
BASOPHILS # BLD: 0.07 K/UL (ref 0–0.12)
BASOPHILS # BLD: 0.07 K/UL (ref 0–0.12)
BILIRUB SERPL-MCNC: 0.7 MG/DL (ref 0.1–1.5)
BILIRUB SERPL-MCNC: 0.7 MG/DL (ref 0.1–1.5)
BUN SERPL-MCNC: 17 MG/DL (ref 8–22)
BUN SERPL-MCNC: 17 MG/DL (ref 8–22)
CALCIUM SERPL-MCNC: 10 MG/DL (ref 8.5–10.5)
CALCIUM SERPL-MCNC: 10.1 MG/DL (ref 8.5–10.5)
CHLORIDE SERPL-SCNC: 102 MMOL/L (ref 96–112)
CHLORIDE SERPL-SCNC: 102 MMOL/L (ref 96–112)
CHOLEST SERPL-MCNC: 194 MG/DL (ref 100–199)
CO2 SERPL-SCNC: 21 MMOL/L (ref 20–33)
CO2 SERPL-SCNC: 23 MMOL/L (ref 20–33)
CREAT SERPL-MCNC: 1.08 MG/DL (ref 0.5–1.4)
CREAT SERPL-MCNC: 1.09 MG/DL (ref 0.5–1.4)
EOSINOPHIL # BLD AUTO: 0.14 K/UL (ref 0–0.51)
EOSINOPHIL # BLD AUTO: 0.14 K/UL (ref 0–0.51)
EOSINOPHIL NFR BLD: 1.4 % (ref 0–6.9)
EOSINOPHIL NFR BLD: 1.5 % (ref 0–6.9)
ERYTHROCYTE [DISTWIDTH] IN BLOOD BY AUTOMATED COUNT: 42.5 FL (ref 35.9–50)
ERYTHROCYTE [DISTWIDTH] IN BLOOD BY AUTOMATED COUNT: 42.5 FL (ref 35.9–50)
EST. AVERAGE GLUCOSE BLD GHB EST-MCNC: 123 MG/DL
GLOBULIN SER CALC-MCNC: 3.8 G/DL (ref 1.9–3.5)
GLOBULIN SER CALC-MCNC: 3.9 G/DL (ref 1.9–3.5)
GLUCOSE SERPL-MCNC: 93 MG/DL (ref 65–99)
GLUCOSE SERPL-MCNC: 93 MG/DL (ref 65–99)
HBA1C MFR BLD: 5.9 % (ref 4–5.6)
HCT VFR BLD AUTO: 49.7 % (ref 42–52)
HCT VFR BLD AUTO: 50.3 % (ref 42–52)
HDLC SERPL-MCNC: 39 MG/DL
HGB BLD-MCNC: 17.1 G/DL (ref 14–18)
HGB BLD-MCNC: 17.3 G/DL (ref 14–18)
IMM GRANULOCYTES # BLD AUTO: 0.06 K/UL (ref 0–0.11)
IMM GRANULOCYTES # BLD AUTO: 0.09 K/UL (ref 0–0.11)
IMM GRANULOCYTES NFR BLD AUTO: 0.6 % (ref 0–0.9)
IMM GRANULOCYTES NFR BLD AUTO: 0.9 % (ref 0–0.9)
LDLC SERPL CALC-MCNC: 122 MG/DL
LYMPHOCYTES # BLD AUTO: 3.46 K/UL (ref 1–4.8)
LYMPHOCYTES # BLD AUTO: 3.51 K/UL (ref 1–4.8)
LYMPHOCYTES NFR BLD: 35.8 % (ref 22–41)
LYMPHOCYTES NFR BLD: 36 % (ref 22–41)
MCH RBC QN AUTO: 31.2 PG (ref 27–33)
MCH RBC QN AUTO: 31.2 PG (ref 27–33)
MCHC RBC AUTO-ENTMCNC: 34.4 G/DL (ref 33.7–35.3)
MCHC RBC AUTO-ENTMCNC: 34.4 G/DL (ref 33.7–35.3)
MCV RBC AUTO: 90.6 FL (ref 81.4–97.8)
MCV RBC AUTO: 90.7 FL (ref 81.4–97.8)
MONOCYTES # BLD AUTO: 1.03 K/UL (ref 0–0.85)
MONOCYTES # BLD AUTO: 1.04 K/UL (ref 0–0.85)
MONOCYTES NFR BLD AUTO: 10.5 % (ref 0–13.4)
MONOCYTES NFR BLD AUTO: 10.8 % (ref 0–13.4)
NEUTROPHILS # BLD AUTO: 4.81 K/UL (ref 1.82–7.42)
NEUTROPHILS # BLD AUTO: 5 K/UL (ref 1.82–7.42)
NEUTROPHILS NFR BLD: 50.1 % (ref 44–72)
NEUTROPHILS NFR BLD: 51 % (ref 44–72)
NRBC # BLD AUTO: 0 K/UL
NRBC # BLD AUTO: 0 K/UL
NRBC BLD-RTO: 0 /100 WBC
NRBC BLD-RTO: 0 /100 WBC
PLATELET # BLD AUTO: 392 K/UL (ref 164–446)
PLATELET # BLD AUTO: 412 K/UL (ref 164–446)
PMV BLD AUTO: 9.3 FL (ref 9–12.9)
PMV BLD AUTO: 9.5 FL (ref 9–12.9)
POTASSIUM SERPL-SCNC: 4.5 MMOL/L (ref 3.6–5.5)
POTASSIUM SERPL-SCNC: 4.7 MMOL/L (ref 3.6–5.5)
PROT SERPL-MCNC: 8.3 G/DL (ref 6–8.2)
PROT SERPL-MCNC: 8.4 G/DL (ref 6–8.2)
RBC # BLD AUTO: 5.48 M/UL (ref 4.7–6.1)
RBC # BLD AUTO: 5.55 M/UL (ref 4.7–6.1)
SODIUM SERPL-SCNC: 139 MMOL/L (ref 135–145)
SODIUM SERPL-SCNC: 140 MMOL/L (ref 135–145)
T3FREE SERPL-MCNC: 3.25 PG/ML (ref 2–4.4)
T4 FREE SERPL-MCNC: 1.63 NG/DL (ref 0.93–1.7)
TRIGL SERPL-MCNC: 165 MG/DL (ref 0–149)
TSH SERPL DL<=0.005 MIU/L-ACNC: 1.24 UIU/ML (ref 0.38–5.33)
VIT B12 SERPL-MCNC: 968 PG/ML (ref 211–911)
WBC # BLD AUTO: 9.6 K/UL (ref 4.8–10.8)
WBC # BLD AUTO: 9.8 K/UL (ref 4.8–10.8)

## 2022-02-01 PROCEDURE — 36415 COLL VENOUS BLD VENIPUNCTURE: CPT

## 2022-02-01 PROCEDURE — 84402 ASSAY OF FREE TESTOSTERONE: CPT

## 2022-02-01 PROCEDURE — 85025 COMPLETE CBC W/AUTO DIFF WBC: CPT | Mod: 91

## 2022-02-01 PROCEDURE — 71046 X-RAY EXAM CHEST 2 VIEWS: CPT

## 2022-02-01 PROCEDURE — 83036 HEMOGLOBIN GLYCOSYLATED A1C: CPT

## 2022-02-01 PROCEDURE — 85025 COMPLETE CBC W/AUTO DIFF WBC: CPT

## 2022-02-01 PROCEDURE — 80061 LIPID PANEL: CPT

## 2022-02-01 PROCEDURE — 84481 FREE ASSAY (FT-3): CPT

## 2022-02-01 PROCEDURE — 82607 VITAMIN B-12: CPT

## 2022-02-01 PROCEDURE — 84443 ASSAY THYROID STIM HORMONE: CPT

## 2022-02-01 PROCEDURE — 84270 ASSAY OF SEX HORMONE GLOBUL: CPT

## 2022-02-01 PROCEDURE — 84403 ASSAY OF TOTAL TESTOSTERONE: CPT

## 2022-02-01 PROCEDURE — 80053 COMPREHEN METABOLIC PANEL: CPT | Mod: 91

## 2022-02-01 PROCEDURE — 80197 ASSAY OF TACROLIMUS: CPT

## 2022-02-01 PROCEDURE — 80053 COMPREHEN METABOLIC PANEL: CPT

## 2022-02-01 PROCEDURE — 84439 ASSAY OF FREE THYROXINE: CPT

## 2022-02-03 LAB — TACROLIMUS BLD-MCNC: 3.8 NG/ML

## 2022-02-04 ENCOUNTER — PATIENT MESSAGE (OUTPATIENT)
Dept: HEALTH INFORMATION MANAGEMENT | Facility: OTHER | Age: 67
End: 2022-02-04
Payer: MEDICARE

## 2022-02-04 LAB
SHBG SERPL-SCNC: 34 NMOL/L (ref 11–80)
TESTOST FREE MFR SERPL: 1.9 % (ref 1.6–2.9)
TESTOST FREE SERPL-MCNC: 88 PG/ML (ref 47–244)
TESTOST SERPL-MCNC: 475 NG/DL (ref 300–720)

## 2022-03-03 ENCOUNTER — OFFICE VISIT (OUTPATIENT)
Dept: CARDIOLOGY | Facility: MEDICAL CENTER | Age: 67
End: 2022-03-03
Payer: MEDICARE

## 2022-03-03 VITALS
BODY MASS INDEX: 25.43 KG/M2 | HEIGHT: 67 IN | SYSTOLIC BLOOD PRESSURE: 110 MMHG | HEART RATE: 86 BPM | WEIGHT: 162 LBS | RESPIRATION RATE: 14 BRPM | DIASTOLIC BLOOD PRESSURE: 60 MMHG | OXYGEN SATURATION: 97 %

## 2022-03-03 DIAGNOSIS — F17.200 TOBACCO DEPENDENCE: ICD-10-CM

## 2022-03-03 DIAGNOSIS — I49.3 PVC'S (PREMATURE VENTRICULAR CONTRACTIONS): ICD-10-CM

## 2022-03-03 DIAGNOSIS — R06.02 SHORTNESS OF BREATH: ICD-10-CM

## 2022-03-03 DIAGNOSIS — R07.89 CHEST PRESSURE: ICD-10-CM

## 2022-03-03 PROCEDURE — 99406 BEHAV CHNG SMOKING 3-10 MIN: CPT | Performed by: INTERNAL MEDICINE

## 2022-03-03 PROCEDURE — 99215 OFFICE O/P EST HI 40 MIN: CPT | Mod: 25 | Performed by: INTERNAL MEDICINE

## 2022-03-03 RX ORDER — CEFDINIR 300 MG/1
300 CAPSULE ORAL 2 TIMES DAILY
COMMUNITY
Start: 2021-12-09 | End: 2022-03-03

## 2022-03-03 RX ORDER — PANTOPRAZOLE SODIUM 40 MG/1
40 TABLET, DELAYED RELEASE ORAL DAILY
Status: ON HOLD | COMMUNITY
End: 2022-03-31

## 2022-03-03 RX ORDER — METOPROLOL SUCCINATE 25 MG/1
25 TABLET, EXTENDED RELEASE ORAL DAILY
Qty: 30 TABLET | Refills: 1 | Status: ON HOLD | OUTPATIENT
Start: 2022-03-03 | End: 2022-03-28

## 2022-03-03 RX ORDER — PROPRANOLOL HYDROCHLORIDE 10 MG/1
TABLET ORAL
COMMUNITY
Start: 2022-01-26 | End: 2022-03-03

## 2022-03-03 RX ORDER — RIZATRIPTAN BENZOATE 10 MG/1
10 TABLET, ORALLY DISINTEGRATING ORAL
COMMUNITY
Start: 2022-02-01

## 2022-03-03 RX ORDER — TRIAMCINOLONE ACETONIDE 1 MG/G
OINTMENT TOPICAL
COMMUNITY
End: 2022-03-29

## 2022-03-03 RX ORDER — BENZONATATE 100 MG/1
100 CAPSULE ORAL 3 TIMES DAILY PRN
COMMUNITY
Start: 2022-02-09 | End: 2022-03-03

## 2022-03-03 RX ORDER — AZITHROMYCIN 250 MG/1
TABLET, FILM COATED ORAL
COMMUNITY
End: 2022-03-03

## 2022-03-03 RX ORDER — METHYLPREDNISOLONE 4 MG/1
TABLET ORAL
COMMUNITY
End: 2022-03-03

## 2022-03-03 ASSESSMENT — FIBROSIS 4 INDEX: FIB4 SCORE: 0.47

## 2022-03-03 NOTE — PROGRESS NOTES
Cardiology Follow-up Consultation Note    Date of note:    3/3/2022    Primary Care Provider: Alejandra Sutherland M.D.    Name:             Quirino Ruiz   YOB: 1955  MRN:               [id number]    Chief Complaint   Patient presents with   • Premature Ventricular Contractions (PVCs)   • Palpitations       HISTORY OF PRESENT ILLNESS  Mr. Quirino Ruiz is a 66 y.o. male who returns to see us for follow-up of chest pressure.    Last clinic visit: 8/13/2021 with Dr. Wiggins.  Patient is new to me.    Interim History:  Since his last visit, patient reports initial resolution of chest pain.  However, for the past couple of months he has been experiencing substernal chest pressure along with fatigue and dyspnea on exertion.    States that these are similar symptoms to which he underwent nuclear cardiac stress test and transthoracic echocardiogram.    Has been smoking since the age of 15 with last cigarette 4 months ago.  Is currently using e-cigarettes.       Past Medical History:   Diagnosis Date   • Arthritis     fingers, hands   • Back pain    • Cancer (HCC) 2002;2013    kidney / liver - treated with chemo, & transplant   • Cholesterol blood decreased    • Chronic pain    • Cold 8/27/2014    URI   • Dental disorder     upper  and lower dentures   • Gout    • Heart burn     under control with meds   • Hepatitis C 1993   • Hypertension    • Indigestion    • Liver transplanted (HCC) 2002   • Pain     neck   • Pain     neck and lower back   • Renal cancer (HCC)    • Renal disorder     left nephrectomy; right cryoablation, voids (no dialysis)   • Stroke (HCC) 03/2018    denies residual         Past Surgical History:   Procedure Laterality Date   • PUMP REVISION Left 9/17/2021    Procedure: REVISION, INSERTION, OR REPLACEMENT, INTRATHECAL ANALGESIC PUMP - REPLACEMENT;  Surgeon: Jass Mendes M.D.;  Location: SURGERY [facility];  Service: Pain Management   • RECOVERY  11/30/2015     Procedure: US-Non Targeted Liver Biopsy-;  Surgeon: [facility];  [place] [facility] [place]:    • PUMP INSERT/REMOVE  11/25/2014    Performed by Jass Mendes M.D. at SURGERY [facility]   • CATH PLACE PERM EPIDURAL  9/9/2014    Performed by Jass Mendes M.D. at SURGERY [facility]   • OTHER  2/28/2014    cryoablation right kidney   • EVACUATION OF HEMATOMA  5/31/2013    Performed by Davis Murray M.D. at SURGERY [facility]   • EXPLORATORY LAPAROTOMY  5/31/2013    Performed by Davis Murray M.D. at SURGERY [facility]   • LUMBAR FUSION ANTERIOR  5/29/2013    Performed by Suman Burks M.D. at SURGERY [facility]   • RECOVERY  4/23/2013    [facility]   • CERVICAL DISK AND FUSION ANTERIOR  12/11/2012    Performed by Suman Burks M.D. at SURGERY [facility]   • OTHER  1994    gallbladder removal   • CERVICAL DECOMPRESSION POSTERIOR     • CHOLECYSTECTOMY     • NEPHRECTOMY RADICAL     • NEPHRECTOMY RADICAL Left     Left kidney removed   • OTHER      liver transplant   • OTHER SURGICAL PROCEDURE      liver transplant   • PUMP INSERT/REMOVE      pain pump placed         Current Outpatient Medications   Medication Sig Dispense Refill   • pantoprazole (PROTONIX) 40 MG Tablet Delayed Response pantoprazole 40 mg tablet,delayed release   TAKE 1 TABLET BY MOUTH EVERY DAY     • rizatriptan (MAXALT-MLT) 10 MG disintegrating tablet TAKE 1 TAKE BY MOUTH AT ONSET OF HEADACHE. MAY REPEAT     • triamcinolone acetonide (KENALOG) 0.1 % Ointment triamcinolone acetonide 0.1 % topical ointment   APPLY TO AFFECTED AREAS TWICE A DAY FOR 14 DAYS AS NEEDED     • metoprolol SR (TOPROL XL) 25 MG TABLET SR 24 HR Take 1 Tablet by mouth every day. 30 Tablet 1   • tamsulosin (FLOMAX) 0.4 MG capsule Take 0.4 mg by mouth 1/2 hour after breakfast.     • allopurinol (ZYLOPRIM) 100 MG Tab Take 100 mg by  "mouth every bedtime.     • lisinopril (PRINIVIL) 10 MG Tab Take 10 mg by mouth every morning. Indications: High Blood Pressure Disorder     • Pain Pump (PATIENT SUPPLIED) XX BRANDON Inject  as directed continuous. Patient's Pain Pump (placed and maintained as an outpatient)  Medications/concentrations: Hydromorphone 400.0 mcg/mL  Route: Intrathecally  Date of Placement: Unknown  Last changed: 9/17/2020  Continuous infusion rates (Drug/Rate):  Hydromorphone 99.97 mcg / 24 hours (4.17 mcg / hr)  Patient activation dose: Hydromorphone 9.69 mcg  Maximum daily activation dose: Hydromorphone 138.16 mcg  Patient activation lockout interval: 1 hour  Maximum activations per day: 4     • tacrolimus (PROGRAF) 1 MG Cap Take 2 mg by mouth 2 Times a Day. Indications: Liver Transplant Recipients       No current facility-administered medications for this visit.         Allergies   Allergen Reactions   • Niacin      Passed out.    • Latex Rash     Reaction- Itching, Rash   • Pcn [Penicillins] Anaphylaxis and Swelling     Patient has tolerated cefazolin in past   • Morphine Sulfate      Per pt., \"it makes me cranky\"   • Mushroom Extract Complex          Family History   Problem Relation Age of Onset   • Hypertension Other          Social History     Socioeconomic History   • Marital status:      Spouse name: Not on file   • Number of children: Not on file   • Years of education: Not on file   • Highest education level: Not on file   Occupational History   • Not on file   Tobacco Use   • Smoking status: Current Every Day Smoker     Packs/day: 0.50     Years: 52.00     Pack years: 26.00     Types: Cigarettes   • Smokeless tobacco: Never Used   • Tobacco comment: E-Cigarettes    Vaping Use   • Vaping Use: Never used   Substance and Sexual Activity   • Alcohol use: Yes     Alcohol/week: 0.6 oz     Types: 1 Cans of beer per week   • Drug use: No   • Sexual activity: Not on file   Other Topics Concern   • Not on file   Social History " "Narrative    ** Merged History Encounter **          Social Determinants of Health     Financial Resource Strain: Not on file   Food Insecurity: Not on file   Transportation Needs: Not on file   Physical Activity: Not on file   Stress: Not on file   Social Connections: Not on file   Intimate Partner Violence: Not on file   Housing Stability: Not on file         Physical Exam:  Ambulatory Vitals  /60 (BP Location: Right arm, Patient Position: Sitting, BP Cuff Size: Adult)   Pulse 86   Resp 14   Ht 1.702 m (5' 7\")   Wt 73.5 kg (162 lb)   SpO2 97%    Oxygen Therapy:  Pulse Oximetry: 97 %  BP Readings from Last 4 Encounters:   03/03/22 110/60   09/17/21 151/81   08/13/21 150/84   05/06/21 122/74       Weight/BMI: Body mass index is 25.37 kg/m².  Wt Readings from Last 4 Encounters:   03/03/22 73.5 kg (162 lb)   09/17/21 75.3 kg (166 lb 0.1 oz)   08/13/21 75.3 kg (166 lb)   05/06/21 75.8 kg (167 lb 3.2 oz)       GEN: Well developed, well nourished and in no acute distress.  HEART: no significant JVD, regular rate and rhythm, normal S1 and S2, no murmurs, no third heart sounds, normal cardiac palpation  LUNG: Distant breath sounds, clear to auscultation bilaterally, no wheezing, no crackles, normal respiratory effort on room air  ABDOMEN: soft, non-tender, non-distended, normal bowel sounds throughout  EXTREMITIES: no peripheral edema noted  VASCULAR: no significantly elevated jugular venous pressure, no carotid bruits noted, radial pulses 2+ and equal      Lab Data Review:  Lab Results   Component Value Date/Time    CHOLSTRLTOT 194 02/01/2022 11:08 AM     (H) 02/01/2022 11:08 AM    HDL 39 (A) 02/01/2022 11:08 AM    TRIGLYCERIDE 165 (H) 02/01/2022 11:08 AM       Lab Results   Component Value Date/Time    SODIUM 140 02/01/2022 11:08 AM    SODIUM 139 02/01/2022 11:08 AM    POTASSIUM 4.7 02/01/2022 11:08 AM    POTASSIUM 4.5 02/01/2022 11:08 AM    CHLORIDE 102 02/01/2022 11:08 AM    CHLORIDE 102 02/01/2022 " 11:08 AM    CO2 21 02/01/2022 11:08 AM    CO2 23 02/01/2022 11:08 AM    GLUCOSE 93 02/01/2022 11:08 AM    GLUCOSE 93 02/01/2022 11:08 AM    BUN 17 02/01/2022 11:08 AM    BUN 17 02/01/2022 11:08 AM    CREATININE 1.09 02/01/2022 11:08 AM    CREATININE 1.08 02/01/2022 11:08 AM     Lab Results   Component Value Date/Time    ALKPHOSPHAT 65 02/01/2022 11:08 AM    ALKPHOSPHAT 65 02/01/2022 11:08 AM    ASTSGOT 16 02/01/2022 11:08 AM    ASTSGOT 20 02/01/2022 11:08 AM    ALTSGPT 48 02/01/2022 11:08 AM    ALTSGPT 47 02/01/2022 11:08 AM    TBILIRUBIN 0.7 02/01/2022 11:08 AM    TBILIRUBIN 0.7 02/01/2022 11:08 AM      Lab Results   Component Value Date/Time    WBC 9.6 02/01/2022 11:08 AM    WBC 9.8 02/01/2022 11:08 AM     Lab Results   Component Value Date/Time    HBA1C 5.9 (H) 02/01/2022 11:08 AM    HBA1C 5.4 05/31/2021 09:12 AM       Cardiac Imaging and Procedures Review:    EKG dated 5/6/2021: My personal interpretation is sinus rhythm    Echo dated 7/5/2021:   CONCLUSIONS  Normal left ventricular chamber size.  Left ventricular ejection fraction is visually estimated to be 60%.  Normal diastolic function.  Normal inferior vena cava size and inspiratory collapse.    Nuclear Perfusion Imaging (6/3/2021):    NUCLEAR IMAGING INTERPRETATION   Normal perfusion.   Normal left ventricular wall motion.     LV ejection fraction = 68%.      ECG INTERPRETATION   Nondiagnostic ECG portion of a Regadenoson nuclear stress test.      Radiology test Review:  CXR: No cardiopulmonary abnormality noted        Assessment & Plan     1. PVC's (premature ventricular contractions)  metoprolol SR (TOPROL XL) 25 MG TABLET SR 24 HR   2. Chest pressure  metoprolol SR (TOPROL XL) 25 MG TABLET SR 24 HR    PULMONARY FUNCTION TESTS -Test requested: Complete Pulmonary Function Test    EC-ECHOCARDIOGRAM COMPLETE W/O CONT   3. Shortness of breath  PULMONARY FUNCTION TESTS -Test requested: Complete Pulmonary Function Test    EC-ECHOCARDIOGRAM COMPLETE W/O CONT     Referral to Pulmonary and Sleep Medicine   4. Tobacco dependence  Referral to Pulmonary and Sleep Medicine         Discussed patient's symptoms in detail.  Recently underwent nuclear stress test for similar symptoms which did not reveal ischemia.  Is currently on propanolol 20 mg twice daily.  Recommend discontinuation of propranolol and initiate metoprolol XL 25 mg for antianginal properties and monitor symptoms.  Discussed that if his symptoms improve, there could be underlying microvascular disease contributing to his symptoms.    Given shortness of breath, dyspnea on exertion and chronic smoking, obtain PFTs.  Referral provided to pulmonary medicine with concern for COPD.    Obtain limited transthoracic echocardiogram to evaluate pulmonary pressures.    In regards to tobacco dependence, spent approximately 4 minutes with the patient to discuss harmful cardiovascular effects including accelerated atherosclerosis, risk factor for stroke, coronary artery disease and heart attack.  After discussion, patient is motivated to quit smoking.  Has a prescription for Chantix but is afraid to take it due to side effects of anxiety.  Strongly encouraged trial of the medication.      A total of 40 minutes of time was spent on day of encounter reviewing medical records (prior cardiology notes/testing/labs), performing history and examination, counseling, ordering medication/test/consults and documentation.    All of patient's excellent questions were answered to the best of my knowledge and to his satisfaction.  It was a pleasure seeing Mr. Quirino Ruiz in my clinic today. Return in about 3 months (around 6/3/2022). Patient is aware to call the cardiology clinic with any questions or concerns.      Shahid Kennedy MD  [facility]  [facility], [address]  [address] [address]  Phone: [phone]  Fax: [phone]    Please note that this dictation  was created using voice recognition software. I have made every reasonable attempt to correct obvious errors, but it is possible there are errors of grammar and possibly content that I did not discover before finalizing the note.

## 2022-03-07 ENCOUNTER — NON-PROVIDER VISIT (OUTPATIENT)
Dept: SLEEP MEDICINE | Facility: MEDICAL CENTER | Age: 67
End: 2022-03-07
Attending: INTERNAL MEDICINE
Payer: MEDICARE

## 2022-03-07 VITALS — HEIGHT: 66 IN | WEIGHT: 162 LBS | BODY MASS INDEX: 26.03 KG/M2

## 2022-03-07 DIAGNOSIS — R06.02 SHORTNESS OF BREATH: ICD-10-CM

## 2022-03-07 DIAGNOSIS — R07.89 CHEST PRESSURE: ICD-10-CM

## 2022-03-07 PROCEDURE — 94729 DIFFUSING CAPACITY: CPT | Performed by: INTERNAL MEDICINE

## 2022-03-07 PROCEDURE — 94726 PLETHYSMOGRAPHY LUNG VOLUMES: CPT | Performed by: INTERNAL MEDICINE

## 2022-03-07 PROCEDURE — 94060 EVALUATION OF WHEEZING: CPT | Performed by: INTERNAL MEDICINE

## 2022-03-07 ASSESSMENT — PULMONARY FUNCTION TESTS
FEV1/FVC_PERCENT_PREDICTED: 106
FEV1/FVC_PERCENT_LLN: 64
FEV1_PERCENT_CHANGE: 3
FEV1/FVC_PERCENT_PREDICTED: 106
FEV1_PERCENT_CHANGE: 3
FEV1/FVC: 82.14
FEV1/FVC: 82
FVC_PERCENT_PREDICTED: 87
FVC_LLN: 3.11
FEV1/FVC_PERCENT_PREDICTED: 77
FEV1/FVC_PERCENT_CHANGE: 100
FEV1_PERCENT_PREDICTED: 93
FEV1/FVC: 83
FVC_PERCENT_PREDICTED: 90
FEV1_LLN: 2.40
FVC: 3.24
FEV1: 2.76
FEV1/FVC_PREDICTED: 77
FEV1/FVC_PERCENT_CHANGE: 0
FEV1_PREDICTED: 2.87
FVC: 3.36
FEV1/FVC_PERCENT_PREDICTED: 107
FVC_PREDICTED: 3.72
FEV1/FVC_PERCENT_PREDICTED: 107
FEV1/FVC: 82
FEV1: 2.67
FEV1_PERCENT_PREDICTED: 96

## 2022-03-07 ASSESSMENT — FIBROSIS 4 INDEX: FIB4 SCORE: 0.47

## 2022-03-07 NOTE — PROCEDURES
Technician: Sasha Garnett RRT, CPFT  Good patient effort & cooperation.  Patient had a hard time repreating PRE FVC/BEST EFFORT REPORTED FOR PRE FVC The results of this test meet the ATS/ERS standards for acceptability & reproducibility.  Test was performed on the [employer] Body Plethysmograph-Elite DX system.  Predicted equations for Spirometry are GLI-2012, ITS for lung volumes, and GLI-2017 for DLCO.  The DLCO was uncorrected for Hgb.  A bronchodilator of Ventolin HFA -2puffs via spacer administered.  DLCO performed during dilation period.  Patient C/O being claustrophobic, but was able to perform the Pleth maneuver without incident.    Interpretation;   Baseline spirometry shows normal airflows.  No significant bronchodilator response.  Lung volumes are within normal limits.  Diffusion capacity is within normal limits.  Normal pulmonary function testing with normal flow volume loop.  This does not rule out reactive airways disease.  Correlate clinically.

## 2022-03-08 ENCOUNTER — TELEPHONE (OUTPATIENT)
Dept: CARDIOLOGY | Facility: MEDICAL CENTER | Age: 67
End: 2022-03-08
Payer: MEDICARE

## 2022-03-09 ENCOUNTER — HOSPITAL ENCOUNTER (OUTPATIENT)
Facility: MEDICAL CENTER | Age: 67
End: 2022-03-10
Attending: EMERGENCY MEDICINE | Admitting: STUDENT IN AN ORGANIZED HEALTH CARE EDUCATION/TRAINING PROGRAM
Payer: MEDICARE

## 2022-03-09 ENCOUNTER — TELEPHONE (OUTPATIENT)
Dept: CARDIOLOGY | Facility: MEDICAL CENTER | Age: 67
End: 2022-03-09
Payer: MEDICARE

## 2022-03-09 ENCOUNTER — APPOINTMENT (OUTPATIENT)
Dept: RADIOLOGY | Facility: MEDICAL CENTER | Age: 67
End: 2022-03-09
Attending: EMERGENCY MEDICINE
Payer: MEDICARE

## 2022-03-09 DIAGNOSIS — R07.9 CHEST PAIN, UNSPECIFIED TYPE: ICD-10-CM

## 2022-03-09 DIAGNOSIS — U07.1 COVID-19 VIRUS RNA TEST RESULT POSITIVE AT LIMIT OF DETECTION: ICD-10-CM

## 2022-03-09 PROBLEM — R73.9 HYPERGLYCEMIA: Status: ACTIVE | Noted: 2022-03-09

## 2022-03-09 LAB
ALBUMIN SERPL BCP-MCNC: 4.6 G/DL (ref 3.2–4.9)
ALBUMIN/GLOB SERPL: 1.5 G/DL
ALP SERPL-CCNC: 74 U/L (ref 30–99)
ALT SERPL-CCNC: 24 U/L (ref 2–50)
ANION GAP SERPL CALC-SCNC: 12 MMOL/L (ref 7–16)
APPEARANCE UR: CLEAR
AST SERPL-CCNC: 20 U/L (ref 12–45)
BASOPHILS # BLD AUTO: 0.5 % (ref 0–1.8)
BASOPHILS # BLD: 0.05 K/UL (ref 0–0.12)
BILIRUB SERPL-MCNC: 0.2 MG/DL (ref 0.1–1.5)
BILIRUB UR QL STRIP.AUTO: NEGATIVE
BUN SERPL-MCNC: 12 MG/DL (ref 8–22)
CALCIUM SERPL-MCNC: 10 MG/DL (ref 8.5–10.5)
CHLORIDE SERPL-SCNC: 104 MMOL/L (ref 96–112)
CO2 SERPL-SCNC: 27 MMOL/L (ref 20–33)
COLOR UR: YELLOW
CREAT SERPL-MCNC: 0.73 MG/DL (ref 0.5–1.4)
D DIMER PPP IA.FEU-MCNC: 0.76 UG/ML (FEU) (ref 0–0.5)
EKG IMPRESSION: NORMAL
EOSINOPHIL # BLD AUTO: 0.29 K/UL (ref 0–0.51)
EOSINOPHIL NFR BLD: 3 % (ref 0–6.9)
ERYTHROCYTE [DISTWIDTH] IN BLOOD BY AUTOMATED COUNT: 45.2 FL (ref 35.9–50)
GLOBULIN SER CALC-MCNC: 3.1 G/DL (ref 1.9–3.5)
GLUCOSE SERPL-MCNC: 107 MG/DL (ref 65–99)
GLUCOSE UR STRIP.AUTO-MCNC: NEGATIVE MG/DL
HCT VFR BLD AUTO: 41.9 % (ref 42–52)
HGB BLD-MCNC: 14.1 G/DL (ref 14–18)
IMM GRANULOCYTES # BLD AUTO: 0.04 K/UL (ref 0–0.11)
IMM GRANULOCYTES NFR BLD AUTO: 0.4 % (ref 0–0.9)
KETONES UR STRIP.AUTO-MCNC: NEGATIVE MG/DL
LEUKOCYTE ESTERASE UR QL STRIP.AUTO: NEGATIVE
LYMPHOCYTES # BLD AUTO: 3.51 K/UL (ref 1–4.8)
LYMPHOCYTES NFR BLD: 36.8 % (ref 22–41)
MCH RBC QN AUTO: 30.4 PG (ref 27–33)
MCHC RBC AUTO-ENTMCNC: 33.7 G/DL (ref 33.7–35.3)
MCV RBC AUTO: 90.3 FL (ref 81.4–97.8)
MICRO URNS: NORMAL
MONOCYTES # BLD AUTO: 1.16 K/UL (ref 0–0.85)
MONOCYTES NFR BLD AUTO: 12.2 % (ref 0–13.4)
NEUTROPHILS # BLD AUTO: 4.48 K/UL (ref 1.82–7.42)
NEUTROPHILS NFR BLD: 47.1 % (ref 44–72)
NITRITE UR QL STRIP.AUTO: NEGATIVE
NRBC # BLD AUTO: 0 K/UL
NRBC BLD-RTO: 0 /100 WBC
NT-PROBNP SERPL IA-MCNC: 548 PG/ML (ref 0–125)
PH UR STRIP.AUTO: 6 [PH] (ref 5–8)
PLATELET # BLD AUTO: 321 K/UL (ref 164–446)
PMV BLD AUTO: 9.7 FL (ref 9–12.9)
POTASSIUM SERPL-SCNC: 3.9 MMOL/L (ref 3.6–5.5)
PROT SERPL-MCNC: 7.7 G/DL (ref 6–8.2)
PROT UR QL STRIP: NEGATIVE MG/DL
RBC # BLD AUTO: 4.64 M/UL (ref 4.7–6.1)
RBC UR QL AUTO: NEGATIVE
SODIUM SERPL-SCNC: 143 MMOL/L (ref 135–145)
SP GR UR STRIP.AUTO: 1.01
TROPONIN T SERPL-MCNC: 14 NG/L (ref 6–19)
TROPONIN T SERPL-MCNC: 15 NG/L (ref 6–19)
UROBILINOGEN UR STRIP.AUTO-MCNC: 0.2 MG/DL
WBC # BLD AUTO: 9.5 K/UL (ref 4.8–10.8)

## 2022-03-09 PROCEDURE — 80053 COMPREHEN METABOLIC PANEL: CPT

## 2022-03-09 PROCEDURE — G0378 HOSPITAL OBSERVATION PER HR: HCPCS

## 2022-03-09 PROCEDURE — 93005 ELECTROCARDIOGRAM TRACING: CPT

## 2022-03-09 PROCEDURE — 71275 CT ANGIOGRAPHY CHEST: CPT | Mod: ME

## 2022-03-09 PROCEDURE — 700117 HCHG RX CONTRAST REV CODE 255: Performed by: EMERGENCY MEDICINE

## 2022-03-09 PROCEDURE — 96372 THER/PROPH/DIAG INJ SC/IM: CPT | Mod: XU

## 2022-03-09 PROCEDURE — 85025 COMPLETE CBC W/AUTO DIFF WBC: CPT

## 2022-03-09 PROCEDURE — 84484 ASSAY OF TROPONIN QUANT: CPT

## 2022-03-09 PROCEDURE — 83880 ASSAY OF NATRIURETIC PEPTIDE: CPT

## 2022-03-09 PROCEDURE — 99214 OFFICE O/P EST MOD 30 MIN: CPT | Performed by: STUDENT IN AN ORGANIZED HEALTH CARE EDUCATION/TRAINING PROGRAM

## 2022-03-09 PROCEDURE — 71045 X-RAY EXAM CHEST 1 VIEW: CPT

## 2022-03-09 PROCEDURE — 99285 EMERGENCY DEPT VISIT HI MDM: CPT

## 2022-03-09 PROCEDURE — 99220 PR INITIAL OBSERVATION CARE,LEVL III: CPT | Performed by: STUDENT IN AN ORGANIZED HEALTH CARE EDUCATION/TRAINING PROGRAM

## 2022-03-09 PROCEDURE — 93005 ELECTROCARDIOGRAM TRACING: CPT | Performed by: EMERGENCY MEDICINE

## 2022-03-09 PROCEDURE — 81003 URINALYSIS AUTO W/O SCOPE: CPT

## 2022-03-09 PROCEDURE — 85379 FIBRIN DEGRADATION QUANT: CPT

## 2022-03-09 RX ORDER — POLYETHYLENE GLYCOL 3350 17 G/17G
1 POWDER, FOR SOLUTION ORAL
Status: DISCONTINUED | OUTPATIENT
Start: 2022-03-09 | End: 2022-03-10 | Stop reason: HOSPADM

## 2022-03-09 RX ORDER — ASPIRIN 81 MG/1
324 TABLET, CHEWABLE ORAL DAILY
Status: DISCONTINUED | OUTPATIENT
Start: 2022-03-09 | End: 2022-03-09

## 2022-03-09 RX ORDER — TACROLIMUS 1 MG/1
2 CAPSULE ORAL 2 TIMES DAILY
Status: DISCONTINUED | OUTPATIENT
Start: 2022-03-09 | End: 2022-03-10 | Stop reason: HOSPADM

## 2022-03-09 RX ORDER — METOPROLOL SUCCINATE 25 MG/1
25 TABLET, EXTENDED RELEASE ORAL DAILY
Status: DISCONTINUED | OUTPATIENT
Start: 2022-03-10 | End: 2022-03-10 | Stop reason: HOSPADM

## 2022-03-09 RX ORDER — BISACODYL 10 MG
10 SUPPOSITORY, RECTAL RECTAL
Status: DISCONTINUED | OUTPATIENT
Start: 2022-03-09 | End: 2022-03-10 | Stop reason: HOSPADM

## 2022-03-09 RX ORDER — REGADENOSON 0.08 MG/ML
0.4 INJECTION, SOLUTION INTRAVENOUS
Status: DISCONTINUED | OUTPATIENT
Start: 2022-03-09 | End: 2022-03-10 | Stop reason: HOSPADM

## 2022-03-09 RX ORDER — NITROGLYCERIN 0.4 MG/1
0.4 TABLET SUBLINGUAL
Status: DISCONTINUED | OUTPATIENT
Start: 2022-03-09 | End: 2022-03-10 | Stop reason: HOSPADM

## 2022-03-09 RX ORDER — ACETAMINOPHEN 325 MG/1
650 TABLET ORAL EVERY 6 HOURS PRN
Status: DISCONTINUED | OUTPATIENT
Start: 2022-03-09 | End: 2022-03-10 | Stop reason: HOSPADM

## 2022-03-09 RX ORDER — ASPIRIN 300 MG/1
300 SUPPOSITORY RECTAL DAILY
Status: DISCONTINUED | OUTPATIENT
Start: 2022-03-09 | End: 2022-03-09

## 2022-03-09 RX ORDER — ATORVASTATIN CALCIUM 20 MG/1
40 TABLET, FILM COATED ORAL EVERY EVENING
Status: DISCONTINUED | OUTPATIENT
Start: 2022-03-10 | End: 2022-03-10 | Stop reason: HOSPADM

## 2022-03-09 RX ORDER — AMINOPHYLLINE 25 MG/ML
100 INJECTION, SOLUTION INTRAVENOUS
Status: DISCONTINUED | OUTPATIENT
Start: 2022-03-09 | End: 2022-03-10 | Stop reason: HOSPADM

## 2022-03-09 RX ORDER — LORAZEPAM 2 MG/ML
0.5 INJECTION INTRAMUSCULAR EVERY 6 HOURS PRN
Status: DISCONTINUED | OUTPATIENT
Start: 2022-03-09 | End: 2022-03-10 | Stop reason: HOSPADM

## 2022-03-09 RX ORDER — ONDANSETRON 2 MG/ML
4 INJECTION INTRAMUSCULAR; INTRAVENOUS EVERY 4 HOURS PRN
Status: DISCONTINUED | OUTPATIENT
Start: 2022-03-09 | End: 2022-03-10 | Stop reason: HOSPADM

## 2022-03-09 RX ORDER — ASPIRIN 325 MG
325 TABLET ORAL DAILY
Status: DISCONTINUED | OUTPATIENT
Start: 2022-03-09 | End: 2022-03-09

## 2022-03-09 RX ORDER — ALLOPURINOL 100 MG/1
100 TABLET ORAL
Status: DISCONTINUED | OUTPATIENT
Start: 2022-03-09 | End: 2022-03-10 | Stop reason: HOSPADM

## 2022-03-09 RX ORDER — HYDRALAZINE HYDROCHLORIDE 20 MG/ML
10 INJECTION INTRAMUSCULAR; INTRAVENOUS EVERY 4 HOURS PRN
Status: DISCONTINUED | OUTPATIENT
Start: 2022-03-09 | End: 2022-03-10 | Stop reason: HOSPADM

## 2022-03-09 RX ORDER — TAMSULOSIN HYDROCHLORIDE 0.4 MG/1
0.4 CAPSULE ORAL
Status: DISCONTINUED | OUTPATIENT
Start: 2022-03-10 | End: 2022-03-10 | Stop reason: HOSPADM

## 2022-03-09 RX ORDER — ONDANSETRON 4 MG/1
4 TABLET, ORALLY DISINTEGRATING ORAL EVERY 4 HOURS PRN
Status: DISCONTINUED | OUTPATIENT
Start: 2022-03-09 | End: 2022-03-10 | Stop reason: HOSPADM

## 2022-03-09 RX ORDER — AMOXICILLIN 250 MG
2 CAPSULE ORAL 2 TIMES DAILY
Status: DISCONTINUED | OUTPATIENT
Start: 2022-03-09 | End: 2022-03-10 | Stop reason: HOSPADM

## 2022-03-09 RX ORDER — OMEPRAZOLE 20 MG/1
40 CAPSULE, DELAYED RELEASE ORAL DAILY
Status: DISCONTINUED | OUTPATIENT
Start: 2022-03-10 | End: 2022-03-10 | Stop reason: HOSPADM

## 2022-03-09 RX ORDER — LISINOPRIL 10 MG/1
10 TABLET ORAL EVERY MORNING
Status: DISCONTINUED | OUTPATIENT
Start: 2022-03-10 | End: 2022-03-10 | Stop reason: HOSPADM

## 2022-03-09 RX ORDER — GUAIFENESIN/DEXTROMETHORPHAN 100-10MG/5
10 SYRUP ORAL EVERY 6 HOURS PRN
Status: DISCONTINUED | OUTPATIENT
Start: 2022-03-09 | End: 2022-03-10 | Stop reason: HOSPADM

## 2022-03-09 RX ADMIN — IOHEXOL 40 ML: 350 INJECTION, SOLUTION INTRAVENOUS at 19:05

## 2022-03-09 ASSESSMENT — ENCOUNTER SYMPTOMS
NAUSEA: 0
SHORTNESS OF BREATH: 0
CHILLS: 0
HEADACHES: 0
FLANK PAIN: 0
SEIZURES: 0
PALPITATIONS: 0
FOCAL WEAKNESS: 0
DIAPHORESIS: 0
SPUTUM PRODUCTION: 0
COUGH: 0
ABDOMINAL PAIN: 0
SHORTNESS OF BREATH: 1
NECK PAIN: 0
SPEECH CHANGE: 0
HEARTBURN: 0
FEVER: 0
WEAKNESS: 0
FALLS: 0
MYALGIAS: 0
BLURRED VISION: 0
EYE REDNESS: 0
VOMITING: 0
BACK PAIN: 0
DOUBLE VISION: 0
SORE THROAT: 0
BRUISES/BLEEDS EASILY: 0
DIZZINESS: 0
DEPRESSION: 0

## 2022-03-09 ASSESSMENT — LIFESTYLE VARIABLES: SUBSTANCE_ABUSE: 0

## 2022-03-09 ASSESSMENT — FIBROSIS 4 INDEX
FIB4 SCORE: 0.47
FIB4 SCORE: 0.47

## 2022-03-09 ASSESSMENT — PAIN DESCRIPTION - PAIN TYPE: TYPE: ACUTE PAIN

## 2022-03-09 NOTE — TELEPHONE ENCOUNTER
"Called pt [phone]  Pt reports increased SOB and chest pressure. Pt reports feeling \"distressed\" and \"feels like something is wrong\" and feels \"scared\". Advised pt to seek urgent help by calling 911 for transport to nearest ED. Pt will call 911 NOW.        MILY to ASMITA  "

## 2022-03-09 NOTE — TELEPHONE ENCOUNTER
----- Message from Shahid Kennedy M.D. sent at 3/8/2022  4:51 PM PST -----  Please let the patient know that the pulmonary function test does not show any abnormality.  He should still keep his follow-up with Dr. Perez and with TW.  Thank you.

## 2022-03-09 NOTE — ED TRIAGE NOTES
"Chief Complaint   Patient presents with   • Chest Pressure     Pt states he's been having pressure for a couple months and it's been getting progressively worse   • Shortness of Breath     States his SOB is worse when he is laying down     Pt recently seen at his cardiologist and everything came back relatively normal. Pt continuing to c/o chest pressure and SOB so cardiologist recommended him to come into the ER.    Denies lightheadedness, dizziness, headache and blurry vision    Hx of stroke, HTN, and liver transplant    BP (!) 169/100   Pulse 99   Temp 36.1 °C (96.9 °F) (Temporal)   Resp 20   Ht 1.702 m (5' 7\")   Wt 74.8 kg (164 lb 14.5 oz)   SpO2 96%   BMI 25.83 kg/m²     "

## 2022-03-09 NOTE — TELEPHONE ENCOUNTER
DA    Pt called, he would like to inquire about a CT scan for his heart.  He also states he is having some chest pressure and SOB.  He would like a call back.     Quirino - [phone]    Thank you,   Lauren ELLIOTT

## 2022-03-10 ENCOUNTER — APPOINTMENT (OUTPATIENT)
Dept: CARDIOLOGY | Facility: MEDICAL CENTER | Age: 67
End: 2022-03-10
Attending: INTERNAL MEDICINE
Payer: MEDICARE

## 2022-03-10 ENCOUNTER — APPOINTMENT (OUTPATIENT)
Dept: RADIOLOGY | Facility: MEDICAL CENTER | Age: 67
End: 2022-03-10
Attending: STUDENT IN AN ORGANIZED HEALTH CARE EDUCATION/TRAINING PROGRAM
Payer: MEDICARE

## 2022-03-10 VITALS
WEIGHT: 164.9 LBS | DIASTOLIC BLOOD PRESSURE: 65 MMHG | HEART RATE: 75 BPM | RESPIRATION RATE: 16 BRPM | HEIGHT: 67 IN | SYSTOLIC BLOOD PRESSURE: 120 MMHG | TEMPERATURE: 97 F | BODY MASS INDEX: 25.88 KG/M2 | OXYGEN SATURATION: 94 %

## 2022-03-10 PROBLEM — U07.1 COVID-19 VIRUS RNA TEST RESULT POSITIVE AT LIMIT OF DETECTION: Status: ACTIVE | Noted: 2022-03-10

## 2022-03-10 PROBLEM — E78.5 DYSLIPIDEMIA: Status: ACTIVE | Noted: 2022-03-10

## 2022-03-10 LAB
ALBUMIN SERPL BCP-MCNC: 4.4 G/DL (ref 3.2–4.9)
BASOPHILS # BLD AUTO: 0.6 % (ref 0–1.8)
BASOPHILS # BLD: 0.05 K/UL (ref 0–0.12)
BUN SERPL-MCNC: 11 MG/DL (ref 8–22)
CALCIUM SERPL-MCNC: 9.5 MG/DL (ref 8.5–10.5)
CHLORIDE SERPL-SCNC: 100 MMOL/L (ref 96–112)
CHOLEST SERPL-MCNC: 176 MG/DL (ref 100–199)
CO2 SERPL-SCNC: 25 MMOL/L (ref 20–33)
CREAT SERPL-MCNC: 0.75 MG/DL (ref 0.5–1.4)
EOSINOPHIL # BLD AUTO: 0.26 K/UL (ref 0–0.51)
EOSINOPHIL NFR BLD: 2.9 % (ref 0–6.9)
ERYTHROCYTE [DISTWIDTH] IN BLOOD BY AUTOMATED COUNT: 46.1 FL (ref 35.9–50)
EST. AVERAGE GLUCOSE BLD GHB EST-MCNC: 120 MG/DL
FLUAV RNA SPEC QL NAA+PROBE: NEGATIVE
FLUBV RNA SPEC QL NAA+PROBE: NEGATIVE
GLUCOSE SERPL-MCNC: 104 MG/DL (ref 65–99)
HBA1C MFR BLD: 5.8 % (ref 4–5.6)
HCT VFR BLD AUTO: 41.5 % (ref 42–52)
HDLC SERPL-MCNC: 37 MG/DL
HGB BLD-MCNC: 14 G/DL (ref 14–18)
IMM GRANULOCYTES # BLD AUTO: 0.03 K/UL (ref 0–0.11)
IMM GRANULOCYTES NFR BLD AUTO: 0.3 % (ref 0–0.9)
LDLC SERPL CALC-MCNC: 109 MG/DL
LV EJECT FRACT  99904: 75
LV EJECT FRACT MOD 2C 99903: 76.04
LV EJECT FRACT MOD 4C 99902: 71.72
LV EJECT FRACT MOD BP 99901: 73.93
LYMPHOCYTES # BLD AUTO: 2.96 K/UL (ref 1–4.8)
LYMPHOCYTES NFR BLD: 32.6 % (ref 22–41)
MAGNESIUM SERPL-MCNC: 1.2 MG/DL (ref 1.5–2.5)
MCH RBC QN AUTO: 30.8 PG (ref 27–33)
MCHC RBC AUTO-ENTMCNC: 33.7 G/DL (ref 33.7–35.3)
MCV RBC AUTO: 91.4 FL (ref 81.4–97.8)
MONOCYTES # BLD AUTO: 1.18 K/UL (ref 0–0.85)
MONOCYTES NFR BLD AUTO: 13 % (ref 0–13.4)
NEUTROPHILS # BLD AUTO: 4.6 K/UL (ref 1.82–7.42)
NEUTROPHILS NFR BLD: 50.6 % (ref 44–72)
NRBC # BLD AUTO: 0 K/UL
NRBC BLD-RTO: 0 /100 WBC
PHOSPHATE SERPL-MCNC: 4.7 MG/DL (ref 2.5–4.5)
PLATELET # BLD AUTO: 275 K/UL (ref 164–446)
PMV BLD AUTO: 9.3 FL (ref 9–12.9)
POTASSIUM SERPL-SCNC: 3.6 MMOL/L (ref 3.6–5.5)
RBC # BLD AUTO: 4.54 M/UL (ref 4.7–6.1)
RSV RNA SPEC QL NAA+PROBE: NEGATIVE
SARS-COV-2 RNA RESP QL NAA+PROBE: DETECTED
SODIUM SERPL-SCNC: 137 MMOL/L (ref 135–145)
SPECIMEN SOURCE: ABNORMAL
TRIGL SERPL-MCNC: 152 MG/DL (ref 0–149)
TROPONIN T SERPL-MCNC: 20 NG/L (ref 6–19)
WBC # BLD AUTO: 9.1 K/UL (ref 4.8–10.8)

## 2022-03-10 PROCEDURE — 83735 ASSAY OF MAGNESIUM: CPT

## 2022-03-10 PROCEDURE — 700102 HCHG RX REV CODE 250 W/ 637 OVERRIDE(OP): Performed by: STUDENT IN AN ORGANIZED HEALTH CARE EDUCATION/TRAINING PROGRAM

## 2022-03-10 PROCEDURE — 85025 COMPLETE CBC W/AUTO DIFF WBC: CPT

## 2022-03-10 PROCEDURE — 83036 HEMOGLOBIN GLYCOSYLATED A1C: CPT

## 2022-03-10 PROCEDURE — C9803 HOPD COVID-19 SPEC COLLECT: HCPCS | Performed by: INTERNAL MEDICINE

## 2022-03-10 PROCEDURE — A9270 NON-COVERED ITEM OR SERVICE: HCPCS | Performed by: INTERNAL MEDICINE

## 2022-03-10 PROCEDURE — 700111 HCHG RX REV CODE 636 W/ 250 OVERRIDE (IP): Performed by: STUDENT IN AN ORGANIZED HEALTH CARE EDUCATION/TRAINING PROGRAM

## 2022-03-10 PROCEDURE — 93306 TTE W/DOPPLER COMPLETE: CPT | Mod: 26 | Performed by: INTERNAL MEDICINE

## 2022-03-10 PROCEDURE — 99217 PR OBSERVATION CARE DISCHARGE: CPT | Performed by: INTERNAL MEDICINE

## 2022-03-10 PROCEDURE — 700102 HCHG RX REV CODE 250 W/ 637 OVERRIDE(OP): Performed by: INTERNAL MEDICINE

## 2022-03-10 PROCEDURE — G0378 HOSPITAL OBSERVATION PER HR: HCPCS

## 2022-03-10 PROCEDURE — 80069 RENAL FUNCTION PANEL: CPT

## 2022-03-10 PROCEDURE — 84484 ASSAY OF TROPONIN QUANT: CPT

## 2022-03-10 PROCEDURE — 0241U HCHG SARS-COV-2 COVID-19 NFCT DS RESP RNA 4 TRGT MIC: CPT

## 2022-03-10 PROCEDURE — A9270 NON-COVERED ITEM OR SERVICE: HCPCS | Performed by: STUDENT IN AN ORGANIZED HEALTH CARE EDUCATION/TRAINING PROGRAM

## 2022-03-10 PROCEDURE — 93306 TTE W/DOPPLER COMPLETE: CPT

## 2022-03-10 PROCEDURE — 80061 LIPID PANEL: CPT

## 2022-03-10 RX ORDER — POLYETHYLENE GLYCOL 3350 17 G/17G
POWDER, FOR SOLUTION ORAL
Refills: 3 | Status: SHIPPED | COMMUNITY
Start: 2022-03-10 | End: 2022-03-17

## 2022-03-10 RX ORDER — ALBUTEROL SULFATE 90 UG/1
2 AEROSOL, METERED RESPIRATORY (INHALATION)
Status: DISCONTINUED | OUTPATIENT
Start: 2022-03-10 | End: 2022-03-10 | Stop reason: HOSPADM

## 2022-03-10 RX ORDER — ASCORBIC ACID 500 MG
500 TABLET ORAL 2 TIMES DAILY
Status: DISCONTINUED | OUTPATIENT
Start: 2022-03-10 | End: 2022-03-10 | Stop reason: HOSPADM

## 2022-03-10 RX ORDER — ACETAMINOPHEN 325 MG/1
650 TABLET ORAL EVERY 6 HOURS PRN
Qty: 30 TABLET | Refills: 0 | Status: SHIPPED | COMMUNITY
Start: 2022-03-10 | End: 2022-03-29

## 2022-03-10 RX ORDER — GUAIFENESIN/DEXTROMETHORPHAN 100-10MG/5
10 SYRUP ORAL EVERY 6 HOURS PRN
Qty: 840 ML | Status: SHIPPED | COMMUNITY
Start: 2022-03-10 | End: 2022-03-29

## 2022-03-10 RX ORDER — ASPIRIN 81 MG/1
81 TABLET ORAL DAILY
Qty: 30 TABLET | Refills: 0 | Status: ON HOLD | OUTPATIENT
Start: 2022-03-11 | End: 2023-03-08

## 2022-03-10 RX ORDER — NITROGLYCERIN 0.4 MG/1
0.4 TABLET SUBLINGUAL PRN
Qty: 25 TABLET | Refills: 0 | Status: SHIPPED | OUTPATIENT
Start: 2022-03-10 | End: 2022-03-29

## 2022-03-10 RX ORDER — ATORVASTATIN CALCIUM 20 MG/1
20 TABLET, FILM COATED ORAL NIGHTLY
Qty: 30 TABLET | Refills: 0 | Status: SHIPPED | OUTPATIENT
Start: 2022-03-10 | End: 2022-03-29

## 2022-03-10 RX ORDER — VITAMIN B COMPLEX
1000 TABLET ORAL DAILY
Status: DISCONTINUED | OUTPATIENT
Start: 2022-03-10 | End: 2022-03-10 | Stop reason: HOSPADM

## 2022-03-10 RX ORDER — ZINC SULFATE 50(220)MG
220 CAPSULE ORAL DAILY
Status: DISCONTINUED | OUTPATIENT
Start: 2022-03-10 | End: 2022-03-10 | Stop reason: HOSPADM

## 2022-03-10 RX ORDER — ALBUTEROL SULFATE 90 UG/1
2 AEROSOL, METERED RESPIRATORY (INHALATION) EVERY 4 HOURS PRN
Qty: 8.5 G | Refills: 0 | Status: SHIPPED | OUTPATIENT
Start: 2022-03-10

## 2022-03-10 RX ORDER — REGADENOSON 0.08 MG/ML
INJECTION, SOLUTION INTRAVENOUS
Status: DISCONTINUED
Start: 2022-03-10 | End: 2022-03-10 | Stop reason: HOSPADM

## 2022-03-10 RX ORDER — ASCORBIC ACID 500 MG
500 TABLET ORAL 2 TIMES DAILY
Qty: 30 TABLET | Refills: 0 | Status: SHIPPED | OUTPATIENT
Start: 2022-03-10 | End: 2023-09-07

## 2022-03-10 RX ORDER — ZINC SULFATE 50(220)MG
2200 CAPSULE ORAL DAILY
Qty: 7 CAPSULE | Refills: 0 | Status: SHIPPED | COMMUNITY
End: 2023-09-07

## 2022-03-10 RX ORDER — AMOXICILLIN 250 MG
2 CAPSULE ORAL 2 TIMES DAILY
Qty: 30 TABLET | Refills: 0 | Status: SHIPPED | COMMUNITY
Start: 2022-03-10 | End: 2022-03-29

## 2022-03-10 RX ADMIN — ASPIRIN 81 MG: 81 TABLET, COATED ORAL at 08:46

## 2022-03-10 RX ADMIN — TACROLIMUS 2 MG: 1 CAPSULE ORAL at 08:46

## 2022-03-10 RX ADMIN — ALBUTEROL SULFATE 2 PUFF: 90 AEROSOL, METERED RESPIRATORY (INHALATION) at 14:11

## 2022-03-10 RX ADMIN — ALLOPURINOL 100 MG: 100 TABLET ORAL at 01:03

## 2022-03-10 RX ADMIN — ENOXAPARIN SODIUM 40 MG: 40 INJECTION SUBCUTANEOUS at 08:47

## 2022-03-10 RX ADMIN — OMEPRAZOLE 40 MG: 20 CAPSULE, DELAYED RELEASE ORAL at 08:46

## 2022-03-10 RX ADMIN — METOPROLOL SUCCINATE 25 MG: 25 TABLET, EXTENDED RELEASE ORAL at 08:46

## 2022-03-10 RX ADMIN — TAMSULOSIN HYDROCHLORIDE 0.4 MG: 0.4 CAPSULE ORAL at 08:47

## 2022-03-10 RX ADMIN — ATORVASTATIN CALCIUM 40 MG: 20 TABLET, FILM COATED ORAL at 01:04

## 2022-03-10 RX ADMIN — LISINOPRIL 10 MG: 10 TABLET ORAL at 08:47

## 2022-03-10 ASSESSMENT — ENCOUNTER SYMPTOMS
ABDOMINAL PAIN: 0
WHEEZING: 0
LOSS OF CONSCIOUSNESS: 0
CONSTIPATION: 0
INSOMNIA: 0
VOMITING: 0
EYE PAIN: 0
NAUSEA: 0
SHORTNESS OF BREATH: 1
CHILLS: 0
FALLS: 0
FOCAL WEAKNESS: 0
MYALGIAS: 0
COUGH: 0
HEMOPTYSIS: 0
EYE REDNESS: 0
PALPITATIONS: 0
WEAKNESS: 0
HEADACHES: 0
TREMORS: 0
BLOOD IN STOOL: 0
DIZZINESS: 0
FEVER: 0
NERVOUS/ANXIOUS: 1
SEIZURES: 0
DIARRHEA: 0

## 2022-03-10 NOTE — DISCHARGE PLANNING
[facility] TCN chart review completed. Collaborated with ED Case Manager Yanna prior to meeting with the pt. The most current review of medical record, knowledge of pt's PLOF and social support, LACE+ score of 75 and no  6 clicks score------------------ were considered.      Pt seen at bedside,very pleasant. Mbr states he is independent with ADLs , lives alone with his pet dogs.Denies additional assistance with food/housing/transportation. Introduced TCN program. Provided education regarding differences in post acute resources including IRF, SNF and HH . Discussed [facility] plan benefits including [facility] Beds and [facility] transitional care services. Pt verbalizes understanding. Offered [facility] services, sending [facility].TCN will continue to follow and collaborate with discharge planning team as additional post acute needs arise. Thank you.     Choice forms obtained: NONE  [facility] introduced ( Y) Referral ( Sent)

## 2022-03-10 NOTE — DISCHARGE SUMMARY
Discharge Summary    CHIEF COMPLAINT ON ADMISSION  Chief Complaint   Patient presents with   • Chest Pressure     Pt states he's been having pressure for a couple months and it's been getting progressively worse   • Shortness of Breath     States his SOB is worse when he is laying down       Reason for Admission  Chest Pressure     Admission Date  3/9/2022    CODE STATUS  Full Code    HPI & HOSPITAL COURSE  This is a 66 y.o. male here with Chest Pressure (Pt states he's been having pressure for a couple months and it's been getting progressively worse) and Shortness of Breath (States his SOB is worse when he is laying down)  Please review Dr. Ant Crowe M.D. notes for further details of history of present illness, past medical/social/family histories, allergies and medications. Please review Dr. Friend, Dr. Pierce, Cardiology consultation notes.  He has a former smoker, liver transplant on tacrolimus, nephrectomy due to cancer, hypertension, chronic pain, follows Dr. Mendes. He presents with chest pressure and shortness of breath. He has had a normal stress test 6/3/2021 and normal echo 7/2021. He has been seen 3/2/2022 by Dr. Kennedy, Cardiology clinic for substernal pressure. An echo was ordered but he continued to have substernal pressure along with shortness of breath. He decided to come to the ED to be evaluated.  At the ED, he is afebrile, normotensive  CTA Chest no PE, mild emphysematous changes.  EKG sinus, no obvious ischemic changes  Troponin x 2 negative.  Cardiology consulted. Echo and stress tests ordered.  He has a former smoker, liver transplant on tacrolimus, nephrectomy due to cancer, hypertension, chronic pain, follows Dr. Mendes. He presents with chest pressure and shortness of breath. He has had a normal stress test 6/3/2021 and normal echo 7/2021. He has been seen 3/2/2022 by Dr. Kennedy, Cardiology clinic for substernal pressure. An echo was ordered but he continued to have substernal pressure  along with shortness of breath. He decided to come to the ED to be evaluated.  At the ED, he is afebrile, normotensive  CTA Chest no PE, mild emphysematous changes.  EKG sinus, no obvious ischemic changes  Troponin x 2 negative.  Cardiology consulted. Echo and stress tests ordered.  I ordered CoVID test. That came out positive. Patient however is asymptomatic and NOT hypoxic. I gave him albuterol PRN, cough medicine, Vit C, D and zinc. I wrote DME pulse oximeter. He walked with nursing and he did NOT require O2.   Meanwhile because of this, stress test was cancelled. Echo was done but not read, however Dr. Pierce, cardiology CLEARED him for discharge as patient's stress tst and echos in the past have been negative for ischemia. I agree. I relayed this to the patient. Meanwhile he will follow up with Cardiology closely and discuss elective ischemic evaluation. He will be on baby aspirin for now, low dose statin (see instructions below) for LDL>100 and low HDL, blood pressure medications as his blood pressures were not in control (see instructions below) and PRN nitro. HHC was offered but it looks like he declined and nursng felt he was ambulating and doing his ADLS well.    At discharge date, Quirino Ruiz afebrile and hemodynamically stable.  Quirino Ruiz wanted to be discharged today.    Discharge Physical Exam  Vitals and nursing note reviewed.   Constitutional:       Comments: Older appearing.   HENT:      Head: Normocephalic and atraumatic.      Right Ear: External ear normal.      Left Ear: External ear normal.      Nose: Nose normal.      Mouth/Throat:      Mouth: Mucous membranes are moist.   Eyes:      General: No scleral icterus.     Conjunctiva/sclera: Conjunctivae normal.   Cardiovascular:      Rate and Rhythm: Normal rate and regular rhythm.      Heart sounds: No murmur heard.    No friction rub. No gallop.   Pulmonary:      Effort: Pulmonary effort is normal.      Breath sounds: Normal  breath sounds.   Abdominal:      General: Abdomen is flat. Bowel sounds are normal. There is no distension.      Palpations: Abdomen is soft.      Tenderness: There is no abdominal tenderness. There is no guarding.   Musculoskeletal:         General: Normal range of motion.      Cervical back: Normal range of motion and neck supple.   Skin:     General: Skin is warm.   Neurological:      Mental Status: He is alert and oriented to person, place, and time. Mental status is at baseline.   Psychiatric:         Mood and Affect: Mood normal.         Behavior: Behavior normal.         Thought Content: Thought content normal.         Judgment: Judgment normal.      Comments: Anxious  Can be irritable.     Imaging  CT-CTA CHEST PULMONARY ARTERY W/ RECONS   Final Result         1.  No evidence of pulmonary embolism.   2.  Mild emphysematous changes.   3.  Atherosclerosis.      Fleischner Society pulmonary nodule recommendations:   Not applicable      DX-CHEST-PORTABLE (1 VIEW)   Final Result      No acute cardiac or pulmonary abnormalities are identified.      EC-ECHOCARDIOGRAM COMPLETE W/O CONT    (Results Pending)           Therefore, he is discharged in fair and stable condition to home with close outpatient follow-up.      Discharge Date  3/10/2022    FOLLOW UP ITEMS POST DISCHARGE      DISCHARGE DIAGNOSES  Principal Problem:    Recurrent chest pain, COVID positive, chornic pain syndrome POA: Unknown  Active Problems:    Degeneration of lumbar or lumbosacral intervertebral disc POA: Yes    Liver transplant recipient (HCC) POA: Yes      Overview: Premorbid.      4/1 Prograf resumed.    Cancer of kidney (HCC) POA: Yes    HTN (hypertension) POA: Yes    Chest pain POA: Yes    Hyperglycemia POA: Unknown    Dyslipidemia POA: Unknown    COVID-19 virus RNA test result positive at limit of detection POA: Unknown        FOLLOW UP  Future Appointments   Date Time Provider Department Center   3/16/2022 10:15 AM Shahid Kennedy M.D.  [facility] None   3/22/2022  2:10 PM Luis Perez M.D. [facility] None   5/20/2022 12:15 PM [facility] EXAM 9 ECHO [facility]   5/31/2022 10:45 AM Coleman Wiggins M.D. [facility] None     Alejandra Sutherland M.D.  [address]  [address]  [place] [address]  [phone]    In 1 week      Jl Pierce M.D.  [address]  [address]  [place] [address]  [phone]          Jass Mendes M.D.  [address]  [place] [id number] [phone]      As needed for chronic pain complaints  Follow up Alejandra Sutherland M.D. in 1 week. Advise Quirino Ruiz to check blood pressure at home at least twice a day and have a log for primary provider to review. For COVID, see rocio and I ordered a DME pulse oximeter for him. Since he is on BP meds, will need labs at that visit to check renal function and K+. Because he is a liver transplant patient, I will give low dose statin, PCP can check liver function. Follow up with Dr. Pierce or cardiologist within 1 week. If possible appointment MUST be made. Follow up with Dr. Mendes, Pain clinic for chronic pain complaints. Follow up with his transplant physicians as needed. Return to ER in the event of new or worsening symptoms. Please note importance of compliance and the patient has agreed to proceed with all medical recommendations and follow up plan indicated above. All medications come with benefits and risks. Risks may include permanent injury or death and these risks can be minimized with close reassessment and monitoring. Please make it to your scheduled follow ups with your primary provider, and/or specialists clinic. Quarantine, mask, hand washing, proper hygiene and seeing a doctor if you have fever, hypoxia or respiratory/GI symptoms or symptoms of thrombosis (stroke/chest pain/new extremity swelling) are MUSTS. Self isolation if still symptomatic or if fever (must self isolate until NO fever for 72 hours from last temp without use of anti-fever medications). As  advised by the Centers for Disease Control and Prevention (CDC), please isolate yourself for at least 10 days since the onset of your symptoms AND one day without a fever without the use of fever reducing medications AND with improved symptoms.  This is true for everyone, even if your test is negative because of high rates of falsely negative results. Restrict activities outside your home for 10 days. Do not go to work, school, public areas, or restaurants. Avoid using public transportation, ride-sharing, or taxis. If your symptoms worsen or you need a return to work note please call the COVID line [phone]. If you become sicker (even if your initial COVID test is negative), have difficulty breathing, chest pain, you are unable to eat or drink enough, or have severe vomiting, diarrhea or weakness, you may need to return to the Emergency Department or contact your clinic provider for re-evaluation. If you need care and are coming into the hospital or one of our clinics, inform the healthcare facility by phone that you have been tested for COVID-19 prior to entry. Put on a facemask before you enter the facility or before emergency services arrive if you have called 911.   Unless you have severe difficulty breathing you will be expected to wear your mask throughout your hospital/clinic visit in order to protect our staff and other employees. Postpone all elective medical appointments within your isolation period, including but not limited to physical therapy, dental, chiropractic, routine check-ups, etc., until cleared by the healthline, local health department, or your personal physician. If you have any upcoming elective appointments, please contact that office directly in order to cancel, reschedule or be evaluated for a potential virtual visit. IF YOU LIVE WITH OTHERS Please have ALL household members quarantine until your test results are back.  They should not go into public, to  or school, or go to  work.  If any of your household members need a work note, please have them call the [facility] hotline at [phone]. If living with others, try to have your own bathroom and bedroom if possible.  Please try and wipe down high-touch surfaces (counters, tabletops, doorknobs, bathroom fixtures, toilets, phones, keyboards, tablets, and bedside tables) at least twice a day. Avoid sharing personal household items. You should not share dishes, drinking glasses, cups, eating utensils, towels, or bedding with other people in your home. After using these items, they should be washed thoroughly with soap and water.  Also, clean any surfaces that may have blood, stool, or body fluids on them. Use a household cleaning spray or wipe, according to the label instructions. Labels contain instructions for safe and effective use of the cleaning product including precautions you should take when applying the product, such as wearing gloves and making sure you have good ventilation during use of the product. Clean your hands often. Wash your hands often with soap and water for at least 20 seconds. If soap and water are not available, clean your hands with an alcohol-based hand  that contains at least 60% alcohol, covering all surfaces of your hands and rubbing them together until they feel dry. Soap and water should be used preferentially if hands are visibly dirty. Avoid touching your eyes, nose, and mouth with unwashed hands. Cover your coughs and sneezes   Please see the resources below for more information.   CDC Corona Website https://www.cdc.gov/coronavirus/2019-ncov/index.html General Information https://www.cdc.gov/coronavirus/2019-ncov/faq.html [facility]: [phone] [facility] Health Line [phone]    MEDICATIONS ON DISCHARGE     Medication List      START taking these medications      Instructions   acetaminophen 325 MG Tabs  Commonly known as: Tylenol   Take 2 Tablets by mouth every 6 hours  as needed.  Dose: 650 mg     albuterol 108 (90 Base) MCG/ACT Aers inhalation aerosol   Inhale 2 Puffs every four hours as needed for Shortness of Breath.  Dose: 2 Puff     ascorbic acid 500 MG tablet  Commonly known as: Vitamin C   Take 1 Tablet by mouth 2 times a day.  Dose: 500 mg     aspirin 81 MG EC tablet  Start taking on: March 11, 2022   Take 1 Tablet by mouth every day.  Dose: 81 mg     atorvastatin 20 MG Tabs  Commonly known as: LIPITOR   Take 1 Tablet by mouth every evening.  Dose: 20 mg     guaiFENesin dextromethorphan 100-10 MG/5ML Syrp syrup  Commonly known as: ROBITUSSIN DM   Take 10 mL by mouth every 6 hours as needed for Cough.  Dose: 10 mL     nitroglycerin 0.4 MG Subl  Commonly known as: NITROSTAT   Place 1 Tablet under the tongue as needed for Chest Pain.  Dose: 0.4 mg     polyethylene glycol/lytes 17 g Pack  Commonly known as: MIRALAX   Take  by mouth 1 time a day as needed (if sennosides and docusate ineffective after 24 hours).     senna-docusate 8.6-50 MG Tabs  Commonly known as: PERICOLACE or SENOKOT S   Take 2 Tablets by mouth 2 times a day.  Dose: 2 Tablet     vitamin D 1000 UNIT Tabs  Commonly known as: VITAMIND D3   Take 1 Tablet by mouth every day.  Dose: 1,000 Units     zinc sulfate 220 (50 Zn) MG Caps  Commonly known as: ZINCATE   Take 1 Capsule by mouth every day.  Dose: 220 mg        CONTINUE taking these medications      Instructions   allopurinol 100 MG Tabs  Commonly known as: ZYLOPRIM   Take 100 mg by mouth every bedtime.  Dose: 100 mg     lisinopril 10 MG Tabs  Commonly known as: PRINIVIL   Take 10 mg by mouth every morning. Indications: High Blood Pressure Disorder  Dose: 10 mg     metoprolol SR 25 MG Tb24  Commonly known as: TOPROL XL   Take 1 Tablet by mouth every day.  Dose: 25 mg     Pain Pump Odilia  Commonly known as: patient supplied   Inject  as directed continuous. Patient's Pain Pump (placed and maintained as an outpatient)  Medications/concentrations: Hydromorphone  "400.0 mcg/mL  Route: Intrathecally  Date of Placement: Unknown  Last changed: 1/11/2022 due to refill pump before 3/16/2022  Continuous infusion rates (Drug/Rate):  Hydromorphone 139.81 mcg / 24 hours (5.83 mcg / hr)  Patient activation dose: Hydromorphone 15.00 mcg  Maximum daily activation dose: Hydromorphone 228.06 mcg  Patient activation lockout interval: 1 hour  Maximum activations per day: 6     pantoprazole 40 MG Tbec  Commonly known as: PROTONIX   pantoprazole 40 mg tablet,delayed release   TAKE 1 TABLET BY MOUTH EVERY DAY     rizatriptan 10 MG disintegrating tablet  Commonly known as: MAXALT-MLT   TAKE 1 TAKE BY MOUTH AT ONSET OF HEADACHE. MAY REPEAT     tacrolimus 1 MG Caps  Commonly known as: PROGRAF   Take 2 mg by mouth 2 Times a Day. Indications: Liver Transplant Recipients  Dose: 2 mg     tamsulosin 0.4 MG capsule  Commonly known as: FLOMAX   Take 0.4 mg by mouth 1/2 hour after breakfast.  Dose: 0.4 mg     triamcinolone acetonide 0.1 % Oint  Commonly known as: KENALOG   triamcinolone acetonide 0.1 % topical ointment   APPLY TO AFFECTED AREAS TWICE A DAY FOR 14 DAYS AS NEEDED            Allergies  Allergies   Allergen Reactions   • Niacin      Passed out.    • Latex Rash     Reaction- Itching, Rash   • Pcn [Penicillins] Anaphylaxis and Swelling     Patient has tolerated cefazolin in past   • Morphine Sulfate      Per pt., \"it makes me cranky\"   • Mushroom Extract Complex        DIET  Orders Placed This Encounter   Procedures   • Diet NPO Restrict to: Sips with Medications (ice chips ok)     Standing Status:   Standing     Number of Occurrences:   8     Order Specific Question:   Diet NPO Restrict to:     Answer:   Sips with Medications [3]     Comments:   ice chips ok       ACTIVITY  Avoid heavy lifting or strenuous activity      CONSULTATIONS  Cardiology    PROCEDURES  DX-CHEST-PORTABLE (1 VIEW)    Result Date: 3/9/2022  3/9/2022 4:35 PM HISTORY/REASON FOR EXAM:  Chest Pain. TECHNIQUE/EXAM DESCRIPTION " AND NUMBER OF VIEWS: Single portable view of the chest. COMPARISON: Chest x-ray 2/1/2022 FINDINGS: Heart size is within normal limits. No pulmonary infiltrates or consolidations are noted. No pleural abnormalities are noted.     No acute cardiac or pulmonary abnormalities are identified.    CT-CTA CHEST PULMONARY ARTERY W/ RECONS    Result Date: 3/9/2022  3/9/2022 6:54 PM HISTORY/REASON FOR EXAM:  PE suspected, low/intermediate prob, positive D-dimer Shortness of breath TECHNIQUE/EXAM DESCRIPTION: CT angiogram scan for pulmonary embolism with contrast, with reconstructions. 1.25 mm helical sections were obtained from the lung apices through the lung bases following the rapid bolus administration of 40 mL of Omnipaque 350 nonionic contrast. Thin-section overlapping reconstruction interval was utilized. Coronal reconstructions were generated from the axial data. MIP post processing was performed and utilized for the interpretation. Low dose optimization technique was utilized for this CT exam including automated exposure control and adjustment of the mA and/or kV according to patient size. COMPARISON: None FINDINGS: Neck Base:  Normal. Lungs/Pleura: Mild emphysematous changes. Mild dependent changes and mild linear atelectasis/scarring in the right middle lobe and lingula. No pneumothorax or pleural effusion. Cardiovascular Structures: The pulmonary arteries are adequately opacified through the subsegmental levels. No intraluminal filling defect is identified to suggest pulmonary embolus. Central pulmonary arteries are normal in caliber. Heart size is normal.  No pericardial effusion. There is atherosclerosis of the coronary arteries and aorta. Aorta is normal in caliber without aneurysm or dissection. Mediastinum/ lymph nodes: No lymphadenopathy. Upper Abdomen:  Left nephrectomy partially visualized. There is scarring in the superior pole right kidney with adjacent calcification or nonobstructing stone Soft  tissues/wall: Within normal limits. Bones:  No acute or aggressive abnormality. Old left rib fractures.     1.  No evidence of pulmonary embolism. 2.  Mild emphysematous changes. 3.  Atherosclerosis. Fleischner Society pulmonary nodule recommendations: Not applicable      LABORATORY  Lab Results   Component Value Date    SODIUM 137 03/10/2022    POTASSIUM 3.6 03/10/2022    CHLORIDE 100 03/10/2022    CO2 25 03/10/2022    GLUCOSE 104 (H) 03/10/2022    BUN 11 03/10/2022    CREATININE 0.75 03/10/2022        Lab Results   Component Value Date    WBC 9.1 03/10/2022    HEMOGLOBIN 14.0 03/10/2022    HEMATOCRIT 41.5 (L) 03/10/2022    PLATELETCT 275 03/10/2022        Total time of the discharge process exceeds 35 minutes.

## 2022-03-10 NOTE — H&P
Hospital Medicine History & Physical Note    Date of Service  3/9/2022    Primary Care Physician  Alejandra Sutherland M.D.    Consultants  Cardiology (Dr. Friend)    Code Status  Full Code    Chief Complaint  Chief Complaint   Patient presents with   • Chest Pressure     Pt states he's been having pressure for a couple months and it's been getting progressively worse   • Shortness of Breath     States his SOB is worse when he is laying down       History of Presenting Illness  Quirino Ruiz is a 66 y.o. male with history of liver transplant on tacrolimus, nephrectomy due to cancer, hypertension who presented 3/9/2022 with complaint of chest pain.  Patient was recently seen by cardiology outpatient 3/2/2022, reported of chest pain at the time, recommended echo.  Patient however came to ER today as he felt that his chest pain is not improving.  Troponin T WNL, nonspecific ST changes on EKG.  Cardiology was consulted, recommended echo and stress test.  Admitted to medicine service for further evaluation treatment.    I discussed the plan of care with patient, bedside RN and cardiology.    Review of Systems  Review of Systems   Constitutional: Negative for chills, diaphoresis, fever and malaise/fatigue.   HENT: Negative for hearing loss and tinnitus.    Eyes: Negative for blurred vision and double vision.   Respiratory: Negative for cough, sputum production and shortness of breath.    Cardiovascular: Positive for chest pain. Negative for palpitations and leg swelling.   Gastrointestinal: Negative for abdominal pain, heartburn, nausea and vomiting.   Genitourinary: Negative for dysuria, flank pain and hematuria.   Musculoskeletal: Positive for joint pain. Negative for falls and myalgias.   Skin: Negative for itching and rash.   Neurological: Negative for dizziness, speech change, focal weakness, weakness and headaches.   Endo/Heme/Allergies: Negative for environmental allergies. Does not bruise/bleed easily.  "  Psychiatric/Behavioral: Negative for depression and substance abuse.   All other systems reviewed and are negative.      Past Medical History   has a past medical history of Arthritis, Back pain, Cancer (HCC) (2002;2013), Cholesterol blood decreased, Chronic pain, Cold (8/27/2014), Dental disorder, Gout, Heart burn, Hepatitis C (1993), Hypertension, Indigestion, Liver transplanted (HCC) (2002), Pain, Pain, Renal cancer (HCC), Renal disorder, and Stroke (HCC) (03/2018).    Surgical History   has a past surgical history that includes other (1994); cervical disk and fusion anterior (12/11/2012); recovery (4/23/2013); lumbar fusion anterior (5/29/2013); evacuation of hematoma (5/31/2013); other (2/28/2014); cath place perm epidural (9/9/2014); pump insert/remove (11/25/2014); exploratory laparotomy (5/31/2013); recovery (11/30/2015); other surgical procedure; nephrectomy radical; cholecystectomy; cervical decompression posterior; other; nephrectomy radical (Left); pump insert/remove; and pump revision (Left, 9/17/2021).     Family History  family history includes Hypertension in an other family member.   Family history reviewed with patient. There is family history that is pertinent to the chief complaint.     Social History   reports that he has quit smoking. His smoking use included cigarettes. He has a 26.00 pack-year smoking history. He has never used smokeless tobacco. He reports current alcohol use of about 0.6 oz of alcohol per week. He reports that he does not use drugs.    Allergies  Allergies   Allergen Reactions   • Niacin      Passed out.    • Latex Rash     Reaction- Itching, Rash   • Pcn [Penicillins] Anaphylaxis and Swelling     Patient has tolerated cefazolin in past   • Morphine Sulfate      Per pt., \"it makes me cranky\"   • Mushroom Extract Complex        Medications  Prior to Admission Medications   Prescriptions Last Dose Informant Patient Reported? Taking?   Pain Pump (PATIENT SUPPLIED) XX BRANDON  " Other Facility Yes No   Sig: Inject  as directed continuous. Patient's Pain Pump (placed and maintained as an outpatient)  Medications/concentrations: Hydromorphone 400.0 mcg/mL  Route: Intrathecally  Date of Placement: Unknown  Last changed: 9/17/2020  Continuous infusion rates (Drug/Rate):  Hydromorphone 99.97 mcg / 24 hours (4.17 mcg / hr)  Patient activation dose: Hydromorphone 9.69 mcg  Maximum daily activation dose: Hydromorphone 138.16 mcg  Patient activation lockout interval: 1 hour  Maximum activations per day: 4   allopurinol (ZYLOPRIM) 100 MG Tab   Yes No   Sig: Take 100 mg by mouth every bedtime.   lisinopril (PRINIVIL) 10 MG Tab  Patient Yes No   Sig: Take 10 mg by mouth every morning. Indications: High Blood Pressure Disorder   metoprolol SR (TOPROL XL) 25 MG TABLET SR 24 HR   No No   Sig: Take 1 Tablet by mouth every day.   pantoprazole (PROTONIX) 40 MG Tablet Delayed Response   Yes No   Sig: pantoprazole 40 mg tablet,delayed release   TAKE 1 TABLET BY MOUTH EVERY DAY   rizatriptan (MAXALT-MLT) 10 MG disintegrating tablet   Yes No   Sig: TAKE 1 TAKE BY MOUTH AT ONSET OF HEADACHE. MAY REPEAT   tacrolimus (PROGRAF) 1 MG Cap  Patient Yes No   Sig: Take 2 mg by mouth 2 Times a Day. Indications: Liver Transplant Recipients   tamsulosin (FLOMAX) 0.4 MG capsule   Yes No   Sig: Take 0.4 mg by mouth 1/2 hour after breakfast.   triamcinolone acetonide (KENALOG) 0.1 % Ointment   Yes No   Sig: triamcinolone acetonide 0.1 % topical ointment   APPLY TO AFFECTED AREAS TWICE A DAY FOR 14 DAYS AS NEEDED      Facility-Administered Medications: None       Physical Exam  Temp:  [36.1 °C (96.9 °F)-36.1 °C (97 °F)] 36.1 °C (97 °F)  Pulse:  [64-99] 64  Resp:  [15-21] 17  BP: (118-177)/() 118/64  SpO2:  [90 %-96 %] 90 %  Blood Pressure : 141/73   Temperature: 36.1 °C (97 °F)   Pulse: 76   Respiration: 20   Pulse Oximetry: 93 %       Physical Exam  Vitals and nursing note reviewed.   Constitutional:       Appearance:  Normal appearance.   HENT:      Head: Normocephalic and atraumatic.      Nose: Nose normal.      Mouth/Throat:      Mouth: Mucous membranes are moist.      Pharynx: Oropharynx is clear.   Eyes:      General: No scleral icterus.     Extraocular Movements: Extraocular movements intact.   Cardiovascular:      Rate and Rhythm: Normal rate and regular rhythm.      Pulses: Normal pulses.      Heart sounds:     No friction rub.   Pulmonary:      Effort: Pulmonary effort is normal. No respiratory distress.      Breath sounds: No stridor. No wheezing.   Chest:      Chest wall: No tenderness.   Abdominal:      General: There is no distension.      Palpations: Abdomen is soft.      Tenderness: There is no abdominal tenderness. There is no guarding or rebound.   Musculoskeletal:         General: No swelling or tenderness. Normal range of motion.      Cervical back: Neck supple. No tenderness.   Skin:     General: Skin is warm and dry.      Capillary Refill: Capillary refill takes less than 2 seconds.   Neurological:      General: No focal deficit present.      Mental Status: He is alert and oriented to person, place, and time. Mental status is at baseline.      Motor: No weakness.   Psychiatric:         Mood and Affect: Mood normal.         Laboratory:  Recent Labs     03/09/22  1551   WBC 9.5   RBC 4.64*   HEMOGLOBIN 14.1   HEMATOCRIT 41.9*   MCV 90.3   MCH 30.4   MCHC 33.7   RDW 45.2   PLATELETCT 321   MPV 9.7     Recent Labs     03/09/22  1551   SODIUM 143   POTASSIUM 3.9   CHLORIDE 104   CO2 27   GLUCOSE 107*   BUN 12   CREATININE 0.73   CALCIUM 10.0     Recent Labs     03/09/22  1551   ALTSGPT 24   ASTSGOT 20   ALKPHOSPHAT 74   TBILIRUBIN 0.2   GLUCOSE 107*         Recent Labs     03/09/22  1551   NTPROBNP 548*         Recent Labs     03/09/22  1551 03/09/22  2133   TROPONINT 14 15       Imaging:  CT-CTA CHEST PULMONARY ARTERY W/ RECONS   Final Result         1.  No evidence of pulmonary embolism.   2.  Mild emphysematous  changes.   3.  Atherosclerosis.      Fleischner Society pulmonary nodule recommendations:   Not applicable      DX-CHEST-PORTABLE (1 VIEW)   Final Result      No acute cardiac or pulmonary abnormalities are identified.      NM-CARDIAC STRESS TEST    (Results Pending)   EC-ECHOCARDIOGRAM COMPLETE W/O CONT    (Results Pending)       X-Ray:  I have personally reviewed the images and compared with prior images.  EKG:  I have personally reviewed the images and compared with prior images.    Assessment/Plan:  I anticipate this patient is appropriate for observation status at this time.    * Pain in the chest  Assessment & Plan  R/o ACS, anxiety likely contributory  Trend CE, EKG  No PE seen on CTA PE  PRN nitro SL  ASA/statin  F/u echo, MPS  Cardiology f/u appreciated    HTN (hypertension)- (present on admission)  Assessment & Plan  Home meds    Cancer of kidney (HCC)- (present on admission)  Assessment & Plan  S/p nephrectomy    Degeneration of lumbar or lumbosacral intervertebral disc- (present on admission)  Assessment & Plan  Continue pain meds    Liver transplant recipient (HCC)- (present on admission)  Assessment & Plan  Cont tacrolimus    Hyperglycemia  Assessment & Plan  F/u HbA1c      VTE prophylaxis: enoxaparin ppx

## 2022-03-10 NOTE — PROGRESS NOTES
[facility] Medicine Daily Progress Note    Date of Service  3/10/2022    Chief Complaint  Quirino Ruiz is a 66 y.o. male admitted 3/9/2022 with Chest Pressure (Pt states he's been having pressure for a couple months and it's been getting progressively worse) and Shortness of Breath (States his SOB is worse when he is laying down)    Hospital Course  No notes on file  He has a former smoker, liver transplant on tacrolimus, nephrectomy due to cancer, hypertension, chronic pain, follows Dr. Mendes. He presents with chest pressure and shortness of breath. He has had a normal stress test 6/3/2021 and normal echo 7/2021. He has been seen 3/2/2022 by Dr. Kennedy, Cardiology clinic for substernal pressure. An echo was ordered but he continued to have substernal pressure along with shortness of breath. He decided to come to the ED to be evaluated.  At the ED, he is afebrile, normotensive  CTA Chest no PE, mild emphysematous changes.  EKG sinus, no obvious ischemic changes  Troponin x 2 negative.  Cardiology consulted. Echo and stress tests ordered.    Interval Problem Update  3/10. He is pretty anxious and indicating he wants his chest pain resolved. He mentioned he has an aunt who had negative stress tests for chest pain, ended up having heart attack. I spoke with Cardiology about this and he agreed with plan to do stress testing and echo first.    I have personally seen and examined the patient at bedside. I discussed the plan of care with patient, bedside RN and cardiology.    Consultants/Specialty  cardiology    Code Status  Full Code    Disposition  Patient is not medically cleared for discharge.   Anticipate discharge to home.  I have placed the appropriate orders for post-discharge needs.    Review of Systems  Review of Systems   Constitutional: Negative for chills and fever.   HENT: Negative for congestion, hearing loss and nosebleeds.    Eyes: Negative for pain and redness.   Respiratory: Positive for shortness  of breath. Negative for cough, hemoptysis and wheezing.    Cardiovascular: Positive for chest pain. Negative for palpitations.   Gastrointestinal: Negative for abdominal pain, blood in stool, constipation, diarrhea, nausea and vomiting.   Genitourinary: Negative for dysuria, frequency and hematuria.   Musculoskeletal: Negative for falls, joint pain and myalgias.   Skin: Negative for rash.   Neurological: Negative for dizziness, tremors, focal weakness, seizures, loss of consciousness, weakness and headaches.   Psychiatric/Behavioral: The patient is nervous/anxious. The patient does not have insomnia.    All other systems reviewed and are negative.       Physical Exam  Temp:  [36.1 °C (96.9 °F)-36.1 °C (97 °F)] 36.1 °C (97 °F)  Pulse:  [62-99] 75  Resp:  [15-21] 18  BP: (111-177)/() 133/63  SpO2:  [89 %-97 %] 92 %    Physical Exam  Vitals and nursing note reviewed.   Constitutional:       Comments: Older appearing.   HENT:      Head: Normocephalic and atraumatic.      Right Ear: External ear normal.      Left Ear: External ear normal.      Nose: Nose normal.      Mouth/Throat:      Mouth: Mucous membranes are moist.   Eyes:      General: No scleral icterus.     Conjunctiva/sclera: Conjunctivae normal.   Cardiovascular:      Rate and Rhythm: Normal rate and regular rhythm.      Heart sounds: No murmur heard.    No friction rub. No gallop.   Pulmonary:      Effort: Pulmonary effort is normal.      Breath sounds: Normal breath sounds.   Abdominal:      General: Abdomen is flat. Bowel sounds are normal. There is no distension.      Palpations: Abdomen is soft.      Tenderness: There is no abdominal tenderness. There is no guarding.   Musculoskeletal:         General: Normal range of motion.      Cervical back: Normal range of motion and neck supple.   Skin:     General: Skin is warm.   Neurological:      Mental Status: He is alert and oriented to person, place, and time. Mental status is at baseline.   Psychiatric:  "        Mood and Affect: Mood normal.         Behavior: Behavior normal.         Thought Content: Thought content normal.         Judgment: Judgment normal.      Comments: Anxious  Can be irritable.         Fluids  No intake or output data in the 24 hours ending 03/10/22 1125    Laboratory  Recent Labs     03/09/22  1551 03/10/22  0130   WBC 9.5 9.1   RBC 4.64* 4.54*   HEMOGLOBIN 14.1 14.0   HEMATOCRIT 41.9* 41.5*   MCV 90.3 91.4   MCH 30.4 30.8   MCHC 33.7 33.7   RDW 45.2 46.1   PLATELETCT 321 275   MPV 9.7 9.3     Recent Labs     03/09/22  1551 03/10/22  0130   SODIUM 143 137   POTASSIUM 3.9 3.6   CHLORIDE 104 100   CO2 27 25   GLUCOSE 107* 104*   BUN 12 11   CREATININE 0.73 0.75   CALCIUM 10.0 9.5             Recent Labs     03/10/22  0130   TRIGLYCERIDE 152*   HDL 37*   *       Imaging  CT-CTA CHEST PULMONARY ARTERY W/ RECONS   Final Result         1.  No evidence of pulmonary embolism.   2.  Mild emphysematous changes.   3.  Atherosclerosis.      Fleischner Society pulmonary nodule recommendations:   Not applicable      DX-CHEST-PORTABLE (1 VIEW)   Final Result      No acute cardiac or pulmonary abnormalities are identified.      NM-CARDIAC STRESS TEST    (Results Pending)   EC-ECHOCARDIOGRAM COMPLETE W/O CONT    (Results Pending)        Assessment/Plan  * Pain in the chest  Assessment & Plan  R/o ACS, anxiety likely contributory  Trend CE, EKG  No PE seen on CTA PE  PRN nitro SL  ASA/statin  F/u echo, MPS  Cardiology f/u appreciated\"    CoVID test ordered  Awaiting echo and stress test  Cardiology clearance.  Follow up with [facility]? In the outpatient.    Dyslipidemia  Assessment & Plan  Recommend diet control for now, goal LDL<70  Because of liver transplant history, will try to avoid statins unless necessary    Hyperglycemia  Assessment & Plan  F/u HbA1c\"    A1c 5.8.    HTN (hypertension)- (present on admission)  Assessment & Plan  Home meds\"    Vitals:    03/10/22 1005   BP:    Pulse: 75 "   Resp: 18   Temp:    SpO2: 92%     Stable now with current medications.    Cancer of kidney (HCC)- (present on admission)  Assessment & Plan  S/p nephrectomy    Degeneration of lumbar or lumbosacral intervertebral disc- (present on admission)  Assessment & Plan  Continue pain meds    Liver transplant recipient (HCC)- (present on admission)  Assessment & Plan  Cont tacrolimus       VTE prophylaxis: enoxaparin ppx    I have performed a physical exam and reviewed and updated ROS and Plan today (3/10/2022). In review of yesterday's note (3/9/2022), there are no changes except as documented above.

## 2022-03-10 NOTE — ED NOTES
Med Rec partial per Pt at bedside.  Allergies reviewed.    Pt currently has a Pain Pump connected. Pt reports he was supposed to get it filled tomorrow.    Dr. Mendes's office currently closed. Unable to verify Pain Pump settings at this time.     Home Pharmacy:  [facility]/[place]

## 2022-03-10 NOTE — ASSESSMENT & PLAN NOTE
Recommend diet control for now, goal LDL<70  Because of liver transplant history, will try to avoid statins unless necessary

## 2022-03-10 NOTE — ASSESSMENT & PLAN NOTE
"Home meds\"    Vitals:    03/10/22 1005   BP:    Pulse: 75   Resp: 18   Temp:    SpO2: 92%     Stable now with current medications.  "

## 2022-03-10 NOTE — FACE TO FACE
Face to Face Supporting Documentation - Home Health    The encounter with this patient was in whole or in part the primary reason for home health admission.    Date of encounter:   Patient:                    MRN:                       YOB: 2023  Quirino Ruiz  [id number]  1955     Home health to see patient for:  Skilled Nursing care for assessment, interventions & education, Physical Therapy evaluation and treatment and Occupational therapy evaluation and treatment    Skilled need for:  Medication Management started BP meds; manage chest pain    Skilled nursing interventions to include:  Comment: as above    Homebound status evidenced by:  Needs the assistance of another person in order to leave the home. Leaving home requires a considerable and taxing effort. There is a normal inability to leave the home.    Community Physician to provide follow up care: Alejandra Sutherland M.D.     Optional Interventions? No      I certify the face to face encounter for this home health care referral meets the CMS requirements and the encounter/clinical assessment with the patient was, in whole, or in part, for the medical condition(s) listed above, which is the primary reason for home health care. Based on my clinical findings: the service(s) are medically necessary, support the need for home health care, and the homebound criteria are met.  I certify that this patient has had a face to face encounter by myself.  Ibrahima Renee M.D. - NPI: [id number]

## 2022-03-10 NOTE — ED PROVIDER NOTES
ED Provider Note    CHIEF COMPLAINT  Chief Complaint   Patient presents with   • Chest Pressure     Pt states he's been having pressure for a couple months and it's been getting progressively worse   • Shortness of Breath     States his SOB is worse when he is laying down       HPI  Quirino Ruiz is a 66 y.o. male with history of liver transplant, nephrectomy due to cancer, hypertension who presents with chest pain and shortness of breath.  Patient reports he has had ongoing intermittent symptoms since last summer.  He reports a aching, tightness in his chest with associated shortness of breath.  He states that this is worse when he is sitting or lying down, no exertional component.  No associated diaphoresis, nausea, vomiting, leg swelling.  He states he feels as though something very bad is going to happen to him.  He was seen by his cardiologist 6 days ago and had a plan for repeat echocardiogram however this is not scheduled until May.  He states that his symptoms were worsening today therefore he called their clinic and they recommend he come in here for evaluation.  He also endorses some increasing nocturnal urination and possibly burning with urination.    REVIEW OF SYSTEMS  See HPI for further details.   Review of Systems   Constitutional: Positive for malaise/fatigue. Negative for chills and fever.   HENT: Negative for sore throat.    Eyes: Negative for blurred vision and redness.   Respiratory: Positive for shortness of breath. Negative for cough.    Cardiovascular: Positive for chest pain. Negative for leg swelling.   Gastrointestinal: Negative for abdominal pain and vomiting.   Genitourinary: Positive for dysuria and frequency. Negative for urgency.   Musculoskeletal: Negative for back pain and neck pain.   Skin: Negative for rash.   Neurological: Negative for focal weakness, seizures and headaches.   Psychiatric/Behavioral: Negative for suicidal ideas.         PAST MEDICAL HISTORY   has a past  Looks like patient has an INFECTION - can you call her and ask her to please start E-Visit so we can prescribe medication for her?  Thanks,  Dr. Carolina Pulliam MD / [facility]   medical history of Arthritis, Back pain, Cancer (HCC) (2002;2013), Cholesterol blood decreased, Chronic pain, Cold (8/27/2014), Dental disorder, Gout, Heart burn, Hepatitis C (1993), Hypertension, Indigestion, Liver transplanted (HCC) (2002), Pain, Pain, Renal cancer (HCC), Renal disorder, and Stroke (HCC) (03/2018).    SOCIAL HISTORY  Social History     Tobacco Use   • Smoking status: Former Smoker     Packs/day: 0.50     Years: 52.00     Pack years: 26.00     Types: Cigarettes   • Smokeless tobacco: Never Used   • Tobacco comment: quit four months ago   Vaping Use   • Vaping Use: Never used   Substance and Sexual Activity   • Alcohol use: Yes     Alcohol/week: 0.6 oz     Types: 1 Cans of beer per week   • Drug use: No   • Sexual activity: Not on file       SURGICAL HISTORY   has a past surgical history that includes other (1994); cervical disk and fusion anterior (12/11/2012); recovery (4/23/2013); lumbar fusion anterior (5/29/2013); evacuation of hematoma (5/31/2013); other (2/28/2014); cath place perm epidural (9/9/2014); pump insert/remove (11/25/2014); exploratory laparotomy (5/31/2013); recovery (11/30/2015); other surgical procedure; nephrectomy radical; cholecystectomy; cervical decompression posterior; other; nephrectomy radical (Left); pump insert/remove; and pump revision (Left, 9/17/2021).    CURRENT MEDICATIONS  Home Medications     Reviewed by Olga Wilks R.N. (Registered Nurse) on 03/09/22 at 1543  Med List Status: <None>   Medication Last Dose Status   allopurinol (ZYLOPRIM) 100 MG Tab  Active   lisinopril (PRINIVIL) 10 MG Tab  Active   metoprolol SR (TOPROL XL) 25 MG TABLET SR 24 HR  Active   Pain Pump (PATIENT SUPPLIED) XX BRANDON  Active   pantoprazole (PROTONIX) 40 MG Tablet Delayed Response  Active   rizatriptan (MAXALT-MLT) 10 MG disintegrating tablet  Active   tacrolimus (PROGRAF) 1 MG Cap  Active   tamsulosin (FLOMAX) 0.4 MG capsule  Active   triamcinolone acetonide (KENALOG) 0.1 %  "Ointment  Active                ALLERGIES  Allergies   Allergen Reactions   • Niacin      Passed out.    • Latex Rash     Reaction- Itching, Rash   • Pcn [Penicillins] Anaphylaxis and Swelling     Patient has tolerated cefazolin in past   • Morphine Sulfate      Per pt., \"it makes me cranky\"   • Mushroom Extract Complex        PHYSICAL EXAM   VITAL SIGNS: /64   Pulse 78   Temp 36.1 °C (97 °F) (Temporal)   Resp (!) 21   Ht 1.702 m (5' 7\")   Wt 74.8 kg (164 lb 14.5 oz)   SpO2 94%   BMI 25.83 kg/m²      Physical Exam  Constitutional:       General: He is not in acute distress.     Comments: Nontoxic-appearing older male   HENT:      Head: Normocephalic and atraumatic.   Eyes:      Conjunctiva/sclera: Conjunctivae normal.      Pupils: Pupils are equal, round, and reactive to light.   Cardiovascular:      Rate and Rhythm: Normal rate and regular rhythm.      Heart sounds: Normal heart sounds.   Pulmonary:      Effort: Pulmonary effort is normal. No respiratory distress.      Breath sounds: Normal breath sounds.   Abdominal:      General: There is no distension.      Palpations: Abdomen is soft.      Tenderness: There is no abdominal tenderness.   Musculoskeletal:         General: No tenderness. Normal range of motion.      Cervical back: Normal range of motion and neck supple.   Skin:     General: Skin is warm and dry.   Neurological:      Mental Status: He is alert and oriented to person, place, and time.      Comments: Moving all extremities spontaneously   Psychiatric:         Mood and Affect: Affect normal.           DIAGNOSTIC STUDIES    EKG  Results for orders placed or performed during the hospital encounter of 03/09/22   EKG   Result Value Ref Range    Report       [facility] Emergency Dept.    Test Date:  2022-03-09  Pt Name:    PRERNA SOUZA                Department: ER  MRN:        [id number]                      Room:  Gender:     Male                         Technician: " [id number]  :        1955                   Requested By:ER TRIAGE PROTOCOL  Order #:    [id number]                    Reading MD: Camilla Kirby MD    Measurements  Intervals                                Axis  Rate:       90                           P:          45  MS:         196                          QRS:        27  QRSD:       82                           T:          53  QT:         364  QTc:        446    Interpretive Statements  SINUS RHYTHM  Normal intervals, no ectopy  No ST or T wave change  Compared to ECG 2021 13:51:53  No significant changes  Electronically Signed On 3-9-2022 17:04:07 PST by Camilla Kirby MD             LABS  Personally reviewed by me  Labs Reviewed   CBC WITH DIFFERENTIAL - Abnormal; Notable for the following components:       Result Value    RBC 4.64 (*)     Hematocrit 41.9 (*)     Monos (Absolute) 1.16 (*)     All other components within normal limits   COMP METABOLIC PANEL - Abnormal; Notable for the following components:    Glucose 107 (*)     All other components within normal limits   PROBRAIN NATRIURETIC PEPTIDE, NT - Abnormal; Notable for the following components:    NT-proBNP 548 (*)     All other components within normal limits   D-DIMER - Abnormal; Notable for the following components:    D-Dimer Screen 0.76 (*)     All other components within normal limits   TROPONIN   ESTIMATED GFR   URINALYSIS,CULTURE IF INDICATED    Narrative:     Indication for culture:->Patient WITHOUT an indwelling Mercado  catheter in place with new onset of Dysuria, Frequency,  Urgency, and/or Suprapubic pain   TROPONIN   TROPONIN   TROPONIN           RADIOLOGY  Personally reviewed by me  CT-CTA CHEST PULMONARY ARTERY W/ RECONS   Final Result         1.  No evidence of pulmonary embolism.   2.  Mild emphysematous changes.   3.  Atherosclerosis.      Fleischner Society pulmonary nodule recommendations:   Not applicable      DX-CHEST-PORTABLE (1 VIEW)   Final Result      No acute cardiac  or pulmonary abnormalities are identified.      NM-CARDIAC STRESS TEST    (Results Pending)   EC-ECHOCARDIOGRAM COMPLETE W/O CONT    (Results Pending)         ED COURSE  Vitals:    03/09/22 1929 03/09/22 2029 03/09/22 2059 03/09/22 2229   BP: 152/68 141/73  129/64   Pulse: 73 74 76 78   Resp: 20 19 20 (!) 21   Temp:  36.1 °C (97 °F)     TempSrc:  Temporal     SpO2: 93% 91% 93% 94%   Weight:       Height:             Medications administered:      Old records personally reviewed:  Patient seen by Dr. Alejo with cardiology on 3/3.  He had a normal nuclear medicine test and echocardiogram in June and July of last year.        MEDICAL DECISION MAKING  Patient presents with ongoing episodes of intermittent chest pain over the last few months which are worsening.  He is afebrile, hypertensive on arrival with otherwise reassuring vital signs.  His EKG does not show evidence of ischemia or arrhythmia.  Initial troponin is normal.  Clinically doubt aortic dissection.  His D-dimer was elevated therefore CT of the chest was performed that does not show pulmonary embolism however does show some atherosclerosis and findings of emphysema.  Labs are otherwise reassuring without anemia or electrolyte abnormality.  Patient has had a recent stress test and echocardiogram within the last year however was sent here by his cardiology office therefore I will consult with them for further management.  I do suspect anxiety component.    I discussed the case with Dr. Friend, on-call for cardiology.  She thinks it is reasonable as it has been sometime to admit for repeat stress testing and echocardiogram.  I discussed the plan of care with the patient who is agreeable at this time.    I discussed the case with Dr. Crowe, hospitalist on-call, who accepts admission of the patient.        IMPRESSION  (R07.9) Chest pain, unspecified type    Disposition: Admit medicine, stable condition  Results, diagnoses, and treatment options were discussed  with the patient and/or family. Patient verbalized understanding of plan of care.    Patient referred to primary care provider for monitoring and treatment of blood pressure.      New Prescriptions    No medications on file           Electronically signed by: Camilla Kirby M.D., 3/9/2022 7:34 PM

## 2022-03-10 NOTE — ASSESSMENT & PLAN NOTE
"R/o ACS, anxiety likely contributory  Trend CE, EKG  No PE seen on CTA PE  PRN nitro SL  ASA/statin  F/u echo, MPS  Cardiology f/u appreciated\"    CoVID test ordered  Awaiting echo and stress test  Cardiology clearance.  Follow up with [facility]? In the outpatient.  "

## 2022-03-10 NOTE — PROGRESS NOTES
Patient was found to be Covid positive so is not a candidate for stress testing and he has had normal serial echocardiograms.  I would encourage him to follow-up with cardiology for continued evaluation of his chest pain but given normal serial Stress imaging last within the last year it is unlikely he has severe coronary artery disease.    Cardiology will sign off    Future Appointments   Date Time Provider Department Center   3/22/2022  2:10 PM Luis Perez M.D. [facility] None   5/20/2022 12:15 PM [facility] EXAM 9 ECHO [facility]   5/31/2022 10:45 AM Coleman Wiggins M.D. [facility] None     It is my pleasure to participate in the care of Mr. Ruiz.  Please do not hesitate to contact me with questions or concerns.    Jl Pierce MD PhD [facility]  Cardiologist [facility]    Please note that this dictation was created using voice recognition software. There may be errors I did not discover before finalizing the note.     3/10/2022  1:21 PM

## 2022-03-10 NOTE — CONSULTS
Reason for Consult:  Asked by Dr Camilla Kirby M.D. to see this patient with chest pain  Patient's PCP: Alejandra Sutherland M.D.    CC:   Chief Complaint   Patient presents with   • Chest Pressure     Pt states he's been having pressure for a couple months and it's been getting progressively worse   • Shortness of Breath     States his SOB is worse when he is laying down       HPI:  Quirino Ruiz is a 66 year old man with history of liver transplant, nephrectomy due to cancer, and hypertension who presented with chest pain.      The patient was recently seen in cardiology clinic 3/2/2022.  During the visit, the patient reported ongoing substernal chest pressure along with fatigue and dyspnea on exertion.  As such, the patient was ordered a repeat echocardiogram.  However, the patient started feeling worse and decided to come to the ER.    The patient reports current chest pain as well as shortness of breath.  Endorses orthopnea.  No PND or leg swelling.  No palpitations.  No syncope or presyncopal episodes.    Medications / Drug list prior to admission:  No current facility-administered medications on file prior to encounter.     Current Outpatient Medications on File Prior to Encounter   Medication Sig Dispense Refill   • pantoprazole (PROTONIX) 40 MG Tablet Delayed Response pantoprazole 40 mg tablet,delayed release   TAKE 1 TABLET BY MOUTH EVERY DAY     • rizatriptan (MAXALT-MLT) 10 MG disintegrating tablet TAKE 1 TAKE BY MOUTH AT ONSET OF HEADACHE. MAY REPEAT     • triamcinolone acetonide (KENALOG) 0.1 % Ointment triamcinolone acetonide 0.1 % topical ointment   APPLY TO AFFECTED AREAS TWICE A DAY FOR 14 DAYS AS NEEDED     • metoprolol SR (TOPROL XL) 25 MG TABLET SR 24 HR Take 1 Tablet by mouth every day. 30 Tablet 1   • tamsulosin (FLOMAX) 0.4 MG capsule Take 0.4 mg by mouth 1/2 hour after breakfast.     • allopurinol (ZYLOPRIM) 100 MG Tab Take 100 mg by mouth every bedtime.     • lisinopril (PRINIVIL)  10 MG Tab Take 10 mg by mouth every morning. Indications: High Blood Pressure Disorder     • Pain Pump (PATIENT SUPPLIED) XX BRANDON Inject  as directed continuous. Patient's Pain Pump (placed and maintained as an outpatient)  Medications/concentrations: Hydromorphone 400.0 mcg/mL  Route: Intrathecally  Date of Placement: Unknown  Last changed: 9/17/2020  Continuous infusion rates (Drug/Rate):  Hydromorphone 99.97 mcg / 24 hours (4.17 mcg / hr)  Patient activation dose: Hydromorphone 9.69 mcg  Maximum daily activation dose: Hydromorphone 138.16 mcg  Patient activation lockout interval: 1 hour  Maximum activations per day: 4     • tacrolimus (PROGRAF) 1 MG Cap Take 2 mg by mouth 2 Times a Day. Indications: Liver Transplant Recipients         Current list of administered Medications:    Current Facility-Administered Medications:   •  senna-docusate (PERICOLACE or SENOKOT S) 8.6-50 MG per tablet 2 Tablet, 2 Tablet, Oral, BID **AND** polyethylene glycol/lytes (MIRALAX) PACKET 1 Packet, 1 Packet, Oral, QDAY PRN **AND** magnesium hydroxide (MILK OF MAGNESIA) suspension 30 mL, 30 mL, Oral, QDAY PRN **AND** bisacodyl (DULCOLAX) suppository 10 mg, 10 mg, Rectal, QDAY PRN, Ant Crowe M.D.  •  aspirin (ASA) tablet 325 mg, 325 mg, Oral, DAILY **OR** aspirin (ASA) chewable tab 324 mg, 324 mg, Oral, DAILY **OR** aspirin (ASA) suppository 300 mg, 300 mg, Rectal, DAILY, Ant Crowe M.D.  •  regadenoson (LEXISCAN) injection SOLN 0.4 mg, 0.4 mg, Intravenous, Once PRN, Ant Crowe M.D.  •  aminophylline injection 100 mg, 100 mg, Intravenous, Q5 MIN PRN, Ant Crowe M.D.  •  acetaminophen (Tylenol) tablet 650 mg, 650 mg, Oral, Q6HRS PRN, Ant Crowe M.D.  •  hydrALAZINE (APRESOLINE) injection 10 mg, 10 mg, Intravenous, Q4HRS PRN, Ant Crowe M.D.  •  ondansetron (ZOFRAN) syringe/vial injection 4 mg, 4 mg, Intravenous, Q4HRS PRN, Ant Crowe M.D.  •  ondansetron (ZOFRAN ODT) dispertab 4 mg, 4 mg, Oral, Q4HRS PRN, Ant  KIMBERLY Crowe  •  guaiFENesin dextromethorphan (ROBITUSSIN DM) 100-10 MG/5ML syrup 10 mL, 10 mL, Oral, Q6HRS PRN, Ant Crowe M.D.  •  nitroglycerin (NITROSTAT) tablet 0.4 mg, 0.4 mg, Sublingual, Q5 MIN PRN, Ant Crowe M.D.  •  allopurinol (ZYLOPRIM) tablet 100 mg, 100 mg, Oral, QHS, Ant Crowe M.D.  •  [START ON 3/10/2022] lisinopril (PRINIVIL) tablet 10 mg, 10 mg, Oral, QAM, Ant Crowe M.D.  •  [START ON 3/10/2022] metoprolol SR (TOPROL XL) tablet 25 mg, 25 mg, Oral, DAILY, Ant Crowe M.D.  •  [START ON 3/10/2022] pantoprazole (PROTONIX) EC tablet 40 mg, 40 mg, Oral, DAILY, Ant Crowe M.D.  •  tacrolimus (PROGRAF) capsule 2 mg, 2 mg, Oral, BID, Ant Crowe M.D.  •  [START ON 3/10/2022] tamsulosin (FLOMAX) capsule 0.4 mg, 0.4 mg, Oral, AFTER BREAKFAST, Ant Crowe M.D.  •  LORazepam (ATIVAN) injection 0.5 mg, 0.5 mg, Intravenous, Q6HRS PRN, Ant Crowe M.D.  •  Pain Pump (patient supplied) Device, , Injection, Continuous, Ant Crowe M.D.    Current Outpatient Medications:   •  pantoprazole (PROTONIX) 40 MG Tablet Delayed Response, pantoprazole 40 mg tablet,delayed release  TAKE 1 TABLET BY MOUTH EVERY DAY, Disp: , Rfl:   •  rizatriptan (MAXALT-MLT) 10 MG disintegrating tablet, TAKE 1 TAKE BY MOUTH AT ONSET OF HEADACHE. MAY REPEAT, Disp: , Rfl:   •  triamcinolone acetonide (KENALOG) 0.1 % Ointment, triamcinolone acetonide 0.1 % topical ointment  APPLY TO AFFECTED AREAS TWICE A DAY FOR 14 DAYS AS NEEDED, Disp: , Rfl:   •  metoprolol SR (TOPROL XL) 25 MG TABLET SR 24 HR, Take 1 Tablet by mouth every day., Disp: 30 Tablet, Rfl: 1  •  tamsulosin (FLOMAX) 0.4 MG capsule, Take 0.4 mg by mouth 1/2 hour after breakfast., Disp: , Rfl:   •  allopurinol (ZYLOPRIM) 100 MG Tab, Take 100 mg by mouth every bedtime., Disp: , Rfl:   •  lisinopril (PRINIVIL) 10 MG Tab, Take 10 mg by mouth every morning. Indications: High Blood Pressure Disorder, Disp: , Rfl:   •  Pain Pump (PATIENT SUPPLIED) XX BRANDON, Inject   as directed continuous. Patient's Pain Pump (placed and maintained as an outpatient) Medications/concentrations: Hydromorphone 400.0 mcg/mL Route: Intrathecally Date of Placement: Unknown Last changed: 9/17/2020 Continuous infusion rates (Drug/Rate): Hydromorphone 99.97 mcg / 24 hours (4.17 mcg / hr) Patient activation dose: Hydromorphone 9.69 mcg Maximum daily activation dose: Hydromorphone 138.16 mcg Patient activation lockout interval: 1 hour Maximum activations per day: 4, Disp: , Rfl:   •  tacrolimus (PROGRAF) 1 MG Cap, Take 2 mg by mouth 2 Times a Day. Indications: Liver Transplant Recipients, Disp: , Rfl:     Past Medical History:   Diagnosis Date   • Arthritis     fingers, hands   • Back pain    • Cancer (HCC) 2002;2013    kidney / liver - treated with chemo, & transplant   • Cholesterol blood decreased    • Chronic pain    • Cold 8/27/2014    URI   • Dental disorder     upper  and lower dentures   • Gout    • Heart burn     under control with meds   • Hepatitis C 1993   • Hypertension    • Indigestion    • Liver transplanted (HCC) 2002   • Pain     neck   • Pain     neck and lower back   • Renal cancer (HCC)    • Renal disorder     left nephrectomy; right cryoablation, voids (no dialysis)   • Stroke (HCC) 03/2018    denies residual       Past Surgical History:   Procedure Laterality Date   • PUMP REVISION Left 9/17/2021    Procedure: REVISION, INSERTION, OR REPLACEMENT, INTRATHECAL ANALGESIC PUMP - REPLACEMENT;  Surgeon: Jass Mendes M.D.;  Location: [facility];  Service: Pain Management   • RECOVERY  11/30/2015    Procedure: US-Non Targeted Liver Biopsy-;  Surgeon: [facility];  Location: SURGERY PRE-POST [facility] UNIT [facility];  Service:    • PUMP INSERT/REMOVE  11/25/2014    Performed by Jass Mendes M.D. at [facility]   • CATH PLACE PERM EPIDURAL  9/9/2014    Performed by Jass Mendes M.D. at [facility]   • OTHER  2/28/2014    cryoablation  "right kidney   • EVACUATION OF HEMATOMA  5/31/2013    Performed by Davis Murray M.D. at SURGERY [facility]S   • EXPLORATORY LAPAROTOMY  5/31/2013    Performed by Davis Murray M.D. at SURGERY [facility]   • LUMBAR FUSION ANTERIOR  5/29/2013    Performed by Suman Burks M.D. at SURGERY [facility]   • RECOVERY  4/23/2013    Performed by -Recovery Surgery at [facility] SAME DAY [facility]   • CERVICAL DISK AND FUSION ANTERIOR  12/11/2012    Performed by Suman Burks M.D. at SURGERY [facility]   • OTHER  1994    gallbladder removal   • CERVICAL DECOMPRESSION POSTERIOR     • CHOLECYSTECTOMY     • NEPHRECTOMY RADICAL     • NEPHRECTOMY RADICAL Left     Left kidney removed   • OTHER      liver transplant   • OTHER SURGICAL PROCEDURE      liver transplant   • PUMP INSERT/REMOVE      pain pump placed       Family History   Problem Relation Age of Onset   • Hypertension Other      Patient family history was personally reviewed, no pertinent family history to current presentation    Social History     Tobacco Use   • Smoking status: Former Smoker     Packs/day: 0.50     Years: 52.00     Pack years: 26.00     Types: Cigarettes   • Smokeless tobacco: Never Used   • Tobacco comment: quit four months ago   Vaping Use   • Vaping Use: Never used   Substance Use Topics   • Alcohol use: Yes     Alcohol/week: 0.6 oz     Types: 1 Cans of beer per week   • Drug use: No       ALLERGIES:  Allergies   Allergen Reactions   • Niacin      Passed out.    • Latex Rash     Reaction- Itching, Rash   • Pcn [Penicillins] Anaphylaxis and Swelling     Patient has tolerated cefazolin in past   • Morphine Sulfate      Per pt., \"it makes me cranky\"   • Mushroom Extract Complex        Review of systems:  A complete review of symptoms was reviewed with patient. This is reviewed in H&P and PMH. ALL OTHERS reviewed and negative    Physical exam:  Patient Vitals for the past 24 hrs:   BP Temp Temp src Pulse Resp SpO2 Height " "Weight   22 152/68 -- -- 73 20 93 % -- --   22 -- -- -- 67 18 94 % -- --   22 191 (!) 168/76 -- -- -- -- -- -- --   22 1830 (!) 162/78 -- -- 79 15 94 % -- --   22 1730 146/74 -- -- 79 16 91 % -- --   22 1630 149/67 -- -- 85 16 91 % -- --   22 1618 (!) 177/87 -- -- 88 18 94 % -- --   22 1536 -- -- -- -- -- -- -- 74.8 kg (164 lb 14.5 oz)   22 (!) 169/100 36.1 °C (96.9 °F) Temporal 99 20 96 % 1.702 m (5' 7\") 74.4 kg (164 lb)     General: No acute distress.   EYES: no jaundice  HEENT: OP clear   Neck: No bruits No JVD.   CVS:  RRR. S1 + S2. No M/R/G  Resp: CTAB. No wheezing or crackles/rhonchi.  Abdomen: Soft, NT, ND,  Skin: Grossly nothing acute no obvious rashes  Neurological: Alert, Moves all extremities, no cranial nerve defects on limited exam  Extremities: Pulse 2+ in b/l LE.  edema. No cyanosis.       Data:  Laboratory studies personally reviewed by me:  Recent Results (from the past 24 hour(s))   EKG    Collection Time: 22  3:41 PM   Result Value Ref Range    Report       [facility] Emergency Dept.    Test Date:  2022  Pt Name:    PRERNA SOUZA                Department: ER  MRN:        [id number]                      Room:  Gender:     Male                         Technician: [id number]  :        1955                   Requested By:ER TRIAGE PROTOCOL  Order #:    [id number]                    Reading MD: Camilla Kirby MD    Measurements  Intervals                                Axis  Rate:       90                           P:          45  CT:         196                          QRS:        27  QRSD:       82                           T:          53  QT:         364  QTc:        446    Interpretive Statements  SINUS RHYTHM  Normal intervals, no ectopy  No ST or T wave change  Compared to ECG 2021 13:51:53  No significant changes  Electronically Signed On 3-9-2022 17:04:07 PST by Camilla Kirby MD   "   CBC with Differential    Collection Time: 03/09/22  3:51 PM   Result Value Ref Range    WBC 9.5 4.8 - 10.8 K/uL    RBC 4.64 (L) 4.70 - 6.10 M/uL    Hemoglobin 14.1 14.0 - 18.0 g/dL    Hematocrit 41.9 (L) 42.0 - 52.0 %    MCV 90.3 81.4 - 97.8 fL    MCH 30.4 27.0 - 33.0 pg    MCHC 33.7 33.7 - 35.3 g/dL    RDW 45.2 35.9 - 50.0 fL    Platelet Count 321 164 - 446 K/uL    MPV 9.7 9.0 - 12.9 fL    Neutrophils-Polys 47.10 44.00 - 72.00 %    Lymphocytes 36.80 22.00 - 41.00 %    Monocytes 12.20 0.00 - 13.40 %    Eosinophils 3.00 0.00 - 6.90 %    Basophils 0.50 0.00 - 1.80 %    Immature Granulocytes 0.40 0.00 - 0.90 %    Nucleated RBC 0.00 /100 WBC    Neutrophils (Absolute) 4.48 1.82 - 7.42 K/uL    Lymphs (Absolute) 3.51 1.00 - 4.80 K/uL    Monos (Absolute) 1.16 (H) 0.00 - 0.85 K/uL    Eos (Absolute) 0.29 0.00 - 0.51 K/uL    Baso (Absolute) 0.05 0.00 - 0.12 K/uL    Immature Granulocytes (abs) 0.04 0.00 - 0.11 K/uL    NRBC (Absolute) 0.00 K/uL   Complete Metabolic Panel (CMP)    Collection Time: 03/09/22  3:51 PM   Result Value Ref Range    Sodium 143 135 - 145 mmol/L    Potassium 3.9 3.6 - 5.5 mmol/L    Chloride 104 96 - 112 mmol/L    Co2 27 20 - 33 mmol/L    Anion Gap 12.0 7.0 - 16.0    Glucose 107 (H) 65 - 99 mg/dL    Bun 12 8 - 22 mg/dL    Creatinine 0.73 0.50 - 1.40 mg/dL    Calcium 10.0 8.5 - 10.5 mg/dL    AST(SGOT) 20 12 - 45 U/L    ALT(SGPT) 24 2 - 50 U/L    Alkaline Phosphatase 74 30 - 99 U/L    Total Bilirubin 0.2 0.1 - 1.5 mg/dL    Albumin 4.6 3.2 - 4.9 g/dL    Total Protein 7.7 6.0 - 8.2 g/dL    Globulin 3.1 1.9 - 3.5 g/dL    A-G Ratio 1.5 g/dL   Troponin    Collection Time: 03/09/22  3:51 PM   Result Value Ref Range    Troponin T 14 6 - 19 ng/L   proBrain Natriuretic Peptide, NT    Collection Time: 03/09/22  3:51 PM   Result Value Ref Range    NT-proBNP 548 (H) 0 - 125 pg/mL   ESTIMATED GFR    Collection Time: 03/09/22  3:51 PM   Result Value Ref Range    GFR If African American >60 >60 mL/min/1.73 m 2    GFR If  Non African American >60 >60 mL/min/1.73 m 2   D-DIMER    Collection Time: 03/09/22  5:17 PM   Result Value Ref Range    D-Dimer Screen 0.76 (H) 0.00 - 0.50 ug/mL (FEU)   URINALYSIS CULTURE, IF INDICATED    Collection Time: 03/09/22  5:30 PM    Specimen: Urine   Result Value Ref Range    Color Yellow     Character Clear     Specific Gravity 1.009 <1.035    Ph 6.0 5.0 - 8.0    Glucose Negative Negative mg/dL    Ketones Negative Negative mg/dL    Protein Negative Negative mg/dL    Bilirubin Negative Negative    Urobilinogen, Urine 0.2 Negative    Nitrite Negative Negative    Leukocyte Esterase Negative Negative    Occult Blood Negative Negative    Micro Urine Req see below        Imaging:  CT-CTA CHEST PULMONARY ARTERY W/ RECONS   Final Result         1.  No evidence of pulmonary embolism.   2.  Mild emphysematous changes.   3.  Atherosclerosis.      Fleischner Society pulmonary nodule recommendations:   Not applicable      DX-CHEST-PORTABLE (1 VIEW)   Final Result      No acute cardiac or pulmonary abnormalities are identified.      NM-CARDIAC STRESS TEST    (Results Pending)   EC-ECHOCARDIOGRAM COMPLETE W/O CONT    (Results Pending)           EKG 3/9/2022: personally reviewed by me normal sinus rhythm    Echocardiogram 7/5/2021  CONCLUSIONS  Normal left ventricular chamber size.  Left ventricular ejection fraction is visually estimated to be 60%.  Normal diastolic function.  Normal inferior vena cava size and inspiratory collapse.       Nuclear stress test 6/3/2021   NUCLEAR IMAGING INTERPRETATION   Normal perfusion.   Normal left ventricular wall motion.     LV ejection fraction = 68%.      ECG INTERPRETATION   Nondiagnostic ECG portion of a Regadenoson nuclear stress test.    All pertinent features of laboratory and imaging reviewed including primary images where applicable      Principal Problem:    Chest pain POA: Yes  Resolved Problems:    * No resolved hospital problems. *      Assessment / Plan:  Chest  pain  -Trend troponin serially for ACS rule out  -Obtain echocardiogram and nuclear stress test in the a.m.    Hypertension  BP elevated  -Continue home metoprolol and lisinopril  -Consider adding amlodipine      I personally discussed his case with  Dr Camilla Kirby M.D.    Future Appointments   Date Time Provider Department Center   3/22/2022  2:10 PM Luis Perez M.D. [facility] None   5/20/2022 12:15 PM [facility] EXAM 9 ECHO [facility]   5/31/2022 10:45 AM Coleman Wiggins M.D. [facility] None       It is my pleasure to participate in the care of Mr. Ruiz.  Please do not hesitate to contact me with questions or concerns.    Jerald Friend MD   Cardiologist [facility]    3/9/2022    Please note that this dictation was created using voice recognition software. There may be errors I did not discover before finalizing the note.

## 2022-03-10 NOTE — PROGRESS NOTES
Nuclear Medicine RN note:      Stress test cancelled per Dr. Pierce d/t pt being COVID positive. DOROTHEA Smith RN updated w/ plan of care.

## 2022-03-10 NOTE — ED NOTES
The patient has been provided with discharge education and information.  The patient was also provided with instructions on follow up care and return precautions.  The patient verbalizes understanding of discharge instructions, follow up care, and return precautions.  All questions have been answered.    Pt provided with [employer] voucher

## 2022-03-17 ENCOUNTER — APPOINTMENT (OUTPATIENT)
Dept: RADIOLOGY | Facility: MEDICAL CENTER | Age: 67
End: 2022-03-17
Attending: EMERGENCY MEDICINE
Payer: MEDICARE

## 2022-03-17 ENCOUNTER — OFFICE VISIT (OUTPATIENT)
Dept: CARDIOLOGY | Facility: MEDICAL CENTER | Age: 67
End: 2022-03-17
Payer: MEDICARE

## 2022-03-17 ENCOUNTER — HOSPITAL ENCOUNTER (EMERGENCY)
Facility: MEDICAL CENTER | Age: 67
End: 2022-03-17
Attending: EMERGENCY MEDICINE
Payer: MEDICARE

## 2022-03-17 VITALS
SYSTOLIC BLOOD PRESSURE: 158 MMHG | HEIGHT: 67 IN | DIASTOLIC BLOOD PRESSURE: 95 MMHG | BODY MASS INDEX: 27.02 KG/M2 | TEMPERATURE: 98 F | OXYGEN SATURATION: 93 % | RESPIRATION RATE: 15 BRPM | WEIGHT: 172.18 LBS | HEART RATE: 77 BPM

## 2022-03-17 VITALS
BODY MASS INDEX: 25.9 KG/M2 | WEIGHT: 165 LBS | OXYGEN SATURATION: 98 % | RESPIRATION RATE: 14 BRPM | DIASTOLIC BLOOD PRESSURE: 88 MMHG | SYSTOLIC BLOOD PRESSURE: 138 MMHG | HEIGHT: 67 IN | HEART RATE: 74 BPM

## 2022-03-17 DIAGNOSIS — U07.1 ACUTE COVID-19: ICD-10-CM

## 2022-03-17 DIAGNOSIS — I10 ESSENTIAL HYPERTENSION: ICD-10-CM

## 2022-03-17 DIAGNOSIS — R06.02 SHORTNESS OF BREATH: ICD-10-CM

## 2022-03-17 DIAGNOSIS — R09.A2 GLOBUS PHARYNGEUS: ICD-10-CM

## 2022-03-17 DIAGNOSIS — Z94.4 LIVER TRANSPLANT RECIPIENT (HCC): ICD-10-CM

## 2022-03-17 DIAGNOSIS — R07.89 CHEST PRESSURE: ICD-10-CM

## 2022-03-17 PROCEDURE — 99214 OFFICE O/P EST MOD 30 MIN: CPT | Performed by: INTERNAL MEDICINE

## 2022-03-17 PROCEDURE — 70360 X-RAY EXAM OF NECK: CPT

## 2022-03-17 PROCEDURE — 99283 EMERGENCY DEPT VISIT LOW MDM: CPT

## 2022-03-17 PROCEDURE — 700102 HCHG RX REV CODE 250 W/ 637 OVERRIDE(OP): Performed by: EMERGENCY MEDICINE

## 2022-03-17 PROCEDURE — A9270 NON-COVERED ITEM OR SERVICE: HCPCS | Performed by: EMERGENCY MEDICINE

## 2022-03-17 RX ADMIN — LIDOCAINE HYDROCHLORIDE 15 ML: 20 SOLUTION OROPHARYNGEAL at 01:30

## 2022-03-17 ASSESSMENT — FIBROSIS 4 INDEX
FIB4 SCORE: 0.99
FIB4 SCORE: 0.99

## 2022-03-17 NOTE — ED PROVIDER NOTES
"ER Provider Note     Scribed for Eh Chawla M.D. by Zhou Goodman. 3/17/2022, 1:37 AM.    Primary Care Provider: Alejandra Sutherland M.D.  Means of Arrival: Walk in   History obtained from: Patient  History limited by: None     CHIEF COMPLAINT  Chief Complaint   Patient presents with    Difficulty Swallowing     Pt states after taking his night time meds, he felt like a pill got stuck in throat. Pt called [facility] hotline, MD suggested to come to ER for evaluation.      HPI  Quirino Ruiz is a 66 y.o. male who presents to the Emergency Department for evaluation of moderate dysphagia onset at approximately 8:00 PM yesterday. The patient states that he took approximately 4 pills prior to arrival and experienced \"pain as it goes down.\" Additionally, the patient noticed his blood pressure was elevated which in addition to his dysphagia prompted him to visit the ED today. The patient denies any dyspnea. Patient reports medicating with TUMS with minimal alleviation. No exacerbating factors noted.     REVIEW OF SYSTEMS  See HPI for further details.    PAST MEDICAL HISTORY   has a past medical history of Arthritis, Back pain, Cancer (HCC) (2002;2013), Cholesterol blood decreased, Chronic pain, Cold (8/27/2014), Dental disorder, Gout, Heart burn, Hepatitis C (1993), Hypertension, Indigestion, Liver transplanted (HCC) (2002), Pain, Pain, Renal cancer (HCC), Renal disorder, and Stroke (HCC) (03/2018).    SURGICAL HISTORY   has a past surgical history that includes other (1994); cervical disk and fusion anterior (12/11/2012); recovery (4/23/2013); lumbar fusion anterior (5/29/2013); evacuation of hematoma (5/31/2013); other (2/28/2014); cath place perm epidural (9/9/2014); pump insert/remove (11/25/2014); exploratory laparotomy (5/31/2013); recovery (11/30/2015); other surgical procedure; nephrectomy radical; cholecystectomy; cervical decompression posterior; other; nephrectomy radical (Left); pump insert/remove; and " pump revision (Left, 9/17/2021).    SOCIAL HISTORY  Social History     Tobacco Use    Smoking status: Former Smoker     Packs/day: 0.50     Years: 52.00     Pack years: 26.00     Types: Cigarettes    Smokeless tobacco: Never Used    Tobacco comment: quit four months ago   Vaping Use    Vaping Use: Never used   Substance Use Topics    Alcohol use: Yes     Alcohol/week: 0.6 oz     Types: 1 Cans of beer per week    Drug use: No      Social History     Substance and Sexual Activity   Drug Use No       FAMILY HISTORY  Family History   Problem Relation Age of Onset    Hypertension Other        CURRENT MEDICATIONS  Home Medications       Reviewed by Alberta Soriano R.N. (Registered Nurse) on 03/17/22 at 0032  Med List Status: Partial     Medication Last Dose Status   acetaminophen (TYLENOL) 325 MG Tab  Active   albuterol 108 (90 Base) MCG/ACT Aero Soln inhalation aerosol  Active   allopurinol (ZYLOPRIM) 100 MG Tab  Active   ascorbic acid (VITAMIN C) 500 MG tablet  Active   aspirin EC 81 MG EC tablet  Active   atorvastatin (LIPITOR) 20 MG Tab  Active   guaiFENesin dextromethorphan (ROBITUSSIN DM) 100-10 MG/5ML Syrup syrup  Active   lisinopril (PRINIVIL) 10 MG Tab  Active   metoprolol SR (TOPROL XL) 25 MG TABLET SR 24 HR  Active   nitroglycerin (NITROSTAT) 0.4 MG SL Tab  Active   Pain Pump (PATIENT SUPPLIED) XX BRANDON  Active   pantoprazole (PROTONIX) 40 MG Tablet Delayed Response  Active   polyethylene glycol/lytes (MIRALAX) 17 g Pack  Active   rizatriptan (MAXALT-MLT) 10 MG disintegrating tablet  Active   senna-docusate (PERICOLACE OR SENOKOT S) 8.6-50 MG Tab  Active   tacrolimus (PROGRAF) 1 MG Cap  Active   tamsulosin (FLOMAX) 0.4 MG capsule  Active   triamcinolone acetonide (KENALOG) 0.1 % Ointment  Active   vitamin D3 (VITAMIND D3) 1000 UNIT Tab  Active   zinc sulfate (ZINCATE) 220 (50 Zn) MG Cap  Active                    ALLERGIES  Allergies   Allergen Reactions    Niacin      Passed out.     Latex Rash     Reaction-  "Itching, Rash    Pcn [Penicillins] Anaphylaxis and Swelling     Patient has tolerated cefazolin in past    Morphine Sulfate      Per pt., \"it makes me cranky\"    Mushroom Extract Complex        PHYSICAL EXAM  VITAL SIGNS: BP (!) 179/99   Pulse 94   Temp 36.7 °C (98 °F) (Temporal)   Resp 18   Ht 1.702 m (5' 7\")   Wt 78.1 kg (172 lb 2.9 oz)   SpO2 96%   BMI 26.97 kg/m²      Constitutional: Alert in no apparent distress.  HENT: Normocephalic, Atraumatic, Bilateral external ears normal. Nose normal.   Eyes: Pupils are equal and reactive. Conjunctiva normal, non-icteric.   Heart: Regular rate and rythm, no murmurs.    Lungs: Clear to auscultation bilaterally.  Skin: Warm, Dry, No erythema, No rash.   Neurologic: Alert, Grossly non-focal.   Psychiatric: Affect normal, Judgment normal, Mood normal, Appears appropriate and not intoxicated.     DIAGNOSTIC STUDIES / PROCEDURES    RADIOLOGY  DX-NECK FOR SOFT TISSUE   Final Result      1.  No radiopaque foreign body is seen.         The radiologist's interpretation of all radiological studies have been reviewed by me.    COURSE & MEDICAL DECISION MAKING  Pertinent Labs & Imaging studies reviewed. (See chart for details)    This is a 66 y.o. male that presents with some difficulty swallowing at this time I do not believe that the patient actually has any foreign object stuck but do believe that he is likely has some abrasion in his GI tract.  We will get an x-ray to evaluate for radiopaque foreign object.  I will reassess after this..     1:37 AM - Patient seen and examined at bedside. Ordered DX-Neck for Soft Tissue.  Patient will be medicated with GI   Cocktail 15 mL for his symptoms. Discussed ordering an imaging study to evaluate the patient's presentation of symptoms.    2:27 AM - Patient was reevaluated at bedside. Discussed radiology results with the patient and informed them that they were largely unremarkable. Discussed plan for discharge; I advised the patient " to follow-up with Dr. Sutherland in 2 days, and to return to the [facility] ED with any new or worsening symptoms. Patient was given the opportunity for questions. I addressed all questions or concerns at this time and they verbalize agreement to the plan of care.     Patient is feeling much improved after lidocaine and the GI cocktail.  The radiopaque foreign object is not seen.  I will discharge him home with strict return precautions and follow-up.    The patient will return for new or worsening symptoms and is stable at the time of discharge.    The patient is referred to a primary physician for blood pressure management, diabetic screening, and for all other preventative health concerns.    DISPOSITION:  Patient will be discharged home in stable condition.    FOLLOW UP:  Alejandra Sutherland M.D.  [address]  [address]  [place] [address]  [phone]    In 2 days      FINAL IMPRESSION  1. Globus pharyngeus          Zhou MADDOX (Scribe), am scribing for, and in the presence of, Eh Chawla M.D..    Electronically signed by: Zhou Goodman (Scribe), 3/17/2022 Eh RETANA M.D. personally performed the services described in this documentation, as scribed by Zhou Goodman in my presence, and it is both accurate and complete.     The note accurately reflects work and decisions made by me.  Eh Chawla M.D.  3/17/2022  6:09 AM

## 2022-03-17 NOTE — ED TRIAGE NOTES
Chief Complaint   Patient presents with   • Difficulty Swallowing     Pt states after taking his night time meds, he felt like a pill got stuck in throat. Pt called health hotline, MD suggested to come to ER for evaluation.      Pt ambulates to triage with a steady gait w/ above complaints.     Pt able to control secretions, no respiratory distress. Pt states he has drank a lot of water and made himself vomit, with no relief.    Pt to lobby, explained triage process and to notify staff of any change in condition.

## 2022-03-17 NOTE — PROGRESS NOTES
Cardiology Follow-up Consultation Note    Date of note:    3/17/2022    Primary Care Provider: Alejandra Sutherland M.D.    Name:             Quirino Ruiz   YOB: 1955  MRN:               [id number]    Chief Complaint   Patient presents with   • Premature Ventricular Contractions (PVCs)   • Palpitations       HISTORY OF PRESENT ILLNESS  Mr. Quirino Ruiz is a 66 y.o. male who returns to see us for follow-up of chest pressure.    Last clinic visit: 3/3/2022    Interim History:  Since his last visit, patient was admitted at [facility] for shortness of breath and chest pressure.  Was found to have acute Covid infection.  Repeat echocardiogram done in the hospital showed no significant changes from prior.    Is still having episodes of shortness of breath.  He is feeling hopeless and believes that he may be depressed.    Has been smoking since the age of 15 with last cigarette 4 months ago.  Is currently using e-cigarettes.       Past Medical History:   Diagnosis Date   • Arthritis     fingers, hands   • Back pain    • Cancer (HCC) 2002;2013    kidney / liver - treated with chemo, & transplant   • Cholesterol blood decreased    • Chronic pain    • Cold 8/27/2014    URI   • Dental disorder     upper  and lower dentures   • Gout    • Heart burn     under control with meds   • Hepatitis C 1993   • Hypertension    • Indigestion    • Liver transplanted (HCC) 2002   • Pain     neck   • Pain     neck and lower back   • Renal cancer (HCC)    • Renal disorder     left nephrectomy; right cryoablation, voids (no dialysis)   • Stroke (HCC) 03/2018    denies residual         Past Surgical History:   Procedure Laterality Date   • PUMP REVISION Left 9/17/2021    Procedure: REVISION, INSERTION, OR REPLACEMENT, INTRATHECAL ANALGESIC PUMP - REPLACEMENT;  Surgeon: Jass Mendes M.D.;  Location: SURGERY [facility];  Service: Pain Management   • RECOVERY  11/30/2015    Procedure: US-Non Targeted Liver  Biopsy-;  Surgeon: Recoveryonly Surgery;  Location: SURGERY PRE-POST PROC UNIT [facility];  Service:    • PUMP INSERT/REMOVE  11/25/2014    Performed by Jass Mendes M.D. at SURGERY [facility]   • CATH PLACE PERM EPIDURAL  9/9/2014    Performed by Jass Mendes M.D. at SURGERY [facility]   • OTHER  2/28/2014    cryoablation right kidney   • EVACUATION OF HEMATOMA  5/31/2013    Performed by Davis Murray M.D. at SURGERY [facility]   • EXPLORATORY LAPAROTOMY  5/31/2013    Performed by Davis Murray M.D. at SURGERY [facility] ORS   • LUMBAR FUSION ANTERIOR  5/29/2013    Performed by Suman Burks M.D. at SURGERY [facility]   • RECOVERY  4/23/2013    Performed by -Recovery Surgery at [facility] SAME DAY [facility]   • CERVICAL DISK AND FUSION ANTERIOR  12/11/2012    Performed by Suman Burks M.D. at SURGERY [facility]   • OTHER  1994    gallbladder removal   • CERVICAL DECOMPRESSION POSTERIOR     • CHOLECYSTECTOMY     • NEPHRECTOMY RADICAL     • NEPHRECTOMY RADICAL Left     Left kidney removed   • OTHER      liver transplant   • OTHER SURGICAL PROCEDURE      liver transplant   • PUMP INSERT/REMOVE      pain pump placed         Current Outpatient Medications   Medication Sig Dispense Refill   • acetaminophen (TYLENOL) 325 MG Tab Take 2 Tablets by mouth every 6 hours as needed. 30 Tablet 0   • guaiFENesin dextromethorphan (ROBITUSSIN DM) 100-10 MG/5ML Syrup syrup Take 10 mL by mouth every 6 hours as needed for Cough. 840 mL    • senna-docusate (PERICOLACE OR SENOKOT S) 8.6-50 MG Tab Take 2 Tablets by mouth 2 times a day. 30 Tablet 0   • albuterol 108 (90 Base) MCG/ACT Aero Soln inhalation aerosol Inhale 2 Puffs every four hours as needed for Shortness of Breath. 8.5 g 0   • ascorbic acid (VITAMIN C) 500 MG tablet Take 1 Tablet by mouth 2 times a day. 30 Tablet 0   • nitroglycerin (NITROSTAT) 0.4 MG SL Tab Place 1 Tablet under the tongue as needed for Chest Pain. 25  Tablet 0   • vitamin D3 (VITAMIND D3) 1000 UNIT Tab Take 1 Tablet by mouth every day. 60 Tablet    • zinc sulfate (ZINCATE) 220 (50 Zn) MG Cap Take 1 Capsule by mouth every day. 7 Capsule 0   • rizatriptan (MAXALT-MLT) 10 MG disintegrating tablet TAKE 1 TAKE BY MOUTH AT ONSET OF HEADACHE. MAY REPEAT     • triamcinolone acetonide (KENALOG) 0.1 % Ointment triamcinolone acetonide 0.1 % topical ointment   APPLY TO AFFECTED AREAS TWICE A DAY FOR 14 DAYS AS NEEDED     • metoprolol SR (TOPROL XL) 25 MG TABLET SR 24 HR Take 1 Tablet by mouth every day. 30 Tablet 1   • tamsulosin (FLOMAX) 0.4 MG capsule Take 0.4 mg by mouth 1/2 hour after breakfast.     • allopurinol (ZYLOPRIM) 100 MG Tab Take 100 mg by mouth every bedtime.     • lisinopril (PRINIVIL) 10 MG Tab Take 10 mg by mouth every morning. Indications: High Blood Pressure Disorder     • Pain Pump (PATIENT SUPPLIED) XX BRANDON Inject  as directed continuous. Patient's Pain Pump (placed and maintained as an outpatient)  Medications/concentrations: Hydromorphone 400.0 mcg/mL  Route: Intrathecally  Date of Placement: Unknown  Last changed: 1/11/2022 due to refill pump before 3/16/2022  Continuous infusion rates (Drug/Rate):  Hydromorphone 139.81 mcg / 24 hours (5.83 mcg / hr)  Patient activation dose: Hydromorphone 15.00 mcg  Maximum daily activation dose: Hydromorphone 228.06 mcg  Patient activation lockout interval: 1 hour  Maximum activations per day: 6     • tacrolimus (PROGRAF) 1 MG Cap Take 2 mg by mouth 2 Times a Day. Indications: Liver Transplant Recipients     • aspirin EC 81 MG EC tablet Take 1 Tablet by mouth every day. (Patient not taking: Reported on 3/17/2022) 30 Tablet 0   • atorvastatin (LIPITOR) 20 MG Tab Take 1 Tablet by mouth every evening. (Patient not taking: Reported on 3/17/2022) 30 Tablet 0   • pantoprazole (PROTONIX) 40 MG Tablet Delayed Response pantoprazole 40 mg tablet,delayed release   TAKE 1 TABLET BY MOUTH EVERY DAY (Patient not taking: Reported  "on 3/17/2022)       No current facility-administered medications for this visit.         Allergies   Allergen Reactions   • Niacin      Passed out.    • Latex Rash     Reaction- Itching, Rash   • Pcn [Penicillins] Anaphylaxis and Swelling     Patient has tolerated cefazolin in past   • Morphine Sulfate      Per pt., \"it makes me cranky\"   • Mushroom Extract Complex          Family History   Problem Relation Age of Onset   • Hypertension Other          Social History     Socioeconomic History   • Marital status:      Spouse name: Not on file   • Number of children: Not on file   • Years of education: Not on file   • Highest education level: Not on file   Occupational History   • Not on file   Tobacco Use   • Smoking status: Former Smoker     Packs/day: 0.50     Years: 52.00     Pack years: 26.00     Types: Cigarettes   • Smokeless tobacco: Never Used   • Tobacco comment: quit four months ago   Vaping Use   • Vaping Use: Never used   Substance and Sexual Activity   • Alcohol use: Yes     Alcohol/week: 0.6 oz     Types: 1 Cans of beer per week   • Drug use: No   • Sexual activity: Not on file   Other Topics Concern   • Not on file   Social History Narrative    ** Merged History Encounter **          Social Determinants of Health     Financial Resource Strain: Not on file   Food Insecurity: Not on file   Transportation Needs: Not on file   Physical Activity: Not on file   Stress: Not on file   Social Connections: Not on file   Intimate Partner Violence: Not on file   Housing Stability: Not on file         Physical Exam:  Ambulatory Vitals  /88 (BP Location: Left arm, Patient Position: Sitting, BP Cuff Size: Adult)   Pulse 74   Resp 14   Ht 1.702 m (5' 7\")   Wt 74.8 kg (165 lb)   SpO2 98%    Oxygen Therapy:  Pulse Oximetry: 98 %  BP Readings from Last 4 Encounters:   03/17/22 138/88   03/17/22 158/95   03/10/22 120/65   03/03/22 110/60       Weight/BMI: Body mass index is 25.84 kg/m².  Wt Readings from " Last 4 Encounters:   03/17/22 74.8 kg (165 lb)   03/17/22 78.1 kg (172 lb 2.9 oz)   03/09/22 74.8 kg (164 lb 14.5 oz)   03/07/22 73.5 kg (162 lb)       GEN: Well developed, well nourished and in no acute distress.  HEART: no significant JVD, regular rate and rhythm, normal S1 and S2, no murmurs, no third heart sounds, normal cardiac palpation  LUNG: Distant breath sounds, clear to auscultation bilaterally, no wheezing, no crackles, normal respiratory effort on room air  ABDOMEN: soft, non-tender, non-distended, normal bowel sounds throughout  EXTREMITIES: no peripheral edema noted  VASCULAR: no significantly elevated jugular venous pressure, no carotid bruits noted, radial pulses 2+ and equal      Lab Data Review:  Lab Results   Component Value Date/Time    CHOLSTRLTOT 176 03/10/2022 01:30 AM     (H) 03/10/2022 01:30 AM    HDL 37 (A) 03/10/2022 01:30 AM    TRIGLYCERIDE 152 (H) 03/10/2022 01:30 AM       Lab Results   Component Value Date/Time    SODIUM 137 03/10/2022 01:30 AM    POTASSIUM 3.6 03/10/2022 01:30 AM    CHLORIDE 100 03/10/2022 01:30 AM    CO2 25 03/10/2022 01:30 AM    GLUCOSE 104 (H) 03/10/2022 01:30 AM    BUN 11 03/10/2022 01:30 AM    CREATININE 0.75 03/10/2022 01:30 AM     Lab Results   Component Value Date/Time    ALKPHOSPHAT 74 03/09/2022 03:51 PM    ASTSGOT 20 03/09/2022 03:51 PM    ALTSGPT 24 03/09/2022 03:51 PM    TBILIRUBIN 0.2 03/09/2022 03:51 PM      Lab Results   Component Value Date/Time    WBC 9.1 03/10/2022 01:30 AM     Lab Results   Component Value Date/Time    HBA1C 5.8 (H) 03/10/2022 01:30 AM    HBA1C 5.9 (H) 02/01/2022 11:08 AM       Cardiac Imaging and Procedures Review:    ECG: ECG performed 3/9/2022 was interpreted by me which shows sinus rhythm    EKG dated 5/6/2021: My personal interpretation is sinus rhythm    Echo: Echocardiogram performed on 3/10/2022 was personally interpreted by me which shows hyperdynamic left ventricular systolic function with no valve abnormality and  no wall motion abnormality.    Echo dated 7/5/2021:   CONCLUSIONS  Normal left ventricular chamber size.  Left ventricular ejection fraction is visually estimated to be 60%.  Normal diastolic function.  Normal inferior vena cava size and inspiratory collapse.    Nuclear Perfusion Imaging (6/3/2021):    NUCLEAR IMAGING INTERPRETATION   Normal perfusion.   Normal left ventricular wall motion.     LV ejection fraction = 68%.      ECG INTERPRETATION   Nondiagnostic ECG portion of a Regadenoson nuclear stress test.      Radiology test Review:  CXR: No cardiopulmonary abnormality noted    CTA chest 3/9/2022: IMPRESSION:   1.  No evidence of pulmonary embolism.  2.  Mild emphysematous changes.  3.  Atherosclerosis.        Assessment & Plan     1. Shortness of breath     2. Chest pressure     3. Acute COVID-19     4. Liver transplant recipient (HCC)     5. Essential hypertension         Patient continues to experience shortness of breath and chest pressure which is likely due to COVID-19 infection.  Discussed repeat echocardiogram results with the patient which shows no changes from prior.  Was scheduled to do repeat stress test while hospitalized but was canceled due to COVID-19 positivity.  Discussed that nuclear stress test from a year ago did not show any evidence of obstructive CAD.    Patient is quite concerned about his ongoing symptoms and believes that he is also depressed which is worsening his condition.  Has an appointment with his PCP tomorrow with plan to initiate antidepressant medication.    Blood pressure well controlled.  Continue Toprol-XL 25 mg daily and lisinopril 10 mg daily.      All of patient's excellent questions were answered to the best of my knowledge and to his satisfaction.  It was a pleasure seeing Mr. Quirino Ruiz in my clinic today. Return in about 2 months (around 5/17/2022). Patient is aware to call the cardiology clinic with any questions or concerns.      Shahid Kennedy MD  [facility]  [facility], [facility]  [address], [place] [address]  [place] [address]  Phone: [phone]  Fax: [phone]    Please note that this dictation was created using voice recognition software. I have made every reasonable attempt to correct obvious errors, but it is possible there are errors of grammar and possibly content that I did not discover before finalizing the note.

## 2022-03-17 NOTE — ED NOTES
Pt ambulated to [facility] from lobby with steady gait.  On monitor, call light in reach. Chart up for ERP.

## 2022-03-22 ENCOUNTER — OFFICE VISIT (OUTPATIENT)
Dept: SLEEP MEDICINE | Facility: MEDICAL CENTER | Age: 67
End: 2022-03-22
Payer: MEDICARE

## 2022-03-22 VITALS
SYSTOLIC BLOOD PRESSURE: 124 MMHG | HEIGHT: 67 IN | DIASTOLIC BLOOD PRESSURE: 64 MMHG | OXYGEN SATURATION: 95 % | WEIGHT: 163.56 LBS | BODY MASS INDEX: 25.67 KG/M2 | HEART RATE: 87 BPM

## 2022-03-22 DIAGNOSIS — Z94.4 LIVER TRANSPLANT RECIPIENT (HCC): ICD-10-CM

## 2022-03-22 DIAGNOSIS — C64.9 MALIGNANT NEOPLASM OF KIDNEY, UNSPECIFIED LATERALITY (HCC): ICD-10-CM

## 2022-03-22 DIAGNOSIS — I20.89 CHRONIC STABLE ANGINA (HCC): ICD-10-CM

## 2022-03-22 DIAGNOSIS — Z87.891 FORMER TOBACCO USE: ICD-10-CM

## 2022-03-22 PROCEDURE — 99205 OFFICE O/P NEW HI 60 MIN: CPT | Performed by: INTERNAL MEDICINE

## 2022-03-22 RX ORDER — CITALOPRAM 20 MG/1
20 TABLET ORAL
COMMUNITY

## 2022-03-22 ASSESSMENT — PATIENT HEALTH QUESTIONNAIRE - PHQ9
SUM OF ALL RESPONSES TO PHQ QUESTIONS 1-9: 8
CLINICAL INTERPRETATION OF PHQ2 SCORE: 3
5. POOR APPETITE OR OVEREATING: 2 - MORE THAN HALF THE DAYS

## 2022-03-22 ASSESSMENT — ENCOUNTER SYMPTOMS
ABDOMINAL PAIN: 0
PND: 0
HEADACHES: 0
SINUS PAIN: 0
PALPITATIONS: 0
NAUSEA: 0
SENSORY CHANGE: 0
NERVOUS/ANXIOUS: 1
EYE DISCHARGE: 0
CHILLS: 0
SPUTUM PRODUCTION: 0
WHEEZING: 0
WEIGHT LOSS: 0
MYALGIAS: 0
DIARRHEA: 0
FEVER: 0
STRIDOR: 0
ORTHOPNEA: 0
EYE PAIN: 0
FOCAL WEAKNESS: 0
VOMITING: 0
SHORTNESS OF BREATH: 1
CLAUDICATION: 0
COUGH: 0
DIZZINESS: 0
LOSS OF CONSCIOUSNESS: 0
SORE THROAT: 0

## 2022-03-22 ASSESSMENT — FIBROSIS 4 INDEX: FIB4 SCORE: 0.99

## 2022-03-22 NOTE — PROGRESS NOTES
"Pulmonary Clinic- Initial Consult    Date of Service: 3/22/2022    Referring Physician: Shahid Kennedy M.D.    Reason for Consult: Chest pressure and shortness of breath.    Chief Complaint:   Chief Complaint   Patient presents with   • Establish Care     Referred by Dr Kennedy for SOB   • Other     PFT 03/07/22, Labs 03/2022, Echo 03/10/22, CT-CTA 03/09/22, CXR 03/09/22     HPI:   Quirino Ruiz is a very pleasant 66 y.o. male former tobacco smoker 50 pack years, quit 2021 who is followed by Shahid Kennedy M.D. and is referred to the pulmonary clinic for chest pressure and shortness of breath.  Patient has not been seen by pulmonary in the past.      Quirino reports intermittent chest pressure and shortness of breath since June 2021.  Symptoms began insidiously, not related to exertion, lasting 15 seconds at a time occurring 1-2 times per day.  He was seen by his PCP as well as referred to cardiology and underwent an extensive work-up including a nuclear stress test which was unremarkable, and a resting echocardiogram which also was reassuring.  PFTs earlier this month showed no obstruction, partial bronchodilator response, otherwise normal.  On a recent hospitalization has been prescribed albuterol which she has been taking with no symptomatic relief.  CT angiogram during that hospitalization showed mild emphysematous changes otherwise unremarkable.  Over this time, the duration, severity, and frequency of his symptoms of become longer, more severe, and more frequent, now occurring multiple times per day.  He describes it as a sensation of \"taking the wind out of him\".  He denies any radiation or loss of consciousness.  No palpitations.    He is a former heavy smoker quit in 8/2021, transition to e-cigarettes which she also underwent in 2/2022.    Review of systems is notable for difficulty swallowing.    He has an old back injury of precludes him from exercise-based studies  Past medical history further significant " for history of liver transplant and history of renal cancer.  Status post nephrectomy.  Labs reviewed, hemoglobin last measured 14    Past Medical History:   Diagnosis Date   • Arthritis     fingers, hands   • Back pain    • Cancer (HCC) 2002;2013    kidney / liver - treated with chemo, & transplant   • Cholesterol blood decreased    • Chronic pain    • Cold 8/27/2014    URI   • Dental disorder     upper  and lower dentures   • Gout    • Heart burn     under control with meds   • Hepatitis C 1993   • Hypertension    • Indigestion    • Liver transplanted (HCC) 2002   • Pain     neck   • Pain     neck and lower back   • Renal cancer (HCC)    • Renal disorder     left nephrectomy; right cryoablation, voids (no dialysis)   • Shortness of breath    • Stroke (HCC) 03/2018    denies residual       Past Surgical History:   Procedure Laterality Date   • PUMP REVISION Left 9/17/2021    Procedure: REVISION, INSERTION, OR REPLACEMENT, INTRATHECAL ANALGESIC PUMP - REPLACEMENT;  Surgeon: Jass Mendes M.D.;  Location: SURGERY [facility];  Service: Pain Management   • RECOVERY  11/30/2015    Procedure: US-Non Targeted Liver Biopsy-;  Surgeon: [facility];  Location: SURGERY PRE-POST PROC UNIT [facility];  Service:    • PUMP INSERT/REMOVE  11/25/2014    Performed by Jass Mendes M.D. at SURGERY [facility]   • CATH PLACE PERM EPIDURAL  9/9/2014    Performed by Jass Mendes M.D. at [facility]   • OTHER  2/28/2014    cryoablation right kidney   • EVACUATION OF HEMATOMA  5/31/2013    Performed by Davis Murray M.D. at SURGERY [facility]   • EXPLORATORY LAPAROTOMY  5/31/2013    Performed by Davis Murray M.D. at SURGERY [facility]   • LUMBAR FUSION ANTERIOR  5/29/2013    Performed by Suman Burks M.D. at SURGERY [facility]   • RECOVERY  4/23/2013    Performed by -Recovery Surgery at [facility] SAME DAY [facility]   • CERVICAL DISK AND FUSION ANTERIOR   2012    Performed by Suman Burks M.D. at SURGERY [facility]   • OTHER  1994    gallbladder removal   • CERVICAL DECOMPRESSION POSTERIOR     • CHOLECYSTECTOMY     • NEPHRECTOMY RADICAL     • NEPHRECTOMY RADICAL Left     Left kidney removed   • OTHER      liver transplant   • OTHER SURGICAL PROCEDURE      liver transplant   • PUMP INSERT/REMOVE      pain pump placed       Social History     Socioeconomic History   • Marital status:      Spouse name: Not on file   • Number of children: Not on file   • Years of education: Not on file   • Highest education level: Not on file   Occupational History   • Not on file   Tobacco Use   • Smoking status: Former Smoker     Packs/day: 1.00     Years: 50.00     Pack years: 50.00     Types: Cigarettes     Quit date: 2021     Years since quittin.7   • Smokeless tobacco: Never Used   • Tobacco comment: quit four months ago   Vaping Use   • Vaping Use: Former   • Substances: Nicotine   • Devices: Pre-filled or refillable cartridge   Substance and Sexual Activity   • Alcohol use: Not Currently     Alcohol/week: 0.6 oz     Types: 1 Cans of beer per week   • Drug use: No   • Sexual activity: Not on file   Other Topics Concern   • Not on file   Social History Narrative    ** Merged History Encounter **          Social Determinants of Health     Financial Resource Strain: Not on file   Food Insecurity: Not on file   Transportation Needs: Not on file   Physical Activity: Not on file   Stress: Not on file   Social Connections: Not on file   Intimate Partner Violence: Not on file   Housing Stability: Not on file          Family History   Problem Relation Age of Onset   • Hypertension Other        Current Outpatient Medications on File Prior to Visit   Medication Sig Dispense Refill   • citalopram (CELEXA) 20 MG Tab Take 20 mg by mouth every day.     • acetaminophen (TYLENOL) 325 MG Tab Take 2 Tablets by mouth every 6 hours as needed. 30 Tablet 0   • guaiFENesin  dextromethorphan (ROBITUSSIN DM) 100-10 MG/5ML Syrup syrup Take 10 mL by mouth every 6 hours as needed for Cough. 840 mL    • albuterol 108 (90 Base) MCG/ACT Aero Soln inhalation aerosol Inhale 2 Puffs every four hours as needed for Shortness of Breath. 8.5 g 0   • ascorbic acid (VITAMIN C) 500 MG tablet Take 1 Tablet by mouth 2 times a day. 30 Tablet 0   • aspirin EC 81 MG EC tablet Take 1 Tablet by mouth every day. 30 Tablet 0   • nitroglycerin (NITROSTAT) 0.4 MG SL Tab Place 1 Tablet under the tongue as needed for Chest Pain. 25 Tablet 0   • vitamin D3 (VITAMIND D3) 1000 UNIT Tab Take 1 Tablet by mouth every day. 60 Tablet    • zinc sulfate (ZINCATE) 220 (50 Zn) MG Cap Take 1 Capsule by mouth every day. 7 Capsule 0   • pantoprazole (PROTONIX) 40 MG Tablet Delayed Response      • rizatriptan (MAXALT-MLT) 10 MG disintegrating tablet TAKE 1 TAKE BY MOUTH AT ONSET OF HEADACHE. MAY REPEAT     • triamcinolone acetonide (KENALOG) 0.1 % Ointment triamcinolone acetonide 0.1 % topical ointment   APPLY TO AFFECTED AREAS TWICE A DAY FOR 14 DAYS AS NEEDED     • metoprolol SR (TOPROL XL) 25 MG TABLET SR 24 HR Take 1 Tablet by mouth every day. 30 Tablet 1   • tamsulosin (FLOMAX) 0.4 MG capsule Take 0.4 mg by mouth 1/2 hour after breakfast.     • allopurinol (ZYLOPRIM) 100 MG Tab Take 100 mg by mouth every bedtime.     • lisinopril (PRINIVIL) 10 MG Tab Take 10 mg by mouth every morning. Indications: High Blood Pressure Disorder     • Pain Pump (PATIENT SUPPLIED) XX BRANDON Inject  as directed continuous. Patient's Pain Pump (placed and maintained as an outpatient)  Medications/concentrations: Hydromorphone 400.0 mcg/mL  Route: Intrathecally  Date of Placement: Unknown  Last changed: 1/11/2022 due to refill pump before 3/16/2022  Continuous infusion rates (Drug/Rate):  Hydromorphone 139.81 mcg / 24 hours (5.83 mcg / hr)  Patient activation dose: Hydromorphone 15.00 mcg  Maximum daily activation dose: Hydromorphone 228.06  "[facility]  Patient activation lockout interval: 1 hour  Maximum activations per day: 6     • tacrolimus (PROGRAF) 1 MG Cap Take 2 mg by mouth 2 Times a Day. Indications: Liver Transplant Recipients     • senna-docusate (PERICOLACE OR SENOKOT S) 8.6-50 MG Tab Take 2 Tablets by mouth 2 times a day. (Patient not taking: Reported on 3/22/2022) 30 Tablet 0   • atorvastatin (LIPITOR) 20 MG Tab Take 1 Tablet by mouth every evening. (Patient not taking: No sig reported) 30 Tablet 0     No current facility-administered medications on file prior to visit.       Allergies: Niacin, Latex, Pcn [penicillins], Morphine sulfate, and Mushroom extract complex      ROS:   Review of Systems   Constitutional: Negative for chills, fever and weight loss.   HENT: Negative for congestion, sinus pain and sore throat.    Eyes: Negative for pain and discharge.   Respiratory: Positive for shortness of breath. Negative for cough, sputum production, wheezing and stridor.    Cardiovascular: Positive for chest pain. Negative for palpitations, orthopnea, claudication, leg swelling and PND.   Gastrointestinal: Negative for abdominal pain, diarrhea, nausea and vomiting.   Genitourinary: Negative for dysuria, frequency and urgency.   Musculoskeletal: Negative for myalgias.   Skin: Negative for rash.   Neurological: Negative for dizziness, sensory change, focal weakness, loss of consciousness and headaches.   Psychiatric/Behavioral: The patient is nervous/anxious.    All other systems reviewed and are negative.    Vitals:  /64 (BP Location: Left arm, Patient Position: Sitting, BP Cuff Size: Adult)   Pulse 87   Ht 1.702 m (5' 7\")   Wt 74.2 kg (163 lb 9 oz)   SpO2 95%     Physical Exam:  Physical Exam  Vitals and nursing note reviewed.   Constitutional:       General: He is not in acute distress.     Appearance: Normal appearance. He is well-developed. He is not diaphoretic.   HENT:      Nose: Nose normal.      Mouth/Throat:      Pharynx: No " oropharyngeal exudate.   Eyes:      General: No scleral icterus.        Right eye: No discharge.         Left eye: No discharge.      Conjunctiva/sclera: Conjunctivae normal.      Pupils: Pupils are equal, round, and reactive to light.   Neck:      Thyroid: No thyromegaly.      Vascular: No JVD.      Trachea: No tracheal deviation.   Cardiovascular:      Rate and Rhythm: Normal rate and regular rhythm.      Heart sounds: Normal heart sounds. No murmur heard.  Pulmonary:      Effort: Pulmonary effort is normal. No respiratory distress.      Breath sounds: Normal breath sounds. No stridor. No wheezing or rales.   Abdominal:      General: There is no distension.      Palpations: Abdomen is soft.      Tenderness: There is no abdominal tenderness. There is no guarding.   Musculoskeletal:         General: No tenderness. Normal range of motion.      Cervical back: Neck supple.   Lymphadenopathy:      Cervical: No cervical adenopathy.   Skin:     General: Skin is warm and dry.      Capillary Refill: Capillary refill takes less than 2 seconds.      Coloration: Skin is not pale.      Findings: No erythema.   Neurological:      Mental Status: He is alert and oriented to person, place, and time.      Sensory: No sensory deficit.      Motor: No abnormal muscle tone.      Coordination: Coordination normal.      Deep Tendon Reflexes: Reflexes normal.   Psychiatric:      Comments: anxious       Laboratory Data:    PFTs as reviewed by me personally show:  PFTs earlier this month showed no obstruction, partial bronchodilator response, otherwise normal.     Imaging as reviewed by me personally show:    CT angiogram from 3/2022 showing mild emphysematous changes, otherwise unremarkable lung fields.    Assessment/Plan:    Problem List Items Addressed This Visit     Chronic stable angina (HCC)     Chest pressure and dyspnea since 6/2021, initially intermittent, now more frequent  Reassuring PFTs, CT angiogram, recent nuc stress test and  resting echocardiogram  -- Nitrostat 0.4mg SL x 1 administered in office today and patient monitored (Pt supply, had with him). Pt reported near complete resolution of symptoms.  -- Significant anxiety component to symptoms, cont Celexa  -- Agree with GI referral, ? Esophageal dysfunction/motility/RICHARD given report of difficulty swallowing  -- Will confer with Dr Kennedy next steps, ? [facility]  -- Given reassuring PFTs, CT angio, and no improvement with DONALD, doubt pulmonary etiology of symptoms         Relevant Medications    citalopram (CELEXA) 20 MG Tab    Former tobacco use     50 pack years  Quit cigs 6/2021, ecigs 2/2022  Due for LDCT lung cancer screening 3/2023         Liver transplant recipient (HCC)     Prograf         Cancer of kidney (HCC)     S/p nephrectomy              Return if symptoms worsen or fail to improve.     This note was generated using voice recognition software which has a chance of producing errors of grammar and possibly content.  I have made every reasonable attempt to find and correct any obvious errors, but it should be expected that some may not be found prior to finalization of this note.    Time spent in record review prior to patient arrival, reviewing results, and in face-to-face encounter totaled 65 min, excluding any procedures if performed.  __________  Luis Perez MD  Pulmonary and Critical Care Medicine  [facility]

## 2022-03-22 NOTE — ASSESSMENT & PLAN NOTE
Chest pressure and dyspnea since 6/2021, initially intermittent, now more frequent  Reassuring PFTs, CT angiogram, recent nuc stress test and resting echocardiogram  -- Nitrostat 0.4mg SL x 1 administered in office today and patient monitored (Pt supply, had with him). Pt reported near complete resolution of symptoms.  -- Significant anxiety component to symptoms, cont Celexa  -- Agree with GI referral, ? Esophageal dysfunction/motility/RICHARD given report of difficulty swallowing  -- Will confer with Dr Kennedy next steps, ? [facility]  -- Given reassuring PFTs, CT angio, and no improvement with DONALD, doubt pulmonary etiology of symptoms

## 2022-03-25 ENCOUNTER — TELEPHONE (OUTPATIENT)
Dept: CARDIOLOGY | Facility: MEDICAL CENTER | Age: 67
End: 2022-03-25
Payer: MEDICARE

## 2022-03-25 DIAGNOSIS — R07.89 CHEST PRESSURE: ICD-10-CM

## 2022-03-25 DIAGNOSIS — I49.3 PVC'S (PREMATURE VENTRICULAR CONTRACTIONS): ICD-10-CM

## 2022-03-25 NOTE — TELEPHONE ENCOUNTER
DA    Pt is having SOB and chest pressure. He was given Nitroglycerin when he was in the hospital and said the medication only helps for about 15 min and then all symptoms come back. He needs to know how often he can take the medication. He is concerned he will end up back in the ER. Made appt 04/07/2022, he only wanted to see DA. Would like a call back to advise.   PH: [phone]    Thank You  Jasmin WEST

## 2022-03-25 NOTE — TELEPHONE ENCOUNTER
"Called pt [phone]  Pt was seen on 3/17 with ASMITA regarding same symptoms. Pt reports chest pressure and SOB. Pt denies CP and palpitations. Pt reports taking nitro PRN with positive results. FV made with ASMITA on 4/7/22. Pt hesitant to go to ED, as pt states \"they never do anything and then send me home\". Advised pt that if symptoms worsen he will need to go to ED. ER precautions STRESSED several times. Pt will call on Monday for follow up Pt verbalized understanding.          To DA   "

## 2022-03-26 DIAGNOSIS — I49.3 PVC'S (PREMATURE VENTRICULAR CONTRACTIONS): ICD-10-CM

## 2022-03-26 DIAGNOSIS — R07.89 CHEST PRESSURE: ICD-10-CM

## 2022-03-28 ENCOUNTER — HOSPITAL ENCOUNTER (OUTPATIENT)
Facility: MEDICAL CENTER | Age: 67
End: 2022-03-31
Attending: STUDENT IN AN ORGANIZED HEALTH CARE EDUCATION/TRAINING PROGRAM | Admitting: INTERNAL MEDICINE
Payer: MEDICARE

## 2022-03-28 ENCOUNTER — APPOINTMENT (OUTPATIENT)
Dept: RADIOLOGY | Facility: MEDICAL CENTER | Age: 67
End: 2022-03-28
Attending: STUDENT IN AN ORGANIZED HEALTH CARE EDUCATION/TRAINING PROGRAM
Payer: MEDICARE

## 2022-03-28 DIAGNOSIS — G47.00 INSOMNIA, UNSPECIFIED TYPE: ICD-10-CM

## 2022-03-28 DIAGNOSIS — R07.9 ACUTE CHEST PAIN: Primary | ICD-10-CM

## 2022-03-28 DIAGNOSIS — Z87.19 H/O GASTROESOPHAGEAL REFLUX (GERD): ICD-10-CM

## 2022-03-28 LAB
ALBUMIN SERPL BCP-MCNC: 4.5 G/DL (ref 3.2–4.9)
ALBUMIN/GLOB SERPL: 1.6 G/DL
ALP SERPL-CCNC: 65 U/L (ref 30–99)
ALT SERPL-CCNC: 15 U/L (ref 2–50)
ANION GAP SERPL CALC-SCNC: 14 MMOL/L (ref 7–16)
AST SERPL-CCNC: 16 U/L (ref 12–45)
BASOPHILS # BLD AUTO: 0.4 % (ref 0–1.8)
BASOPHILS # BLD: 0.04 K/UL (ref 0–0.12)
BILIRUB SERPL-MCNC: <0.2 MG/DL (ref 0.1–1.5)
BUN SERPL-MCNC: 9 MG/DL (ref 8–22)
CALCIUM SERPL-MCNC: 8.9 MG/DL (ref 8.5–10.5)
CHLORIDE SERPL-SCNC: 101 MMOL/L (ref 96–112)
CO2 SERPL-SCNC: 20 MMOL/L (ref 20–33)
CREAT SERPL-MCNC: 0.77 MG/DL (ref 0.5–1.4)
EKG IMPRESSION: NORMAL
EOSINOPHIL # BLD AUTO: 0.21 K/UL (ref 0–0.51)
EOSINOPHIL NFR BLD: 2.1 % (ref 0–6.9)
ERYTHROCYTE [DISTWIDTH] IN BLOOD BY AUTOMATED COUNT: 44.8 FL (ref 35.9–50)
GFR SERPLBLD CREATININE-BSD FMLA CKD-EPI: 98 ML/MIN/1.73 M 2
GLOBULIN SER CALC-MCNC: 2.9 G/DL (ref 1.9–3.5)
GLUCOSE SERPL-MCNC: 100 MG/DL (ref 65–99)
HCT VFR BLD AUTO: 38.6 % (ref 42–52)
HGB BLD-MCNC: 13.4 G/DL (ref 14–18)
IMM GRANULOCYTES # BLD AUTO: 0.1 K/UL (ref 0–0.11)
IMM GRANULOCYTES NFR BLD AUTO: 1 % (ref 0–0.9)
LYMPHOCYTES # BLD AUTO: 3.37 K/UL (ref 1–4.8)
LYMPHOCYTES NFR BLD: 33.5 % (ref 22–41)
MCH RBC QN AUTO: 31.2 PG (ref 27–33)
MCHC RBC AUTO-ENTMCNC: 34.7 G/DL (ref 33.7–35.3)
MCV RBC AUTO: 90 FL (ref 81.4–97.8)
MONOCYTES # BLD AUTO: 0.92 K/UL (ref 0–0.85)
MONOCYTES NFR BLD AUTO: 9.1 % (ref 0–13.4)
NEUTROPHILS # BLD AUTO: 5.42 K/UL (ref 1.82–7.42)
NEUTROPHILS NFR BLD: 53.9 % (ref 44–72)
NRBC # BLD AUTO: 0 K/UL
NRBC BLD-RTO: 0 /100 WBC
PLATELET # BLD AUTO: 291 K/UL (ref 164–446)
PMV BLD AUTO: 8.5 FL (ref 9–12.9)
POTASSIUM SERPL-SCNC: 3.8 MMOL/L (ref 3.6–5.5)
PROT SERPL-MCNC: 7.4 G/DL (ref 6–8.2)
RBC # BLD AUTO: 4.29 M/UL (ref 4.7–6.1)
SODIUM SERPL-SCNC: 135 MMOL/L (ref 135–145)
TROPONIN T SERPL-MCNC: 14 NG/L (ref 6–19)
TROPONIN T SERPL-MCNC: 15 NG/L (ref 6–19)
WBC # BLD AUTO: 10.1 K/UL (ref 4.8–10.8)

## 2022-03-28 PROCEDURE — 71045 X-RAY EXAM CHEST 1 VIEW: CPT

## 2022-03-28 PROCEDURE — 93005 ELECTROCARDIOGRAM TRACING: CPT | Performed by: STUDENT IN AN ORGANIZED HEALTH CARE EDUCATION/TRAINING PROGRAM

## 2022-03-28 PROCEDURE — 93005 ELECTROCARDIOGRAM TRACING: CPT

## 2022-03-28 PROCEDURE — G0378 HOSPITAL OBSERVATION PER HR: HCPCS

## 2022-03-28 PROCEDURE — 80053 COMPREHEN METABOLIC PANEL: CPT

## 2022-03-28 PROCEDURE — 85025 COMPLETE CBC W/AUTO DIFF WBC: CPT

## 2022-03-28 PROCEDURE — 84484 ASSAY OF TROPONIN QUANT: CPT

## 2022-03-28 PROCEDURE — 99285 EMERGENCY DEPT VISIT HI MDM: CPT

## 2022-03-28 PROCEDURE — 36415 COLL VENOUS BLD VENIPUNCTURE: CPT

## 2022-03-28 RX ORDER — ASPIRIN 325 MG
325 TABLET ORAL DAILY
Status: DISCONTINUED | OUTPATIENT
Start: 2022-03-29 | End: 2022-03-29

## 2022-03-28 RX ORDER — OXYCODONE HYDROCHLORIDE 5 MG/1
5 TABLET ORAL
Status: DISCONTINUED | OUTPATIENT
Start: 2022-03-28 | End: 2022-03-31 | Stop reason: HOSPADM

## 2022-03-28 RX ORDER — ASPIRIN 81 MG/1
324 TABLET, CHEWABLE ORAL ONCE
Status: COMPLETED | OUTPATIENT
Start: 2022-03-29 | End: 2022-03-29

## 2022-03-28 RX ORDER — ACETAMINOPHEN 325 MG/1
650 TABLET ORAL EVERY 6 HOURS PRN
Status: DISCONTINUED | OUTPATIENT
Start: 2022-03-28 | End: 2022-03-31 | Stop reason: HOSPADM

## 2022-03-28 RX ORDER — NITROGLYCERIN 0.4 MG/1
0.4 TABLET SUBLINGUAL ONCE
Status: COMPLETED | OUTPATIENT
Start: 2022-03-29 | End: 2022-03-29

## 2022-03-28 RX ORDER — ONDANSETRON 2 MG/ML
4 INJECTION INTRAMUSCULAR; INTRAVENOUS EVERY 4 HOURS PRN
Status: DISCONTINUED | OUTPATIENT
Start: 2022-03-28 | End: 2022-03-31 | Stop reason: HOSPADM

## 2022-03-28 RX ORDER — AMOXICILLIN 250 MG
2 CAPSULE ORAL 2 TIMES DAILY
Status: DISCONTINUED | OUTPATIENT
Start: 2022-03-29 | End: 2022-03-31 | Stop reason: HOSPADM

## 2022-03-28 RX ORDER — LABETALOL HYDROCHLORIDE 5 MG/ML
10 INJECTION, SOLUTION INTRAVENOUS EVERY 4 HOURS PRN
Status: DISCONTINUED | OUTPATIENT
Start: 2022-03-28 | End: 2022-03-31 | Stop reason: HOSPADM

## 2022-03-28 RX ORDER — ISOSORBIDE MONONITRATE 30 MG/1
30 TABLET, EXTENDED RELEASE ORAL EVERY MORNING
Qty: 30 TABLET | Refills: 0 | Status: SHIPPED | OUTPATIENT
Start: 2022-03-28 | End: 2022-03-29

## 2022-03-28 RX ORDER — ASPIRIN 81 MG/1
324 TABLET, CHEWABLE ORAL DAILY
Status: DISCONTINUED | OUTPATIENT
Start: 2022-03-29 | End: 2022-03-29

## 2022-03-28 RX ORDER — HYDROMORPHONE HYDROCHLORIDE 1 MG/ML
0.25 INJECTION, SOLUTION INTRAMUSCULAR; INTRAVENOUS; SUBCUTANEOUS
Status: DISCONTINUED | OUTPATIENT
Start: 2022-03-28 | End: 2022-03-31 | Stop reason: HOSPADM

## 2022-03-28 RX ORDER — REGADENOSON 0.08 MG/ML
0.4 INJECTION, SOLUTION INTRAVENOUS
Status: DISCONTINUED | OUTPATIENT
Start: 2022-03-28 | End: 2022-03-31 | Stop reason: HOSPADM

## 2022-03-28 RX ORDER — ONDANSETRON 4 MG/1
4 TABLET, ORALLY DISINTEGRATING ORAL EVERY 4 HOURS PRN
Status: DISCONTINUED | OUTPATIENT
Start: 2022-03-28 | End: 2022-03-31 | Stop reason: HOSPADM

## 2022-03-28 RX ORDER — POLYETHYLENE GLYCOL 3350 17 G/17G
1 POWDER, FOR SOLUTION ORAL
Status: DISCONTINUED | OUTPATIENT
Start: 2022-03-28 | End: 2022-03-31 | Stop reason: HOSPADM

## 2022-03-28 RX ORDER — BISACODYL 10 MG
10 SUPPOSITORY, RECTAL RECTAL
Status: DISCONTINUED | OUTPATIENT
Start: 2022-03-28 | End: 2022-03-31 | Stop reason: HOSPADM

## 2022-03-28 RX ORDER — AMINOPHYLLINE 25 MG/ML
100 INJECTION, SOLUTION INTRAVENOUS
Status: DISCONTINUED | OUTPATIENT
Start: 2022-03-28 | End: 2022-03-31 | Stop reason: HOSPADM

## 2022-03-28 RX ORDER — ASPIRIN 300 MG/1
300 SUPPOSITORY RECTAL DAILY
Status: DISCONTINUED | OUTPATIENT
Start: 2022-03-29 | End: 2022-03-29

## 2022-03-28 RX ORDER — OXYCODONE HYDROCHLORIDE 5 MG/1
2.5 TABLET ORAL
Status: DISCONTINUED | OUTPATIENT
Start: 2022-03-28 | End: 2022-03-31 | Stop reason: HOSPADM

## 2022-03-28 ASSESSMENT — ENCOUNTER SYMPTOMS
HEADACHES: 0
DOUBLE VISION: 0
NAUSEA: 0
BLURRED VISION: 0
VOMITING: 0
COUGH: 0
FEVER: 0
NECK PAIN: 0
SHORTNESS OF BREATH: 1
CHILLS: 0
SORE THROAT: 0
LOSS OF CONSCIOUSNESS: 0
ABDOMINAL PAIN: 0
FALLS: 0

## 2022-03-28 ASSESSMENT — FIBROSIS 4 INDEX: FIB4 SCORE: 0.99

## 2022-03-28 ASSESSMENT — PAIN DESCRIPTION - PAIN TYPE: TYPE: ACUTE PAIN

## 2022-03-28 ASSESSMENT — LIFESTYLE VARIABLES: DO YOU DRINK ALCOHOL: NO

## 2022-03-28 NOTE — TELEPHONE ENCOUNTER
Can you send a prescription for Imdur 30 mg daily?  Please let the patient know that this is a long-acting nitrate as a replacement for sublingual nitro.  Should take it at night.  Side effects include headache for which he can take Tylenol.  Thanks.

## 2022-03-28 NOTE — TELEPHONE ENCOUNTER
Called pt [phone]  Relayed DA recommendations. Pt in agreement. Pharmacy verified. ER precautions STRESSED. Pt verbalized understanding.        Rx sent to [id number] per pt request

## 2022-03-29 PROBLEM — G47.00 INSOMNIA: Status: ACTIVE | Noted: 2022-03-29

## 2022-03-29 PROBLEM — F41.9 ANXIETY: Status: ACTIVE | Noted: 2022-03-29

## 2022-03-29 PROBLEM — R07.9 PAIN IN THE CHEST: Status: RESOLVED | Noted: 2019-08-15 | Resolved: 2022-03-29

## 2022-03-29 LAB
CHOLEST SERPL-MCNC: 145 MG/DL (ref 100–199)
EKG IMPRESSION: NORMAL
HDLC SERPL-MCNC: 31 MG/DL
LDLC SERPL CALC-MCNC: 88 MG/DL
TRIGL SERPL-MCNC: 132 MG/DL (ref 0–149)
TROPONIN T SERPL-MCNC: 24 NG/L (ref 6–19)
TSH SERPL DL<=0.005 MIU/L-ACNC: 0.54 UIU/ML (ref 0.38–5.33)

## 2022-03-29 PROCEDURE — 80061 LIPID PANEL: CPT

## 2022-03-29 PROCEDURE — 700102 HCHG RX REV CODE 250 W/ 637 OVERRIDE(OP): Performed by: INTERNAL MEDICINE

## 2022-03-29 PROCEDURE — 84443 ASSAY THYROID STIM HORMONE: CPT

## 2022-03-29 PROCEDURE — G0378 HOSPITAL OBSERVATION PER HR: HCPCS

## 2022-03-29 PROCEDURE — 700102 HCHG RX REV CODE 250 W/ 637 OVERRIDE(OP): Performed by: STUDENT IN AN ORGANIZED HEALTH CARE EDUCATION/TRAINING PROGRAM

## 2022-03-29 PROCEDURE — 96372 THER/PROPH/DIAG INJ SC/IM: CPT

## 2022-03-29 PROCEDURE — 84484 ASSAY OF TROPONIN QUANT: CPT

## 2022-03-29 PROCEDURE — A9270 NON-COVERED ITEM OR SERVICE: HCPCS | Performed by: INTERNAL MEDICINE

## 2022-03-29 PROCEDURE — 99215 OFFICE O/P EST HI 40 MIN: CPT | Performed by: INTERNAL MEDICINE

## 2022-03-29 PROCEDURE — 93005 ELECTROCARDIOGRAM TRACING: CPT | Performed by: INTERNAL MEDICINE

## 2022-03-29 PROCEDURE — A9270 NON-COVERED ITEM OR SERVICE: HCPCS | Performed by: STUDENT IN AN ORGANIZED HEALTH CARE EDUCATION/TRAINING PROGRAM

## 2022-03-29 PROCEDURE — 700111 HCHG RX REV CODE 636 W/ 250 OVERRIDE (IP): Performed by: INTERNAL MEDICINE

## 2022-03-29 PROCEDURE — 36415 COLL VENOUS BLD VENIPUNCTURE: CPT

## 2022-03-29 RX ORDER — ALBUTEROL SULFATE 90 UG/1
2 AEROSOL, METERED RESPIRATORY (INHALATION) EVERY 4 HOURS PRN
Status: DISCONTINUED | OUTPATIENT
Start: 2022-03-29 | End: 2022-03-31 | Stop reason: HOSPADM

## 2022-03-29 RX ORDER — CITALOPRAM 20 MG/1
20 TABLET ORAL DAILY
Status: DISCONTINUED | OUTPATIENT
Start: 2022-03-29 | End: 2022-03-31 | Stop reason: HOSPADM

## 2022-03-29 RX ORDER — OMEPRAZOLE 20 MG/1
20 CAPSULE, DELAYED RELEASE ORAL DAILY
Status: DISCONTINUED | OUTPATIENT
Start: 2022-03-29 | End: 2022-03-31 | Stop reason: HOSPADM

## 2022-03-29 RX ORDER — TAMSULOSIN HYDROCHLORIDE 0.4 MG/1
0.4 CAPSULE ORAL
Status: DISCONTINUED | OUTPATIENT
Start: 2022-03-29 | End: 2022-03-31 | Stop reason: HOSPADM

## 2022-03-29 RX ORDER — METOPROLOL SUCCINATE 25 MG/1
25 TABLET, EXTENDED RELEASE ORAL DAILY
Status: DISCONTINUED | OUTPATIENT
Start: 2022-03-29 | End: 2022-03-31 | Stop reason: HOSPADM

## 2022-03-29 RX ORDER — CHOLECALCIFEROL (VITAMIN D3) 125 MCG
5 CAPSULE ORAL NIGHTLY
Status: DISCONTINUED | OUTPATIENT
Start: 2022-03-29 | End: 2022-03-31 | Stop reason: HOSPADM

## 2022-03-29 RX ORDER — TACROLIMUS 1 MG/1
2 CAPSULE ORAL 2 TIMES DAILY
Status: DISCONTINUED | OUTPATIENT
Start: 2022-03-29 | End: 2022-03-31 | Stop reason: HOSPADM

## 2022-03-29 RX ORDER — LISINOPRIL 10 MG/1
10 TABLET ORAL EVERY MORNING
Status: DISCONTINUED | OUTPATIENT
Start: 2022-03-29 | End: 2022-03-31 | Stop reason: HOSPADM

## 2022-03-29 RX ADMIN — ENOXAPARIN SODIUM 40 MG: 40 INJECTION SUBCUTANEOUS at 05:41

## 2022-03-29 RX ADMIN — OMEPRAZOLE 20 MG: 20 CAPSULE, DELAYED RELEASE ORAL at 05:40

## 2022-03-29 RX ADMIN — TACROLIMUS 2 MG: 1 CAPSULE ORAL at 05:42

## 2022-03-29 RX ADMIN — Medication 5 MG: at 20:50

## 2022-03-29 RX ADMIN — ASPIRIN 81 MG CHEWABLE TABLET 324 MG: 81 TABLET CHEWABLE at 00:00

## 2022-03-29 RX ADMIN — ASPIRIN 81 MG: 81 TABLET, COATED ORAL at 05:40

## 2022-03-29 RX ADMIN — ACETAMINOPHEN 650 MG: 325 TABLET, FILM COATED ORAL at 18:18

## 2022-03-29 RX ADMIN — LISINOPRIL 10 MG: 10 TABLET ORAL at 05:40

## 2022-03-29 RX ADMIN — NITROGLYCERIN 0.25 INCH: 20 OINTMENT TOPICAL at 18:18

## 2022-03-29 RX ADMIN — Medication 5 MG: at 00:51

## 2022-03-29 RX ADMIN — METOPROLOL SUCCINATE 25 MG: 25 TABLET, EXTENDED RELEASE ORAL at 05:41

## 2022-03-29 RX ADMIN — ACETAMINOPHEN 650 MG: 325 TABLET, FILM COATED ORAL at 00:51

## 2022-03-29 RX ADMIN — NITROGLYCERIN 0.25 INCH: 20 OINTMENT TOPICAL at 23:26

## 2022-03-29 RX ADMIN — TACROLIMUS 2 MG: 1 CAPSULE ORAL at 18:17

## 2022-03-29 RX ADMIN — TAMSULOSIN HYDROCHLORIDE 0.4 MG: 0.4 CAPSULE ORAL at 00:51

## 2022-03-29 RX ADMIN — TAMSULOSIN HYDROCHLORIDE 0.4 MG: 0.4 CAPSULE ORAL at 20:50

## 2022-03-29 RX ADMIN — NITROGLYCERIN 0.4 MG: 0.4 TABLET, ORALLY DISINTEGRATING SUBLINGUAL at 00:00

## 2022-03-29 RX ADMIN — OXYCODONE 5 MG: 5 TABLET ORAL at 20:50

## 2022-03-29 RX ADMIN — NITROGLYCERIN 0.25 INCH: 20 OINTMENT TOPICAL at 13:17

## 2022-03-29 RX ADMIN — CITALOPRAM HYDROBROMIDE 20 MG: 20 TABLET ORAL at 05:41

## 2022-03-29 ASSESSMENT — LIFESTYLE VARIABLES
CONSUMPTION TOTAL: NEGATIVE
AVERAGE NUMBER OF DAYS PER WEEK YOU HAVE A DRINK CONTAINING ALCOHOL: 1
HOW MANY TIMES IN THE PAST YEAR HAVE YOU HAD 5 OR MORE DRINKS IN A DAY: 0
HAVE YOU EVER FELT YOU SHOULD CUT DOWN ON YOUR DRINKING: NO
HAVE PEOPLE ANNOYED YOU BY CRITICIZING YOUR DRINKING: NO
TOTAL SCORE: 0
EVER HAD A DRINK FIRST THING IN THE MORNING TO STEADY YOUR NERVES TO GET RID OF A HANGOVER: NO
ON A TYPICAL DAY WHEN YOU DRINK ALCOHOL HOW MANY DRINKS DO YOU HAVE: 1
DOES PATIENT WANT TO STOP DRINKING: NO
EVER FELT BAD OR GUILTY ABOUT YOUR DRINKING: NO
ALCOHOL_USE: YES

## 2022-03-29 ASSESSMENT — PATIENT HEALTH QUESTIONNAIRE - PHQ9
SUM OF ALL RESPONSES TO PHQ9 QUESTIONS 1 AND 2: 0
2. FEELING DOWN, DEPRESSED, IRRITABLE, OR HOPELESS: NOT AT ALL
SUM OF ALL RESPONSES TO PHQ9 QUESTIONS 1 AND 2: 0
2. FEELING DOWN, DEPRESSED, IRRITABLE, OR HOPELESS: NOT AT ALL
1. LITTLE INTEREST OR PLEASURE IN DOING THINGS: NOT AT ALL
1. LITTLE INTEREST OR PLEASURE IN DOING THINGS: NOT AT ALL

## 2022-03-29 ASSESSMENT — ENCOUNTER SYMPTOMS
COUGH: 0
NERVOUS/ANXIOUS: 1
HEADACHES: 0
ABDOMINAL PAIN: 0
MYALGIAS: 0
PALPITATIONS: 1
BLURRED VISION: 0
DEPRESSION: 0
SHORTNESS OF BREATH: 1
DOUBLE VISION: 0
CHILLS: 0
FEVER: 0
SPUTUM PRODUCTION: 0
ORTHOPNEA: 0
DIZZINESS: 0
NAUSEA: 0
SORE THROAT: 0
VOMITING: 0

## 2022-03-29 ASSESSMENT — PAIN DESCRIPTION - PAIN TYPE: TYPE: ACUTE PAIN;CHRONIC PAIN

## 2022-03-29 ASSESSMENT — FIBROSIS 4 INDEX: FIB4 SCORE: 0.95

## 2022-03-29 NOTE — HOSPITAL COURSE
Mr. Quirino Ruiz 66-ye PMHx of liver carcinoma status post liver transplant on immunosuppressive therapy, hypertension and former smoker who presented to the hospital on 3/28/2022 with progressive substernal chest pressure associated with dyspnea has been ongoing for several months.      Patient has been taking sublingual nitro with resolution of his pain, he was recently seen by cardiology and here in the ER, CTPE scan was negative. He underwent echo which was normal and had a normal stress test in 2021 thus no further intervention was done. He was sent to pul for further evaluation, he states he underwent PFTs which were normal. He has continued to have chest pressure thus he presented here for further evaluation.     On presentation in ER he was hypertensive but otherwise vitals stable, troponin with mild elevation from 14 to 24, EKG without acute changes.  Patient admitted to the observation unit for further evaluation and treatment.    As patient has had recent work-up for chest pain, cardiology was consulted of which a left heart catheterization was recommended for this patient.  Pending results from catheterization.

## 2022-03-29 NOTE — DISCHARGE PLANNING
HTH/SCP TCN chart review completed. Collaborated with MYLA HUNT  prior to meeting with the pt. The most current review of medical record, knowledge of pt's PLOF and social support, LACE+ score of 78 were considered.      Pt seen at bedside. Mbr lives by himself  in a 1 brisa house, independent with ADLs. He states he needs help with housekeeping services but denies additional  Assistance with food/housing/transportation Introduced TCN program. Provided education regarding differences in post acute resources including IRF, SNF and HH  Discussed HTH/SCP plan benefits including Meds to Beds and [facility] transitional care services. Pt verbalizes understanding. Mbr previously refused [facility] but is now aggreable . Sending referral to [facility] with possible [facility] servcies. TCN will continue to follow and collaborate with discharge planning team as additional post acute needs arise. Thank you.     Choice forms obtained: NONE  [facility] Introduced ( Y) Referral ( sent) * possible [facility] services

## 2022-03-29 NOTE — ED NOTES
"Patient reports taking lisinopril as prescribed and SL nitro at night for 3 days d/t intermittent mid sternal chest pain.     BP (!) 196/92   Pulse 72   Temp 36.9 °C (98.4 °F) (Temporal)   Resp 16   Ht 1.702 m (5' 7\")   Wt 74.4 kg (164 lb)   SpO2 97%   BMI 25.69 kg/m²     "

## 2022-03-29 NOTE — ED NOTES
Med rec is a partial  Confirmed name and date of birth.    Pt denies antibiotic use in last 30 days.        Home pharmacy [facility] [phone]

## 2022-03-29 NOTE — ED NOTES
Report called to Silvia DEGROOT, pt transported to [facility] via gurney with all belongings.

## 2022-03-29 NOTE — PROGRESS NOTES
Received in bed sleeping, no s/s of distress, aaox4, c/o chest pain but better as verbalized, POC discussed, needs attended.

## 2022-03-29 NOTE — ASSESSMENT & PLAN NOTE
Recent COVID positive test on 3/10, no respiratory symptoms   Out 19 days, no further isolation per CDC guidelines

## 2022-03-29 NOTE — PROGRESS NOTES
4 Eyes Skin Assessment Completed by lin, RN and hailey RN.    Head WDL  Ears WDL  Nose WDL  Mouth WDL  Neck WDL  Breast/Chest WDL  Shoulder Blades WDL  Spine WDL  (R) Arm/Elbow/Hand WDL  (L) Arm/Elbow/Hand WDL  Abdomen Scar  Groin WDL  Scrotum/Coccyx/Buttocks WDL  (R) Leg WDL  (L) Leg WDL  (R) Heel/Foot/Toe WDL  (L) Heel/Foot/Toe WDL          Devices In Places Tele Box and Pulse Ox      Interventions In Place N/A    Possible Skin Injury No    Pictures Uploaded Into Epic N/A  Wound Consult Placed N/A  RN Wound Prevention Protocol Ordered No

## 2022-03-29 NOTE — ED NOTES
Pt ambulatory to Ort2, steady gait.  Agree with triage assessment.  Pt changed into gown.  Patient reports intermittent mid sternal chest pain 4/10 - reports 1 nitro adminsitered by EMS PTA.  PIV noted in R hand from PTA.  Chart up for ERP.

## 2022-03-29 NOTE — ED TRIAGE NOTES
"Chief Complaint   Patient presents with   • Chest Pain     Going on since June 2021, worsened today w/ SOB with exertion, midsternal chest pain 4/10 sharp pain. Hx of HTN and stroke in 2018. Pt states he takes his medication as indicated.        Pt ambulated w/ steady gait to triage. Pt A&Ox4, on room air, pt states his BP was elevated PTA. Pt was BIB [facility]. EKG done. PIV to L wrist.     Pt to lobby . Pt educated on alerting staff in changes to condition. Pt verbalized understanding. Protocol ordered.     Given 324 mg ASA and 0.4 nitroglycerin in EMS    /70   Pulse 83   Temp 36.9 °C (98.4 °F) (Temporal)   Resp 19   Ht 1.702 m (5' 7\")   Wt 74.4 kg (164 lb)   SpO2 97%   BMI 25.69 kg/m²   "

## 2022-03-29 NOTE — ASSESSMENT & PLAN NOTE
Atypical in nature.  Follow-up telemetry and serial troponin if significantly elevated consider cardiology consult.

## 2022-03-29 NOTE — H&P
Hospital Medicine History & Physical Note    Date of Service  3/28/22    Primary Care Physician  Alejandra Sutherland M.D.      Code Status  Full Code    Chief Complaint  Chief Complaint   Patient presents with   • Chest Pain     Going on since June 2021, worsened today w/ SOB with exertion, midsternal chest pain 4/10 sharp pain. Hx of HTN and stroke in 2018. Pt states he takes his medication as indicated.        History of Presenting Illness  Quirino Ruiz is a 66 y.o. male who presented 3/28/2022 with substernal chest pain which is pressure-like starting at 230 this afternoon.  Patient reports that he feels like he is being punched in the chest.  Patient also endorses dyspnea on exertion.  Patient reports that the pain is moderate intensity, nonradiating, not exertional or pleuritic.  Patient reports that the pain improved with nitroglycerin.  In the emergency department he is now acute ischemic changes on EKG, troponin is only 15 and repeat is 14.  Patient endorses the above however he also states his pressure-like pain of 3 out of 5 intensity is midsternal, lasting seconds, nonradiating not associated with palpitations, nausea vomiting, diaphoresis.  He has associated shortness of breath which lasts merely seconds and resolve spontaneously without intervention.  He is unable to identify exacerbating factors.  He reports during these periods he uses his pulse oximeter which reads normal.  Please see below for recent cardiac evaluations.   Please review Dr. Friend, Dr. Pierce, Cardiology consultation notes.  He has a former smoker, liver transplant on tacrolimus, nephrectomy due to cancer, hypertension, chronic pain, follows Dr. Mendes. He presents with chest pressure and shortness of breath. He has had a normal stress test 6/3/2021 and normal echo 7/2021. He has been seen 3/2/2022 by Dr. Kennedy, Cardiology clinic for substernal pressure. An echo was ordered but he continued to have substernal pressure along  with shortness of breath. He decided to come to the ED to be evaluated.  At the ED, he is afebrile, normotensive  CTA Chest no PE, mild emphysematous changes.  EKG sinus, no obvious ischemic changes  Troponin x 2 negative.  Cardiology consulted. Echo and stress tests ordered.  He has a former smoker, liver transplant on tacrolimus, nephrectomy due to cancer, hypertension, chronic pain, follows Dr. Mendes. He presents with chest pressure and shortness of breath. He has had a normal stress test 6/3/2021 and normal echo 7/2021. He has been seen 3/2/2022 by Dr. Kennedy, Cardiology clinic for substernal pressure. An echo was ordered but he continued to have substernal pressure along with shortness of breath. He decided to come to the ED to be evaluated.  At the ED, he is afebrile, normotensive  CTA Chest no PE, mild emphysematous changes.  EKG sinus, no obvious ischemic changes  Troponin x 2 negative.  Cardiology consulted. Echo and stress tests ordered.  I ordered CoVID test. That came out positive. Patient however is asymptomatic and NOT hypoxic. I gave him albuterol PRN, cough medicine, Vit C, D and zinc. I wrote DME pulse oximeter. He walked with nursing and he did NOT require O2.   Meanwhile because of this, stress test was cancelled. Echo was done but not read, however Dr. Pierce, cardiology CLEARED him for discharge as patient's stress tst and echos in the past have been negative for ischemia.      I discussed the plan of care with patient.    Review of Systems  Review of Systems   Reason unable to perform ROS: Patient felt an unreliable historian as answers affirmatively to all questions.       Past Medical History   has a past medical history of Arthritis, Back pain, Cancer (HCC) (2002;2013), Cholesterol blood decreased, Chronic pain, Cold (8/27/2014), Dental disorder, Gout, Heart burn, Hepatitis C (1993), Hypertension, Indigestion, Liver transplanted (HCC) (2002), Pain, Pain, Renal cancer (HCC), Renal disorder,  "Shortness of breath, and Stroke (HCC) (03/2018).    Surgical History   has a past surgical history that includes other (1994); cervical disk and fusion anterior (12/11/2012); recovery (4/23/2013); lumbar fusion anterior (5/29/2013); evacuation of hematoma (5/31/2013); other (2/28/2014); cath place perm epidural (9/9/2014); pump insert/remove (11/25/2014); exploratory laparotomy (5/31/2013); recovery (11/30/2015); other surgical procedure; nephrectomy radical; cholecystectomy; cervical decompression posterior; other; nephrectomy radical (Left); pump insert/remove; and pump revision (Left, 9/17/2021).     Family History  family history includes Hypertension in an other family member.   Family history reviewed with patient. There is no family history that is pertinent to the chief complaint.     Social History   reports that he quit smoking about 8 months ago. His smoking use included cigarettes. He has a 50.00 pack-year smoking history. He has never used smokeless tobacco. He reports previous alcohol use of about 0.6 oz of alcohol per week. He reports that he does not use drugs.    Allergies  Allergies   Allergen Reactions   • Niacin      Passed out.    • Latex Rash     Reaction- Itching, Rash   • Pcn [Penicillins] Anaphylaxis and Swelling     Patient has tolerated cefazolin in past   • Morphine Sulfate      Per pt., \"it makes me cranky\"   • Mushroom Extract Complex        Medications  Prior to Admission Medications   Prescriptions Last Dose Informant Patient Reported? Taking?   Pain Pump (PATIENT SUPPLIED) XX BRANDON 3/28/2022 at continuous Patient Yes No   Sig: Inject  as directed continuous. Patient's Pain Pump (placed and maintained as an outpatient)  Medications/concentrations: Hydromorphone 400.0 mcg/mL  Route: Intrathecally  Date of Placement: Unknown  Last changed: 03/16/2022 due to refill pump before 5/16/2022  Continuous infusion rates (Drug/Rate):  Hydromorphone 139.81 mcg / 24 hours (5.83 mcg / hr)  Patient " activation dose: Hydromorphone 15.00 mcg  Maximum daily activation dose: Hydromorphone 228.06 mcg  Patient activation lockout interval: 1 hour  Maximum activations per day: 6  Dr Mendes [phone]   albuterol 108 (90 Base) MCG/ACT Aero Soln inhalation aerosol 3/27/2022 at 1600 Patient No No   Sig: Inhale 2 Puffs every four hours as needed for Shortness of Breath.   allopurinol (ZYLOPRIM) 100 MG Tab 3/27/2022 at 2200 Patient Yes No   Sig: Take 100 mg by mouth every bedtime.   ascorbic acid (VITAMIN C) 500 MG tablet 3/28/2022 at 0900 Patient No No   Sig: Take 1 Tablet by mouth 2 times a day.   aspirin EC 81 MG EC tablet 3/28/2022 at 0900 Patient No No   Sig: Take 1 Tablet by mouth every day.   citalopram (CELEXA) 20 MG Tab 3/28/2022 at 0900 Patient Yes No   Sig: Take 20 mg by mouth every day.   lisinopril (PRINIVIL) 10 MG Tab 3/28/2022 at 0900 Patient Yes No   Sig: Take 10 mg by mouth every morning. Indications: High Blood Pressure Disorder   metoprolol SR (TOPROL XL) 25 MG TABLET SR 24 HR 3/27/2022 at 2200 Patient No No   Sig: Take 1 Tablet by mouth every day.   pantoprazole (PROTONIX) 40 MG Tablet Delayed Response 3/28/2022 at 0900 Patient Yes No   Sig: Take 40 mg by mouth every day.   rizatriptan (MAXALT-MLT) 10 MG disintegrating tablet 3/28/2022 at 1030 Patient Yes No   Sig: TAKE 1 TAKE BY MOUTH AT ONSET OF HEADACHE. MAY REPEAT   tacrolimus (PROGRAF) 1 MG Cap 3/28/2022 at 0900 Patient Yes No   Sig: Take 2 mg by mouth 2 Times a Day. Indications: Liver Transplant Recipients   tamsulosin (FLOMAX) 0.4 MG capsule 3/27/2022 at 2200 Patient Yes No   Sig: Take 0.4 mg by mouth at bedtime.   vitamin D3 (VITAMIND D3) 1000 UNIT Tab 3/28/2022 at 0900 Patient Yes No   Sig: Take 1 Tablet by mouth every day.   zinc sulfate (ZINCATE) 220 (50 Zn) MG Cap 3/28/2022 at 0900 Patient Yes No   Sig: Take 2,200 mg by mouth every day.      Facility-Administered Medications: None       Physical Exam  Temp:  [36.9 °C (98.4 °F)] 36.9 °C  (98.4 °F)  Pulse:  [63-83] 69  Resp:  [14-19] 16  BP: (125-199)/(63-92) 125/69  SpO2:  [92 %-99 %] 94 %  Blood Pressure : 125/69   Temperature: 36.9 °C (98.4 °F)   Pulse: 69   Respiration: 16   Pulse Oximetry: 94 %       Physical Exam  Vitals and nursing note reviewed.   Constitutional:       General: He is not in acute distress.     Appearance: Normal appearance. He is normal weight. He is not toxic-appearing.   HENT:      Head: Normocephalic and atraumatic.      Nose: Nose normal. No congestion or rhinorrhea.      Mouth/Throat:      Mouth: Mucous membranes are moist.      Pharynx: Oropharynx is clear.   Eyes:      Extraocular Movements: Extraocular movements intact.      Conjunctiva/sclera: Conjunctivae normal.      Pupils: Pupils are equal, round, and reactive to light.   Neck:      Vascular: No carotid bruit.   Cardiovascular:      Rate and Rhythm: Normal rate and regular rhythm.      Pulses: Normal pulses.      Heart sounds: Normal heart sounds. No murmur heard.    No gallop.   Pulmonary:      Effort: No respiratory distress.      Breath sounds: Normal breath sounds. No wheezing or rales.   Abdominal:      General: Abdomen is flat. Bowel sounds are normal. There is no distension.      Palpations: Abdomen is soft. There is no mass.      Tenderness: There is no abdominal tenderness.      Hernia: No hernia is present.   Musculoskeletal:         General: No tenderness or signs of injury.      Cervical back: Normal range of motion and neck supple. No muscular tenderness.   Lymphadenopathy:      Cervical: No cervical adenopathy.   Skin:     Capillary Refill: Capillary refill takes less than 2 seconds.      Coloration: Skin is not jaundiced or pale.      Findings: No bruising.   Neurological:      General: No focal deficit present.      Mental Status: He is alert and oriented to person, place, and time. Mental status is at baseline.      Cranial Nerves: No cranial nerve deficit.      Motor: No weakness.       Coordination: Coordination normal.   Psychiatric:         Mood and Affect: Mood normal.         Thought Content: Thought content normal.         Judgment: Judgment normal.         Laboratory:  Recent Labs     03/28/22 2003   WBC 10.1   RBC 4.29*   HEMOGLOBIN 13.4*   HEMATOCRIT 38.6*   MCV 90.0   MCH 31.2   MCHC 34.7   RDW 44.8   PLATELETCT 291   MPV 8.5*     Recent Labs     03/28/22 2003   SODIUM 135   POTASSIUM 3.8   CHLORIDE 101   CO2 20   GLUCOSE 100*   BUN 9   CREATININE 0.77   CALCIUM 8.9     Recent Labs     03/28/22 2003   ALTSGPT 15   ASTSGOT 16   ALKPHOSPHAT 65   TBILIRUBIN <0.2   GLUCOSE 100*         No results for input(s): NTPROBNP in the last 72 hours.      Recent Labs     03/28/22 2003 03/28/22  2210   TROPONINT 15 14       Imaging:  DX-CHEST-PORTABLE (1 VIEW)   Final Result         1.  No acute cardiopulmonary disease.          EKG:  I have personally reviewed the images and compared with prior images.    Assessment/Plan:  I anticipate this patient is appropriate for observation status at this time.    Insomnia  Assessment & Plan  Likely related to depression or anxiety, follow-up TSH, melatonin as needed    Anxiety  Assessment & Plan  Follow-up TSH    Recurrent chest pain, COVID positive, chornic pain syndrome- (present on admission)  Assessment & Plan  Atypical in nature.  Follow-up telemetry and serial troponin if significantly elevated consider cardiology consult.      VTE prophylaxis: SCDs/TEDs

## 2022-03-29 NOTE — ED PROVIDER NOTES
ED Provider Note    Chief Complaint:   Chest pain    HPI:  Quirino Ruiz is a very pleasant 66-year-old male who presents with substernal chest pain which is pressure-like starting at 230 this afternoon.  Patient reports that he feels like he is being punched in the chest.  Patient also endorses dyspnea on exertion.  Patient reports that the pain is moderate intensity, nonradiating, not exertional or pleuritic.  Patient reports that the pain improved with nitroglycerin.    Review of Systems:  Review of Systems   Constitutional: Negative for chills and fever.   HENT: Negative for congestion and sore throat.    Eyes: Negative for blurred vision and double vision.   Respiratory: Positive for shortness of breath. Negative for cough.    Cardiovascular: Positive for chest pain. Negative for leg swelling.   Gastrointestinal: Negative for abdominal pain, nausea and vomiting.   Genitourinary: Negative for dysuria and hematuria.   Musculoskeletal: Negative for falls and neck pain.   Skin: Negative for itching and rash.   Neurological: Negative for loss of consciousness and headaches.       Past Medical History:   has a past medical history of Arthritis, Back pain, Cancer (HCC) (;), Cholesterol blood decreased, Chronic pain, Cold (2014), Dental disorder, Gout, Heart burn, Hepatitis C (), Hypertension, Indigestion, Liver transplanted (HCC) (), Pain, Pain, Renal cancer (HCC), Renal disorder, Shortness of breath, and Stroke (HCC) (2018).    Social History:  Social History     Tobacco Use   • Smoking status: Former Smoker     Packs/day: 1.00     Years: 50.00     Pack years: 50.00     Types: Cigarettes     Quit date: 2021     Years since quittin.7   • Smokeless tobacco: Never Used   • Tobacco comment: quit four months ago   Vaping Use   • Vaping Use: Former   • Substances: Nicotine   • Devices: Pre-filled or refillable cartridge   Substance and Sexual Activity   • Alcohol use: Not Currently      "Alcohol/week: 0.6 oz     Types: 1 Cans of beer per week   • Drug use: No   • Sexual activity: Not on file       Surgical History:   has a past surgical history that includes other (1994); cervical disk and fusion anterior (12/11/2012); recovery (4/23/2013); lumbar fusion anterior (5/29/2013); evacuation of hematoma (5/31/2013); other (2/28/2014); cath place perm epidural (9/9/2014); pump insert/remove (11/25/2014); exploratory laparotomy (5/31/2013); recovery (11/30/2015); other surgical procedure; nephrectomy radical; cholecystectomy; cervical decompression posterior; other; nephrectomy radical (Left); pump insert/remove; and pump revision (Left, 9/17/2021).    Allergies:  Allergies   Allergen Reactions   • Niacin      Passed out.    • Latex Rash     Reaction- Itching, Rash   • Pcn [Penicillins] Anaphylaxis and Swelling     Patient has tolerated cefazolin in past   • Morphine Sulfate      Per pt., \"it makes me cranky\"   • Mushroom Extract Complex        Physical Exam:  Vital Signs: /70   Pulse 69   Temp 36.9 °C (98.4 °F) (Temporal)   Resp 16   Ht 1.702 m (5' 7\")   Wt 74.4 kg (164 lb)   SpO2 95%   BMI 25.69 kg/m²   Physical Exam  Vitals and nursing note reviewed.   Constitutional:       Comments: Patient is lying in bed supine, pleasant, conversant, speaking in complete sentences   HENT:      Head: Normocephalic and atraumatic.   Eyes:      Extraocular Movements: Extraocular movements intact.      Conjunctiva/sclera: Conjunctivae normal.      Pupils: Pupils are equal, round, and reactive to light.   Cardiovascular:      Pulses: Normal pulses.      Comments: HR 69  Pulmonary:      Effort: Pulmonary effort is normal. No respiratory distress.   Musculoskeletal:         General: No swelling. Normal range of motion.      Cervical back: Normal range of motion. No rigidity.   Skin:     General: Skin is warm and dry.      Capillary Refill: Capillary refill takes less than 2 seconds.   Neurological:      Mental " Status: He is alert.         Medical records reviewed for continuity of care.     Results for orders placed or performed during the hospital encounter of 03/28/22   CBC with Differential   Result Value Ref Range    WBC 10.1 4.8 - 10.8 K/uL    RBC 4.29 (L) 4.70 - 6.10 M/uL    Hemoglobin 13.4 (L) 14.0 - 18.0 g/dL    Hematocrit 38.6 (L) 42.0 - 52.0 %    MCV 90.0 81.4 - 97.8 fL    MCH 31.2 27.0 - 33.0 pg    MCHC 34.7 33.7 - 35.3 g/dL    RDW 44.8 35.9 - 50.0 fL    Platelet Count 291 164 - 446 K/uL    MPV 8.5 (L) 9.0 - 12.9 fL    Neutrophils-Polys 53.90 44.00 - 72.00 %    Lymphocytes 33.50 22.00 - 41.00 %    Monocytes 9.10 0.00 - 13.40 %    Eosinophils 2.10 0.00 - 6.90 %    Basophils 0.40 0.00 - 1.80 %    Immature Granulocytes 1.00 (H) 0.00 - 0.90 %    Nucleated RBC 0.00 /100 WBC    Neutrophils (Absolute) 5.42 1.82 - 7.42 K/uL    Lymphs (Absolute) 3.37 1.00 - 4.80 K/uL    Monos (Absolute) 0.92 (H) 0.00 - 0.85 K/uL    Eos (Absolute) 0.21 0.00 - 0.51 K/uL    Baso (Absolute) 0.04 0.00 - 0.12 K/uL    Immature Granulocytes (abs) 0.10 0.00 - 0.11 K/uL    NRBC (Absolute) 0.00 K/uL   Complete Metabolic Panel (CMP)   Result Value Ref Range    Sodium 135 135 - 145 mmol/L    Potassium 3.8 3.6 - 5.5 mmol/L    Chloride 101 96 - 112 mmol/L    Co2 20 20 - 33 mmol/L    Anion Gap 14.0 7.0 - 16.0    Glucose 100 (H) 65 - 99 mg/dL    Bun 9 8 - 22 mg/dL    Creatinine 0.77 0.50 - 1.40 mg/dL    Calcium 8.9 8.5 - 10.5 mg/dL    AST(SGOT) 16 12 - 45 U/L    ALT(SGPT) 15 2 - 50 U/L    Alkaline Phosphatase 65 30 - 99 U/L    Total Bilirubin <0.2 0.1 - 1.5 mg/dL    Albumin 4.5 3.2 - 4.9 g/dL    Total Protein 7.4 6.0 - 8.2 g/dL    Globulin 2.9 1.9 - 3.5 g/dL    A-G Ratio 1.6 g/dL   Troponin   Result Value Ref Range    Troponin T 15 6 - 19 ng/L   ESTIMATED GFR   Result Value Ref Range    GFR (CKD-EPI) 98 >60 mL/min/1.73 m 2   TROPONIN   Result Value Ref Range    Troponin T 14 6 - 19 ng/L   EKG   Result Value Ref Range    Report       [facility] Emergency Dept.    Test Date:  2022  Pt Name:    PRERNA SOUZA                Department: ER  MRN:        [id number]                      Room:       ORTHO  Gender:     Male                         Technician: [id number]  :        1955                   Requested By:ER TRIAGE PROTOCOL  Order #:    [id number]                    Reading MD: Jl Schneider MD    Measurements  Intervals                                Axis  Rate:       78                           P:          52  VA:         156                          QRS:        45  QRSD:       76                           T:          68  QT:         386  QTc:        440    Interpretive Statements  Sinus rhythm  No ST elevations or ST depressions, no T wave inversions except for isolated  in  aVL.  Flattened T wave in V2.  No obvious ischemic changes.  Normal axis  QTC within normal limits at 440  Electronically Signed On 3- 22:02:16 PDT by Jl Schneider MD         Radiology:  DX-CHEST-PORTABLE (1 VIEW)   Final Result         1.  No acute cardiopulmonary disease.           MDM:  CMP demonstrates no evidence of acute kidney injury, acute electrolyte abnormality, acute liver failure, CBC demonstrates no evidence of acute anemia or leukocytosis.  Troponin x2 are negative.  Patient is a heart score of 5 for age, history of CVA, moderately suspicious story, improvement with nitroglycerin.  Chest x-ray demonstrates no acute cardiopulmonary process.  Disposition to the hospital medicine service for chest pain rule out.  Dr. Canchola has graciously accepted the patient to his service.  Is a patient second admission in 1 month, I do believe the patient may require coronary catheterization.      Electronically signed by: Jl Schneider M.D., 3/28/2022, 11:38 PM      Disposition:  Hospital medicine    Final Impression:  1. Acute chest pain

## 2022-03-29 NOTE — PROGRESS NOTES
Report given to receiving RN, transported vi wheelchair by receiving RN with belongings and chart.

## 2022-03-29 NOTE — CONSULTS
Consults   Cardiology Initial Consultation    Date of Service  3/29/2022    Referring Physician  Jennifer George M.D.    Reason for Consultation  Recurrent chest pain    History of Presenting Illness  Quirino Ruiz is a 66 y.o. male admitted 3/28/2022 with recurrent chest pain.  Patient has had progression of substernal chest pain that lasts up to 15 seconds.  Does not radiate.  Typically happens at rest.  But he has been avoiding activity because the chest pain.  It is relieved with nitroglycerin.  It is not relieved with Tums.      PCP did set him up with GI.    Prior normal stress test June 2021.  Systolic blood pressure 160 on arrival.  High-sensitivity troponin did go from 14 to 24.  Patient has been seen in cardiology clinic, most recently seen by Dr. Kennedy on 3/17/2022.  Was previously treated for Covid related to shortness of breath.    Former smoker, quit about 4 months ago.  Using e-cigarettes.  Shortness of breath has been improving somewhat but he is avoiding activity.  No significant orthopnea or lower extremity swelling.   No significant palpitations, lightheadedness, or presyncope/syncope.   No symptoms of leg claudication.   No stroke/TIA like symptoms.    Liver transplant pt, also HTN.  Has been on aspirin, beta-blocker, sublingual nitro.    Review of Systems  Review of Systems    All systems reviewed and negative.    Past Medical History   has a past medical history of Arthritis, Back pain, Cancer (HCC) (2002;2013), Cholesterol blood decreased, Chronic pain, Cold (8/27/2014), Dental disorder, Gout, Heart burn, Hepatitis C (1993), Hypertension, Indigestion, Liver transplanted (HCC) (2002), Pain, Pain, Renal cancer (HCC), Renal disorder, Shortness of breath, and Stroke (HCC) (03/2018).    He has no past medical history of Cough, Painful breathing, Sputum production, or Wheezing.    Surgical History   has a past surgical history that includes other (1994); cervical disk and fusion anterior  (12/11/2012); recovery (4/23/2013); lumbar fusion anterior (5/29/2013); evacuation of hematoma (5/31/2013); other (2/28/2014); cath place perm epidural (9/9/2014); pump insert/remove (11/25/2014); exploratory laparotomy (5/31/2013); recovery (11/30/2015); other surgical procedure; nephrectomy radical; cholecystectomy; cervical decompression posterior; other; nephrectomy radical (Left); pump insert/remove; and pump revision (Left, 9/17/2021).    Family History  family history includes Hypertension in an other family member.      Social History   reports that he quit smoking about 8 months ago. His smoking use included cigarettes. He has a 50.00 pack-year smoking history. He has never used smokeless tobacco. He reports previous alcohol use of about 0.6 oz of alcohol per week. He reports that he does not use drugs.    Medications  Prior to Admission Medications   Prescriptions Last Dose Informant Patient Reported? Taking?   Pain Pump (PATIENT SUPPLIED) XX BRANDON 3/28/2022 at continuous Patient Yes No   Sig: Inject  as directed continuous. Patient's Pain Pump (placed and maintained as an outpatient)  Medications/concentrations: Hydromorphone 400.0 mcg/mL  Route: Intrathecally  Date of Placement: Unknown  Last changed: 03/16/2022 due to refill pump before 5/16/2022  Continuous infusion rates (Drug/Rate):  Hydromorphone 139.81 mcg / 24 hours (5.83 mcg / hr)  Patient activation dose: Hydromorphone 15.00 mcg  Maximum daily activation dose: Hydromorphone 228.06 mcg  Patient activation lockout interval: 1 hour  Maximum activations per day: 6  Dr Mendes [phone]   albuterol 108 (90 Base) MCG/ACT Aero Soln inhalation aerosol 3/27/2022 at 1600 Patient No No   Sig: Inhale 2 Puffs every four hours as needed for Shortness of Breath.   allopurinol (ZYLOPRIM) 100 MG Tab 3/27/2022 at 2200 Patient Yes No   Sig: Take 100 mg by mouth every bedtime.   ascorbic acid (VITAMIN C) 500 MG tablet 3/28/2022 at 0900 Patient No No   Sig: Take 1  "Tablet by mouth 2 times a day.   aspirin EC 81 MG EC tablet 3/28/2022 at 0900 Patient No No   Sig: Take 1 Tablet by mouth every day.   citalopram (CELEXA) 20 MG Tab 3/28/2022 at 0900 Patient Yes No   Sig: Take 20 mg by mouth every day.   lisinopril (PRINIVIL) 10 MG Tab 3/28/2022 at 0900 Patient Yes No   Sig: Take 10 mg by mouth every morning. Indications: High Blood Pressure Disorder   metoprolol SR (TOPROL XL) 25 MG TABLET SR 24 HR 3/27/2022 at 2200 Patient No No   Sig: Take 1 Tablet by mouth every day.   pantoprazole (PROTONIX) 40 MG Tablet Delayed Response 3/28/2022 at 0900 Patient Yes No   Sig: Take 40 mg by mouth every day.   rizatriptan (MAXALT-MLT) 10 MG disintegrating tablet 3/28/2022 at 1030 Patient Yes No   Sig: TAKE 1 TAKE BY MOUTH AT ONSET OF HEADACHE. MAY REPEAT   tacrolimus (PROGRAF) 1 MG Cap 3/28/2022 at 0900 Patient Yes No   Sig: Take 2 mg by mouth 2 Times a Day. Indications: Liver Transplant Recipients   tamsulosin (FLOMAX) 0.4 MG capsule 3/27/2022 at 2200 Patient Yes No   Sig: Take 0.4 mg by mouth at bedtime.   vitamin D3 (VITAMIND D3) 1000 UNIT Tab 3/28/2022 at 0900 Patient Yes No   Sig: Take 1 Tablet by mouth every day.   zinc sulfate (ZINCATE) 220 (50 Zn) MG Cap 3/28/2022 at 0900 Patient Yes No   Sig: Take 2,200 mg by mouth every day.      Facility-Administered Medications: None       Allergies  Allergies   Allergen Reactions   • Niacin      Passed out.    • Latex Rash     Reaction- Itching, Rash   • Pcn [Penicillins] Anaphylaxis and Swelling     Patient has tolerated cefazolin in past   • Morphine Sulfate      Per pt., \"it makes me cranky\"   • Mushroom Extract Complex        Vital signs in last 24 hours  Temp:  [36.9 °C (98.4 °F)-37.1 °C (98.7 °F)] 37.1 °C (98.7 °F)  Pulse:  [58-83] 71  Resp:  [14-20] 20  BP: (101-199)/(57-92) 102/64  SpO2:  [91 %-99 %] 93 %    Physical Exam  Physical Exam      General: No acute distress. Well nourished.  HEENT: EOM grossly intact, no scleral icterus, no " pharyngeal erythema.   Neck:  No JVD, no bruits, trachea midline  CVS: RRR. Normal S1, S2. No M/R/G. No LE edema.  2+ radial pulses, 2+ PT pulses  Resp: CTAB. No wheezing or crackles/rhonchi. Normal respiratory effort.  Abdomen: Soft, NT, no ana lilia hepatomegaly.  Surgical scar related to liver transplant.  MSK/Ext: No clubbing or cyanosis.  Skin: Warm and dry, no rashes.  Neurological: CN III-XII grossly intact. No focal deficits.   Psych: A&O x 3, appropriate affect, good judgement      Lab Review  Lab Results   Component Value Date/Time    WBC 10.1 03/28/2022 08:03 PM    RBC 4.29 (L) 03/28/2022 08:03 PM    HEMOGLOBIN 13.4 (L) 03/28/2022 08:03 PM    HEMATOCRIT 38.6 (L) 03/28/2022 08:03 PM    MCV 90.0 03/28/2022 08:03 PM    MCH 31.2 03/28/2022 08:03 PM    MCHC 34.7 03/28/2022 08:03 PM    MPV 8.5 (L) 03/28/2022 08:03 PM      Lab Results   Component Value Date/Time    SODIUM 135 03/28/2022 08:03 PM    POTASSIUM 3.8 03/28/2022 08:03 PM    CHLORIDE 101 03/28/2022 08:03 PM    CO2 20 03/28/2022 08:03 PM    GLUCOSE 100 (H) 03/28/2022 08:03 PM    BUN 9 03/28/2022 08:03 PM    CREATININE 0.77 03/28/2022 08:03 PM      Lab Results   Component Value Date/Time    ASTSGOT 16 03/28/2022 08:03 PM    ALTSGPT 15 03/28/2022 08:03 PM     Lab Results   Component Value Date/Time    CHOLSTRLTOT 145 03/29/2022 04:08 AM    LDL 88 03/29/2022 04:08 AM    HDL 31 (A) 03/29/2022 04:08 AM    TRIGLYCERIDE 132 03/29/2022 04:08 AM    TROPONINT 24 (H) 03/29/2022 04:08 AM       No results for input(s): NTPROBNP in the last 72 hours.    Cardiac Imaging and Procedures Review  EKG:  My personal interpretation of the EKG dated 3/29/2022 is sinus, 64, baseline artifact      Echo: Echocardiogram performed on 3/10/2022 was personally interpreted by me which shows hyperdynamic left ventricular systolic function with no valve abnormality and no wall motion abnormality.     Echo dated 7/5/2021:   CONCLUSIONS  Normal left ventricular chamber size.  Left  ventricular ejection fraction is visually estimated to be 60%.  Normal diastolic function.  Normal inferior vena cava size and inspiratory collapse.     Nuclear Perfusion Imaging (6/3/2021):    NUCLEAR IMAGING INTERPRETATION   Normal perfusion.   Normal left ventricular wall motion.     LV ejection fraction = 68%.      ECG INTERPRETATION   Nondiagnostic ECG portion of a Regadenoson nuclear stress test.        Radiology test Review:  CXR: No cardiopulmonary abnormality noted     CTA chest 3/9/2022: IMPRESSION:   1.  No evidence of pulmonary embolism.  2.  Mild emphysematous changes.  3.  Atherosclerosis.       Imaging  DX-CHEST-PORTABLE (1 VIEW)   Final Result         1.  No acute cardiopulmonary disease.            Assessment/Plan  -Chest pain, some typical and atypical features, relieved with nitroglycerin, concern for unstable angina  -Prior Covid infection  -Liver transplant recipient  -Hypertension  -Former smoker, quit 4 months ago  -Elevated troponin    Plan:  -Given his abnormal troponin and increasing chest pain responsive to nitroglycerin, he will undergo left heart catheterization this admission.  I am concerned for unstable angina but no significant evidence of myocardial necrosis and he is chest pain-free so I will defer heparin drip.  -Continue aspirin  -Continue blood pressure control  -He is a candidate for dual antiplatelet therapy  -Continue beta-blocker and I am going to add a little bit of topical nitroglycerin  -We will add low-dose statin tomorrow after we have identified his coronary anatomy.    We discussed the risks of left heart catheterization including MI, emergent open heart surgery, stroke, kidney dysfunction and death on the table.  Risk and benefit will further be discussed by the interventionalist performing a left heart catheterization.      Discussed with Dr. Jennifer George    Cardiology will continue to follow along.    Thank you for allowing me to participate in the care of this  patient.    Please contact me with any questions.    Lisbeth Wallace M.D.   Cardiologist, [facility]  [phone]

## 2022-03-29 NOTE — ASSESSMENT & PLAN NOTE
Progressive chest pain, atypical and typical features, relieved by nitro   - trop trending upwards   - EKG without acute changes   - aspirin, statin and BB  - risk factors given age/HTN and smoking   - recent echo normal   - cardiology consulted given persistent symptoms, plan for LHC for definitive evaluation - pending results and further recommendations from cardiology  - nitro ordered   - no heparin ggt per cards   - supportive care with pain control

## 2022-03-29 NOTE — PROGRESS NOTES
Hospital Medicine Daily Progress Note    Date of Service  3/29/2022    Chief Complaint  Quirino Ruiz is a 66 y.o. male admitted 3/28/2022 with chest pain     Hospital Course  66-year-old male with past medical history of liver carcinoma status post liver transplant on immunosuppressive therapy, hypertension and former smoker who presented to the hospital on 3/28/2022 with progressive substernal chest pressure associated with dyspnea has been ongoing for several months.  Patient has been taking sublingual nitro with resolution of his pain, he was recently seen by cardiology and here in the ER, CTPE scan was negative. He underwent echo which was normal and had a normal stress test in 2021 thus no further intervention was done. He was sent to pulm for further evaluation, he states he underwent PFTs which were normal. He has continued to have chest pressure thus he presented here for further evaluation.   Today on presentation he is hypertensive but otherwise vitals stable, troponin with mild elevation from 14 to 24, EKG without acute changes.       Interval Problem Update  Patient seen and examined, continue to have intermittent chest pressure with associated dyspnea  - vitals stable, BP improved from admission   - trop trending up, max 24   - given risk factors, persistent pain, improvement with nitro and lack of improvement with PPI therapy, and pulm evaluation do feel that further evaluation is necessary to rule out CAD. Cardiology consulted, plan for C during this admission.   - topical nitro added by cards  - NPO at midnight, cath tomorrow     I have personally seen and examined the patient at bedside. I discussed the plan of care with patient and cardiology.    Consultants/Specialty  cardiology    Code Status  Full Code    Disposition  Patient is not medically cleared for discharge.   Anticipate discharge to to home with close outpatient follow-up.  I have placed the appropriate orders for  post-discharge needs.    Review of Systems  Review of Systems   Constitutional: Positive for malaise/fatigue. Negative for chills and fever.   HENT: Negative for congestion and sore throat.    Eyes: Negative for blurred vision and double vision.   Respiratory: Positive for shortness of breath (associated with CP). Negative for cough and sputum production.    Cardiovascular: Positive for chest pain and palpitations. Negative for orthopnea and leg swelling.   Gastrointestinal: Negative for abdominal pain, nausea and vomiting.   Genitourinary: Negative for dysuria, frequency and urgency.   Musculoskeletal: Negative for myalgias.   Neurological: Negative for dizziness and headaches.   Psychiatric/Behavioral: Negative for depression. The patient is nervous/anxious.         Physical Exam  Temp:  [36.9 °C (98.4 °F)-37.1 °C (98.7 °F)] 37.1 °C (98.7 °F)  Pulse:  [58-83] 63  Resp:  [14-20] 18  BP: (101-199)/(57-92) 129/58  SpO2:  [91 %-99 %] 93 %    Physical Exam  Vitals and nursing note reviewed.   Constitutional:       General: He is not in acute distress.     Appearance: Normal appearance. He is normal weight. He is not ill-appearing.   HENT:      Head: Normocephalic and atraumatic.      Mouth/Throat:      Mouth: Mucous membranes are moist.      Pharynx: Oropharynx is clear.   Eyes:      Extraocular Movements: Extraocular movements intact.      Conjunctiva/sclera: Conjunctivae normal.   Cardiovascular:      Rate and Rhythm: Normal rate and regular rhythm.      Pulses: Normal pulses.      Heart sounds: No murmur heard.  Pulmonary:      Effort: Pulmonary effort is normal. No respiratory distress.      Breath sounds: Normal breath sounds.   Abdominal:      General: Bowel sounds are normal. There is no distension.      Palpations: Abdomen is soft.   Musculoskeletal:         General: Normal range of motion.      Cervical back: Normal range of motion and neck supple.      Right lower leg: No edema.      Left lower leg: No edema.    Skin:     General: Skin is warm.   Neurological:      General: No focal deficit present.      Mental Status: He is alert and oriented to person, place, and time.   Psychiatric:         Mood and Affect: Mood normal.         Behavior: Behavior normal.         Thought Content: Thought content normal.         Fluids    Intake/Output Summary (Last 24 hours) at 3/29/2022 1445  Last data filed at 3/29/2022 1200  Gross per 24 hour   Intake 120 ml   Output 600 ml   Net -480 ml       Laboratory  Recent Labs     03/28/22 2003   WBC 10.1   RBC 4.29*   HEMOGLOBIN 13.4*   HEMATOCRIT 38.6*   MCV 90.0   MCH 31.2   MCHC 34.7   RDW 44.8   PLATELETCT 291   MPV 8.5*     Recent Labs     03/28/22 2003   SODIUM 135   POTASSIUM 3.8   CHLORIDE 101   CO2 20   GLUCOSE 100*   BUN 9   CREATININE 0.77   CALCIUM 8.9             Recent Labs     03/29/22  0408   TRIGLYCERIDE 132   HDL 31*   LDL 88       Imaging  DX-CHEST-PORTABLE (1 VIEW)   Final Result         1.  No acute cardiopulmonary disease.           Assessment/Plan  * Chest pain- (present on admission)  Assessment & Plan  Progressive chest pain, atypical and typical features, relieved by nitro   - trop trending upwards   - EKG without acute changes   - aspirin, statin and BB  - risk factors given age/HTN and smoking   - recent echo normal   - cardiology consulted given persistent symptoms, plan for [facility] for definitive evaluation   - nitro ordered   - no heparin ggt per cards   - supportive care with pain control     Insomnia  Assessment & Plan  Melatonin prn     Anxiety  Assessment & Plan  Anxiety associated with current symptoms  TSH normal  Continue celexa       COVID-19  Assessment & Plan  Recent COVID positive test on 3/10, no respiratory symptoms   Out 19 days, no further isolation per CDC guidelines       VTE prophylaxis: enoxaparin ppx    I have performed a physical exam and reviewed and updated ROS and Plan today (3/29/2022). In review of yesterday's note (3/28/2022), there are  no changes except as documented above.

## 2022-03-30 ENCOUNTER — APPOINTMENT (OUTPATIENT)
Dept: CARDIOLOGY | Facility: MEDICAL CENTER | Age: 67
End: 2022-03-30
Attending: PHYSICIAN ASSISTANT
Payer: MEDICARE

## 2022-03-30 LAB
ANION GAP SERPL CALC-SCNC: 15 MMOL/L (ref 7–16)
BUN SERPL-MCNC: 13 MG/DL (ref 8–22)
CALCIUM SERPL-MCNC: 9.1 MG/DL (ref 8.5–10.5)
CHLORIDE SERPL-SCNC: 96 MMOL/L (ref 96–112)
CO2 SERPL-SCNC: 21 MMOL/L (ref 20–33)
CREAT SERPL-MCNC: 0.67 MG/DL (ref 0.5–1.4)
GFR SERPLBLD CREATININE-BSD FMLA CKD-EPI: 102 ML/MIN/1.73 M 2
GLUCOSE SERPL-MCNC: 103 MG/DL (ref 65–99)
INR PPP: 1.05 (ref 0.87–1.13)
POTASSIUM SERPL-SCNC: 3.7 MMOL/L (ref 3.6–5.5)
PROTHROMBIN TIME: 13.4 SEC (ref 12–14.6)
SODIUM SERPL-SCNC: 132 MMOL/L (ref 135–145)

## 2022-03-30 PROCEDURE — 700102 HCHG RX REV CODE 250 W/ 637 OVERRIDE(OP): Performed by: INTERNAL MEDICINE

## 2022-03-30 PROCEDURE — 700111 HCHG RX REV CODE 636 W/ 250 OVERRIDE (IP)

## 2022-03-30 PROCEDURE — 96372 THER/PROPH/DIAG INJ SC/IM: CPT | Mod: XU

## 2022-03-30 PROCEDURE — 99152 MOD SED SAME PHYS/QHP 5/>YRS: CPT

## 2022-03-30 PROCEDURE — A9270 NON-COVERED ITEM OR SERVICE: HCPCS | Performed by: STUDENT IN AN ORGANIZED HEALTH CARE EDUCATION/TRAINING PROGRAM

## 2022-03-30 PROCEDURE — 99215 OFFICE O/P EST HI 40 MIN: CPT | Performed by: INTERNAL MEDICINE

## 2022-03-30 PROCEDURE — 85610 PROTHROMBIN TIME: CPT

## 2022-03-30 PROCEDURE — A9270 NON-COVERED ITEM OR SERVICE: HCPCS | Performed by: INTERNAL MEDICINE

## 2022-03-30 PROCEDURE — 700101 HCHG RX REV CODE 250

## 2022-03-30 PROCEDURE — 700117 HCHG RX CONTRAST REV CODE 255: Performed by: INTERNAL MEDICINE

## 2022-03-30 PROCEDURE — 99152 MOD SED SAME PHYS/QHP 5/>YRS: CPT | Performed by: INTERNAL MEDICINE

## 2022-03-30 PROCEDURE — 700111 HCHG RX REV CODE 636 W/ 250 OVERRIDE (IP): Performed by: INTERNAL MEDICINE

## 2022-03-30 PROCEDURE — 93458 L HRT ARTERY/VENTRICLE ANGIO: CPT | Mod: 26 | Performed by: INTERNAL MEDICINE

## 2022-03-30 PROCEDURE — 80048 BASIC METABOLIC PNL TOTAL CA: CPT

## 2022-03-30 PROCEDURE — G0378 HOSPITAL OBSERVATION PER HR: HCPCS

## 2022-03-30 PROCEDURE — 99225 PR SUBSEQUENT OBSERVATION CARE,LEVEL II: CPT | Mod: CS | Performed by: NURSE PRACTITIONER

## 2022-03-30 PROCEDURE — 700102 HCHG RX REV CODE 250 W/ 637 OVERRIDE(OP): Performed by: STUDENT IN AN ORGANIZED HEALTH CARE EDUCATION/TRAINING PROGRAM

## 2022-03-30 RX ORDER — RIZATRIPTAN BENZOATE 10 MG/1
10 TABLET, ORALLY DISINTEGRATING ORAL
Status: DISCONTINUED | OUTPATIENT
Start: 2022-03-30 | End: 2022-03-30

## 2022-03-30 RX ORDER — PRAVASTATIN SODIUM 20 MG
10 TABLET ORAL EVERY EVENING
Status: DISCONTINUED | OUTPATIENT
Start: 2022-03-30 | End: 2022-03-31 | Stop reason: HOSPADM

## 2022-03-30 RX ORDER — VERAPAMIL HYDROCHLORIDE 2.5 MG/ML
INJECTION, SOLUTION INTRAVENOUS
Status: COMPLETED
Start: 2022-03-30 | End: 2022-03-30

## 2022-03-30 RX ORDER — HEPARIN SODIUM 200 [USP'U]/100ML
INJECTION, SOLUTION INTRAVENOUS
Status: COMPLETED
Start: 2022-03-30 | End: 2022-03-30

## 2022-03-30 RX ORDER — TRAZODONE HYDROCHLORIDE 50 MG/1
50 TABLET ORAL NIGHTLY PRN
Status: DISCONTINUED | OUTPATIENT
Start: 2022-03-30 | End: 2022-03-30

## 2022-03-30 RX ORDER — LIDOCAINE HYDROCHLORIDE 20 MG/ML
INJECTION, SOLUTION EPIDURAL; INFILTRATION; INTRACAUDAL; PERINEURAL
Status: COMPLETED
Start: 2022-03-30 | End: 2022-03-30

## 2022-03-30 RX ORDER — SUMATRIPTAN 50 MG/1
100 TABLET, FILM COATED ORAL
Status: DISCONTINUED | OUTPATIENT
Start: 2022-03-30 | End: 2022-03-31 | Stop reason: HOSPADM

## 2022-03-30 RX ORDER — MIDAZOLAM HYDROCHLORIDE 1 MG/ML
INJECTION INTRAMUSCULAR; INTRAVENOUS
Status: COMPLETED
Start: 2022-03-30 | End: 2022-03-30

## 2022-03-30 RX ORDER — METOPROLOL SUCCINATE 25 MG/1
25 TABLET, EXTENDED RELEASE ORAL DAILY
Qty: 100 TABLET | Refills: 2 | Status: SHIPPED | OUTPATIENT
Start: 2022-03-30 | End: 2023-01-05

## 2022-03-30 RX ORDER — QUETIAPINE FUMARATE 25 MG/1
25 TABLET, FILM COATED ORAL NIGHTLY PRN
Status: DISCONTINUED | OUTPATIENT
Start: 2022-03-30 | End: 2022-03-31 | Stop reason: HOSPADM

## 2022-03-30 RX ORDER — HEPARIN SODIUM 1000 [USP'U]/ML
INJECTION, SOLUTION INTRAVENOUS; SUBCUTANEOUS
Status: COMPLETED
Start: 2022-03-30 | End: 2022-03-30

## 2022-03-30 RX ADMIN — FENTANYL CITRATE 75 MCG: 50 INJECTION, SOLUTION INTRAMUSCULAR; INTRAVENOUS at 13:56

## 2022-03-30 RX ADMIN — NITROGLYCERIN 0.25 INCH: 20 OINTMENT TOPICAL at 18:09

## 2022-03-30 RX ADMIN — HEPARIN SODIUM 2000 UNITS: 200 INJECTION, SOLUTION INTRAVENOUS at 13:53

## 2022-03-30 RX ADMIN — NITROGLYCERIN: 20 INJECTION INTRAVENOUS at 13:30

## 2022-03-30 RX ADMIN — QUETIAPINE FUMARATE 25 MG: 25 TABLET ORAL at 20:36

## 2022-03-30 RX ADMIN — SENNOSIDES AND DOCUSATE SODIUM 2 TABLET: 50; 8.6 TABLET ORAL at 18:09

## 2022-03-30 RX ADMIN — METOPROLOL SUCCINATE 25 MG: 25 TABLET, EXTENDED RELEASE ORAL at 05:09

## 2022-03-30 RX ADMIN — VERAPAMIL HYDROCHLORIDE 2.5 MG: 2.5 INJECTION, SOLUTION INTRAVENOUS at 13:54

## 2022-03-30 RX ADMIN — MIDAZOLAM HYDROCHLORIDE 1.5 MG: 1 INJECTION, SOLUTION INTRAMUSCULAR; INTRAVENOUS at 13:56

## 2022-03-30 RX ADMIN — LISINOPRIL 10 MG: 10 TABLET ORAL at 05:09

## 2022-03-30 RX ADMIN — IOHEXOL 30 ML: 350 INJECTION, SOLUTION INTRAVENOUS at 13:54

## 2022-03-30 RX ADMIN — NITROGLYCERIN 0.25 INCH: 20 OINTMENT TOPICAL at 05:55

## 2022-03-30 RX ADMIN — OXYCODONE 5 MG: 5 TABLET ORAL at 16:28

## 2022-03-30 RX ADMIN — OXYCODONE 5 MG: 5 TABLET ORAL at 20:37

## 2022-03-30 RX ADMIN — CITALOPRAM HYDROBROMIDE 20 MG: 20 TABLET ORAL at 05:10

## 2022-03-30 RX ADMIN — ENOXAPARIN SODIUM 40 MG: 40 INJECTION SUBCUTANEOUS at 05:10

## 2022-03-30 RX ADMIN — LIDOCAINE HYDROCHLORIDE 20 ML: 20 INJECTION, SOLUTION EPIDURAL; INFILTRATION; INTRACAUDAL; PERINEURAL at 13:53

## 2022-03-30 RX ADMIN — OXYCODONE 5 MG: 5 TABLET ORAL at 08:16

## 2022-03-30 RX ADMIN — HEPARIN SODIUM: 1000 INJECTION, SOLUTION INTRAVENOUS; SUBCUTANEOUS at 13:53

## 2022-03-30 RX ADMIN — Medication 5 MG: at 20:36

## 2022-03-30 RX ADMIN — OXYCODONE 5 MG: 5 TABLET ORAL at 04:11

## 2022-03-30 RX ADMIN — ASPIRIN 81 MG: 81 TABLET, COATED ORAL at 05:10

## 2022-03-30 RX ADMIN — TACROLIMUS 2 MG: 1 CAPSULE ORAL at 18:09

## 2022-03-30 RX ADMIN — OXYCODONE 5 MG: 5 TABLET ORAL at 11:41

## 2022-03-30 RX ADMIN — TACROLIMUS 2 MG: 1 CAPSULE ORAL at 05:10

## 2022-03-30 RX ADMIN — TAMSULOSIN HYDROCHLORIDE 0.4 MG: 0.4 CAPSULE ORAL at 20:36

## 2022-03-30 RX ADMIN — OMEPRAZOLE 20 MG: 20 CAPSULE, DELAYED RELEASE ORAL at 05:09

## 2022-03-30 RX ADMIN — PRAVASTATIN SODIUM 10 MG: 20 TABLET ORAL at 18:09

## 2022-03-30 RX ADMIN — NITROGLYCERIN 0.25 INCH: 20 OINTMENT TOPICAL at 12:47

## 2022-03-30 ASSESSMENT — ENCOUNTER SYMPTOMS
GASTROINTESTINAL NEGATIVE: 1
RESPIRATORY NEGATIVE: 1
FEVER: 0
CHILLS: 0
PSYCHIATRIC NEGATIVE: 1
NEUROLOGICAL NEGATIVE: 1
MUSCULOSKELETAL NEGATIVE: 1
EYES NEGATIVE: 1

## 2022-03-30 ASSESSMENT — PATIENT HEALTH QUESTIONNAIRE - PHQ9
SUM OF ALL RESPONSES TO PHQ9 QUESTIONS 1 AND 2: 0
2. FEELING DOWN, DEPRESSED, IRRITABLE, OR HOPELESS: NOT AT ALL
1. LITTLE INTEREST OR PLEASURE IN DOING THINGS: NOT AT ALL

## 2022-03-30 ASSESSMENT — PAIN DESCRIPTION - PAIN TYPE: TYPE: ACUTE PAIN

## 2022-03-30 NOTE — PROGRESS NOTES
Assessment completed. Pt A&Ox4. Respirations are even and unlabored on RA. Pt reports 6/10 headache this time, medication per MAR. Monitors applied, VS stable, call light and belongings within reach. POC updated (angiogram: 1600). Pt educated on room and call light, pt verbalized understanding. Communication board updated. Needs met. NPO for procedure.

## 2022-03-30 NOTE — PROGRESS NOTES
Hospital Medicine Daily Progress Note    Date of Service  3/30/2022    Chief Complaint  Quirino Ruiz is a 66 y.o. male admitted 3/28/2022 with chest pain    Hospital Course  Mr. Quirino Ruiz 66-ye PMHx of liver carcinoma status post liver transplant on immunosuppressive therapy, hypertension and former smoker who presented to the hospital on 3/28/2022 with progressive substernal chest pressure associated with dyspnea has been ongoing for several months.      Patient has been taking sublingual nitro with resolution of his pain, he was recently seen by cardiology and here in the ER, CTPE scan was negative. He underwent echo which was normal and had a normal stress test in 2021 thus no further intervention was done. He was sent to pul for further evaluation, he states he underwent PFTs which were normal. He has continued to have chest pressure thus he presented here for further evaluation.     On presentation in ER he was hypertensive but otherwise vitals stable, troponin with mild elevation from 14 to 24, EKG without acute changes.  Patient admitted to the observation unit for further evaluation and treatment.    As patient has had recent work-up for chest pain, cardiology was consulted of which a left heart catheterization was recommended for this patient.  Pending results from catheterization.      Interval Problem Update  -Patient seen and examined.  Patient still reports some mild chest discomfort.  Discussed with patient plan of care with expected left heart catheterization at 1600 today.  Patient n.p.o. for procedure.  -Plan of care: Planned LHC at 1600 today; okay to resume diet after procedure; continue to monitor patient for chest pain, obtain EKG if chest pain increases.  -Disposition: Pending LHC; will need further recommendations from cardiology post LHC; will need clearance from cardiology prior to discharge  -Lab work: Reviewed; expected  -VSS at this time    I have personally seen and examined  the patient at bedside. I discussed the plan of care with patient and bedside RN.    Consultants/Specialty  cardiology    Code Status  Full Code    Disposition  Patient is not medically cleared for discharge.   Anticipate discharge to to home with close outpatient follow-up.  I have placed the appropriate orders for post-discharge needs.    Review of Systems  Review of Systems   Constitutional: Positive for malaise/fatigue. Negative for chills and fever.   Eyes: Negative.    Respiratory: Negative.    Cardiovascular: Positive for chest pain.   Gastrointestinal: Negative.    Genitourinary: Negative.    Musculoskeletal: Negative.    Skin: Negative.    Neurological: Negative.    Endo/Heme/Allergies: Negative.    Psychiatric/Behavioral: Negative.         Physical Exam  Temp:  [36.1 °C (97 °F)-36.3 °C (97.3 °F)] 36.3 °C (97.3 °F)  Pulse:  [55-75] 55  Resp:  [16-18] 16  BP: (104-145)/(58-68) 115/63  SpO2:  [91 %-93 %] 93 %    Physical Exam  Vitals and nursing note reviewed.   HENT:      Head: Normocephalic.      Nose: Nose normal.      Mouth/Throat:      Mouth: Mucous membranes are moist.      Pharynx: Oropharynx is clear.   Eyes:      Pupils: Pupils are equal, round, and reactive to light.   Cardiovascular:      Rate and Rhythm: Normal rate and regular rhythm.      Pulses: Normal pulses.      Heart sounds: Normal heart sounds.   Pulmonary:      Effort: Pulmonary effort is normal.      Breath sounds: Normal breath sounds.   Abdominal:      General: Bowel sounds are normal.      Palpations: Abdomen is soft.   Musculoskeletal:         General: Tenderness present.      Cervical back: Normal range of motion and neck supple.   Skin:     General: Skin is dry.      Capillary Refill: Capillary refill takes 2 to 3 seconds.   Neurological:      Mental Status: He is alert. Mental status is at baseline.         Fluids  No intake or output data in the 24 hours ending 03/30/22 1209    Laboratory  Recent Labs     03/28/22 2003   WBC  10.1   RBC 4.29*   HEMOGLOBIN 13.4*   HEMATOCRIT 38.6*   MCV 90.0   MCH 31.2   MCHC 34.7   RDW 44.8   PLATELETCT 291   MPV 8.5*     Recent Labs     03/28/22 2003 03/30/22  0430   SODIUM 135 132*   POTASSIUM 3.8 3.7   CHLORIDE 101 96   CO2 20 21   GLUCOSE 100* 103*   BUN 9 13   CREATININE 0.77 0.67   CALCIUM 8.9 9.1     Recent Labs     03/30/22  0430   INR 1.05         Recent Labs     03/29/22  0408   TRIGLYCERIDE 132   HDL 31*   LDL 88       Imaging  DX-CHEST-PORTABLE (1 VIEW)   Final Result         1.  No acute cardiopulmonary disease.      CL-LEFT HEART CATHETERIZATION WITH POSSIBLE INTERVENTION    (Results Pending)        Assessment/Plan  * Chest pain- (present on admission)  Assessment & Plan  Progressive chest pain, atypical and typical features, relieved by nitro   - trop trending upwards   - EKG without acute changes   - aspirin, statin and BB  - risk factors given age/HTN and smoking   - recent echo normal   - cardiology consulted given persistent symptoms, plan for [facility] for definitive evaluation - pending results and further recommendations from cardiology  - nitro ordered   - no heparin ggt per cards   - supportive care with pain control     Insomnia  Assessment & Plan  Melatonin prn     Anxiety  Assessment & Plan  Anxiety associated with current symptoms  TSH normal  Continue celexa       COVID-19  Assessment & Plan  Recent COVID positive test on 3/10, no respiratory symptoms   Out 19 days, no further isolation per CDC guidelines       VTE prophylaxis: enoxaparin ppx    I have performed a physical exam and reviewed and updated ROS and Plan today (3/30/2022). In review of yesterday's note (3/29/2022), there are no changes except as documented above.    ============================================================================================================  Please note that this dictation was created using voice recognition software. I have made every reasonable attempt to correct obvious errors, but  there may be errors of grammar and possibly content that I did not discover before finalizing the note.    Electronically signed by:  JENNY Pelaez, MSN, APRN, FNP-C  Hospitalist Services  [facility]  [phone]  [email or web address]  03/30/22                  [id number]

## 2022-03-30 NOTE — CARE PLAN
The patient is Watcher - Medium risk of patient condition declining or worsening    Shift Goals  Clinical Goals: cardiac cath, tele monitoring  Patient Goals: cardiac cath, tele monitoring  Family Goals: n/a    Progress made toward(s) clinical / shift goals:  yes    Patient is not progressing towards the following goals: none      Problem: Psychosocial  Goal: Patient's level of anxiety will decrease  Outcome: Progressing  Goal: Patient's ability to verbalize feelings about condition will improve  Outcome: Progressing  Goal: Patient's ability to re-evaluate and adapt role responsibilities will improve  Outcome: Progressing  Goal: Patient and family will demonstrate ability to cope with life altering diagnosis and/or procedure  Outcome: Progressing  Goal: Spiritual and cultural needs incorporated into hospitalization  Outcome: Progressing     Problem: Communication  Goal: The ability to communicate needs accurately and effectively will improve  Outcome: Progressing     Problem: Discharge Barriers/Planning  Goal: Patient's continuum of care needs are met  Outcome: Progressing     Problem: Hemodynamics  Goal: Patient's hemodynamics, fluid balance and neurologic status will be stable or improve  Outcome: Progressing     Problem: Respiratory  Goal: Patient will achieve/maintain optimum respiratory ventilation and gas exchange  Outcome: Progressing     Problem: Chest Tube Management  Goal: Complications related to chest tube will be avoided or minimized  Outcome: Progressing     Problem: Fluid Volume  Goal: Fluid volume balance will be maintained  Outcome: Progressing     Problem: Mechanical Ventilation  Goal: Safe management of artificial airway and ventilation  Outcome: Progressing  Goal: Successful weaning off mechanical ventilator, spontaneously maintains adequate gas exchange  Outcome: Progressing  Goal: Patient will be able to express needs and understand communication  Outcome: Progressing     Problem:  Dysphagia  Goal: Dysphagia will improve  Outcome: Progressing     Problem: Risk for Aspiration  Goal: Patient's risk for aspiration will be absent or decrease  Outcome: Progressing     Problem: Nutrition  Goal: Patient's nutritional and fluid intake will be adequate or improve  Outcome: Progressing  Goal: Enteral nutrition will be maintained or improve  Outcome: Progressing  Goal: Enteral nutrition will be maintained or improve  Outcome: Progressing     Problem: Urinary Elimination  Goal: Establish and maintain regular urinary output  Outcome: Progressing     Problem: Bowel Elimination  Goal: Establish and maintain regular bowel function  Outcome: Progressing     Problem: Gastrointestinal Irritability  Goal: Nausea and vomiting will be absent or improve  Outcome: Progressing  Goal: Diarrhea will be absent or improved  Outcome: Progressing     Problem: Rectal Tube  Goal: Fecal output will be contained and skin will remain free from irritation  Outcome: Progressing     Problem: Mobility  Goal: Patient's capacity to carry out activities will improve  Outcome: Progressing     Problem: Self Care  Goal: Patient will have the ability to perform ADLs independently or with assistance (bathe, groom, dress, toilet and feed)  Outcome: Progressing     Problem: Infection - Standard  Goal: Patient will remain free from infection  Outcome: Progressing     Problem: Wound/ / Incision Healing  Goal: Patient's wound/surgical incision will decrease in size and heals properly  Outcome: Progressing     Problem: Pain - Standard  Goal: Alleviation of pain or a reduction in pain to the patient’s comfort goal  Outcome: Progressing     Problem: Knowledge Deficit - Standard  Goal: Patient and family/care givers will demonstrate understanding of plan of care, disease process/condition, diagnostic tests and medications  Outcome: Progressing

## 2022-03-30 NOTE — DISCHARGE PLANNING
TCN following. HTH/SCP chart review completed. [facility] planned for 1600 today. Discharge pending results and cardiology clearance. Anticipating discharge to home. Will monitor for updates or change in status.     Previously completed:  Choice forms obtained: NONE  [facility] Introduced ( Y) Referral ( sent) * possible [facility] services

## 2022-03-30 NOTE — PROCEDURES
Cardiac Catheterization Laboratory Procedure Note    DATE: 3/30/2022    : Blane Burger MD    PROCEDURES PERFORMED:  1. Left heart catheterization  2. Coronary angiography  3. Moderate conscious sedation    INDICATIONS:  The patient is a 66-year-old gentleman referred for cardiac catheterization to evaluate chest pain with a minimally elevated troponin.    CONSENT:  The complete alternatives, risks, and benefits of the procedure were explained to the patient.  Signed informed consent was obtained and placed in the chart prior to the procedure.  A timeout was performed prior to beginning the procedure.    MEDICATIONS:  1. Lidocaine  2. Fentanyl  3. Midazolam  4. Nitroglycerin  5. Verapamil  6. Heparin    MODERATE CONSCIOUS SEDATION:  I personally supervised the administration of moderate conscious sedation by the nursing staff for 12 minutes.  Sedation start time 2:43 PM  Sedation end time 2:55 PM    CONTRAST: Omnipaque 30 cc    ACCESS: 6-Ukrainian Glidesheath in the right radial artery.    ESTIMATED BLOOD LOSS: 10 cc    COMPLICATIONS: None    DESCRIPTION OF PROCEDURE:  The patient was brought to the cardiac catheterization laboratory in the fasting state.  The skin over the right wrist was prepped and draped in the usual sterile fashion.  Lidocaine infiltration was used to anesthetize the tissue over the right radial artery.  Using the micropuncture technique, a 6-[place] Glidesheath was inserted in the right radial artery.  A 5-Ukrainian Michael catheter was then advanced over a standard J-wire into the aortic root.  This catheter was then used to engage the ostium of the left main coronary artery and cineangiograms were obtained in multiple projections for complete evaluation of the left coronary system.  This catheter was then used to engage the ostium of the right coronary artery and cineangiograms were obtained in multiple projections for complete evaluation of the right coronary system.  Following completion  of coronary angiography, the catheter was advanced into the left ventricular cavity where it was gently aspirated, flushed, and then withdrawn across the aortic valve with sequential pressures measured.  At the completion of the case, all wires, catheters, and sheaths were removed.  A TR band was placed using the patent hemostasis technique.    HEMODYNAMICS:   1. Aortic pressure: 110/51 mmHg  2. Pre A-wave pressure: 9 mmHg  3. Peak-peak aortic gradient: 20 mmHg    CORONARY ANGIOGRAPHY:  1. The left main coronary artery has luminal disease and bifurcates into the left anterior descending and left circumflex coronary arteries.  2. The left anterior descending coronary artery is a moderate, transapical vessel that supplies a large first diagonal branch and has diffuse luminal irregularities in the distribution.  3. The left circumflex coronary artery is a large, nondominant vessel that supplies a small first obtuse marginal branch, a moderate second obtuse marginal branch, and a large third obtuse marginal branch and has diffuse luminal irregularities in the distribution.  4. The right coronary artery is a moderate, dominant vessel that supplies a small posterior descending coronary artery and a small posterolateral branch and has luminal irregularities in the distribution.    IMPRESSION:  1. Mild nonobstructive coronary artery disease  2. Normal left heart filling pressures    RECOMMENDATIONS:  1. Return to floor with continued care per primary service  2. TR band released per protocol  3. Continue evaluation for nonatherosclerotic etiologies of chest pain

## 2022-03-31 VITALS
SYSTOLIC BLOOD PRESSURE: 131 MMHG | DIASTOLIC BLOOD PRESSURE: 70 MMHG | HEART RATE: 64 BPM | WEIGHT: 163.8 LBS | RESPIRATION RATE: 18 BRPM | HEIGHT: 67 IN | TEMPERATURE: 97.6 F | BODY MASS INDEX: 25.71 KG/M2 | OXYGEN SATURATION: 88 %

## 2022-03-31 PROBLEM — U07.1 COVID-19: Status: RESOLVED | Noted: 2022-03-10 | Resolved: 2022-03-31

## 2022-03-31 PROBLEM — R07.9 CHEST PAIN: Status: RESOLVED | Noted: 2022-03-09 | Resolved: 2022-03-31

## 2022-03-31 LAB
ANION GAP SERPL CALC-SCNC: 14 MMOL/L (ref 7–16)
BUN SERPL-MCNC: 17 MG/DL (ref 8–22)
CALCIUM SERPL-MCNC: 8.7 MG/DL (ref 8.5–10.5)
CHLORIDE SERPL-SCNC: 97 MMOL/L (ref 96–112)
CO2 SERPL-SCNC: 23 MMOL/L (ref 20–33)
CREAT SERPL-MCNC: 0.77 MG/DL (ref 0.5–1.4)
ERYTHROCYTE [DISTWIDTH] IN BLOOD BY AUTOMATED COUNT: 45.4 FL (ref 35.9–50)
GFR SERPLBLD CREATININE-BSD FMLA CKD-EPI: 98 ML/MIN/1.73 M 2
GLUCOSE SERPL-MCNC: 96 MG/DL (ref 65–99)
HCT VFR BLD AUTO: 38.1 % (ref 42–52)
HGB BLD-MCNC: 12.9 G/DL (ref 14–18)
MCH RBC QN AUTO: 30.7 PG (ref 27–33)
MCHC RBC AUTO-ENTMCNC: 33.9 G/DL (ref 33.7–35.3)
MCV RBC AUTO: 90.7 FL (ref 81.4–97.8)
PLATELET # BLD AUTO: 278 K/UL (ref 164–446)
PMV BLD AUTO: 9.1 FL (ref 9–12.9)
POTASSIUM SERPL-SCNC: 3.9 MMOL/L (ref 3.6–5.5)
RBC # BLD AUTO: 4.2 M/UL (ref 4.7–6.1)
SODIUM SERPL-SCNC: 134 MMOL/L (ref 135–145)
WBC # BLD AUTO: 8.8 K/UL (ref 4.8–10.8)

## 2022-03-31 PROCEDURE — 80048 BASIC METABOLIC PNL TOTAL CA: CPT

## 2022-03-31 PROCEDURE — G0378 HOSPITAL OBSERVATION PER HR: HCPCS

## 2022-03-31 PROCEDURE — 96372 THER/PROPH/DIAG INJ SC/IM: CPT

## 2022-03-31 PROCEDURE — 85027 COMPLETE CBC AUTOMATED: CPT

## 2022-03-31 PROCEDURE — A9270 NON-COVERED ITEM OR SERVICE: HCPCS | Performed by: INTERNAL MEDICINE

## 2022-03-31 PROCEDURE — 700111 HCHG RX REV CODE 636 W/ 250 OVERRIDE (IP): Performed by: INTERNAL MEDICINE

## 2022-03-31 PROCEDURE — 99217 PR OBSERVATION CARE DISCHARGE: CPT | Mod: FS,CS | Performed by: INTERNAL MEDICINE

## 2022-03-31 PROCEDURE — 700102 HCHG RX REV CODE 250 W/ 637 OVERRIDE(OP): Performed by: INTERNAL MEDICINE

## 2022-03-31 RX ORDER — CHOLECALCIFEROL (VITAMIN D3) 125 MCG
5 CAPSULE ORAL NIGHTLY
Qty: 30 TABLET | Refills: 0 | Status: SHIPPED | OUTPATIENT
Start: 2022-03-31 | End: 2022-04-30

## 2022-03-31 RX ORDER — OMEPRAZOLE 20 MG/1
20 CAPSULE, DELAYED RELEASE ORAL 2 TIMES DAILY
Qty: 60 CAPSULE | Refills: 1 | Status: SHIPPED | OUTPATIENT
Start: 2022-03-31 | End: 2022-04-30

## 2022-03-31 RX ADMIN — METOPROLOL SUCCINATE 25 MG: 25 TABLET, EXTENDED RELEASE ORAL at 06:02

## 2022-03-31 RX ADMIN — NITROGLYCERIN 0.25 INCH: 20 OINTMENT TOPICAL at 00:10

## 2022-03-31 RX ADMIN — TACROLIMUS 2 MG: 1 CAPSULE ORAL at 06:03

## 2022-03-31 RX ADMIN — NITROGLYCERIN 0.25 INCH: 20 OINTMENT TOPICAL at 06:02

## 2022-03-31 RX ADMIN — CITALOPRAM HYDROBROMIDE 20 MG: 20 TABLET ORAL at 06:03

## 2022-03-31 RX ADMIN — LISINOPRIL 10 MG: 10 TABLET ORAL at 06:03

## 2022-03-31 RX ADMIN — ASPIRIN 81 MG: 81 TABLET, COATED ORAL at 06:02

## 2022-03-31 RX ADMIN — OMEPRAZOLE 20 MG: 20 CAPSULE, DELAYED RELEASE ORAL at 06:02

## 2022-03-31 RX ADMIN — ENOXAPARIN SODIUM 40 MG: 40 INJECTION SUBCUTANEOUS at 06:02

## 2022-03-31 RX ADMIN — SENNOSIDES AND DOCUSATE SODIUM 2 TABLET: 50; 8.6 TABLET ORAL at 06:02

## 2022-03-31 ASSESSMENT — PAIN DESCRIPTION - PAIN TYPE: TYPE: ACUTE PAIN

## 2022-03-31 NOTE — PROGRESS NOTES
IV Dc 'd. Discharge instructions provided to patient. Pt verbalizes understanding. Pt states all questions have been answered. Copy of DC provided to patient. Signed copy in chart. Prescriptions sent to his pharmacy. Pt states all personal belongings are in possession.  Pt escorted off unit by tech via w/c without incident. Friend providing transportation.

## 2022-03-31 NOTE — PROGRESS NOTES
Cardiology Follow Up Progress Note    Date of Service  3/30/2022    Attending Physician  Ada Blanca*    Chief Complaint   Atypical, recurrent chest pain    HPI  Quirino Ruiz is a 66 y.o. male admitted 3/28/2022 with recurrent chest pain.  Seen by cardiology for heart angiogram.    Interim Events  Telemetry-sinus rhythm  No chest pain presently  Left heart catheterization with nonobstructive disease      Review of Systems  Review of Systems  No fevers/chills  No nausea/votiming      Vital signs in last 24 hours  Temp:  [36.1 °C (97 °F)-36.3 °C (97.4 °F)] 36.3 °C (97.4 °F)  Pulse:  [55-75] 65  Resp:  [16-18] 18  BP: ()/(50-68) 92/50  SpO2:  [91 %-93 %] 92 %    Physical Exam    General: No acute distress. Well nourished.  HEENT: EOM grossly intact, no scleral icterus, no pharyngeal erythema.   Neck:  No JVD, no bruits, trachea midline  CVS: RRR. Normal S1, S2. No M/R/G. No LE edema.  2+ radial pulses, 2+ PT pulses  Resp: CTAB. No wheezing or crackles/rhonchi. Normal respiratory effort.  Abdomen: Soft, NT, no ana lilia hepatomegaly.  MSK/Ext: No clubbing or cyanosis.  Skin: Warm and dry, no rashes.  Neurological: CN III-XII grossly intact. No focal deficits.   Psych: A&O x 3, appropriate affect, good judgement      Lab Review  Lab Results   Component Value Date/Time    WBC 10.1 03/28/2022 08:03 PM    RBC 4.29 (L) 03/28/2022 08:03 PM    HEMOGLOBIN 13.4 (L) 03/28/2022 08:03 PM    HEMATOCRIT 38.6 (L) 03/28/2022 08:03 PM    MCV 90.0 03/28/2022 08:03 PM    MCH 31.2 03/28/2022 08:03 PM    MCHC 34.7 03/28/2022 08:03 PM    MPV 8.5 (L) 03/28/2022 08:03 PM      Lab Results   Component Value Date/Time    SODIUM 132 (L) 03/30/2022 04:30 AM    POTASSIUM 3.7 03/30/2022 04:30 AM    CHLORIDE 96 03/30/2022 04:30 AM    CO2 21 03/30/2022 04:30 AM    GLUCOSE 103 (H) 03/30/2022 04:30 AM    BUN 13 03/30/2022 04:30 AM    CREATININE 0.67 03/30/2022 04:30 AM      Lab Results   Component Value Date/Time    ASTSGOT 16  03/28/2022 08:03 PM    ALTSGPT 15 03/28/2022 08:03 PM     Lab Results   Component Value Date/Time    CHOLSTRLTOT 145 03/29/2022 04:08 AM    LDL 88 03/29/2022 04:08 AM    HDL 31 (A) 03/29/2022 04:08 AM    TRIGLYCERIDE 132 03/29/2022 04:08 AM    TROPONINT 24 (H) 03/29/2022 04:08 AM       No results for input(s): NTPROBNP in the last 72 hours.    Cardiac Imaging and Procedures Review  EKG:  My personal interpretation of the EKG dated 3/29/2022 is sinus, 64, baseline artifact        Echo: Echocardiogram performed on 3/10/2022 was personally interpreted by me which shows hyperdynamic left ventricular systolic function with no valve abnormality and no wall motion abnormality.     Echo dated 7/5/2021:   CONCLUSIONS  Normal left ventricular chamber size.  Left ventricular ejection fraction is visually estimated to be 60%.  Normal diastolic function.  Normal inferior vena cava size and inspiratory collapse.     Nuclear Perfusion Imaging (6/3/2021):    NUCLEAR IMAGING INTERPRETATION   Normal perfusion.   Normal left ventricular wall motion.     LV ejection fraction = 68%.      ECG INTERPRETATION   Nondiagnostic ECG portion of a Regadenoson nuclear stress test.    Cardiac Catheterization: 3/30/2022  HEMODYNAMICS:   1. Aortic pressure: 110/51 mmHg  2. Pre A-wave pressure: 9 mmHg  3. Peak-peak aortic gradient: 20 mmHg     CORONARY ANGIOGRAPHY:  1. The left main coronary artery has luminal disease and bifurcates into the left anterior descending and left circumflex coronary arteries.  2. The left anterior descending coronary artery is a moderate, transapical vessel that supplies a large first diagonal branch and has diffuse luminal irregularities in the distribution.  3. The left circumflex coronary artery is a large, nondominant vessel that supplies a small first obtuse marginal branch, a moderate second obtuse marginal branch, and a large third obtuse marginal branch and has diffuse luminal irregularities in the  distribution.  4. The right coronary artery is a moderate, dominant vessel that supplies a small posterior descending coronary artery and a small posterolateral branch and has luminal irregularities in the distribution.     IMPRESSION:  1. Mild nonobstructive coronary artery disease  2. Normal left heart filling pressures       Imaging  DX-CHEST-PORTABLE (1 VIEW)   Final Result         1.  No acute cardiopulmonary disease.      CL-LEFT HEART CATHETERIZATION WITH POSSIBLE INTERVENTION    (Results Pending)       Assessment/Plan  -Atypical chest pain  -Nonobstructive CAD  -Former smoker  -Prior Covid infection  -Liver transplant recipient  -Hypertension    PLAN:  -Okay to discharge home from cardiology standpoint  -Okay to continue to use nitroglycerin as needed  Upper GI spasm and possible microvascular disease  -Outpatient GI evaluation  -Continue smoking cessation  -Given nonobstructive disease, reasonable to stay on aspirin, statin, beta-blocker    Discussed with PANKAJ Boateng    Cardiology will sign off, please recall for questions/concerns/change in status.      Thank you for allowing me to participate in the care of this patient.    Please contact me with any questions.    Lisbeth Wallace M.D.   Cardiologist, [facility]  [phone]

## 2022-03-31 NOTE — DISCHARGE INSTRUCTIONS
Discharge Instructions    Discharged to home by car with relative. Discharged via wheelchair, hospital escort: Yes.  Special equipment needed: Not Applicable    Be sure to schedule a follow-up appointment with your primary care doctor or any specialists as instructed.     Discharge Plan:   Diet Plan: Discussed  Activity Level: Discussed  Confirmed Follow up Appointment: Patient to Call and Schedule Appointment  Confirmed Symptoms Management: Discussed  Medication Reconciliation Updated: Yes  Influenza Vaccine Indication: Not indicated: Previously immunized this influenza season and > 8 years of age    I understand that a diet low in cholesterol, fat, and sodium is recommended for good health. Unless I have been given specific instructions below for another diet, I accept this instruction as my diet prescription.   Other diet: heart healthy    Special Instructions: None    · Is patient discharged on Warfarin / Coumadin?   No     Depression / Suicide Risk    As you are discharged from this [facility] facility, it is important to learn how to keep safe from harming yourself.    Recognize the warning signs:  · Abrupt changes in personality, positive or negative- including increase in energy   · Giving away possessions  · Change in eating patterns- significant weight changes-  positive or negative  · Change in sleeping patterns- unable to sleep or sleeping all the time   · Unwillingness or inability to communicate  · Depression  · Unusual sadness, discouragement and loneliness  · Talk of wanting to die  · Neglect of personal appearance   · Rebelliousness- reckless behavior  · Withdrawal from people/activities they love  · Confusion- inability to concentrate     If you or a loved one observes any of these behaviors or has concerns about self-harm, here's what you can do:  · Talk about it- your feelings and reasons for harming yourself  · Remove any means that you might use to hurt yourself (examples: pills, rope,  extension cords, firearm)  · Get professional help from the community (Mental Health, Substance Abuse, psychological counseling)  · Do not be alone:Call your Safe Contact- someone whom you trust who will be there for you.  · Call your local CRISIS HOTLINE 879-4824 or 819-811-3631  · Call your local Children's Mobile Crisis Response Team [place] (472) 939-6553 or www.Immun[employer]  · Call the toll free National Suicide Prevention Hotlines   · National Suicide Prevention Lifeline 317-421-DUXA (7339)  · [employer] Line Network 800-SUICIDE (857-1922)          FOLLOW UP ITEMS POST DISCHARGE  Please call [phone] to schedule PCP appointment for patient.    Required specialty appointments include:       Discharge Instructions per Ada Pelaez, AEmeliaPEmeliaREmeliaN.    -Follow-up with PCP s/p hospitalization  -Call [facility] to set up an appointment for possible EGD  -Continue all home medications specifically aspirin and metoprolol as indicated by cardiology  -Patient currently refusing to take statin due to history of liver cancer  -Stop smoking  -Stick to low-sodium, low-fat, cardiac diet    DIET: Cardiac diet with low-sodium and low-fat    ACTIVITY: As tolerated    DIAGNOSIS: Chest pain    Return to ER if symptoms persist, chest pain, palpitations, shortness of breath, numbness, tingling, weakness, and high fevers.

## 2022-03-31 NOTE — CARE PLAN
The patient is Watcher - Medium risk of patient condition declining or worsening    Shift Goals  Clinical Goals: plan of care, pain control  Patient Goals: plan of care  Family Goals: n/a    Progress made toward(s) clinical / shift goals:  yes    Patient is not progressing towards the following goals: none      Problem: Psychosocial  Goal: Patient's level of anxiety will decrease  Outcome: Progressing  Goal: Patient's ability to verbalize feelings about condition will improve  Outcome: Progressing  Goal: Patient's ability to re-evaluate and adapt role responsibilities will improve  Outcome: Progressing  Goal: Patient and family will demonstrate ability to cope with life altering diagnosis and/or procedure  Outcome: Progressing  Goal: Spiritual and cultural needs incorporated into hospitalization  Outcome: Progressing     Problem: Communication  Goal: The ability to communicate needs accurately and effectively will improve  Outcome: Progressing     Problem: Discharge Barriers/Planning  Goal: Patient's continuum of care needs are met  Outcome: Progressing     Problem: Hemodynamics  Goal: Patient's hemodynamics, fluid balance and neurologic status will be stable or improve  Outcome: Progressing     Problem: Respiratory  Goal: Patient will achieve/maintain optimum respiratory ventilation and gas exchange  Outcome: Progressing     Problem: Chest Tube Management  Goal: Complications related to chest tube will be avoided or minimized  Outcome: Progressing     Problem: Fluid Volume  Goal: Fluid volume balance will be maintained  Outcome: Progressing     Problem: Mechanical Ventilation  Goal: Safe management of artificial airway and ventilation  Outcome: Progressing  Goal: Successful weaning off mechanical ventilator, spontaneously maintains adequate gas exchange  Outcome: Progressing  Goal: Patient will be able to express needs and understand communication  Outcome: Progressing     Problem: Dysphagia  Goal: Dysphagia will  improve  Outcome: Progressing     Problem: Risk for Aspiration  Goal: Patient's risk for aspiration will be absent or decrease  Outcome: Progressing     Problem: Nutrition  Goal: Patient's nutritional and fluid intake will be adequate or improve  Outcome: Progressing  Goal: Enteral nutrition will be maintained or improve  Outcome: Progressing  Goal: Enteral nutrition will be maintained or improve  Outcome: Progressing     Problem: Urinary Elimination  Goal: Establish and maintain regular urinary output  Outcome: Progressing     Problem: Bowel Elimination  Goal: Establish and maintain regular bowel function  Outcome: Progressing     Problem: Gastrointestinal Irritability  Goal: Nausea and vomiting will be absent or improve  Outcome: Progressing  Goal: Diarrhea will be absent or improved  Outcome: Progressing     Problem: Rectal Tube  Goal: Fecal output will be contained and skin will remain free from irritation  Outcome: Progressing     Problem: Mobility  Goal: Patient's capacity to carry out activities will improve  Outcome: Progressing     Problem: Self Care  Goal: Patient will have the ability to perform ADLs independently or with assistance (bathe, groom, dress, toilet and feed)  Outcome: Progressing     Problem: Infection - Standard  Goal: Patient will remain free from infection  Outcome: Progressing     Problem: Wound/ / Incision Healing  Goal: Patient's wound/surgical incision will decrease in size and heals properly  Outcome: Progressing     Problem: Pain - Standard  Goal: Alleviation of pain or a reduction in pain to the patient’s comfort goal  Outcome: Progressing     Problem: Knowledge Deficit - Standard  Goal: Patient and family/care givers will demonstrate understanding of plan of care, disease process/condition, diagnostic tests and medications  Outcome: Progressing

## 2022-03-31 NOTE — CARE PLAN
The patient is Stable - Low risk of patient condition declining or worsening    Shift Goals  Clinical Goals: re-evaluation  Patient Goals: feel better  Family Goals: n/a    Progress made toward(s) clinical / shift goals:      Problem: Psychosocial  Goal: Patient's level of anxiety will decrease  Outcome: Progressing  Goal: Patient's ability to verbalize feelings about condition will improve  Outcome: Progressing  Goal: Patient's ability to re-evaluate and adapt role responsibilities will improve  Outcome: Progressing  Goal: Patient and family will demonstrate ability to cope with life altering diagnosis and/or procedure  Outcome: Progressing  Goal: Spiritual and cultural needs incorporated into hospitalization  Outcome: Progressing     Problem: Communication  Goal: The ability to communicate needs accurately and effectively will improve  Outcome: Progressing     Problem: Discharge Barriers/Planning  Goal: Patient's continuum of care needs are met  Outcome: Progressing     Problem: Hemodynamics  Goal: Patient's hemodynamics, fluid balance and neurologic status will be stable or improve  Outcome: Progressing     Problem: Respiratory  Goal: Patient will achieve/maintain optimum respiratory ventilation and gas exchange  Outcome: Progressing     Problem: Chest Tube Management  Goal: Complications related to chest tube will be avoided or minimized  Outcome: Progressing     Problem: Fluid Volume  Goal: Fluid volume balance will be maintained  Outcome: Progressing     Problem: Mechanical Ventilation  Goal: Safe management of artificial airway and ventilation  Outcome: Progressing  Goal: Successful weaning off mechanical ventilator, spontaneously maintains adequate gas exchange  Outcome: Progressing  Goal: Patient will be able to express needs and understand communication  Outcome: Progressing     Problem: Dysphagia  Goal: Dysphagia will improve  Outcome: Progressing     Problem: Risk for Aspiration  Goal: Patient's risk for  aspiration will be absent or decrease  Outcome: Progressing     Problem: Nutrition  Goal: Patient's nutritional and fluid intake will be adequate or improve  Outcome: Progressing  Goal: Enteral nutrition will be maintained or improve  Outcome: Progressing  Goal: Enteral nutrition will be maintained or improve  Outcome: Progressing     Problem: Urinary Elimination  Goal: Establish and maintain regular urinary output  Outcome: Progressing     Problem: Bowel Elimination  Goal: Establish and maintain regular bowel function  Outcome: Progressing     Problem: Gastrointestinal Irritability  Goal: Nausea and vomiting will be absent or improve  Outcome: Progressing  Goal: Diarrhea will be absent or improved  Outcome: Progressing     Problem: Rectal Tube  Goal: Fecal output will be contained and skin will remain free from irritation  Outcome: Progressing     Problem: Mobility  Goal: Patient's capacity to carry out activities will improve  Outcome: Progressing     Problem: Self Care  Goal: Patient will have the ability to perform ADLs independently or with assistance (bathe, groom, dress, toilet and feed)  Outcome: Progressing     Problem: Infection - Standard  Goal: Patient will remain free from infection  Outcome: Progressing     Problem: Wound/ / Incision Healing  Goal: Patient's wound/surgical incision will decrease in size and heals properly  Outcome: Progressing     Problem: Pain - Standard  Goal: Alleviation of pain or a reduction in pain to the patient’s comfort goal  Outcome: Progressing     Problem: Knowledge Deficit - Standard  Goal: Patient and family/care givers will demonstrate understanding of plan of care, disease process/condition, diagnostic tests and medications  Outcome: Progressing       Patient is not progressing towards the following goals:

## 2022-03-31 NOTE — DISCHARGE SUMMARY
Discharge Summary    CHIEF COMPLAINT ON ADMISSION  Chief Complaint   Patient presents with   • Chest Pain     Going on since June 2021, worsened today w/ SOB with exertion, midsternal chest pain 4/10 sharp pain. Hx of HTN and stroke in 2018. Pt states he takes his medication as indicated.        Reason for Admission  ems     Admission Date  3/28/2022    CODE STATUS  Full Code    HPI & HOSPITAL COURSE     Mr. Quirino Ruiz 66-ye PMHx of liver carcinoma status post liver transplant on immunosuppressive therapy, hypertension and former smoker who presented to the hospital on 3/28/2022 with progressive substernal chest pressure associated with dyspnea has been ongoing for several months.      Patient has been taking sublingual nitro with resolution of his pain, he was recently seen by cardiology and here in the ER, CTPE scan was negative. He underwent echo which was normal and had a normal stress test in 2021 thus no further intervention was done. He was sent to pulm for further evaluation, he states he underwent PFTs which were normal. He has continued to have chest pressure thus he presented here for further evaluation.     On presentation in ER he was hypertensive but otherwise vitals stable, troponin with mild elevation from 14 to 24, EKG without acute changes.  Patient admitted to the observation unit for further evaluation and treatment.    As patient has had recent work-up for chest pain, cardiology was consulted of which a left heart catheterization was recommended for this patient.  Pending results from catheterization.    Left heart catheterization was performed on 3/30 with impression of mild nonobstructive coronary artery disease and normal left heart filling pressures.  Per cardiology recommendations post angiogram, patient okay to discharge from cardiology standpoint, okay to continue use of nitroglycerin as needed, possible upper GI spasm and possible microvascular disease would recommendations for  patient to follow-up with outpatient GI evaluation, continue smoking cessation, and due to given nonobstructive disease, recommended patient to stay on aspirin, beta-blocker and possibly statin.  Discussed with patient use of statin due to significant history of liver cancer and transplantation.  At this time, patient is REFUSING use of statin due to his history of liver cancer.  Discussed with patient lifestyle changes to reduce lipid level.  Patient recommended to follow-up with PCP s/p hospitalization.  Patient encouraged to set up an appointment [facility] possible [facility] due to noted upper GI spasm causing his shortness of breath and chest pain.  Patient to continue aspirin and metoprolol from cardiology standpoint.  Patient prescribed PPI to help with GERD.  All questions and concerns answered prior to being discharged.  Patient discharged home.    Therefore, he is discharged in good and stable condition to home with close outpatient follow-up.    The patient met 2-midnight criteria for an inpatient stay at the time of discharge.    Discharge Date  03/31/22      FOLLOW UP ITEMS POST DISCHARGE  Please call [phone] to schedule PCP appointment for patient.    Required specialty appointments include:       Discharge Instructions per INOCENCIO SchmidtREmeliaNEmelia    -Follow-up with PCP s/p hospitalization  -Call [facility] to set up an appointment for possible EGD  -Continue all home medications specifically aspirin and metoprolol as indicated by cardiology  -Patient currently refusing to take statin due to history of liver cancer  -Stop smoking  -Stick to low-sodium, low-fat, cardiac diet    DIET: Cardiac diet with low-sodium and low-fat    ACTIVITY: As tolerated    DIAGNOSIS: Chest pain    Return to ER if symptoms persist, chest pain, palpitations, shortness of breath, numbness, tingling, weakness, and high fevers.      DISCHARGE DIAGNOSES  Principal Problem (Resolved):    Chest pain  POA: Yes  Active Problems:    Anxiety POA: Unknown    Insomnia POA: Unknown  Resolved Problems:    Recurrent chest pain, COVID positive, chornic pain syndrome POA: Yes    COVID-19 POA: Unknown      FOLLOW UP  Future Appointments   Date Time Provider Department Center   4/7/2022 12:45 PM Shahid Kennedy M.D. [facility] None   5/20/2022 12:15 PM [facility] EXAM 9 ECHO [facility]   5/31/2022 10:45 AM Coleman Wiggins M.D. [facility] None     Alejandra Sutherland M.D.  [address]  [address]  [facility] [address]  [phone]    Schedule an appointment as soon as possible for a visit      [facility]  [address] [place] [id number] [phone]  Schedule an appointment as soon as possible for a visit        MEDICATIONS ON DISCHARGE     Medication List      START taking these medications      Instructions   melatonin 5 mg Tabs   Take 1 Tablet by mouth every evening for 30 days.  Dose: 5 mg     omeprazole 20 MG delayed-release capsule  Commonly known as: PRILOSEC  Replaces: pantoprazole 40 MG Tbec   Take 1 Capsule by mouth 2 times a day for 30 days.  Dose: 20 mg        CONTINUE taking these medications      Instructions   albuterol 108 (90 Base) MCG/ACT Aers inhalation aerosol   Inhale 2 Puffs every four hours as needed for Shortness of Breath.  Dose: 2 Puff     allopurinol 100 MG Tabs  Commonly known as: ZYLOPRIM   Take 100 mg by mouth every bedtime.  Dose: 100 mg     ascorbic acid 500 MG tablet  Commonly known as: Vitamin C   Take 1 Tablet by mouth 2 times a day.  Dose: 500 mg     aspirin 81 MG EC tablet   Take 1 Tablet by mouth every day.  Dose: 81 mg     citalopram 20 MG Tabs  Commonly known as: CeleXA   Take 20 mg by mouth every day.  Dose: 20 mg     lisinopril 10 MG Tabs  Commonly known as: PRINIVIL   Take 10 mg by mouth every morning. Indications: High Blood Pressure Disorder  Dose: 10 mg     metoprolol SR 25 MG Tb24  Commonly known as: TOPROL XL   Take 1 Tablet by mouth every day.  Dose: 25 mg    "  Pain Pump Odilia  Commonly known as: patient supplied   Inject  as directed continuous. Patient's Pain Pump (placed and maintained as an outpatient)  Medications/concentrations: Hydromorphone 400.0 mcg/mL  Route: Intrathecally  Date of Placement: Unknown  Last changed: 03/16/2022 due to refill pump before 5/16/2022  Continuous infusion rates (Drug/Rate):  Hydromorphone 139.81 mcg / 24 hours (5.83 mcg / hr)  Patient activation dose: Hydromorphone 15.00 mcg  Maximum daily activation dose: Hydromorphone 228.06 mcg  Patient activation lockout interval: 1 hour  Maximum activations per day: 6  Dr Mendes [phone]     rizatriptan 10 MG disintegrating tablet  Commonly known as: MAXALT-MLT   TAKE 1 TAKE BY MOUTH AT ONSET OF HEADACHE. MAY REPEAT     tacrolimus 1 MG Caps  Commonly known as: PROGRAF   Take 2 mg by mouth 2 Times a Day. Indications: Liver Transplant Recipients  Dose: 2 mg     tamsulosin 0.4 MG capsule  Commonly known as: FLOMAX   Take 0.4 mg by mouth at bedtime.  Dose: 0.4 mg     vitamin D 1000 UNIT Tabs  Commonly known as: VITAMIND D3   Take 1 Tablet by mouth every day.  Dose: 1,000 Units     zinc sulfate 220 (50 Zn) MG Caps  Commonly known as: ZINCATE   Take 2,200 mg by mouth every day.  Dose: 2,200 mg        STOP taking these medications    pantoprazole 40 MG Tbec  Commonly known as: PROTONIX  Replaced by: omeprazole 20 MG delayed-release capsule            Allergies  Allergies   Allergen Reactions   • Niacin      Passed out.    • Latex Rash     Reaction- Itching, Rash   • Pcn [Penicillins] Anaphylaxis and Swelling     Patient has tolerated cefazolin in past   • Morphine Sulfate      Per pt., \"it makes me cranky\"   • Mushroom Extract Complex        DIET  Orders Placed This Encounter   Procedures   • Diet Order Diet: Cardiac     Standing Status:   Standing     Number of Occurrences:   1     Order Specific Question:   Diet:     Answer:   Cardiac [6]       ACTIVITY  As tolerated.  Weight bearing as " tolerated    CONSULTATIONS  Cardiology    PROCEDURES  Left heart catheterization    IMAGING  DX-CHEST-PORTABLE (1 VIEW)   Final Result         1.  No acute cardiopulmonary disease.      CL-LEFT HEART CATHETERIZATION WITH POSSIBLE INTERVENTION    (Results Pending)         LABORATORY  Lab Results   Component Value Date    SODIUM 134 (L) 03/31/2022    POTASSIUM 3.9 03/31/2022    CHLORIDE 97 03/31/2022    CO2 23 03/31/2022    GLUCOSE 96 03/31/2022    BUN 17 03/31/2022    CREATININE 0.77 03/31/2022        Lab Results   Component Value Date    WBC 8.8 03/31/2022    HEMOGLOBIN 12.9 (L) 03/31/2022    HEMATOCRIT 38.1 (L) 03/31/2022    PLATELETCT 278 03/31/2022        Total time of the discharge process exceeds 36 minutes.  ============================================================================================================  Please note that this dictation was created using voice recognition software. I have made every reasonable attempt to correct obvious errors, but there may be errors of grammar and possibly content that I did not discover before finalizing the note.    Electronically signed by:  JENNY Pelaez, MSN, APRN, FNP-C  Hospitalist Services  [facility]  [phone]  [email or web address]  03/31/22                  [id number]

## 2022-03-31 NOTE — PROGRESS NOTES
Assessment completed. Pt A&Ox4. Respirations are even and unlabored on RA. Pt denies pain at this time. Monitors applied, VS stable, call light and belongings within reach. POC updated (re-evaluation). Pt educated on room and call light, pt verbalized understanding. Communication board updated. Needs met.

## 2022-04-05 NOTE — TELEPHONE ENCOUNTER
Called pt [phone]  Recommend to keep appt with DA due to recent discharge from hospital. Pt concerned with co pay. Okay to keep appt and will be billed for co-pay per Diana. Pt verbalized understanding and will keep said appt with DA.

## 2022-04-05 NOTE — TELEPHONE ENCOUNTER
Pt called stating he was in [facility] from 3/28/2022-3/31/2022. Pt states they did an angiogram and told Pt he does not have a blockage in his heart. Pt was told to schedule an appt with a GI doctor. Pt wants to know if DA wants him to keep the appt with her scheduled for 4/7/2022 or cancel and see what the GI says. Please call Pt back at [phone].    Thank you

## 2022-04-07 ENCOUNTER — OFFICE VISIT (OUTPATIENT)
Dept: CARDIOLOGY | Facility: MEDICAL CENTER | Age: 67
End: 2022-04-07
Payer: MEDICARE

## 2022-04-07 VITALS
WEIGHT: 161 LBS | BODY MASS INDEX: 25.27 KG/M2 | DIASTOLIC BLOOD PRESSURE: 80 MMHG | OXYGEN SATURATION: 98 % | RESPIRATION RATE: 14 BRPM | HEIGHT: 67 IN | HEART RATE: 72 BPM | SYSTOLIC BLOOD PRESSURE: 130 MMHG

## 2022-04-07 DIAGNOSIS — Z79.899 IMMUNOSUPPRESSION DUE TO DRUG THERAPY (HCC): ICD-10-CM

## 2022-04-07 DIAGNOSIS — E78.5 DYSLIPIDEMIA: ICD-10-CM

## 2022-04-07 DIAGNOSIS — D84.821 IMMUNOSUPPRESSION DUE TO DRUG THERAPY (HCC): ICD-10-CM

## 2022-04-07 DIAGNOSIS — Z94.4 LIVER TRANSPLANT RECIPIENT (HCC): ICD-10-CM

## 2022-04-07 DIAGNOSIS — I10 ESSENTIAL HYPERTENSION: ICD-10-CM

## 2022-04-07 DIAGNOSIS — F41.9 ANXIETY: ICD-10-CM

## 2022-04-07 PROBLEM — I20.89 CHRONIC STABLE ANGINA (HCC): Status: RESOLVED | Noted: 2022-03-22 | Resolved: 2022-04-07

## 2022-04-07 PROCEDURE — 99214 OFFICE O/P EST MOD 30 MIN: CPT | Performed by: INTERNAL MEDICINE

## 2022-04-07 RX ORDER — NITROGLYCERIN 0.4 MG/1
0.4 TABLET SUBLINGUAL PRN
Qty: 25 TABLET | Refills: 3 | Status: SHIPPED | OUTPATIENT
Start: 2022-04-07 | End: 2023-09-07

## 2022-04-07 ASSESSMENT — FIBROSIS 4 INDEX: FIB4 SCORE: 1

## 2022-04-07 NOTE — PROGRESS NOTES
Cardiology Follow-up Consultation Note    Date of note:    4/7/2022    Primary Care Provider: Alejandra Sutherland M.D.    Name:             Quirino Ruiz   YOB: 1955  MRN:               [id number]    Chief Complaint   Patient presents with   • Premature Ventricular Contractions (PVCs)   • Palpitations       HISTORY OF PRESENT ILLNESS  Mr. Quirino Ruiz is a 66 y.o. male who returns to see us for follow-up of chest pressure.    Last clinic visit: 3/17/2022    Interim History:  Since his last visit, patient was admitted at [facility] for chest pressure.  Underwent coronary angiogram which showed no obstructive CAD.    Was evaluated by pulmonary, Dr. Perez, who did not think that his symptoms are pulmonary in nature due to normal PFTs and other ancillary tests.    Has changed his diet to low fat and has felt improvement in his symptoms.  Has an upcoming appt with GI on 4/18/2022.        Past Medical History:   Diagnosis Date   • Arthritis     fingers, hands   • Back pain    • Cancer (HCC) 2002;2013    kidney / liver - treated with chemo, & transplant   • Cholesterol blood decreased    • Chronic pain    • Cold 8/27/2014    URI   • Dental disorder     upper  and lower dentures   • Gout    • Heart burn     under control with meds   • Hepatitis C 1993   • Hypertension    • Indigestion    • Liver transplanted (HCC) 2002   • Pain     neck   • Pain     neck and lower back   • Renal cancer (HCC)    • Renal disorder     left nephrectomy; right cryoablation, voids (no dialysis)   • Shortness of breath    • Stroke (HCC) 03/2018    denies residual         Past Surgical History:   Procedure Laterality Date   • PUMP REVISION Left 9/17/2021    Procedure: REVISION, INSERTION, OR REPLACEMENT, INTRATHECAL ANALGESIC PUMP - REPLACEMENT;  Surgeon: Jass Mendes M.D.;  Location: SURGERY [facility];  Service: Pain Management   • RECOVERY  11/30/2015    Procedure: US-Non Targeted Liver  Biopsy-;  Surgeon: Recoveryon Surgery;  Location: SURGERY PRE-POST PROC UNIT [facility];  Service:    • PUMP INSERT/REMOVE  11/25/2014    Performed by Jass Mendes M.D. at SURGERY [facility]   • CATH PLACE PERM EPIDURAL  9/9/2014    Performed by Jass Mendes M.D. at SURGERY [facility]   • OTHER  2/28/2014    cryoablation right kidney   • EVACUATION OF HEMATOMA  5/31/2013    Performed by Davis Murray M.D. at SURGERY [facility]S   • EXPLORATORY LAPAROTOMY  5/31/2013    Performed by Davis Murray M.D. at SURGERY [facility] ORS   • LUMBAR FUSION ANTERIOR  5/29/2013    Performed by Suman Burks M.D. at SURGERY [facility]   • RECOVERY  4/23/2013    Performed by -Recovery Surgery at [facility] SAME DAY Health system   • CERVICAL DISK AND FUSION ANTERIOR  12/11/2012    Performed by Suman Burks M.D. at SURGERY [facility]   • OTHER  1994    gallbladder removal   • CERVICAL DECOMPRESSION POSTERIOR     • CHOLECYSTECTOMY     • NEPHRECTOMY RADICAL     • NEPHRECTOMY RADICAL Left     Left kidney removed   • OTHER      liver transplant   • OTHER SURGICAL PROCEDURE      liver transplant   • PUMP INSERT/REMOVE      pain pump placed         Current Outpatient Medications   Medication Sig Dispense Refill   • nitroglycerin (NITROSTAT) 0.4 MG SL Tab Place 1 Tablet under the tongue as needed for Chest Pain. 25 Tablet 3   • omeprazole (PRILOSEC) 20 MG delayed-release capsule Take 1 Capsule by mouth 2 times a day for 30 days. 60 Capsule 1   • melatonin 5 mg Tab Take 1 Tablet by mouth every evening for 30 days. 30 Tablet 0   • metoprolol SR (TOPROL XL) 25 MG TABLET SR 24 HR Take 1 Tablet by mouth every day. 100 Tablet 2   • citalopram (CELEXA) 20 MG Tab Take 20 mg by mouth every day.     • albuterol 108 (90 Base) MCG/ACT Aero Soln inhalation aerosol Inhale 2 Puffs every four hours as needed for Shortness of Breath. 8.5 g 0   • ascorbic acid (VITAMIN C) 500 MG tablet Take 1 Tablet by  "mouth 2 times a day. 30 Tablet 0   • aspirin EC 81 MG EC tablet Take 1 Tablet by mouth every day. 30 Tablet 0   • vitamin D3 (VITAMIND D3) 1000 UNIT Tab Take 1 Tablet by mouth every day. 60 Tablet    • zinc sulfate (ZINCATE) 220 (50 Zn) MG Cap Take 2,200 mg by mouth every day. 7 Capsule 0   • rizatriptan (MAXALT-MLT) 10 MG disintegrating tablet TAKE 1 TAKE BY MOUTH AT ONSET OF HEADACHE. MAY REPEAT     • tamsulosin (FLOMAX) 0.4 MG capsule Take 0.4 mg by mouth at bedtime.     • allopurinol (ZYLOPRIM) 100 MG Tab Take 100 mg by mouth every bedtime.     • lisinopril (PRINIVIL) 10 MG Tab Take 10 mg by mouth every morning. Indications: High Blood Pressure Disorder     • Pain Pump (PATIENT SUPPLIED) XX BRANDON Inject  as directed continuous. Patient's Pain Pump (placed and maintained as an outpatient)  Medications/concentrations: Hydromorphone 400.0 mcg/mL  Route: Intrathecally  Date of Placement: Unknown  Last changed: 03/16/2022 due to refill pump before 5/16/2022  Continuous infusion rates (Drug/Rate):  Hydromorphone 139.81 mcg / 24 hours (5.83 mcg / hr)  Patient activation dose: Hydromorphone 15.00 mcg  Maximum daily activation dose: Hydromorphone 228.06 mcg  Patient activation lockout interval: 1 hour  Maximum activations per day: 6  Dr Mendes [phone]     • tacrolimus (PROGRAF) 1 MG Cap Take 2 mg by mouth 2 Times a Day. Indications: Liver Transplant Recipients       No current facility-administered medications for this visit.         Allergies   Allergen Reactions   • Niacin      Passed out.    • Latex Rash     Reaction- Itching, Rash   • Pcn [Penicillins] Anaphylaxis and Swelling     Patient has tolerated cefazolin in past   • Morphine Sulfate      Per pt., \"it makes me cranky\"   • Mushroom Extract Complex          Family History   Problem Relation Age of Onset   • Hypertension Other          Social History     Socioeconomic History   • Marital status:      Spouse name: Not on file   • Number of children: Not " "on file   • Years of education: Not on file   • Highest education level: Not on file   Occupational History   • Not on file   Tobacco Use   • Smoking status: Former Smoker     Packs/day: 1.00     Years: 50.00     Pack years: 50.00     Types: Cigarettes     Quit date: 2021     Years since quittin.7   • Smokeless tobacco: Never Used   • Tobacco comment: quit four months ago   Vaping Use   • Vaping Use: Former   • Substances: Nicotine   • Devices: Pre-filled or refillable cartridge   Substance and Sexual Activity   • Alcohol use: Not Currently     Alcohol/week: 0.6 oz     Types: 1 Cans of beer per week   • Drug use: No   • Sexual activity: Not on file   Other Topics Concern   • Not on file   Social History Narrative    ** Merged History Encounter **          Social Determinants of Health     Financial Resource Strain: Not on file   Food Insecurity: Not on file   Transportation Needs: Not on file   Physical Activity: Not on file   Stress: Not on file   Social Connections: Not on file   Intimate Partner Violence: Not on file   Housing Stability: Not on file       Physical Exam:  Ambulatory Vitals  /80 (BP Location: Left arm, Patient Position: Sitting, BP Cuff Size: Adult)   Pulse 72   Resp 14   Ht 1.702 m (5' 7\")   Wt 73 kg (161 lb)   SpO2 98%    Oxygen Therapy:  Pulse Oximetry: 98 %  BP Readings from Last 4 Encounters:   22 130/80   22 131/70   22 124/64   22 138/88       Weight/BMI: Body mass index is 25.22 kg/m².  Wt Readings from Last 4 Encounters:   22 73 kg (161 lb)   22 74.3 kg (163 lb 12.8 oz)   22 74.2 kg (163 lb 9 oz)   22 74.8 kg (165 lb)       GEN: Well developed, well nourished and in no acute distress.  HEART: no significant JVD, regular rate and rhythm, normal S1 and S2, no murmurs, no third heart sounds, normal cardiac palpation  LUNG: Distant breath sounds, clear to auscultation bilaterally, no wheezing, no crackles, normal respiratory " effort on room air  ABDOMEN: soft, non-tender, non-distended, normal bowel sounds throughout  EXTREMITIES: no peripheral edema noted  VASCULAR: no significantly elevated jugular venous pressure, no carotid bruits noted, radial pulses 2+ and equal      Lab Data Review:  Lab Results   Component Value Date/Time    CHOLSTRLTOT 145 03/29/2022 04:08 AM    LDL 88 03/29/2022 04:08 AM    HDL 31 (A) 03/29/2022 04:08 AM    TRIGLYCERIDE 132 03/29/2022 04:08 AM       Lab Results   Component Value Date/Time    SODIUM 134 (L) 03/31/2022 04:42 AM    POTASSIUM 3.9 03/31/2022 04:42 AM    CHLORIDE 97 03/31/2022 04:42 AM    CO2 23 03/31/2022 04:42 AM    GLUCOSE 96 03/31/2022 04:42 AM    BUN 17 03/31/2022 04:42 AM    CREATININE 0.77 03/31/2022 04:42 AM     Lab Results   Component Value Date/Time    ALKPHOSPHAT 65 03/28/2022 08:03 PM    ASTSGOT 16 03/28/2022 08:03 PM    ALTSGPT 15 03/28/2022 08:03 PM    TBILIRUBIN <0.2 03/28/2022 08:03 PM      Lab Results   Component Value Date/Time    WBC 8.8 03/31/2022 04:42 AM     Lab Results   Component Value Date/Time    HBA1C 5.8 (H) 03/10/2022 01:30 AM    HBA1C 5.9 (H) 02/01/2022 11:08 AM       Cardiac Imaging and Procedures Review:    ECG: ECG performed 3/29/2022 was interpreted by me which shows sinus rhythm    ECG: ECG performed 3/9/2022 was interpreted by me which shows sinus rhythm    ECG: ECG performed 5/6/2021 was interpreted by me which shows sinus rhythm     Echo: Echocardiogram performed on 3/10/2022 was personally interpreted by me which shows hyperdynamic left ventricular systolic function with no valve abnormality and no wall motion abnormality.    Echo dated 7/5/2021:   CONCLUSIONS  Normal left ventricular chamber size.  Left ventricular ejection fraction is visually estimated to be 60%.  Normal diastolic function.  Normal inferior vena cava size and inspiratory collapse.    Nuclear Perfusion Imaging (6/3/2021):    NUCLEAR IMAGING INTERPRETATION   Normal perfusion.   Normal left  ventricular wall motion.     LV ejection fraction = 68%.   ECG INTERPRETATION   Nondiagnostic ECG portion of a Regadenoson nuclear stress test.    [facility] 3/30/2022:  IMPRESSION:  1. Mild nonobstructive coronary artery disease  2. Normal left heart filling pressures      Radiology test Review:  CXR: No cardiopulmonary abnormality noted.    CTA chest 3/9/2022: IMPRESSION:   1.  No evidence of pulmonary embolism.  2.  Mild emphysematous changes.  3.  Atherosclerosis.    PFTs 3/7/2022:  Interpretation;   Baseline spirometry shows normal airflows.  No significant bronchodilator response.  Lung volumes are within normal limits.  Diffusion capacity is within normal limits.  Normal pulmonary function testing with normal flow volume loop.      Assessment & Plan     1. Essential hypertension  nitroglycerin (NITROSTAT) 0.4 MG SL Tab   2. Dyslipidemia     3. Anxiety     4. Liver transplant recipient (HCC)     5. Immunosuppression due to drug therapy (HCC)         Discussed cardiac testing results with the patient which showed no evidence of CAD.  Has an upcoming appt with GI.  Is cleared to undergo EGD, if needed.    Feels improved with changing his diet.    Blood pressure well controlled.  Continue lisinopril 10 mg daily and metoprolol-XL 25 mg daily.    Does feel better with SL nitro.  Prescription given.      All of patient's excellent questions were answered to the best of my knowledge and to his satisfaction.  It was a pleasure seeing Mr. Quirino Ruiz in my clinic today. Return in about 6 weeks (around 5/19/2022). Patient is aware to call the cardiology clinic with any questions or concerns.      Shahid Kennedy MD  [facility]  [facility], [address]  [address] [address]  Phone: [phone]  Fax: [phone]    Please note that this dictation was created using voice recognition software. I have made every reasonable attempt to correct obvious  errors, but it is possible there are errors of grammar and possibly content that I did not discover before finalizing the note.

## 2022-04-19 NOTE — ED TRIAGE NOTES
"Quirino Duglas Ruiz  64 y.o. male  Chief Complaint   Patient presents with   • Sent from Urgent Care     Pt went to urgent care because he had numerous cramps today, developed bilateral chest tightness on his way to the ER from .    • Chest Pain     No longer has chest tightness   • Cramping       Pt ambulated to triage with steady for above complaint.      Protocol ordered.     Pt back to lobby. Educated to inform staff of any concerns or changes.     Blood Pressure : 153/76, Pulse: 84, Respiration: 18, Temperature: 37 °C (98.6 °F), Height: 170.2 cm (5' 7\"), Weight: 73.4 kg (161 lb 13.1 oz), Pulse Oximetry: 97 %, O2 Delivery: None (Room Air)    " It was not an active medication on her med list, which is why it got denied.  Looks like it was taken off her med list at her visit with Dr. Barbosa on 04/14.  If this was incorrect, it is fine for her to get back on it.  I am sending a new script.

## 2022-06-24 ENCOUNTER — OFFICE VISIT (OUTPATIENT)
Dept: CARDIOLOGY | Facility: MEDICAL CENTER | Age: 67
End: 2022-06-24
Payer: MEDICARE

## 2022-06-24 VITALS
RESPIRATION RATE: 18 BRPM | HEART RATE: 85 BPM | SYSTOLIC BLOOD PRESSURE: 122 MMHG | BODY MASS INDEX: 25.9 KG/M2 | WEIGHT: 165 LBS | OXYGEN SATURATION: 95 % | DIASTOLIC BLOOD PRESSURE: 64 MMHG | HEIGHT: 67 IN

## 2022-06-24 DIAGNOSIS — G89.4 CHRONIC PAIN SYNDROME: ICD-10-CM

## 2022-06-24 DIAGNOSIS — F32.89 OTHER DEPRESSION: ICD-10-CM

## 2022-06-24 DIAGNOSIS — E78.5 DYSLIPIDEMIA: ICD-10-CM

## 2022-06-24 DIAGNOSIS — I10 ESSENTIAL HYPERTENSION: ICD-10-CM

## 2022-06-24 PROCEDURE — 99214 OFFICE O/P EST MOD 30 MIN: CPT | Performed by: INTERNAL MEDICINE

## 2022-06-24 RX ORDER — LISINOPRIL 20 MG/1
TABLET ORAL
COMMUNITY
End: 2022-06-24

## 2022-06-24 RX ORDER — TADALAFIL 5 MG/1
TABLET ORAL
COMMUNITY

## 2022-06-24 RX ORDER — ROPINIROLE 1 MG/1
1 TABLET, FILM COATED ORAL PRN
COMMUNITY

## 2022-06-24 RX ORDER — OMEPRAZOLE 20 MG/1
20 CAPSULE, DELAYED RELEASE ORAL 2 TIMES DAILY
COMMUNITY
Start: 2022-06-03 | End: 2023-09-07

## 2022-06-24 RX ORDER — VARENICLINE TARTRATE 1 MG/1
TABLET, FILM COATED ORAL
COMMUNITY
End: 2022-06-24

## 2022-06-24 RX ORDER — TAMSULOSIN HYDROCHLORIDE 0.4 MG/1
0.4 CAPSULE ORAL 2 TIMES DAILY
COMMUNITY

## 2022-06-24 RX ORDER — TACROLIMUS 1 MG/1
CAPSULE ORAL
COMMUNITY
End: 2022-06-24

## 2022-06-24 ASSESSMENT — FIBROSIS 4 INDEX: FIB4 SCORE: 1.01

## 2022-06-24 NOTE — PROGRESS NOTES
Cardiology Follow-up Consultation Note    Date of note:    6/24/2022    Primary Care Provider: Alejandra Sutherland M.D.    Name:             Quirino Ruiz   YOB: 1955  MRN:               [id number]    Chief Complaint   Patient presents with   • HTN (Controlled)   • Shortness of Breath   • Premature Ventricular Contractions (PVCs)       HISTORY OF PRESENT ILLNESS  Mr. Quirino Ruiz is a 66 y.o. male who returns to see us for follow-up of chest pressure.    Last clinic visit: 4/7/2022    Interim History:  Since his last visit, patient was evaluated by GI and underwent endoscopy and colonoscopy.  Feels improved after changing his diet to low-fat.    Is beginning to accept that he may be depressed after the loss of his wife which was likely contributing to episodes of chest pressure and shortness of breath as extensive cardiac and pulmonary work-up was unremarkable.      Past Medical History:   Diagnosis Date   • Arthritis     fingers, hands   • Back pain    • Cancer (HCC) 2002;2013    kidney / liver - treated with chemo, & transplant   • Cholesterol blood decreased    • Chronic pain    • Cold 8/27/2014    URI   • Dental disorder     upper  and lower dentures   • Gout    • Heart burn     under control with meds   • Hepatitis C 1993   • Hypertension    • Indigestion    • Liver transplanted (HCC) 2002   • Pain     neck   • Pain     neck and lower back   • Renal cancer (HCC)    • Renal disorder     left nephrectomy; right cryoablation, voids (no dialysis)   • Shortness of breath    • Stroke (HCC) 03/2018    denies residual         Past Surgical History:   Procedure Laterality Date   • PUMP REVISION Left 9/17/2021    Procedure: REVISION, INSERTION, OR REPLACEMENT, INTRATHECAL ANALGESIC PUMP - REPLACEMENT;  Surgeon: Jass Mendes M.D.;  Location: SURGERY [facility];  Service: Pain Management   • RECOVERY  11/30/2015    Procedure: US-Non Targeted Liver Biopsy-;  Surgeon:  Recoveryonly Surgery;  [place] [facility] [place]:    • PUMP INSERT/REMOVE  11/25/2014    Performed by Jass Mendes M.D. at SURGERY [facility]   • CATH PLACE PERM EPIDURAL  9/9/2014    Performed by Jass Mendes M.D. at SURGERY [facility]   • OTHER  2/28/2014    cryoablation right kidney   • EVACUATION OF HEMATOMA  5/31/2013    Performed by Davis Murray M.D. at SURGERY [facility]   • EXPLORATORY LAPAROTOMY  5/31/2013    Performed by Davis Murray M.D. at SURGERY [facility]   • LUMBAR FUSION ANTERIOR  5/29/2013    Performed by Suman Burks M.D. at SURGERY [facility]   • RECOVERY  4/23/2013    Performed by -Recovery Surgery at [facility] SAME DAY [facility]   • CERVICAL DISK AND FUSION ANTERIOR  12/11/2012    Performed by Suman Burks M.D. at SURGERY [facility]   • OTHER  1994    gallbladder removal   • CERVICAL DECOMPRESSION POSTERIOR     • CHOLECYSTECTOMY     • NEPHRECTOMY RADICAL     • NEPHRECTOMY RADICAL Left     Left kidney removed   • OTHER      liver transplant   • OTHER SURGICAL PROCEDURE      liver transplant   • PUMP INSERT/REMOVE      pain pump placed         Current Outpatient Medications   Medication Sig Dispense Refill   • omeprazole (PRILOSEC) 20 MG delayed-release capsule Take 20 mg by mouth 2 times a day.     • tamsulosin (FLOMAX) 0.4 MG capsule Take 0.4 mg by mouth every day.     • ROPINIRole (REQUIP) 1 MG Tab Take 1 mg by mouth as needed.     • nitroglycerin (NITROSTAT) 0.4 MG SL Tab Place 1 Tablet under the tongue as needed for Chest Pain. 25 Tablet 3   • metoprolol SR (TOPROL XL) 25 MG TABLET SR 24 HR Take 1 Tablet by mouth every day. 100 Tablet 2   • citalopram (CELEXA) 20 MG Tab Take 20 mg by mouth every day.     • albuterol 108 (90 Base) MCG/ACT Aero Soln inhalation aerosol Inhale 2 Puffs every four hours as needed for Shortness of Breath. 8.5 g 0   • ascorbic acid (VITAMIN C) 500 MG tablet Take 1 Tablet by mouth  "2 times a day. 30 Tablet 0   • aspirin EC 81 MG EC tablet Take 1 Tablet by mouth every day. 30 Tablet 0   • vitamin D3 (VITAMIND D3) 1000 UNIT Tab Take 1 Tablet by mouth every day. 60 Tablet    • zinc sulfate (ZINCATE) 220 (50 Zn) MG Cap Take 2,200 mg by mouth every day. 7 Capsule 0   • rizatriptan (MAXALT-MLT) 10 MG disintegrating tablet Take 10 mg by mouth one time as needed.     • allopurinol (ZYLOPRIM) 100 MG Tab Take 100 mg by mouth every bedtime.     • lisinopril (PRINIVIL) 10 MG Tab Take 10 mg by mouth every morning. Indications: High Blood Pressure Disorder     • Pain Pump (PATIENT SUPPLIED) XX BRANDON Inject  as directed continuous. Patient's Pain Pump (placed and maintained as an outpatient)  Medications/concentrations: Hydromorphone 400.0 mcg/mL  Route: Intrathecally  Date of Placement: Unknown  Last changed: 03/16/2022 due to refill pump before 5/16/2022  Continuous infusion rates (Drug/Rate):  Hydromorphone 139.81 mcg / 24 hours (5.83 mcg / hr)  Patient activation dose: Hydromorphone 15.00 mcg  Maximum daily activation dose: Hydromorphone 228.06 mcg  Patient activation lockout interval: 1 hour  Maximum activations per day: 6  Dr Mendes [phone]     • tacrolimus (PROGRAF) 1 MG Cap Take 2 mg by mouth 2 Times a Day. Indications: Liver Transplant Recipients     • tadalafil (CIALIS) 5 MG tablet tadalafil 5 mg tablet   Take 1 tablet(s) EVERY DAY by oral route.       No current facility-administered medications for this visit.         Allergies   Allergen Reactions   • Niacin      Passed out.    • Latex Rash     Reaction- Itching, Rash   • Pcn [Penicillins] Anaphylaxis and Swelling     Patient has tolerated cefazolin in past   • Morphine    • Morphine Sulfate      Per pt., \"it makes me cranky\"   • Mushroom Extract Complex          Family History   Problem Relation Age of Onset   • Hypertension Other          Social History     Socioeconomic History   • Marital status:      Spouse name: Not on file   • " "Number of children: Not on file   • Years of education: Not on file   • Highest education level: Not on file   Occupational History   • Not on file   Tobacco Use   • Smoking status: Former Smoker     Packs/day: 1.00     Years: 50.00     Pack years: 50.00     Types: Cigarettes     Quit date: 2021     Years since quittin.9   • Smokeless tobacco: Never Used   • Tobacco comment: quit four months ago   Vaping Use   • Vaping Use: Former   • Substances: Nicotine, quit   • Devices: Pre-filled or refillable cartridge, quit   Substance and Sexual Activity   • Alcohol use: Yes     Alcohol/week: 0.6 oz     Types: 1 Cans of beer per week     Comment: every 3 weeks   • Drug use: No   • Sexual activity: Not on file   Other Topics Concern   • Not on file   Social History Narrative    ** Merged History Encounter **          Social Determinants of Health     Financial Resource Strain: Not on file   Food Insecurity: Not on file   Transportation Needs: Not on file   Physical Activity: Not on file   Stress: Not on file   Social Connections: Not on file   Intimate Partner Violence: Not on file   Housing Stability: Not on file       Physical Exam:  Ambulatory Vitals  /64 (BP Location: Left arm, Patient Position: Sitting, BP Cuff Size: Adult)   Pulse 85   Resp 18   Ht 1.702 m (5' 7\")   Wt 74.8 kg (165 lb)   SpO2 95%    Oxygen Therapy:  Pulse Oximetry: 95 %  BP Readings from Last 4 Encounters:   22 122/64   22 130/80   22 131/70   22 124/64       Weight/BMI: Body mass index is 25.84 kg/m².  Wt Readings from Last 4 Encounters:   22 74.8 kg (165 lb)   22 73 kg (161 lb)   22 74.3 kg (163 lb 12.8 oz)   22 74.2 kg (163 lb 9 oz)       GEN: Well developed, well nourished and in no acute distress.  HEART: no significant JVD, regular rate and rhythm, normal S1 and S2, no murmurs, no third heart sounds, normal cardiac palpation  LUNG: Distant breath sounds, clear to auscultation " bilaterally, no wheezing, no crackles, normal respiratory effort on room air  ABDOMEN: soft, non-tender, non-distended, normal bowel sounds throughout  EXTREMITIES: no peripheral edema noted  VASCULAR: no significantly elevated jugular venous pressure, no carotid bruits noted, radial pulses 2+ and equal      Lab Data Review:  Lab Results   Component Value Date/Time    CHOLSTRLTOT 145 03/29/2022 04:08 AM    LDL 88 03/29/2022 04:08 AM    HDL 31 (A) 03/29/2022 04:08 AM    TRIGLYCERIDE 132 03/29/2022 04:08 AM       Lab Results   Component Value Date/Time    SODIUM 134 (L) 03/31/2022 04:42 AM    POTASSIUM 3.9 03/31/2022 04:42 AM    CHLORIDE 97 03/31/2022 04:42 AM    CO2 23 03/31/2022 04:42 AM    GLUCOSE 96 03/31/2022 04:42 AM    BUN 17 03/31/2022 04:42 AM    CREATININE 0.77 03/31/2022 04:42 AM     Lab Results   Component Value Date/Time    ALKPHOSPHAT 65 03/28/2022 08:03 PM    ASTSGOT 16 03/28/2022 08:03 PM    ALTSGPT 15 03/28/2022 08:03 PM    TBILIRUBIN <0.2 03/28/2022 08:03 PM      Lab Results   Component Value Date/Time    WBC 8.8 03/31/2022 04:42 AM     Lab Results   Component Value Date/Time    HBA1C 5.8 (H) 03/10/2022 01:30 AM    HBA1C 5.9 (H) 02/01/2022 11:08 AM       Cardiac Imaging and Procedures Review:    ECG: ECG performed 3/29/2022 was interpreted by me which shows sinus rhythm    ECG: ECG performed 3/9/2022 was interpreted by me which shows sinus rhythm    ECG: ECG performed 5/6/2021 was interpreted by me which shows sinus rhythm     Echo: Echocardiogram performed on 3/10/2022 was personally interpreted by me which shows hyperdynamic left ventricular systolic function with no valve abnormality and no wall motion abnormality.    Echo dated 7/5/2021:   CONCLUSIONS  Normal left ventricular chamber size.  Left ventricular ejection fraction is visually estimated to be 60%.  Normal diastolic function.  Normal inferior vena cava size and inspiratory collapse.    Nuclear Perfusion Imaging (6/3/2021):    NUCLEAR  IMAGING INTERPRETATION   Normal perfusion.   Normal left ventricular wall motion.     LV ejection fraction = 68%.   ECG INTERPRETATION   Nondiagnostic ECG portion of a Regadenoson nuclear stress test.    [facility] 3/30/2022:  IMPRESSION:  1. Mild nonobstructive coronary artery disease  2. Normal left heart filling pressures      Radiology test Review:  CXR: No cardiopulmonary abnormality noted.    CTA chest 3/9/2022: IMPRESSION:   1.  No evidence of pulmonary embolism.  2.  Mild emphysematous changes.  3.  Atherosclerosis.    PFTs 3/7/2022:  Interpretation;   Baseline spirometry shows normal airflows.  No significant bronchodilator response.  Lung volumes are within normal limits.  Diffusion capacity is within normal limits.  Normal pulmonary function testing with normal flow volume loop.      Assessment & Plan     1. Essential hypertension     2. Chronic pain syndrome     3. Dyslipidemia     4. Other depression         Blood pressure well controlled.  Continue lisinopril 10 mg daily and metoprolol XL 25 mg daily.    Had an extensive discussion with the patient regarding ongoing depression after loss of his wife.  Strongly encouraged to speak with his PCP to get a referral to physiatry.    No further episodes of chest pressure and has not required sublingual nitro.      All of patient's excellent questions were answered to the best of my knowledge and to his satisfaction.  It was a pleasure seeing Mr. Quirino Ruiz in my clinic today.  Patient is aware to call the cardiology clinic with any questions or concerns.      Shahid Kennedy MD  [facility]  [place] [facility], [address]  [address] [address]  Phone: [phone]  Fax: [phone]    Please note that this dictation was created using voice recognition software. I have made every reasonable attempt to correct obvious errors, but it is possible there are errors of grammar and possibly content that  I did not discover before finalizing the note.

## 2022-11-18 ENCOUNTER — HOSPITAL ENCOUNTER (OUTPATIENT)
Dept: RADIOLOGY | Facility: MEDICAL CENTER | Age: 67
End: 2022-11-18
Attending: PHYSICIAN ASSISTANT
Payer: MEDICARE

## 2022-11-18 ENCOUNTER — HOSPITAL ENCOUNTER (OUTPATIENT)
Facility: MEDICAL CENTER | Age: 67
End: 2022-11-18
Attending: INTERNAL MEDICINE
Payer: MEDICARE

## 2022-11-18 ENCOUNTER — HOSPITAL ENCOUNTER (OUTPATIENT)
Dept: LAB | Facility: MEDICAL CENTER | Age: 67
End: 2022-11-18
Attending: UROLOGY
Payer: MEDICARE

## 2022-11-18 ENCOUNTER — HOSPITAL ENCOUNTER (OUTPATIENT)
Dept: RADIOLOGY | Facility: MEDICAL CENTER | Age: 67
End: 2022-11-18
Payer: MEDICARE

## 2022-11-18 VITALS — HEIGHT: 67 IN | BODY MASS INDEX: 25.9 KG/M2 | WEIGHT: 165 LBS

## 2022-11-18 DIAGNOSIS — M47.816 LUMBAR SPONDYLOSIS: ICD-10-CM

## 2022-11-18 DIAGNOSIS — Z98.890 HX OF CERVICAL SPINE SURGERY: ICD-10-CM

## 2022-11-18 DIAGNOSIS — M54.12 CERVICAL RADICULOPATHY: ICD-10-CM

## 2022-11-18 DIAGNOSIS — G56.03 BILATERAL CARPAL TUNNEL SYNDROME: ICD-10-CM

## 2022-11-18 DIAGNOSIS — M54.16 LUMBAR RADICULOPATHY: ICD-10-CM

## 2022-11-18 LAB
ALBUMIN SERPL BCP-MCNC: 4 G/DL (ref 3.2–4.9)
ALBUMIN/GLOB SERPL: 1.3 G/DL
ALP SERPL-CCNC: 64 U/L (ref 30–99)
ALT SERPL-CCNC: 28 U/L (ref 2–50)
ANION GAP SERPL CALC-SCNC: 11 MMOL/L (ref 7–16)
AST SERPL-CCNC: 24 U/L (ref 12–45)
BASOPHILS # BLD AUTO: 0.8 % (ref 0–1.8)
BASOPHILS # BLD: 0.06 K/UL (ref 0–0.12)
BILIRUB SERPL-MCNC: 0.4 MG/DL (ref 0.1–1.5)
BUN SERPL-MCNC: 9 MG/DL (ref 8–22)
CALCIUM SERPL-MCNC: 8.6 MG/DL (ref 8.5–10.5)
CHLORIDE SERPL-SCNC: 100 MMOL/L (ref 96–112)
CO2 SERPL-SCNC: 23 MMOL/L (ref 20–33)
CREAT SERPL-MCNC: 0.79 MG/DL (ref 0.5–1.4)
EOSINOPHIL # BLD AUTO: 0.26 K/UL (ref 0–0.51)
EOSINOPHIL NFR BLD: 3.3 % (ref 0–6.9)
ERYTHROCYTE [DISTWIDTH] IN BLOOD BY AUTOMATED COUNT: 45.7 FL (ref 35.9–50)
GFR SERPLBLD CREATININE-BSD FMLA CKD-EPI: 96 ML/MIN/1.73 M 2
GLOBULIN SER CALC-MCNC: 3.2 G/DL (ref 1.9–3.5)
GLUCOSE SERPL-MCNC: 86 MG/DL (ref 65–99)
HCT VFR BLD AUTO: 43.7 % (ref 42–52)
HGB BLD-MCNC: 14.8 G/DL (ref 14–18)
IMM GRANULOCYTES # BLD AUTO: 0.04 K/UL (ref 0–0.11)
IMM GRANULOCYTES NFR BLD AUTO: 0.5 % (ref 0–0.9)
LYMPHOCYTES # BLD AUTO: 2.38 K/UL (ref 1–4.8)
LYMPHOCYTES NFR BLD: 29.8 % (ref 22–41)
MCH RBC QN AUTO: 31.8 PG (ref 27–33)
MCHC RBC AUTO-ENTMCNC: 33.9 G/DL (ref 33.7–35.3)
MCV RBC AUTO: 94 FL (ref 81.4–97.8)
MONOCYTES # BLD AUTO: 1 K/UL (ref 0–0.85)
MONOCYTES NFR BLD AUTO: 12.5 % (ref 0–13.4)
NEUTROPHILS # BLD AUTO: 4.26 K/UL (ref 1.82–7.42)
NEUTROPHILS NFR BLD: 53.1 % (ref 44–72)
NRBC # BLD AUTO: 0 K/UL
NRBC BLD-RTO: 0 /100 WBC
PLATELET # BLD AUTO: 265 K/UL (ref 164–446)
PMV BLD AUTO: 9.5 FL (ref 9–12.9)
POTASSIUM SERPL-SCNC: 3.5 MMOL/L (ref 3.6–5.5)
PROT SERPL-MCNC: 7.2 G/DL (ref 6–8.2)
PSA SERPL-MCNC: 1.59 NG/ML (ref 0–4)
RBC # BLD AUTO: 4.65 M/UL (ref 4.7–6.1)
SODIUM SERPL-SCNC: 134 MMOL/L (ref 135–145)
TESTOST SERPL-MCNC: 396 NG/DL (ref 175–781)
WBC # BLD AUTO: 8 K/UL (ref 4.8–10.8)

## 2022-11-18 PROCEDURE — 72141 MRI NECK SPINE W/O DYE: CPT

## 2022-11-18 PROCEDURE — A9270 NON-COVERED ITEM OR SERVICE: HCPCS | Performed by: RADIOLOGY

## 2022-11-18 PROCEDURE — 36415 COLL VENOUS BLD VENIPUNCTURE: CPT

## 2022-11-18 PROCEDURE — 700102 HCHG RX REV CODE 250 W/ 637 OVERRIDE(OP): Performed by: RADIOLOGY

## 2022-11-18 PROCEDURE — 80053 COMPREHEN METABOLIC PANEL: CPT

## 2022-11-18 PROCEDURE — 72148 MRI LUMBAR SPINE W/O DYE: CPT

## 2022-11-18 PROCEDURE — 80197 ASSAY OF TACROLIMUS: CPT

## 2022-11-18 PROCEDURE — 85025 COMPLETE CBC W/AUTO DIFF WBC: CPT

## 2022-11-18 PROCEDURE — 84153 ASSAY OF PSA TOTAL: CPT

## 2022-11-18 PROCEDURE — 84403 ASSAY OF TOTAL TESTOSTERONE: CPT

## 2022-11-18 RX ORDER — ALPRAZOLAM 1 MG/1
1 TABLET ORAL
Status: COMPLETED | OUTPATIENT
Start: 2022-11-18 | End: 2022-11-18

## 2022-11-18 RX ADMIN — ALPRAZOLAM 1 MG: 1 TABLET ORAL at 10:55

## 2022-11-18 ASSESSMENT — FIBROSIS 4 INDEX: FIB4 SCORE: 1.01

## 2022-11-18 NOTE — DISCHARGE INSTRUCTIONS
MRI ADULT DISCHARGE INSTRUCTIONS    You have been medicated today for your scan. Please follow the instructions below to ensure your safe recovery. If you have any questions or problems, feel free to call us at [phone] or [phone].     1.   Have someone stay with you to assist you as needed.    2.   Do not drive or operate any mechanical devices.    3.   Do not perform any activity that requires concentration. Make no major decisions over the next 24 hours.     4.   Be careful changing positions from laying down to sitting or standing, as you may become dizzy.     5.   Do not drink alcohol for 48 hours.    6.   There are no restrictions for eating your normal meals. Drink fluids.    7.   You may continue your usual medications for pain, tranquilizers, muscle relaxants or sedatives when awake.     8.   Tomorrow, you may continue your normal daily activities.     9.   Pressure dressing on 10 - 15 minutes. If swelling or bleeding occurs when removed, continue placing direct pressure on injection site for another 5 minutes, or until bleeding stops.   Alprazolam (XANAX) tablet  What is this medicine?  You were prescribed ALPRAZOLAM (al PRAY shailesh chao) for the procedure you had today. This medication is a benzodiazepine. It is used to treat anxiety and panic attacks.  This medicine may be used for other purposes; ask your health care provider or pharmacist if you have questions.  What side effects may I notice from receiving this medicine?  Side effects that you should report to your doctor or health care professional as soon as possible:  allergic reactions like skin rash, itching or hives, swelling of the face, lips, or tongue  confusion, forgetfulness  depression  difficulty sleeping  difficulty speaking  feeling faint or lightheaded, falls  mood changes, excitability or aggressive behavior  muscle cramps  trouble passing urine or change in the amount of urine  unusually weak or tired  Side effects that usually do not  require medical attention (report to your doctor or health care professional if they continue or are bothersome):  changes in appetite  change in sex drive or performance  This list may not describe all possible side effects. Call your doctor for medical advice about side effects. You may report side effects to FDA at 1-315-ZRK-2119.     I have been informed of and understand the above discharge instructions.

## 2022-11-21 LAB — TACROLIMUS BLD-MCNC: 5.3 NG/ML

## 2022-11-28 ENCOUNTER — TELEPHONE (OUTPATIENT)
Dept: NEUROLOGY | Facility: MEDICAL CENTER | Age: 67
End: 2022-11-28
Payer: MEDICARE

## 2022-11-28 NOTE — TELEPHONE ENCOUNTER
[facility]     [facility]? Yes    Is visit type and length correct?  Yes    Is referral attached to visit? Yes    Were records received from referring provider? Yes    Patient was not contacted to have someone accompany them to visit.    Is this appointment scheduled as a Hospital Follow-Up?  No    Does the patient require any pre procedure or post procedure follow up? No    If any orders were placed at last visit or intended to be done for this visit do we have Results/Consult Notes? No  Labs - Labs were not ordered at last office visit.  Note: If patient appointment is for lab review and patient did not complete labs, check with provider if OK to reschedule patient until labs completed.  Imaging - Imaging was not ordered at last office visit.  Referrals - Referral ordered, patient has NOT been seen.        10.  If patient appointment is for Botox - is order pended for provider? No

## 2022-12-06 ENCOUNTER — APPOINTMENT (OUTPATIENT)
Dept: NEUROLOGY | Facility: MEDICAL CENTER | Age: 67
End: 2022-12-06
Attending: PSYCHIATRY & NEUROLOGY
Payer: MEDICARE

## 2022-12-13 NOTE — CARE PLAN
Problem: Hyperinflation:  Goal: Prevent or improve atelectasis  Outcome: PROGRESSING AS EXPECTED  Pt is achieving 0915-6147 mL on IS. PEP is ordered QID       Recommended Eylea injection today. The injection was given and tolerated well by patient. Post-injection instructions were reviewed and understood by the patient.

## 2023-01-10 NOTE — PROGRESS NOTES
Patient back from cath lab.  Lunch provided.  Patient requesting to have hemoband removed. Educated patient on need to keep hemoband on and slowly decrease pressure. Removed 6cc of air at this time. Will continue to monitor.   Tarsorrhaphy Text: A tarsorrhaphy was performed using Frost sutures.

## 2023-02-07 PROBLEM — M48.02 CERVICAL STENOSIS OF SPINE: Status: ACTIVE | Noted: 2023-02-07

## 2023-02-27 ENCOUNTER — HOSPITAL ENCOUNTER (OUTPATIENT)
Dept: RADIOLOGY | Facility: MEDICAL CENTER | Age: 68
DRG: 472 | End: 2023-02-27
Attending: NEUROLOGICAL SURGERY | Admitting: NEUROLOGICAL SURGERY
Payer: MEDICARE

## 2023-02-27 ENCOUNTER — PRE-ADMISSION TESTING (OUTPATIENT)
Dept: ADMISSIONS | Facility: MEDICAL CENTER | Age: 68
DRG: 472 | End: 2023-02-27
Attending: NEUROLOGICAL SURGERY
Payer: MEDICARE

## 2023-02-27 DIAGNOSIS — M48.02 CERVICAL SPINAL STENOSIS: ICD-10-CM

## 2023-02-27 LAB
25(OH)D3 SERPL-MCNC: 17 NG/ML (ref 30–100)
ABO GROUP BLD: NORMAL
ANION GAP SERPL CALC-SCNC: 10 MMOL/L (ref 7–16)
APPEARANCE UR: CLEAR
APTT PPP: 28.4 SEC (ref 24.7–36)
BASOPHILS # BLD AUTO: 0.7 % (ref 0–1.8)
BASOPHILS # BLD: 0.05 K/UL (ref 0–0.12)
BILIRUB UR QL STRIP.AUTO: NEGATIVE
BLD GP AB SCN SERPL QL: NORMAL
BUN SERPL-MCNC: 15 MG/DL (ref 8–22)
CALCIUM SERPL-MCNC: 9.3 MG/DL (ref 8.5–10.5)
CHLORIDE SERPL-SCNC: 102 MMOL/L (ref 96–112)
CO2 SERPL-SCNC: 22 MMOL/L (ref 20–33)
COLOR UR: YELLOW
CREAT SERPL-MCNC: 0.72 MG/DL (ref 0.5–1.4)
EKG IMPRESSION: NORMAL
EOSINOPHIL # BLD AUTO: 0.28 K/UL (ref 0–0.51)
EOSINOPHIL NFR BLD: 3.7 % (ref 0–6.9)
ERYTHROCYTE [DISTWIDTH] IN BLOOD BY AUTOMATED COUNT: 47.1 FL (ref 35.9–50)
EST. AVERAGE GLUCOSE BLD GHB EST-MCNC: 105 MG/DL
GFR SERPLBLD CREATININE-BSD FMLA CKD-EPI: 99 ML/MIN/1.73 M 2
GLUCOSE SERPL-MCNC: 98 MG/DL (ref 65–99)
GLUCOSE UR STRIP.AUTO-MCNC: NEGATIVE MG/DL
HBA1C MFR BLD: 5.3 % (ref 4–5.6)
HCT VFR BLD AUTO: 46.4 % (ref 42–52)
HGB BLD-MCNC: 16.1 G/DL (ref 14–18)
IMM GRANULOCYTES # BLD AUTO: 0.04 K/UL (ref 0–0.11)
IMM GRANULOCYTES NFR BLD AUTO: 0.5 % (ref 0–0.9)
INR PPP: 1.12 (ref 0.87–1.13)
KETONES UR STRIP.AUTO-MCNC: NEGATIVE MG/DL
LEUKOCYTE ESTERASE UR QL STRIP.AUTO: NEGATIVE
LYMPHOCYTES # BLD AUTO: 2.25 K/UL (ref 1–4.8)
LYMPHOCYTES NFR BLD: 29.5 % (ref 22–41)
MCH RBC QN AUTO: 31.4 PG (ref 27–33)
MCHC RBC AUTO-ENTMCNC: 34.7 G/DL (ref 33.7–35.3)
MCV RBC AUTO: 90.4 FL (ref 81.4–97.8)
MICRO URNS: NORMAL
MONOCYTES # BLD AUTO: 0.74 K/UL (ref 0–0.85)
MONOCYTES NFR BLD AUTO: 9.7 % (ref 0–13.4)
NEUTROPHILS # BLD AUTO: 4.27 K/UL (ref 1.82–7.42)
NEUTROPHILS NFR BLD: 55.9 % (ref 44–72)
NITRITE UR QL STRIP.AUTO: NEGATIVE
NRBC # BLD AUTO: 0 K/UL
NRBC BLD-RTO: 0 /100 WBC
PH UR STRIP.AUTO: 5.5 [PH] (ref 5–8)
PLATELET # BLD AUTO: 243 K/UL (ref 164–446)
PMV BLD AUTO: 9.1 FL (ref 9–12.9)
POTASSIUM SERPL-SCNC: 4.6 MMOL/L (ref 3.6–5.5)
PROT UR QL STRIP: NEGATIVE MG/DL
PROTHROMBIN TIME: 14.3 SEC (ref 12–14.6)
RBC # BLD AUTO: 5.13 M/UL (ref 4.7–6.1)
RBC UR QL AUTO: NEGATIVE
RH BLD: NORMAL
SODIUM SERPL-SCNC: 134 MMOL/L (ref 135–145)
SP GR UR STRIP.AUTO: 1.01
UROBILINOGEN UR STRIP.AUTO-MCNC: 0.2 MG/DL
WBC # BLD AUTO: 7.6 K/UL (ref 4.8–10.8)

## 2023-02-27 PROCEDURE — 36415 COLL VENOUS BLD VENIPUNCTURE: CPT

## 2023-02-27 PROCEDURE — 83036 HEMOGLOBIN GLYCOSYLATED A1C: CPT

## 2023-02-27 PROCEDURE — 80048 BASIC METABOLIC PNL TOTAL CA: CPT

## 2023-02-27 PROCEDURE — 86850 RBC ANTIBODY SCREEN: CPT

## 2023-02-27 PROCEDURE — 85610 PROTHROMBIN TIME: CPT

## 2023-02-27 PROCEDURE — 93010 ELECTROCARDIOGRAM REPORT: CPT | Performed by: INTERNAL MEDICINE

## 2023-02-27 PROCEDURE — 85730 THROMBOPLASTIN TIME PARTIAL: CPT

## 2023-02-27 PROCEDURE — 93005 ELECTROCARDIOGRAM TRACING: CPT

## 2023-02-27 PROCEDURE — 85025 COMPLETE CBC W/AUTO DIFF WBC: CPT

## 2023-02-27 PROCEDURE — 86900 BLOOD TYPING SEROLOGIC ABO: CPT

## 2023-02-27 PROCEDURE — 71046 X-RAY EXAM CHEST 2 VIEWS: CPT

## 2023-02-27 PROCEDURE — 86901 BLOOD TYPING SEROLOGIC RH(D): CPT

## 2023-02-27 PROCEDURE — 81003 URINALYSIS AUTO W/O SCOPE: CPT

## 2023-02-27 PROCEDURE — 82306 VITAMIN D 25 HYDROXY: CPT

## 2023-02-27 RX ORDER — PANTOPRAZOLE SODIUM 40 MG/1
40 TABLET, DELAYED RELEASE ORAL
COMMUNITY
Start: 2023-01-30

## 2023-02-27 ASSESSMENT — FIBROSIS 4 INDEX: FIB4 SCORE: 1.16

## 2023-03-08 ENCOUNTER — APPOINTMENT (OUTPATIENT)
Dept: RADIOLOGY | Facility: MEDICAL CENTER | Age: 68
DRG: 472 | End: 2023-03-08
Attending: NEUROLOGICAL SURGERY
Payer: MEDICARE

## 2023-03-08 ENCOUNTER — ANESTHESIA (OUTPATIENT)
Dept: SURGERY | Facility: MEDICAL CENTER | Age: 68
DRG: 472 | End: 2023-03-08
Payer: MEDICARE

## 2023-03-08 ENCOUNTER — ANESTHESIA EVENT (OUTPATIENT)
Dept: SURGERY | Facility: MEDICAL CENTER | Age: 68
DRG: 472 | End: 2023-03-08
Payer: MEDICARE

## 2023-03-08 ENCOUNTER — HOSPITAL ENCOUNTER (INPATIENT)
Facility: MEDICAL CENTER | Age: 68
LOS: 1 days | DRG: 472 | End: 2023-03-09
Attending: NEUROLOGICAL SURGERY | Admitting: NEUROLOGICAL SURGERY
Payer: MEDICARE

## 2023-03-08 DIAGNOSIS — M48.02 CERVICAL STENOSIS OF SPINE: ICD-10-CM

## 2023-03-08 DIAGNOSIS — G89.18 POSTOPERATIVE PAIN AFTER SPINAL SURGERY: ICD-10-CM

## 2023-03-08 PROCEDURE — 22830 EXPLORATION OF SPINAL FUSION: CPT | Mod: ASROC | Performed by: PHYSICIAN ASSISTANT

## 2023-03-08 PROCEDURE — 0RP104Z REMOVAL OF INTERNAL FIXATION DEVICE FROM CERVICAL VERTEBRAL JOINT, OPEN APPROACH: ICD-10-PCS | Performed by: NEUROLOGICAL SURGERY

## 2023-03-08 PROCEDURE — 63082 REMOVE VERTEBRAL BODY ADD-ON: CPT | Mod: 59 | Performed by: NEUROLOGICAL SURGERY

## 2023-03-08 PROCEDURE — 700106 HCHG RX REV CODE 271: Performed by: NEUROLOGICAL SURGERY

## 2023-03-08 PROCEDURE — 700101 HCHG RX REV CODE 250: Performed by: ANESTHESIOLOGY

## 2023-03-08 PROCEDURE — 160029 HCHG SURGERY MINUTES - 1ST 30 MINS LEVEL 4: Performed by: NEUROLOGICAL SURGERY

## 2023-03-08 PROCEDURE — 700111 HCHG RX REV CODE 636 W/ 250 OVERRIDE (IP): Performed by: NEUROLOGICAL SURGERY

## 2023-03-08 PROCEDURE — 22585 ARTHRD ANT NTRBD MIN DSC EA: CPT | Performed by: NEUROLOGICAL SURGERY

## 2023-03-08 PROCEDURE — 22585 ARTHRD ANT NTRBD MIN DSC EA: CPT | Mod: ASROC | Performed by: PHYSICIAN ASSISTANT

## 2023-03-08 PROCEDURE — 22554 ARTHRD ANT NTRBD MIN DSC CRV: CPT | Performed by: NEUROLOGICAL SURGERY

## 2023-03-08 PROCEDURE — 700101 HCHG RX REV CODE 250: Performed by: PHYSICIAN ASSISTANT

## 2023-03-08 PROCEDURE — 69990 MICROSURGERY ADD-ON: CPT | Performed by: NEUROLOGICAL SURGERY

## 2023-03-08 PROCEDURE — 160035 HCHG PACU - 1ST 60 MINS PHASE I: Performed by: NEUROLOGICAL SURGERY

## 2023-03-08 PROCEDURE — 63082 REMOVE VERTEBRAL BODY ADD-ON: CPT | Mod: ASROC,59 | Performed by: PHYSICIAN ASSISTANT

## 2023-03-08 PROCEDURE — 700102 HCHG RX REV CODE 250 W/ 637 OVERRIDE(OP): Performed by: PHYSICIAN ASSISTANT

## 2023-03-08 PROCEDURE — 00670 ANES XTNSV SP&SPI CORD PX: CPT | Performed by: ANESTHESIOLOGY

## 2023-03-08 PROCEDURE — 160002 HCHG RECOVERY MINUTES (STAT): Performed by: NEUROLOGICAL SURGERY

## 2023-03-08 PROCEDURE — 95861 NEEDLE EMG 2 EXTREMITIES: CPT | Performed by: NEUROLOGICAL SURGERY

## 2023-03-08 PROCEDURE — 160048 HCHG OR STATISTICAL LEVEL 1-5: Performed by: NEUROLOGICAL SURGERY

## 2023-03-08 PROCEDURE — C1821 INTERSPINOUS IMPLANT: HCPCS | Performed by: NEUROLOGICAL SURGERY

## 2023-03-08 PROCEDURE — 20936 SP BONE AGRFT LOCAL ADD-ON: CPT | Performed by: NEUROLOGICAL SURGERY

## 2023-03-08 PROCEDURE — 69990 MICROSURGERY ADD-ON: CPT | Mod: ASROC | Performed by: PHYSICIAN ASSISTANT

## 2023-03-08 PROCEDURE — 95938 SOMATOSENSORY TESTING: CPT | Performed by: NEUROLOGICAL SURGERY

## 2023-03-08 PROCEDURE — 700111 HCHG RX REV CODE 636 W/ 250 OVERRIDE (IP): Performed by: PHYSICIAN ASSISTANT

## 2023-03-08 PROCEDURE — 770001 HCHG ROOM/CARE - MED/SURG/GYN PRIV*

## 2023-03-08 PROCEDURE — 700101 HCHG RX REV CODE 250: Performed by: NEUROLOGICAL SURGERY

## 2023-03-08 PROCEDURE — 502000 HCHG MISC OR IMPLANTS RC 0278: Performed by: NEUROLOGICAL SURGERY

## 2023-03-08 PROCEDURE — 160036 HCHG PACU - EA ADDL 30 MINS PHASE I: Performed by: NEUROLOGICAL SURGERY

## 2023-03-08 PROCEDURE — 22855 REMOVAL ANTERIOR INSTRMJ: CPT | Performed by: NEUROLOGICAL SURGERY

## 2023-03-08 PROCEDURE — A9270 NON-COVERED ITEM OR SERVICE: HCPCS | Performed by: PHYSICIAN ASSISTANT

## 2023-03-08 PROCEDURE — 22830 EXPLORATION OF SPINAL FUSION: CPT | Performed by: NEUROLOGICAL SURGERY

## 2023-03-08 PROCEDURE — 700102 HCHG RX REV CODE 250 W/ 637 OVERRIDE(OP): Performed by: ANESTHESIOLOGY

## 2023-03-08 PROCEDURE — 95939 C MOTOR EVOKED UPR&LWR LIMBS: CPT | Performed by: NEUROLOGICAL SURGERY

## 2023-03-08 PROCEDURE — 0RT30ZZ RESECTION OF CERVICAL VERTEBRAL DISC, OPEN APPROACH: ICD-10-PCS | Performed by: NEUROLOGICAL SURGERY

## 2023-03-08 PROCEDURE — 20936 SP BONE AGRFT LOCAL ADD-ON: CPT | Mod: ASROC | Performed by: PHYSICIAN ASSISTANT

## 2023-03-08 PROCEDURE — 22854 INSJ BIOMECHANICAL DEVICE: CPT | Mod: ASROC | Performed by: PHYSICIAN ASSISTANT

## 2023-03-08 PROCEDURE — 01N10ZZ RELEASE CERVICAL NERVE, OPEN APPROACH: ICD-10-PCS | Performed by: NEUROLOGICAL SURGERY

## 2023-03-08 PROCEDURE — 160041 HCHG SURGERY MINUTES - EA ADDL 1 MIN LEVEL 4: Performed by: NEUROLOGICAL SURGERY

## 2023-03-08 PROCEDURE — 700105 HCHG RX REV CODE 258: Performed by: ANESTHESIOLOGY

## 2023-03-08 PROCEDURE — 22854 INSJ BIOMECHANICAL DEVICE: CPT | Performed by: NEUROLOGICAL SURGERY

## 2023-03-08 PROCEDURE — 72040 X-RAY EXAM NECK SPINE 2-3 VW: CPT

## 2023-03-08 PROCEDURE — 63081 REMOVE VERT BODY DCMPRN CRVL: CPT | Mod: ASROC,22ROC | Performed by: PHYSICIAN ASSISTANT

## 2023-03-08 PROCEDURE — C1713 ANCHOR/SCREW BN/BN,TIS/BN: HCPCS | Performed by: NEUROLOGICAL SURGERY

## 2023-03-08 PROCEDURE — 22845 INSERT SPINE FIXATION DEVICE: CPT | Mod: 59 | Performed by: NEUROLOGICAL SURGERY

## 2023-03-08 PROCEDURE — 22845 INSERT SPINE FIXATION DEVICE: CPT | Mod: ASROC,59 | Performed by: PHYSICIAN ASSISTANT

## 2023-03-08 PROCEDURE — 110454 HCHG SHELL REV 250: Performed by: NEUROLOGICAL SURGERY

## 2023-03-08 PROCEDURE — 22554 ARTHRD ANT NTRBD MIN DSC CRV: CPT | Mod: ASROC | Performed by: PHYSICIAN ASSISTANT

## 2023-03-08 PROCEDURE — 700111 HCHG RX REV CODE 636 W/ 250 OVERRIDE (IP): Performed by: ANESTHESIOLOGY

## 2023-03-08 PROCEDURE — 22855 REMOVAL ANTERIOR INSTRMJ: CPT | Mod: ASROC | Performed by: PHYSICIAN ASSISTANT

## 2023-03-08 PROCEDURE — 160009 HCHG ANES TIME/MIN: Performed by: NEUROLOGICAL SURGERY

## 2023-03-08 PROCEDURE — 110371 HCHG SHELL REV 272: Performed by: NEUROLOGICAL SURGERY

## 2023-03-08 PROCEDURE — 63081 REMOVE VERT BODY DCMPRN CRVL: CPT | Mod: 22ROC | Performed by: NEUROLOGICAL SURGERY

## 2023-03-08 PROCEDURE — 95940 IONM IN OPERATNG ROOM 15 MIN: CPT | Performed by: NEUROLOGICAL SURGERY

## 2023-03-08 PROCEDURE — 700105 HCHG RX REV CODE 258: Performed by: NEUROLOGICAL SURGERY

## 2023-03-08 PROCEDURE — 0RG20A0 FUSION OF 2 OR MORE CERVICAL VERTEBRAL JOINTS WITH INTERBODY FUSION DEVICE, ANTERIOR APPROACH, ANTERIOR COLUMN, OPEN APPROACH: ICD-10-PCS | Performed by: NEUROLOGICAL SURGERY

## 2023-03-08 PROCEDURE — A9270 NON-COVERED ITEM OR SERVICE: HCPCS | Performed by: ANESTHESIOLOGY

## 2023-03-08 DEVICE — PLATE ANTERIOR CERVICAL 37.5MM (1TX1+2TCX1=3): Type: IMPLANTABLE DEVICE | Site: SPINE CERVICAL | Status: FUNCTIONAL

## 2023-03-08 DEVICE — SCREW 4.0X16 SELF DRILL VAR (1TX12+2TCX12=36): Type: IMPLANTABLE DEVICE | Site: SPINE CERVICAL | Status: FUNCTIONAL

## 2023-03-08 DEVICE — IMPLANTABLE DEVICE: Type: IMPLANTABLE DEVICE | Site: SPINE CERVICAL | Status: FUNCTIONAL

## 2023-03-08 DEVICE — SCREW 4.0X15 SELF DRILL VAR (1TX12+2TCX12=36): Type: IMPLANTABLE DEVICE | Site: SPINE CERVICAL | Status: FUNCTIONAL

## 2023-03-08 RX ORDER — DIPHENHYDRAMINE HCL 25 MG
25 TABLET ORAL EVERY 6 HOURS PRN
Status: DISCONTINUED | OUTPATIENT
Start: 2023-03-08 | End: 2023-03-09 | Stop reason: HOSPADM

## 2023-03-08 RX ORDER — HYDROMORPHONE HYDROCHLORIDE 1 MG/ML
0.5 INJECTION, SOLUTION INTRAMUSCULAR; INTRAVENOUS; SUBCUTANEOUS
Status: DISCONTINUED | OUTPATIENT
Start: 2023-03-08 | End: 2023-03-09 | Stop reason: HOSPADM

## 2023-03-08 RX ORDER — SODIUM CHLORIDE, SODIUM LACTATE, POTASSIUM CHLORIDE, CALCIUM CHLORIDE 600; 310; 30; 20 MG/100ML; MG/100ML; MG/100ML; MG/100ML
INJECTION, SOLUTION INTRAVENOUS
Status: COMPLETED | OUTPATIENT
Start: 2023-03-08 | End: 2023-03-08

## 2023-03-08 RX ORDER — DEXMEDETOMIDINE HYDROCHLORIDE 100 UG/ML
INJECTION, SOLUTION INTRAVENOUS PRN
Status: DISCONTINUED | OUTPATIENT
Start: 2023-03-08 | End: 2023-03-08 | Stop reason: SURG

## 2023-03-08 RX ORDER — CLINDAMYCIN PHOSPHATE 900 MG/50ML
900 INJECTION, SOLUTION INTRAVENOUS EVERY 8 HOURS
Status: DISCONTINUED | OUTPATIENT
Start: 2023-03-08 | End: 2023-03-08

## 2023-03-08 RX ORDER — DEXAMETHASONE SODIUM PHOSPHATE 10 MG/ML
10 INJECTION, SOLUTION INTRAMUSCULAR; INTRAVENOUS ONCE
Status: DISCONTINUED | OUTPATIENT
Start: 2023-03-08 | End: 2023-03-08 | Stop reason: HOSPADM

## 2023-03-08 RX ORDER — AMOXICILLIN 250 MG
1 CAPSULE ORAL
Status: DISCONTINUED | OUTPATIENT
Start: 2023-03-08 | End: 2023-03-09 | Stop reason: HOSPADM

## 2023-03-08 RX ORDER — CLINDAMYCIN PHOSPHATE 900 MG/50ML
900 INJECTION, SOLUTION INTRAVENOUS ONCE
Status: DISCONTINUED | OUTPATIENT
Start: 2023-03-08 | End: 2023-03-08 | Stop reason: HOSPADM

## 2023-03-08 RX ORDER — HYDROMORPHONE HYDROCHLORIDE 2 MG/ML
INJECTION, SOLUTION INTRAMUSCULAR; INTRAVENOUS; SUBCUTANEOUS PRN
Status: DISCONTINUED | OUTPATIENT
Start: 2023-03-08 | End: 2023-03-08 | Stop reason: SURG

## 2023-03-08 RX ORDER — ALPRAZOLAM 0.25 MG/1
0.25 TABLET ORAL 2 TIMES DAILY PRN
Status: DISCONTINUED | OUTPATIENT
Start: 2023-03-08 | End: 2023-03-09 | Stop reason: HOSPADM

## 2023-03-08 RX ORDER — CYCLOBENZAPRINE HCL 10 MG
10 TABLET ORAL EVERY 8 HOURS PRN
Status: DISCONTINUED | OUTPATIENT
Start: 2023-03-08 | End: 2023-03-09 | Stop reason: HOSPADM

## 2023-03-08 RX ORDER — SODIUM CHLORIDE AND POTASSIUM CHLORIDE 150; 900 MG/100ML; MG/100ML
INJECTION, SOLUTION INTRAVENOUS CONTINUOUS
Status: DISCONTINUED | OUTPATIENT
Start: 2023-03-08 | End: 2023-03-09 | Stop reason: HOSPADM

## 2023-03-08 RX ORDER — TACROLIMUS 1 MG/1
2 CAPSULE ORAL 2 TIMES DAILY
Status: DISCONTINUED | OUTPATIENT
Start: 2023-03-08 | End: 2023-03-09 | Stop reason: HOSPADM

## 2023-03-08 RX ORDER — LISINOPRIL 10 MG/1
10 TABLET ORAL EVERY MORNING
Status: DISCONTINUED | OUTPATIENT
Start: 2023-03-09 | End: 2023-03-09 | Stop reason: HOSPADM

## 2023-03-08 RX ORDER — CEFAZOLIN SODIUM 1 G/3ML
INJECTION, POWDER, FOR SOLUTION INTRAMUSCULAR; INTRAVENOUS
Status: DISCONTINUED | OUTPATIENT
Start: 2023-03-08 | End: 2023-03-08 | Stop reason: HOSPADM

## 2023-03-08 RX ORDER — ONDANSETRON 2 MG/ML
4 INJECTION INTRAMUSCULAR; INTRAVENOUS
Status: DISCONTINUED | OUTPATIENT
Start: 2023-03-08 | End: 2023-03-08 | Stop reason: HOSPADM

## 2023-03-08 RX ORDER — BISACODYL 10 MG
10 SUPPOSITORY, RECTAL RECTAL
Status: DISCONTINUED | OUTPATIENT
Start: 2023-03-08 | End: 2023-03-09 | Stop reason: HOSPADM

## 2023-03-08 RX ORDER — AMOXICILLIN 250 MG
1 CAPSULE ORAL NIGHTLY
Status: DISCONTINUED | OUTPATIENT
Start: 2023-03-08 | End: 2023-03-09 | Stop reason: HOSPADM

## 2023-03-08 RX ORDER — SODIUM CHLORIDE, SODIUM LACTATE, POTASSIUM CHLORIDE, CALCIUM CHLORIDE 600; 310; 30; 20 MG/100ML; MG/100ML; MG/100ML; MG/100ML
INJECTION, SOLUTION INTRAVENOUS CONTINUOUS
Status: DISCONTINUED | OUTPATIENT
Start: 2023-03-08 | End: 2023-03-08 | Stop reason: HOSPADM

## 2023-03-08 RX ORDER — METOPROLOL SUCCINATE 25 MG/1
25 TABLET, EXTENDED RELEASE ORAL DAILY
Status: DISCONTINUED | OUTPATIENT
Start: 2023-03-09 | End: 2023-03-09 | Stop reason: HOSPADM

## 2023-03-08 RX ORDER — NITROGLYCERIN 0.4 MG/1
0.4 TABLET SUBLINGUAL
Status: DISCONTINUED | OUTPATIENT
Start: 2023-03-08 | End: 2023-03-09 | Stop reason: HOSPADM

## 2023-03-08 RX ORDER — MAGNESIUM HYDROXIDE 1200 MG/15ML
LIQUID ORAL
Status: COMPLETED | OUTPATIENT
Start: 2023-03-08 | End: 2023-03-08

## 2023-03-08 RX ORDER — SUCCINYLCHOLINE CHLORIDE 20 MG/ML
INJECTION INTRAMUSCULAR; INTRAVENOUS PRN
Status: DISCONTINUED | OUTPATIENT
Start: 2023-03-08 | End: 2023-03-08 | Stop reason: SURG

## 2023-03-08 RX ORDER — MIDAZOLAM HYDROCHLORIDE 1 MG/ML
INJECTION INTRAMUSCULAR; INTRAVENOUS PRN
Status: DISCONTINUED | OUTPATIENT
Start: 2023-03-08 | End: 2023-03-08 | Stop reason: HOSPADM

## 2023-03-08 RX ORDER — SODIUM CHLORIDE, SODIUM LACTATE, POTASSIUM CHLORIDE, AND CALCIUM CHLORIDE .6; .31; .03; .02 G/100ML; G/100ML; G/100ML; G/100ML
IRRIGANT IRRIGATION
Status: DISCONTINUED | OUTPATIENT
Start: 2023-03-08 | End: 2023-03-08 | Stop reason: HOSPADM

## 2023-03-08 RX ORDER — HYDROMORPHONE HYDROCHLORIDE 1 MG/ML
0.2 INJECTION, SOLUTION INTRAMUSCULAR; INTRAVENOUS; SUBCUTANEOUS
Status: DISCONTINUED | OUTPATIENT
Start: 2023-03-08 | End: 2023-03-08 | Stop reason: HOSPADM

## 2023-03-08 RX ORDER — EPHEDRINE SULFATE 50 MG/ML
5 INJECTION, SOLUTION INTRAVENOUS
Status: DISCONTINUED | OUTPATIENT
Start: 2023-03-08 | End: 2023-03-08 | Stop reason: HOSPADM

## 2023-03-08 RX ORDER — LIDOCAINE HYDROCHLORIDE 20 MG/ML
INJECTION, SOLUTION EPIDURAL; INFILTRATION; INTRACAUDAL; PERINEURAL PRN
Status: DISCONTINUED | OUTPATIENT
Start: 2023-03-08 | End: 2023-03-08 | Stop reason: SURG

## 2023-03-08 RX ORDER — CEFAZOLIN SODIUM 1 G/3ML
INJECTION, POWDER, FOR SOLUTION INTRAMUSCULAR; INTRAVENOUS PRN
Status: DISCONTINUED | OUTPATIENT
Start: 2023-03-08 | End: 2023-03-08 | Stop reason: SURG

## 2023-03-08 RX ORDER — OXYCODONE HYDROCHLORIDE 5 MG/1
5 TABLET ORAL EVERY 4 HOURS PRN
Status: DISCONTINUED | OUTPATIENT
Start: 2023-03-08 | End: 2023-03-09 | Stop reason: HOSPADM

## 2023-03-08 RX ORDER — LABETALOL HYDROCHLORIDE 5 MG/ML
10 INJECTION, SOLUTION INTRAVENOUS
Status: DISCONTINUED | OUTPATIENT
Start: 2023-03-08 | End: 2023-03-09 | Stop reason: HOSPADM

## 2023-03-08 RX ORDER — ONDANSETRON 4 MG/1
4 TABLET, ORALLY DISINTEGRATING ORAL EVERY 4 HOURS PRN
Status: DISCONTINUED | OUTPATIENT
Start: 2023-03-08 | End: 2023-03-09 | Stop reason: HOSPADM

## 2023-03-08 RX ORDER — OMEPRAZOLE 20 MG/1
20 CAPSULE, DELAYED RELEASE ORAL 2 TIMES DAILY
Status: DISCONTINUED | OUTPATIENT
Start: 2023-03-08 | End: 2023-03-09 | Stop reason: HOSPADM

## 2023-03-08 RX ORDER — HALOPERIDOL 5 MG/ML
1 INJECTION INTRAMUSCULAR
Status: DISCONTINUED | OUTPATIENT
Start: 2023-03-08 | End: 2023-03-08 | Stop reason: HOSPADM

## 2023-03-08 RX ORDER — LABETALOL HYDROCHLORIDE 5 MG/ML
5 INJECTION, SOLUTION INTRAVENOUS
Status: DISCONTINUED | OUTPATIENT
Start: 2023-03-08 | End: 2023-03-08 | Stop reason: HOSPADM

## 2023-03-08 RX ORDER — DEXAMETHASONE SODIUM PHOSPHATE 4 MG/ML
4 INJECTION, SOLUTION INTRA-ARTICULAR; INTRALESIONAL; INTRAMUSCULAR; INTRAVENOUS; SOFT TISSUE EVERY 6 HOURS
Status: COMPLETED | OUTPATIENT
Start: 2023-03-08 | End: 2023-03-09

## 2023-03-08 RX ORDER — DEXAMETHASONE SODIUM PHOSPHATE 4 MG/ML
INJECTION, SOLUTION INTRA-ARTICULAR; INTRALESIONAL; INTRAMUSCULAR; INTRAVENOUS; SOFT TISSUE PRN
Status: DISCONTINUED | OUTPATIENT
Start: 2023-03-08 | End: 2023-03-08 | Stop reason: SURG

## 2023-03-08 RX ORDER — MIDAZOLAM HYDROCHLORIDE 1 MG/ML
1 INJECTION INTRAMUSCULAR; INTRAVENOUS
Status: DISCONTINUED | OUTPATIENT
Start: 2023-03-08 | End: 2023-03-08 | Stop reason: HOSPADM

## 2023-03-08 RX ORDER — DOCUSATE SODIUM 100 MG/1
100 CAPSULE, LIQUID FILLED ORAL 2 TIMES DAILY
Status: DISCONTINUED | OUTPATIENT
Start: 2023-03-08 | End: 2023-03-09 | Stop reason: HOSPADM

## 2023-03-08 RX ORDER — POLYETHYLENE GLYCOL 3350 17 G/17G
1 POWDER, FOR SOLUTION ORAL 2 TIMES DAILY PRN
Status: DISCONTINUED | OUTPATIENT
Start: 2023-03-08 | End: 2023-03-09 | Stop reason: HOSPADM

## 2023-03-08 RX ORDER — DIPHENHYDRAMINE HYDROCHLORIDE 50 MG/ML
12.5 INJECTION INTRAMUSCULAR; INTRAVENOUS
Status: DISCONTINUED | OUTPATIENT
Start: 2023-03-08 | End: 2023-03-08 | Stop reason: HOSPADM

## 2023-03-08 RX ORDER — ROCURONIUM BROMIDE 10 MG/ML
INJECTION, SOLUTION INTRAVENOUS PRN
Status: DISCONTINUED | OUTPATIENT
Start: 2023-03-08 | End: 2023-03-08 | Stop reason: SURG

## 2023-03-08 RX ORDER — ACETAMINOPHEN 500 MG
500 TABLET ORAL EVERY 6 HOURS
Status: CANCELLED | OUTPATIENT
Start: 2023-03-08 | End: 2023-03-09

## 2023-03-08 RX ORDER — ENEMA 19; 7 G/133ML; G/133ML
1 ENEMA RECTAL
Status: DISCONTINUED | OUTPATIENT
Start: 2023-03-08 | End: 2023-03-09 | Stop reason: HOSPADM

## 2023-03-08 RX ORDER — PANTOPRAZOLE SODIUM 40 MG/1
40 TABLET, DELAYED RELEASE ORAL
Status: DISCONTINUED | OUTPATIENT
Start: 2023-03-08 | End: 2023-03-08

## 2023-03-08 RX ORDER — ONDANSETRON 2 MG/ML
4 INJECTION INTRAMUSCULAR; INTRAVENOUS EVERY 4 HOURS PRN
Status: DISCONTINUED | OUTPATIENT
Start: 2023-03-08 | End: 2023-03-09 | Stop reason: HOSPADM

## 2023-03-08 RX ORDER — TAMSULOSIN HYDROCHLORIDE 0.4 MG/1
0.4 CAPSULE ORAL DAILY
Status: DISCONTINUED | OUTPATIENT
Start: 2023-03-09 | End: 2023-03-09 | Stop reason: HOSPADM

## 2023-03-08 RX ORDER — ALLOPURINOL 100 MG/1
100 TABLET ORAL
Status: DISCONTINUED | OUTPATIENT
Start: 2023-03-08 | End: 2023-03-09 | Stop reason: HOSPADM

## 2023-03-08 RX ORDER — HYDROMORPHONE HYDROCHLORIDE 1 MG/ML
0.4 INJECTION, SOLUTION INTRAMUSCULAR; INTRAVENOUS; SUBCUTANEOUS
Status: DISCONTINUED | OUTPATIENT
Start: 2023-03-08 | End: 2023-03-08 | Stop reason: HOSPADM

## 2023-03-08 RX ORDER — CITALOPRAM 20 MG/1
20 TABLET ORAL DAILY
Status: DISCONTINUED | OUTPATIENT
Start: 2023-03-09 | End: 2023-03-09 | Stop reason: HOSPADM

## 2023-03-08 RX ORDER — ONDANSETRON 2 MG/ML
INJECTION INTRAMUSCULAR; INTRAVENOUS PRN
Status: DISCONTINUED | OUTPATIENT
Start: 2023-03-08 | End: 2023-03-08 | Stop reason: SURG

## 2023-03-08 RX ORDER — SODIUM CHLORIDE, SODIUM LACTATE, POTASSIUM CHLORIDE, CALCIUM CHLORIDE 600; 310; 30; 20 MG/100ML; MG/100ML; MG/100ML; MG/100ML
INJECTION, SOLUTION INTRAVENOUS
Status: DISCONTINUED | OUTPATIENT
Start: 2023-03-08 | End: 2023-03-08 | Stop reason: SURG

## 2023-03-08 RX ORDER — DIPHENHYDRAMINE HYDROCHLORIDE 50 MG/ML
25 INJECTION INTRAMUSCULAR; INTRAVENOUS EVERY 6 HOURS PRN
Status: DISCONTINUED | OUTPATIENT
Start: 2023-03-08 | End: 2023-03-09 | Stop reason: HOSPADM

## 2023-03-08 RX ORDER — ACETAMINOPHEN 500 MG
500 TABLET ORAL EVERY 6 HOURS PRN
Status: CANCELLED | OUTPATIENT
Start: 2023-03-09

## 2023-03-08 RX ORDER — OXYCODONE HCL 5 MG/5 ML
10 SOLUTION, ORAL ORAL
Status: COMPLETED | OUTPATIENT
Start: 2023-03-08 | End: 2023-03-08

## 2023-03-08 RX ORDER — HYDRALAZINE HYDROCHLORIDE 20 MG/ML
5 INJECTION INTRAMUSCULAR; INTRAVENOUS
Status: DISCONTINUED | OUTPATIENT
Start: 2023-03-08 | End: 2023-03-08 | Stop reason: HOSPADM

## 2023-03-08 RX ORDER — HYDROMORPHONE HYDROCHLORIDE 1 MG/ML
0.1 INJECTION, SOLUTION INTRAMUSCULAR; INTRAVENOUS; SUBCUTANEOUS
Status: DISCONTINUED | OUTPATIENT
Start: 2023-03-08 | End: 2023-03-08 | Stop reason: HOSPADM

## 2023-03-08 RX ORDER — OXYCODONE HCL 5 MG/5 ML
5 SOLUTION, ORAL ORAL
Status: COMPLETED | OUTPATIENT
Start: 2023-03-08 | End: 2023-03-08

## 2023-03-08 RX ORDER — BUPIVACAINE HYDROCHLORIDE AND EPINEPHRINE 5; 5 MG/ML; UG/ML
INJECTION, SOLUTION EPIDURAL; INTRACAUDAL; PERINEURAL
Status: DISCONTINUED | OUTPATIENT
Start: 2023-03-08 | End: 2023-03-08 | Stop reason: HOSPADM

## 2023-03-08 RX ORDER — CLINDAMYCIN PHOSPHATE 900 MG/50ML
900 INJECTION, SOLUTION INTRAVENOUS EVERY 8 HOURS
Status: COMPLETED | OUTPATIENT
Start: 2023-03-09 | End: 2023-03-09

## 2023-03-08 RX ORDER — OXYCODONE HYDROCHLORIDE 5 MG/1
10 TABLET ORAL EVERY 4 HOURS PRN
Status: DISCONTINUED | OUTPATIENT
Start: 2023-03-08 | End: 2023-03-09 | Stop reason: HOSPADM

## 2023-03-08 RX ORDER — KETAMINE HYDROCHLORIDE 50 MG/ML
INJECTION, SOLUTION, CONCENTRATE INTRAMUSCULAR; INTRAVENOUS PRN
Status: DISCONTINUED | OUTPATIENT
Start: 2023-03-08 | End: 2023-03-08 | Stop reason: SURG

## 2023-03-08 RX ADMIN — PROPOFOL 160 MCG/KG/MIN: 10 INJECTION, EMULSION INTRAVENOUS at 07:11

## 2023-03-08 RX ADMIN — PROPOFOL 50 MG: 10 INJECTION, EMULSION INTRAVENOUS at 07:26

## 2023-03-08 RX ADMIN — OMEPRAZOLE 20 MG: 20 CAPSULE, DELAYED RELEASE ORAL at 17:39

## 2023-03-08 RX ADMIN — OXYCODONE HYDROCHLORIDE 10 MG: 5 SOLUTION ORAL at 09:42

## 2023-03-08 RX ADMIN — DEXAMETHASONE SODIUM PHOSPHATE 4 MG: 4 INJECTION, SOLUTION INTRA-ARTICULAR; INTRALESIONAL; INTRAMUSCULAR; INTRAVENOUS; SOFT TISSUE at 21:18

## 2023-03-08 RX ADMIN — HYDROMORPHONE HYDROCHLORIDE 1 MG: 2 INJECTION INTRAMUSCULAR; INTRAVENOUS; SUBCUTANEOUS at 08:25

## 2023-03-08 RX ADMIN — LIDOCAINE HYDROCHLORIDE 50 MG: 20 INJECTION, SOLUTION EPIDURAL; INFILTRATION; INTRACAUDAL at 07:10

## 2023-03-08 RX ADMIN — FENTANYL CITRATE 50 MCG: 50 INJECTION, SOLUTION INTRAMUSCULAR; INTRAVENOUS at 07:27

## 2023-03-08 RX ADMIN — FENTANYL CITRATE 100 MCG: 50 INJECTION, SOLUTION INTRAMUSCULAR; INTRAVENOUS at 07:10

## 2023-03-08 RX ADMIN — HYDROMORPHONE HYDROCHLORIDE 0.4 MG: 1 INJECTION, SOLUTION INTRAMUSCULAR; INTRAVENOUS; SUBCUTANEOUS at 10:51

## 2023-03-08 RX ADMIN — HYDROMORPHONE HYDROCHLORIDE 0.4 MG: 1 INJECTION, SOLUTION INTRAMUSCULAR; INTRAVENOUS; SUBCUTANEOUS at 11:17

## 2023-03-08 RX ADMIN — HYDROMORPHONE HYDROCHLORIDE 0.2 MG: 1 INJECTION, SOLUTION INTRAMUSCULAR; INTRAVENOUS; SUBCUTANEOUS at 10:10

## 2023-03-08 RX ADMIN — CYCLOBENZAPRINE 10 MG: 10 TABLET, FILM COATED ORAL at 09:42

## 2023-03-08 RX ADMIN — CEFAZOLIN 2 G: 330 INJECTION, POWDER, FOR SOLUTION INTRAMUSCULAR; INTRAVENOUS at 07:11

## 2023-03-08 RX ADMIN — DEXMEDETOMIDINE 25 MCG: 200 INJECTION, SOLUTION INTRAVENOUS at 07:32

## 2023-03-08 RX ADMIN — FENTANYL CITRATE 50 MCG: 50 INJECTION, SOLUTION INTRAMUSCULAR; INTRAVENOUS at 07:47

## 2023-03-08 RX ADMIN — HYDROMORPHONE HYDROCHLORIDE 0.4 MG: 1 INJECTION, SOLUTION INTRAMUSCULAR; INTRAVENOUS; SUBCUTANEOUS at 10:01

## 2023-03-08 RX ADMIN — DEXAMETHASONE SODIUM PHOSPHATE 4 MG: 4 INJECTION, SOLUTION INTRA-ARTICULAR; INTRALESIONAL; INTRAMUSCULAR; INTRAVENOUS; SOFT TISSUE at 14:20

## 2023-03-08 RX ADMIN — OXYCODONE HYDROCHLORIDE 10 MG: 5 TABLET ORAL at 21:17

## 2023-03-08 RX ADMIN — PROPOFOL 150 MG: 10 INJECTION, EMULSION INTRAVENOUS at 07:10

## 2023-03-08 RX ADMIN — Medication 25 MG: at 08:29

## 2023-03-08 RX ADMIN — SODIUM CHLORIDE, POTASSIUM CHLORIDE, SODIUM LACTATE AND CALCIUM CHLORIDE: 600; 310; 30; 20 INJECTION, SOLUTION INTRAVENOUS at 06:46

## 2023-03-08 RX ADMIN — ONDANSETRON 4 MG: 2 INJECTION INTRAMUSCULAR; INTRAVENOUS at 08:40

## 2023-03-08 RX ADMIN — FENTANYL CITRATE 50 MCG: 50 INJECTION, SOLUTION INTRAMUSCULAR; INTRAVENOUS at 10:16

## 2023-03-08 RX ADMIN — SUCCINYLCHOLINE CHLORIDE 100 MG: 20 INJECTION, SOLUTION INTRAMUSCULAR; INTRAVENOUS; PARENTERAL at 07:10

## 2023-03-08 RX ADMIN — SODIUM CHLORIDE, POTASSIUM CHLORIDE, SODIUM LACTATE AND CALCIUM CHLORIDE: 600; 310; 30; 20 INJECTION, SOLUTION INTRAVENOUS at 07:04

## 2023-03-08 RX ADMIN — OXYCODONE HYDROCHLORIDE 10 MG: 5 TABLET ORAL at 14:19

## 2023-03-08 RX ADMIN — PROPOFOL 50 MG: 10 INJECTION, EMULSION INTRAVENOUS at 07:47

## 2023-03-08 RX ADMIN — HYDROMORPHONE HYDROCHLORIDE 1 MG: 2 INJECTION INTRAMUSCULAR; INTRAVENOUS; SUBCUTANEOUS at 08:47

## 2023-03-08 RX ADMIN — HYDROMORPHONE HYDROCHLORIDE 0.2 MG: 1 INJECTION, SOLUTION INTRAMUSCULAR; INTRAVENOUS; SUBCUTANEOUS at 11:35

## 2023-03-08 RX ADMIN — FENTANYL CITRATE 25 MCG: 50 INJECTION, SOLUTION INTRAMUSCULAR; INTRAVENOUS at 12:12

## 2023-03-08 RX ADMIN — ROCURONIUM BROMIDE 5 MG: 10 INJECTION, SOLUTION INTRAVENOUS at 07:10

## 2023-03-08 RX ADMIN — CYCLOBENZAPRINE 10 MG: 10 TABLET, FILM COATED ORAL at 23:40

## 2023-03-08 RX ADMIN — DEXAMETHASONE SODIUM PHOSPHATE 10 MG: 4 INJECTION, SOLUTION INTRA-ARTICULAR; INTRALESIONAL; INTRAMUSCULAR; INTRAVENOUS; SOFT TISSUE at 07:11

## 2023-03-08 RX ADMIN — FENTANYL CITRATE 50 MCG: 50 INJECTION, SOLUTION INTRAMUSCULAR; INTRAVENOUS at 08:38

## 2023-03-08 RX ADMIN — ALLOPURINOL 100 MG: 100 TABLET ORAL at 21:17

## 2023-03-08 RX ADMIN — PROPOFOL 100 MG: 10 INJECTION, EMULSION INTRAVENOUS at 07:31

## 2023-03-08 RX ADMIN — FENTANYL CITRATE 50 MCG: 50 INJECTION, SOLUTION INTRAMUSCULAR; INTRAVENOUS at 10:33

## 2023-03-08 RX ADMIN — MIDAZOLAM HYDROCHLORIDE 2 MG: 1 INJECTION, SOLUTION INTRAMUSCULAR; INTRAVENOUS at 07:04

## 2023-03-08 RX ADMIN — FENTANYL CITRATE 100 MCG: 50 INJECTION, SOLUTION INTRAMUSCULAR; INTRAVENOUS at 07:32

## 2023-03-08 RX ADMIN — HYDROMORPHONE HYDROCHLORIDE 0.4 MG: 1 INJECTION, SOLUTION INTRAMUSCULAR; INTRAVENOUS; SUBCUTANEOUS at 09:48

## 2023-03-08 RX ADMIN — TACROLIMUS 2 MG: 1 CAPSULE ORAL at 17:39

## 2023-03-08 RX ADMIN — POTASSIUM CHLORIDE AND SODIUM CHLORIDE: 900; 150 INJECTION, SOLUTION INTRAVENOUS at 14:31

## 2023-03-08 RX ADMIN — CLINDAMYCIN PHOSPHATE 900 MG: 900 INJECTION, SOLUTION INTRAVENOUS at 17:43

## 2023-03-08 RX ADMIN — FENTANYL CITRATE 25 MCG: 50 INJECTION, SOLUTION INTRAMUSCULAR; INTRAVENOUS at 12:36

## 2023-03-08 RX ADMIN — HYDROMORPHONE HYDROCHLORIDE 1 MG: 2 INJECTION INTRAMUSCULAR; INTRAVENOUS; SUBCUTANEOUS at 07:47

## 2023-03-08 ASSESSMENT — PAIN DESCRIPTION - PAIN TYPE
TYPE: SURGICAL PAIN
TYPE: ACUTE PAIN
TYPE: SURGICAL PAIN
TYPE: ACUTE PAIN
TYPE: SURGICAL PAIN
TYPE: ACUTE PAIN

## 2023-03-08 ASSESSMENT — FIBROSIS 4 INDEX: FIB4 SCORE: 1.27

## 2023-03-08 ASSESSMENT — PAIN SCALES - GENERAL: PAIN_LEVEL: 0

## 2023-03-08 NOTE — ANESTHESIA TIME REPORT
Anesthesia Start and Stop Event Times     Date Time Event    3/8/2023 0643 Ready for Procedure     0704 Anesthesia Start     0907 Anesthesia Stop        Responsible Staff  03/08/23    Name Role Begin End    Cristian Shipman M.D. Anesth 0704 0907        Overtime Reason:  no overtime (within assigned shift)    Comments:

## 2023-03-08 NOTE — ANESTHESIA PREPROCEDURE EVALUATION
Case: [id number] Date/Time: 03/08/23 0645    Procedures:       C4 corpectomy with C3-5 anterior cervical decompression and fusion, with removal of anterior cervical plate from C5-C7 (Spine Cervical)      CORPECTOMY, SPINE (Spine Cervical)    Anesthesia type: General    Diagnosis: Cervical stenosis of spine [M48.02]    Pre-op diagnosis: Cervical stenosis of spine     Location: [facility] OR  / SURGERY [facility]    Surgeons: Maria Alejandra Guillen M.D.          Relevant Problems   CARDIAC   (positive) HTN (hypertension)         (positive) ARF (acute renal failure) (HCC)   (positive) Cancer of kidney (HCC)   (positive) Chronic hepatitis C virus infection (HCC)      Other   (positive) Anxiety   (positive) Cervical stenosis of spine   (positive) Chronic pain syndrome   (positive) Former tobacco use   (positive) H/O unilateral nephrectomy   (positive) Liver transplanted (HCC)       Physical Exam    Airway   Mallampati: II  TM distance: >3 FB  Neck ROM: full       Cardiovascular - normal exam  Rhythm: regular  Rate: normal  (-) murmur     Dental - normal exam           Pulmonary - normal exam  Breath sounds clear to auscultation     Abdominal    Neurological - normal exam                 Anesthesia Plan    ASA 3       Plan - general       Airway plan will be ETT          Induction: intravenous    Postoperative Plan: Postoperative administration of opioids is intended.    Pertinent diagnostic labs and testing reviewed    Informed Consent:    Anesthetic plan and risks discussed with patient.    Use of blood products discussed with: patient whom consented to blood products.

## 2023-03-08 NOTE — LETTER
February 7, 2023    Patient Name: Quirino Ruiz  Surgeon Name: Maria Alejandra Guillen M.D.  Surgery Facility: [facility] ([address] [facility])  Surgery Date: 3/8/2023    The time of your surgery is not final and may change up to and until the day of your surgery. You will be contacted 1-2 business days prior to your surgery date with your check-in and surgery time.    BEFORE YOUR SURGERY - WITH THE FACILITY  Pre Registration and/or Lab Work/Tests must be done within 28 days of your surgery date. These will be done at [facility], with an appointment. This appointment should be completed before your pre op appointment in our office.    On 2.14.23 - if you have not already heard from [facility] Pre Admission/Registration Department, please call them at [phone] option 2, then option 1, for an appointment.      BEFORE YOUR SURGERY - WITH YOUR SURGEONS OFFICE  Pre op Appointment:   Date: 2.22.23   Time: 9:30am   Provider: Easton Villa PA-C   Location: [address] [place], [place] [address]    Bring a list of all medications you are currently taking including the dosing and frequency.    Please read the MEDICATION INSTRUCTIONS below completely.    DAY OF YOUR SURGERY  Nothing to eat or drink and refrain from smoking any substance after midnight the day of your surgery. Smoking may interfere with the anesthetic and frequently produces nausea during the recovery period.    Continue taking all lifesaving medications, including the morning of your surgery with small sip of water.    You will need a responsible adult to drive you to and from your surgery.    AFTER YOUR SURGERY  2 Week Post op Appointment:   Date: 3.22.23   Time: 10:30am  With: Easton Villa PA-C  Location: [facility]    6 Week Post op Appointment:   Date: 4.25.23   Time: 12:30pm   With: Easton Villa PA-C  Location: [facility]    MEDICATION INSTRUCTIONS   Do not take these medications prior to your  procedure:  • Anti-inflammatories: stop 7 days prior, restart when advised. For fusions avoid for 12 weeks after surgery  • Naproxen (Naprosyn or Alleve)  • Motrin  • Ibuprofen  • Nabumetone (Relafen)  • Meloxicam (Mobic)  • Celebrex  • Salsalate  • Diclofenac (Arthrotec, Voltaren, Flector)  • Sulindac (Clinoril  • Etodolac (Lodine)  • Indomethacin (Indocin)  • Ketoprofen  • Ketorolac  • Oxaprozin (Daypro)  • Piroxicam (Feldene)  • Blood thinners (stop after approval from the prescribing physician)  • Aspirin (any dosage): 10 days prior, restart 7 days after procedure  • Any medications that contain aspirin in combination (ie: Excedrin migraine, Fiorinal, and Norgesic)  • Warfarin (Coumadin): Stop 7 days prior, INR day of procedure, restart 2-3 weeks after procedure  • Antiplatelet: restart 7 days after procedure  • Ticlid (ticlopidine): Stop 14 days prior  • Plavix (clopidogrel): Stop 7 days prior  • Aggrenox or Dipyridamole: Stop 14 days prior  • ReoPro (abciximab): Stop 14 days prior  • Integrilin (eptifibatide): Stop 14 days prior  • Aggrastat (tirofiban): Stop 14 days prior  • Lovenox (Enoxaparin): Stop 24hrs before and restart 24hrs after procedure  • Heparin: Stop 24hrs before   • Dalteparin (Fragmin): Stop 24hrs before  • Fondaparinux (Arixtra): Stop 24hrs before  • Xeralto, Dabigatran (Pradaxa) Stop 5 days prior  • Eliquis (apibaxan) Stop 7 days prior    Okay to take these medications as prescribed:  • Muscle relaxers  • Acetaminophen and pain medications that have it in addition to oxycodone and hydrocodone  • Blood pressure, cholesterol and diabetes medications are ok    TIME OFF WORK  FMLA or Disability forms can be faxed directly to [phone] or you may drop them off at any [facility]. Our office charges a $35.00 fee. Forms will be completed within 5-10 business days after payment is received. For the status of your forms you may contact our disability office directly at [phone].    DENTAL PROCDURES/CLEANINGS Avoid 3 weeks before surgery and for 3 months after surgery.    QUESTIONS ABOUT COSTS  Contact our Patient Financial Services department at [phone] for more information.    If you have any questions, please contact our office.    Erika    Surgery Scheduler  [email or web address]  ? [phone]   Fax: [phone]  [address]  [address]  [place] [address]  [phone]

## 2023-03-08 NOTE — PROGRESS NOTES
Met with the patient.  He continues to smoke despite being counseled not to.  I again explained to him that he is at much higher risk of complications because he is immunosuppressed if he continues to smoke.  He left his collar at home.  He understands.  The risk of complications is much higher if he keeps smoking.  There is a risk of potential for more surgery.  He said that he would stop smoking.  I counseled him that if he is going t o continue smoke he should not have surgery today.  He wants to proceed.  No other changes to the H&P.

## 2023-03-08 NOTE — OR NURSING
Patient recovered well in post-op. AAOx4.  VSS, on 2L via NC. Surgical sites MARIA LUISA, surgical dressing in place CDI. Hemovac compressed w/ serosanguinous drainage. Surgical pain managed through PO and IV medications. Patient tolerating fluids without nausea. Sister updated and discussed POC. Dentures and glasses in place; all other belongings retrieved and on gurney. Report called to MIKAYLA Jama. Patient transported to T303. O2 tank full for transport.

## 2023-03-08 NOTE — OR NURSING
Assume care for patient in pre-op. Patient states he left his neck brace in his vehicle, upon going back to his vehicle to get it he realized he left it at home. I let him know we will probably have to have Dr. Guillen order him a new one. Messaged Dr. Guillen to update him.  Per Dr. Guillen, will order patient a new brace.  PIV started and infusing. Belongings secured in locker.

## 2023-03-08 NOTE — PROGRESS NOTES
No change to my last H and P (it may be labelled a progress note or a H and P in EPIC). The note was made by either myself, or my PAs [place] or Easton Villa but is signed by me.If it was done by my physician assistant, I verify it is correct and has my co-signature.    Maria Alejandra Guillen MD PhD HARDY

## 2023-03-08 NOTE — OP REPORT
1. DATE OF SURGERY: 3/8/2023    2. SURGEON:  Maria Alejandra Guillen MD, PhD, HARDY, Neurosurgeon    3. ASSISTANT:  Easton Villa PA-C  An assistant was crucial to have during the case as the assistant helped with positioning, keeping the field clear of blood and retraction. This case could not be done easily without a qualified assistant. It was medically necessary.     4. TYPE OF ANESTHESIA:  General anesthesia with endotracheal intubation    5. PREOPERATIVE DIAGNOSIS:  Cervical stenosis    1.  10-years status post C5-7 anterior cervical decompression and fusion with Dr. Burks  2.  12-year sstatus post L5-S1 ALIF with Dr. Burks  3.  Heaviness in his head along with numbness, tingling pain and cramping in his left greater than right arms  4.  Severe stenosis C3-4 and C4-5   5.  Nicotine dependence 1/2 pack per day with greater than 50 pack year history  6.  Solitary kidney. Liver transplant on suppressive Tacrolimus.    6. POSTOPERATIVE DIAGNOSIS:  Cervical stenosis    1.  10-years status post C5-7 anterior cervical decompression and fusion with Dr. Burks  2.  12-year sstatus post L5-S1 ALIF with Dr. Burks  3.  Heaviness in his head along with numbness, tingling pain and cramping in his left greater than right arms  4.  Severe stenosis C3-4 and C4-5   5.  Nicotine dependence 1/2 pack per day with greater than 50 pack year history  6.  Solitary kidney. Liver transplant on suppressive Tacrolimus.    7. HISTORY: See formal admission H and P    8/5/2022   Quirino Ruiz is a 67 y.o. male seen today in a neurosurgery/spine consultation.  He is a previous patient of this practice.  Doctor Wilmer reaves in 2018.  His story begins in 2012 he had a C5-C7 anterior cervical decompression and fusion with Dr. Burks using a DePuy plate.  He subsequently had a L5-S1 ALIF with Dr. Burks as well.  He did well from surgery.  He was seen for some right shoulder pain back in 2018, he was recommended to have his shoulder sorted out.  Doctor  Wilmer discussed with him a posterior cervical surgery at that time things largely settled.        Today he reports increasing numbness in his left greater than right hands with associated cramping.  This is generally when he is driving.  He reports this is in his second and third digits.  He has some fatigue into his arms with fine dexterity motions such as playing games on his phone.  When he extends his arm he gets some pain into the forearms.  He also describes a heaviness in his head with a feeling of difficulty holding it up.  He does have a pain pump implanted.  He has not done any other recent treatments for this lately.     Intercurrently, he has been having inability to climax with sexual intercourse.  He saw urologist who recommended neurology referral.  He did feel that today's appointment was with a neurologist, I explained to him the difference between neurosurgery and neurology practices.     In essence, this is a 67-year-old gentleman status post previous C5-7 ACDF and L5-S1 anterior lumbar interbody fusion with Dr. Burks who presents today with heaviness in his head as well as numbness, cramping and fatigue into his left greater than right arms.     He does have underlying kidney and liver issues.  He does have a solitary kidney.  50-pack-year history of smoking although he quit last year.  He is subsequently restarted and reports half pack per day habit.  He does report a liver transplant in 2002, he is followed by Dr. Hopkins for this.  He is on tacrolimus for suppression.  He takes aspirin.  He does have an implanted pain pump.  He had cardiac work-up which was unremarkable likely related to stress associated with the death of his wife.    8. PREOPERATIVE PHYSICAL EXAMINATION: See formal admission H and P    Well-healed cervical incision.  Mildly reduced range of motion cervical spine.  He does get positive Phalen signs at his bilateral wrists.  He does have positive Tinel's at his left greater  than right elbows these are negative at the wrist.  Impaired pinprick sensation in his first and second digits.  Strength is otherwise preserved.  His reflexes were brisk and symmetric throughout.              Imaging Result(s):   I independently reviewed and assessed the imaging. I also reviewed all imaging reports.      8/5/2022  4 view cervical x-rays from [facility] was reviewed by myself.  This shows instrumented fusion anteriorly C5-C7.  There is no instability between flexion-extension maneuvers.  There is mild adjacent segment disease at C4-5.  There is no significant motion between flexion and extension maneuvers.  No acute fractures identified.  These are stable postoperative images.     2/7/2023  MRI of the cervical and lumbar spine from [facility] reviewed by myself.  These were dated 11/18/2022.  Lumbar MRI shows mild bilateral neural foraminal stenosis at L3-4 and L4-5.  There is mild central stenosis at L3-4.  Cervical MRI shows severe central stenosis C3-4, C4-5 with associated cord compression no evidence of myelomalacia.  There is incidental noted 6 mm right thyroid nodule which is stable.  Nerve study from Dr. Youssef showed a radiculopathy at C5-6.    9. TITLE OF THE PROCEDURE:  C4 anterior cervical decompression using a left sided approach and the microscope (adjacent partial corpectomies of C3 and C5 performed with greater than 50% vertebral body resection as part of the decompression and complete discectomy.)  C3-5 interbody fusion using titanium interbody cages and local morselized autograft  C3-5 anterior segmental fixation using a cervical locking plate.  Microscopic microdissection.  Fluoroscopic guidance for placement of the screws.  Removal of previous C5 7 anterior cervical plate  Modifier 22-this was a revision procedure.  This took greater than 50% time and effort.  There was scar tissue that had to take down meticulously with microdissection.  The old plate had to be removed.   This took an extra 60 minutes.  Exploration of C5 7 fusion-there is bridging bone    10. OPERATIVE FINDINGS:  Stenosis as outlined above.    There was a previous fusion from C5-7.  This was solid.  There was severe stenosis between the vertebral body of C4.  This was decompressed.    11. OPERATED LEVELS:    As above      12. IMPLANTS USED:  [place] K2 M titanium corpectomy interbody cage-small 22 mm high.  Morselized local autograft  [employer] [place] Translational locking plate 15 and 16 mm screws.    13. COMPLICATIONS:  Nil.    14. ESTIMATED BLOOD LOSS:      50  mL    15. OPERATIVE DETAILS:       After a fully informed consent, the patient was brought to the operating room.  General anesthesia was administered. The case was done with AP and lateral fluoroscopy and neurophysiological monitoring, which was stable throughout.  The patient was given intravenous antibiotic and intravenous dexamethasone.  The patient was positioned on a regular operating table.  The head was placed in gentle extension.  A shoulder roll was in place.  The head was resting on a donut headrest as well.  The shoulders were gently taped down and wrist restraints were used as well a footboard.  All pressure points were padded.     The left side of the neck was prepped and draped in a standard fashion.  Local anesthetic was placed into the wound prior to the skin incision.   After fluoroscopic localization, a transverse incision was effected as localized by the x-ray.  Dissection continued down to the platysma.  The plain above this was extensively undermined. The plain above the platysma was extensively undermined.  The platysma was then split in a longitudinal fashion.  Dissection then continued medial to the carotid sheath, lateral to the pharynx, through the prevertebral fascia, in an extensile fashion, to expose the anterior vertebral bodies.  The prevertebral fascia was divided with monopolar cautery.          The omohyoid muscle, if in  the way, was divided with monopolar cautery.  I then placed a spinal needle into the affected disk space and this was confirmed on lateral fluoroscopy.       I initially exposed the previous plate with sharp dissection.  This was disassembled and removed.  There is a large amount of scar tissue around the plate.  Is very careful not to injure the pharynx or the carotid sheath.  I explored the fusion.  There was bridging bone from C5-C7.         The longus colli muscles were then undermined on either side of the operated levels. The [place] self-retaining retractors were then placed medially and laterally as well as cranially and caudally.  An appropriate number of 14 mm [place] distraction pins were then placed under fluoroscopic guidance into the midpoint of the vertebral parallel to the endplates.  The [place] distractor was used to achieve some measure of distraction.  For each affected disk, the disk space was incised and disk material was removed with a curette.  In this case in size a C3-4 and C4-5 disc.  [place] distraction pins were placed in the C3 and C5 vertebral bodies.  Again there was a large amount of scar tissue I had to deal with using sharp dissection.    The operating microscope was then bought into the field. Using AM-12 and then an AM-8 Midas Festus drill, a complete corpectomy of C4 was affected with a posterior lip of osteophyte drilled down with the AM-8 drill bit. Greater than 50% of adjacent vertebral bodies of C3 and C5 were removed.    Using a 5-0 angle curette, the PLL was split and the remaining disk, osteophyte and ligament at the decompression level level was removed with 1 and 2 mm Kerrison punches.  A good central and bilateral foraminal decompression at each level was affected.  Hemostasis was obtained.      Once all the decompressions were done, I then turned to place the the interbody device.  I used the cage trials to select an appropriate size on x-ray .  The cage was then packed  with local bone which was captured in a Hensler bone trap and placed into the interspace using fluoroscopic guidance.  Neuromonitoring continued to be stable.  I then removed the [place] distraction pins.      An appropriately sized anterior cervical locking plate was then secured across the operated levels using fluoroscopic guidance and appropriate length 4 mm diameter screws.    Bone purchase was good.  The locking apparatus was engaged.  Final AP and lateral x-rays were taken.  Neurophysiologic monitoring was stable.  The pharynx was inspected to ensure there was no injury.      Closure was then affected in a standard fashion using 3-0 Vicryl sutures over a suction subfascial Hemovac. Dermabond was applied to the skin and a dressing and soft collar applied prior to transfer to recovery. All counts were correct and all instruments accounted for.    16 PROGNOSIS:  The surgery went well.  The patient has been decompressed.  At the end of the case, the patient awoke moving his/her upper and lower extremities well.    The plan will be to observe the patient very carefully for the next 24 hours in case there is any bleeding and I would anticipate the patient will be discharged tomorrow morning.  When the patient goes home, he/she will be discharged home on narcotic analgesia, flexeril and oral antibiotic, usually Keflex.  The plan will be to follow up in 2 weeks in the office.  We will call the patient next week to ensure there are not any problems.  He/she can shower in 72 hours but is instructed to keep the wound dry.  The patient has also been instructed to abstain from smoking or any anti-inflammatories. The patient has also been instructed to wear a soft collar except when eating or showering.       Maria Alejandra Guillen MD, PhD, HARDY

## 2023-03-08 NOTE — ANESTHESIA POSTPROCEDURE EVALUATION
Patient: Quirino Ruiz    Procedure Summary     Date: 03/08/23 Room / Location: [place] [id number] / SURGERY [facility]    Anesthesia Start: 0704 Anesthesia Stop: 0907    Procedures:       C4 corpectomy with C3-5 anterior cervical decompression and fusion, with removal of anterior cervical plate from C5-C7 (Spine Cervical)      CORPECTOMY, SPINE (Spine Cervical) Diagnosis:       Cervical stenosis of spine      (Cervical stenosis of spine )    Surgeons: Maria Alejandra Guillen M.D. Responsible Provider: Cristian Shipman M.D.    Anesthesia Type: general ASA Status: 3          Final Anesthesia Type: general  Last vitals  BP   Blood Pressure : (!) 144/86    Temp   36.3 °C (97.4 °F)    Pulse   99   Resp   17    SpO2   92 %      Anesthesia Post Evaluation    Patient location during evaluation: PACU  Patient participation: complete - patient participated  Level of consciousness: awake and alert  Pain score: 0    Airway patency: patent  Anesthetic complications: no  Cardiovascular status: hemodynamically stable  Respiratory status: acceptable  Hydration status: euvolemic    PONV: none          No notable events documented.     Nurse Pain Score: 0 (NPRS)

## 2023-03-08 NOTE — ANESTHESIA PROCEDURE NOTES
Airway    Date/Time: 3/8/2023 7:10 AM  Performed by: Cristian Shipman M.D.  Authorized by: Cristian Shipman M.D.     Location:  OR  Urgency:  Elective  Indications for Airway Management:  Anesthesia      Spontaneous Ventilation: absent    Sedation Level:  Deep  Preoxygenated: Yes    Patient Position:  Sniffing  Mask Difficulty Assessment:  0 - not attempted  Final Airway Type:  Endotracheal airway  Final Endotracheal Airway:  ETT  Cuffed: Yes    Technique Used for Successful ETT Placement:  Direct laryngoscopy    Insertion Site:  Oral  Blade Type:  Glide  Laryngoscope Blade/Videolaryngoscope Blade Size:  3  ETT Size (mm):  8.0  Measured from:  Teeth  ETT to Teeth (cm):  22  Placement Verified by: auscultation and capnometry    Cormack-Lehane Classification:  Grade I - full view of glottis  Number of Attempts at Approach:  1

## 2023-03-09 ENCOUNTER — PHARMACY VISIT (OUTPATIENT)
Dept: PHARMACY | Facility: MEDICAL CENTER | Age: 68
End: 2023-03-09
Payer: MEDICARE

## 2023-03-09 VITALS
TEMPERATURE: 97.7 F | HEIGHT: 67 IN | BODY MASS INDEX: 24.6 KG/M2 | WEIGHT: 156.75 LBS | HEART RATE: 86 BPM | RESPIRATION RATE: 16 BRPM | DIASTOLIC BLOOD PRESSURE: 90 MMHG | OXYGEN SATURATION: 93 % | SYSTOLIC BLOOD PRESSURE: 155 MMHG

## 2023-03-09 LAB
ANION GAP SERPL CALC-SCNC: 12 MMOL/L (ref 7–16)
APTT PPP: 28.2 SEC (ref 24.7–36)
BUN SERPL-MCNC: 13 MG/DL (ref 8–22)
CALCIUM SERPL-MCNC: 8.6 MG/DL (ref 8.5–10.5)
CHLORIDE SERPL-SCNC: 98 MMOL/L (ref 96–112)
CO2 SERPL-SCNC: 23 MMOL/L (ref 20–33)
CREAT SERPL-MCNC: 0.66 MG/DL (ref 0.5–1.4)
ERYTHROCYTE [DISTWIDTH] IN BLOOD BY AUTOMATED COUNT: 47 FL (ref 35.9–50)
GFR SERPLBLD CREATININE-BSD FMLA CKD-EPI: 102 ML/MIN/1.73 M 2
GLUCOSE SERPL-MCNC: 147 MG/DL (ref 65–99)
HCT VFR BLD AUTO: 41.7 % (ref 42–52)
HGB BLD-MCNC: 14 G/DL (ref 14–18)
INR PPP: 1.1 (ref 0.87–1.13)
MCH RBC QN AUTO: 31.1 PG (ref 27–33)
MCHC RBC AUTO-ENTMCNC: 33.6 G/DL (ref 33.7–35.3)
MCV RBC AUTO: 92.7 FL (ref 81.4–97.8)
PLATELET # BLD AUTO: 234 K/UL (ref 164–446)
PMV BLD AUTO: 9.5 FL (ref 9–12.9)
POTASSIUM SERPL-SCNC: 4.3 MMOL/L (ref 3.6–5.5)
PROTHROMBIN TIME: 14.1 SEC (ref 12–14.6)
RBC # BLD AUTO: 4.5 M/UL (ref 4.7–6.1)
SODIUM SERPL-SCNC: 133 MMOL/L (ref 135–145)
WBC # BLD AUTO: 11.6 K/UL (ref 4.8–10.8)

## 2023-03-09 PROCEDURE — A9270 NON-COVERED ITEM OR SERVICE: HCPCS | Performed by: PHYSICIAN ASSISTANT

## 2023-03-09 PROCEDURE — 85027 COMPLETE CBC AUTOMATED: CPT

## 2023-03-09 PROCEDURE — 80048 BASIC METABOLIC PNL TOTAL CA: CPT

## 2023-03-09 PROCEDURE — 80197 ASSAY OF TACROLIMUS: CPT

## 2023-03-09 PROCEDURE — 85730 THROMBOPLASTIN TIME PARTIAL: CPT

## 2023-03-09 PROCEDURE — 97166 OT EVAL MOD COMPLEX 45 MIN: CPT

## 2023-03-09 PROCEDURE — 700101 HCHG RX REV CODE 250: Performed by: NEUROLOGICAL SURGERY

## 2023-03-09 PROCEDURE — 85610 PROTHROMBIN TIME: CPT

## 2023-03-09 PROCEDURE — 97162 PT EVAL MOD COMPLEX 30 MIN: CPT

## 2023-03-09 PROCEDURE — 700111 HCHG RX REV CODE 636 W/ 250 OVERRIDE (IP): Performed by: PHYSICIAN ASSISTANT

## 2023-03-09 PROCEDURE — 36415 COLL VENOUS BLD VENIPUNCTURE: CPT

## 2023-03-09 PROCEDURE — 99024 POSTOP FOLLOW-UP VISIT: CPT | Performed by: PHYSICIAN ASSISTANT

## 2023-03-09 PROCEDURE — 700102 HCHG RX REV CODE 250 W/ 637 OVERRIDE(OP): Performed by: PHYSICIAN ASSISTANT

## 2023-03-09 PROCEDURE — RXOTC WILLOW AMBULATORY OTC CHARGE: Performed by: PHARMACIST

## 2023-03-09 PROCEDURE — 97535 SELF CARE MNGMENT TRAINING: CPT

## 2023-03-09 PROCEDURE — RXMED WILLOW AMBULATORY MEDICATION CHARGE: Performed by: PHYSICIAN ASSISTANT

## 2023-03-09 RX ORDER — METHYLPREDNISOLONE 4 MG/1
TABLET ORAL
Qty: 21 TABLET | Refills: 0 | Status: SHIPPED | OUTPATIENT
Start: 2023-03-09 | End: 2023-03-30

## 2023-03-09 RX ORDER — CYCLOBENZAPRINE HCL 10 MG
10 TABLET ORAL EVERY 8 HOURS PRN
Qty: 90 TABLET | Refills: 1 | Status: SHIPPED | OUTPATIENT
Start: 2023-03-09

## 2023-03-09 RX ORDER — OXYCODONE HYDROCHLORIDE 5 MG/1
5 TABLET ORAL EVERY 4 HOURS PRN
Qty: 42 TABLET | Refills: 0 | Status: SHIPPED | OUTPATIENT
Start: 2023-03-09 | End: 2023-03-16

## 2023-03-09 RX ORDER — CEPHALEXIN 500 MG/1
500 CAPSULE ORAL 4 TIMES DAILY
Qty: 20 CAPSULE | Refills: 0 | Status: ACTIVE | OUTPATIENT
Start: 2023-03-09 | End: 2023-03-14

## 2023-03-09 RX ORDER — ACETAMINOPHEN 500 MG
500 TABLET ORAL EVERY 6 HOURS
Qty: 28 TABLET | Refills: 0 | Status: SHIPPED | OUTPATIENT
Start: 2023-03-09 | End: 2023-03-16

## 2023-03-09 RX ORDER — CEPHALEXIN 500 MG/1
500 CAPSULE ORAL 4 TIMES DAILY
Status: DISCONTINUED | OUTPATIENT
Start: 2023-03-09 | End: 2023-03-09 | Stop reason: HOSPADM

## 2023-03-09 RX ADMIN — CITALOPRAM HYDROBROMIDE 20 MG: 20 TABLET ORAL at 05:06

## 2023-03-09 RX ADMIN — OXYCODONE HYDROCHLORIDE 10 MG: 5 TABLET ORAL at 00:24

## 2023-03-09 RX ADMIN — CYCLOBENZAPRINE 10 MG: 10 TABLET, FILM COATED ORAL at 08:55

## 2023-03-09 RX ADMIN — OXYCODONE HYDROCHLORIDE 10 MG: 5 TABLET ORAL at 04:33

## 2023-03-09 RX ADMIN — METOPROLOL SUCCINATE 25 MG: 25 TABLET, EXTENDED RELEASE ORAL at 05:06

## 2023-03-09 RX ADMIN — CLINDAMYCIN PHOSPHATE 900 MG: 900 INJECTION, SOLUTION INTRAVENOUS at 00:30

## 2023-03-09 RX ADMIN — TACROLIMUS 2 MG: 1 CAPSULE ORAL at 05:05

## 2023-03-09 RX ADMIN — OMEPRAZOLE 20 MG: 20 CAPSULE, DELAYED RELEASE ORAL at 05:06

## 2023-03-09 RX ADMIN — DEXAMETHASONE SODIUM PHOSPHATE 4 MG: 4 INJECTION, SOLUTION INTRA-ARTICULAR; INTRALESIONAL; INTRAMUSCULAR; INTRAVENOUS; SOFT TISSUE at 02:43

## 2023-03-09 RX ADMIN — LISINOPRIL 10 MG: 10 TABLET ORAL at 05:06

## 2023-03-09 RX ADMIN — CEPHALEXIN 500 MG: 500 CAPSULE ORAL at 08:55

## 2023-03-09 RX ADMIN — DOCUSATE SODIUM 100 MG: 100 CAPSULE, LIQUID FILLED ORAL at 05:27

## 2023-03-09 RX ADMIN — OXYCODONE HYDROCHLORIDE 10 MG: 5 TABLET ORAL at 08:55

## 2023-03-09 ASSESSMENT — COGNITIVE AND FUNCTIONAL STATUS - GENERAL
TURNING FROM BACK TO SIDE WHILE IN FLAT BAD: A LITTLE
DAILY ACTIVITIY SCORE: 24
DAILY ACTIVITIY SCORE: 24
SUGGESTED CMS G CODE MODIFIER MOBILITY: CJ
MOVING TO AND FROM BED TO CHAIR: A LITTLE
SUGGESTED CMS G CODE MODIFIER DAILY ACTIVITY: CH
SUGGESTED CMS G CODE MODIFIER MOBILITY: CH
SUGGESTED CMS G CODE MODIFIER DAILY ACTIVITY: CH
MOBILITY SCORE: 22
MOBILITY SCORE: 24

## 2023-03-09 ASSESSMENT — GAIT ASSESSMENTS
GAIT LEVEL OF ASSIST: SUPERVISED
DISTANCE (FEET): 300
DEVIATION: BRADYKINETIC;SHUFFLED GAIT

## 2023-03-09 ASSESSMENT — ACTIVITIES OF DAILY LIVING (ADL): TOILETING: INDEPENDENT

## 2023-03-09 ASSESSMENT — PAIN DESCRIPTION - PAIN TYPE
TYPE: ACUTE PAIN
TYPE: ACUTE PAIN
TYPE: ACUTE PAIN;SURGICAL PAIN

## 2023-03-09 NOTE — PROGRESS NOTES
DC instructions reviewed with pt, all questions answered. IV DC'd. Pt DC'd with C-Collar. Pt agreeable to DC plan.

## 2023-03-09 NOTE — CARE PLAN
Problem: Pain - Standard  Goal: Alleviation of pain or a reduction in pain to the patient’s comfort goal  Outcome: Progressing     Problem: Fall Risk  Goal: Patient will remain free from falls  Outcome: Progressing   The patient is Stable - Low risk of patient condition declining or worsening    Shift Goals  Clinical Goals: Safety, pain control, mobility  Patient Goals: Pain control  and comfort  Family Goals: N/A    Progress made toward(s) clinical / shift goals:  Will continue with pain assessment and management   Patient is not progressing towards the following goals:

## 2023-03-09 NOTE — CARE PLAN
The patient is Stable - Low risk of patient condition declining or worsening    Shift Goals  Clinical Goals: Pain management, heomvac removal, PT/OT, VSS, DC  Patient Goals: Pain control  Family Goals: MARIA LUISA    Progress made toward(s) clinical / shift goals:        Problem: Pain - Standard  Goal: Alleviation of pain or a reduction in pain to the patient’s comfort goal  Outcome: Progressing     Problem: Fall Risk  Goal: Patient will remain free from falls  Outcome: Progressing     Problem: Knowledge Deficit - Standard  Goal: Patient and family/care givers will demonstrate understanding of plan of care, disease process/condition, diagnostic tests and medications  Outcome: Progressing       Patient is not progressing towards the following goals:

## 2023-03-09 NOTE — CARE PLAN
The patient is Stable - Low risk of patient condition declining or worsening    Shift Goals  Clinical Goals: safety; pain control  Patient Goals: pain control  Family Goals: not present    Progress made toward(s) clinical / shift goals:    Problem: Pain - Standard  Goal: Alleviation of pain or a reduction in pain to the patient’s comfort goal  Outcome: Progressing     Problem: Fall Risk  Goal: Patient will remain free from falls  Outcome: Progressing     Problem: Knowledge Deficit - Standard  Goal: Patient and family/care givers will demonstrate understanding of plan of care, disease process/condition, diagnostic tests and medications  Outcome: Progressing       Patient is not progressing towards the following goals:

## 2023-03-09 NOTE — PROGRESS NOTES
"Neurosurgery  POD# 1  Seen with Dr. Guillen  Ambulating  Voiding  Dysphagia  Denies nausea, vomiting  Pain controlled on current medication regimen  Arm symptoms improved    Objective:  BP (!) 172/94   Pulse 86   Temp 36.5 °C (97.7 °F) (Temporal)   Resp 16   Ht 1.702 m (5' 7\")   Wt 71.1 kg (156 lb 12 oz)   SpO2 93%     Intake/Output Summary (Last 24 hours) at 3/9/2023 0756  Last data filed at 3/9/2023 0716  Gross per 24 hour   Intake 2873.93 ml   Output 2120 ml   Net 753.93 ml       Recent Labs     03/09/23  0236   WBC 11.6*   RBC 4.50*   HEMOGLOBIN 14.0   HEMATOCRIT 41.7*   MCV 92.7   MCH 31.1   MCHC 33.6*   RDW 47.0   PLATELETCT 234   MPV 9.5     Recent Labs     03/09/23  0236   SODIUM 133*   POTASSIUM 4.3   CHLORIDE 98   CO2 23   GLUCOSE 147*   BUN 13     Recent Labs     03/09/23  0236   APTT 28.2   INR 1.10     VSS  LS  Hemovac 20cc//8hr am  Surgical incision clean, dry, intact, no evidence of infection  Strength:  Upper extremities are 5/5 grossly  Otherwise neurologically intact    Assessment:  Active Hospital Problems    Diagnosis     Cervical stenosis of spine [M48.02]      Added automatically from request for surgery [id number]       POD#1 S/p C3-6 ACDF    Plan:  1. Ambulate with PT/OT as tolerated, [place] j collar off for meals  2. Advance diet as tolerated -   3. D/c Hemovac, D/c IV fluids, D/c PCA,   4. If stable, d/c home midday, call with any issues, scripts sent in anticipation of d/c   "

## 2023-03-09 NOTE — DISCHARGE PLANNING
[facility] TCN chart review completed. Collaborated with CM, PT/OT prior to meeting with the pt. The most current review of medical record, knowledge of pt's PLOF and social support, LACE+ score of 60, 6 clicks scores of 22 mobility were considered.      Pt seen at bedside. Introduced TCN program. Provided education regarding post acute levels of care. Discussed [facility] plan benefits (Meds to Beds, medical uber and [facility] transitional care). Pt verbalizes understanding.     Patient denies concerns with functional mobility, lives alone but daughter is nearby, denies DME needs.  PT consult recommending home with OP.  OT verbalizes no needs, awaiting forma; recs.  No further TCN needs.    No choice proactively obtained for given no needs. TCN will continue to follow and collaborate with discharge planning team as additional post acute needs arise. Thank you.     Completed today:  PT recommends home with OP  Awaiting OT recs.   [facility] referral not sent, secondary to 2 weeks ortho follow up.

## 2023-03-09 NOTE — DISCHARGE SUMMARY
Discharge Summary    CHIEF COMPLAINT ON ADMISSION  No chief complaint on file.      Reason for Admission  Stenosis of cervical spine     Admission Date  3/8/2023    CODE STATUS  Full Code    HPI & HOSPITAL COURSE  8/5/2022   Quirino Ruiz is a 67 y.o. male seen today in a neurosurgery/spine consultation.  He is a previous patient of this practice.  Doctor Guillen back in 2018.  His story begins in 2012 he had a C5-C7 anterior cervical decompression and fusion with Dr. Burks using a DePuy plate.  He subsequently had a L5-S1 ALIF with Dr. Burks as well.  He did well from surgery.  He was seen for some right shoulder pain back in 2018, he was recommended to have his shoulder sorted out.  Doctor Wilmer discussed with him a posterior cervical surgery at that time things largely settled.        Today he reports increasing numbness in his left greater than right hands with associated cramping.  This is generally when he is driving.  He reports this is in his second and third digits.  He has some fatigue into his arms with fine dexterity motions such as playing games on his phone.  When he extends his arm he gets some pain into the forearms.  He also describes a heaviness in his head with a feeling of difficulty holding it up.  He does have a pain pump implanted.  He has not done any other recent treatments for this lately.     Intercurrently, he has been having inability to climax with sexual intercourse.  He saw urologist who recommended neurology referral.  He did feel that today's appointment was with a neurologist, I explained to him the difference between neurosurgery and neurology practices.     In essence, this is a 67-year-old gentleman status post previous C5-7 ACDF and L5-S1 anterior lumbar interbody fusion with Dr. Burks who presents today with heaviness in his head as well as numbness, cramping and fatigue into his left greater than right arms.     He does have underlying kidney and liver issues.  He does  have a solitary kidney.  50-pack-year history of smoking although he quit last year.  He is subsequently restarted and reports half pack per day habit.  He does report a liver transplant in 2002, he is followed by Dr. Hopkins for this.  He is on tacrolimus for suppression.  He takes aspirin.  He does have an implanted pain pump.  He had cardiac work-up which was unremarkable likely related to stress associated with the death of his wife.     3/9/2023  On postoperative day 1 he is doing quite well.  He reports mild dysphagia.  He does have some incisional site as well as neck pain.  His arm symptoms have improved.  He is eating and drinking without complication.  He will be assessed by PT and OT for mobility.  He has remained hemodynamically stable.  Based upon the above information he was deemed safe for discharge to home in stable condition.                 No notes on file    Therefore, he is discharged in good and stable condition to home with close outpatient follow-up.    The patient recovered much more quickly than anticipated on admission.    Discharge Date  3/9/2023     FOLLOW UP ITEMS POST DISCHARGE  Follow up with our office in 2, 6, and 12 weeks.  Report to ER with any complications.  Call our office with any questions or concerns.  No anti-inflammatories for 3 months, no blood thinners for 2 weeks.   Clear to shower Saturday, pat incision dry, no special creams or ointments.   Avoid bending, lifting and twisting.   Walk 4-6 times per day as tolerated to help prevent blood clots.     DISCHARGE DIAGNOSES  Principal Problem:    Cervical stenosis of spine POA: Unknown      Overview: Added automatically from request for surgery [id number]  Resolved Problems:    * No resolved hospital problems. *      FOLLOW UP  Future Appointments   Date Time Provider Department Center   3/22/2023 10:30 AM Easton Villa P.A.-C. [facility]   3/30/2023  4:00 PM Mack Still M.D. [facility] None   4/25/2023 12:30 PM Easton ADAMS  MACK Villa [facility]     No follow-up provider specified.    MEDICATIONS ON DISCHARGE     Medication List        START taking these medications        Instructions   acetaminophen 500 MG Tabs  Commonly known as: TYLENOL   Take 1 Tablet by mouth every 6 hours for 7 days.  Dose: 500 mg     albuterol 108 (90 Base) MCG/ACT Aers inhalation aerosol   Inhale 2 Puffs every four hours as needed for Shortness of Breath.  Dose: 2 Puff     cephALEXin 500 MG Caps  Commonly known as: KEFLEX   Take 1 Capsule by mouth 4 times a day for 5 days.  Dose: 500 mg     cyclobenzaprine 10 mg Tabs  Commonly known as: Flexeril   Take 1 Tablet by mouth every 8 hours as needed for Muscle Spasms.  Dose: 10 mg     methylPREDNISolone 4 MG Tbpk  Commonly known as: MEDROL DOSEPAK   Follow schedule on package instructions.     oxyCODONE immediate-release 5 MG Tabs  Commonly known as: ROXICODONE   Take 1 Tablet by mouth every four hours as needed for Severe Pain for up to 7 days.  Dose: 5 mg            CONTINUE taking these medications        Instructions   allopurinol 100 MG Tabs  Commonly known as: ZYLOPRIM   Take 100 mg by mouth every bedtime.  Dose: 100 mg     ascorbic acid 500 MG tablet  Commonly known as: Vitamin C   Take 1 Tablet by mouth 2 times a day.  Dose: 500 mg     citalopram 20 MG Tabs  Commonly known as: CeleXA   Take 20 mg by mouth every day.  Dose: 20 mg     lisinopril 10 MG Tabs  Commonly known as: PRINIVIL   Take 10 mg by mouth every morning. Indications: High Blood Pressure Disorder  Dose: 10 mg     metoprolol SR 25 MG Tb24  Commonly known as: TOPROL XL   TAKE 1 TABLET BY MOUTH EVERY DAY  Dose: 25 mg     nitroglycerin 0.4 MG Subl  Commonly known as: NITROSTAT   Place 1 Tablet under the tongue as needed for Chest Pain.  Dose: 0.4 mg     omeprazole 20 MG delayed-release capsule  Commonly known as: PRILOSEC   Take 20 mg by mouth 2 times a day.  Dose: 20 mg     Pain Pump Odilia  Commonly known as: patient supplied   Inject  " as directed continuous. Patient's Pain Pump (placed and maintained as an outpatient)  Medications/concentrations: Hydromorphone 400.0 mcg/mL  Route: Intrathecally  Date of Placement: Unknown  Last changed: 03/16/2022 due to refill pump before 5/16/2022  Continuous infusion rates (Drug/Rate):  Hydromorphone 139.81 mcg / 24 hours (5.83 mcg / hr)  Patient activation dose: Hydromorphone 15.00 mcg  Maximum daily activation dose: Hydromorphone 228.06 mcg  Patient activation lockout interval: 1 hour  Maximum activations per day: 6  Dr Mendes [phone]     pantoprazole 40 MG Tbec  Commonly known as: PROTONIX   Take 40 mg by mouth every day.  Dose: 40 mg     rizatriptan 10 MG disintegrating tablet  Commonly known as: MAXALT-MLT   Take 10 mg by mouth one time as needed.  Dose: 10 mg     ROPINIRole 1 MG Tabs  Commonly known as: REQUIP   Take 1 mg by mouth as needed.  Dose: 1 mg     tacrolimus 1 MG Caps  Commonly known as: PROGRAF   Take 2 mg by mouth 2 Times a Day. Indications: Liver Transplant Recipients  Dose: 2 mg     tadalafil 5 MG tablet  Commonly known as: Cialis   tadalafil 5 mg tablet   Take 1 tablet(s) EVERY DAY by oral route.     tamsulosin 0.4 MG capsule  Commonly known as: FLOMAX   Take 0.4 mg by mouth every day.  Dose: 0.4 mg     vitamin D 1000 UNIT Tabs  Commonly known as: VITAMIND D3   Take 1 Tablet by mouth every day.  Dose: 1,000 Units     zinc sulfate 220 (50 Zn) MG Caps  Commonly known as: ZINCATE   Take 2,200 mg by mouth every day.  Dose: 2,200 mg              Allergies  Allergies   Allergen Reactions    Niacin      Passed out.     Latex Rash     Reaction- Itching, Rash    Pcn [Penicillins] Anaphylaxis and Swelling     Patient has tolerated cefazolin in past    Morphine     Morphine Sulfate      Per pt., \"it makes me cranky\"    Mushroom Extract Complex        DIET  Orders Placed This Encounter   Procedures    Diet Order Diet: Regular     Standing Status:   Standing     Number of Occurrences:   1     " Order Specific Question:   Diet:     Answer:   Regular [1]       ACTIVITY  As tolerated.  Weight bearing as tolerated    CONSULTATIONS  None    PROCEDURES  TITLE OF THE PROCEDURE:  C4 anterior cervical decompression using a left sided approach and the microscope (adjacent partial corpectomies of C3 and C5 performed with greater than 50% vertebral body resection as part of the decompression and complete discectomy.)  C3-5 interbody fusion using titanium interbody cages and local morselized autograft  C3-5 anterior segmental fixation using a cervical locking plate.  Microscopic microdissection.  Fluoroscopic guidance for placement of the screws.  Removal of previous C5 7 anterior cervical plate  Modifier 22-this was a revision procedure.  This took greater than 50% time and effort.  There was scar tissue that had to take down meticulously with microdissection.  The old plate had to be removed.  This took an extra 60 minutes.  Exploration of C5 7 fusion-there is bridging bone    LABORATORY  Lab Results   Component Value Date    SODIUM 133 (L) 03/09/2023    POTASSIUM 4.3 03/09/2023    CHLORIDE 98 03/09/2023    CO2 23 03/09/2023    GLUCOSE 147 (H) 03/09/2023    BUN 13 03/09/2023    CREATININE 0.66 03/09/2023        Lab Results   Component Value Date    WBC 11.6 (H) 03/09/2023    HEMOGLOBIN 14.0 03/09/2023    HEMATOCRIT 41.7 (L) 03/09/2023    PLATELETCT 234 03/09/2023        Total time of the discharge process exceeds 30 minutes.

## 2023-03-09 NOTE — PROGRESS NOTES
4 Eyes Skin Assessment Completed by MIKAYLA Phillips and MIKAYLA Silverio.    Head WDL  Ears WDL  Nose WDL  Mouth WDL  Neck Incision  Breast/Chest WDL  Shoulder Blades WDL  Spine WDL  (R) Arm/Elbow/Hand WDL  (L) Arm/Elbow/Hand WDL  Abdomen WDL  Groin WDL  Scrotum/Coccyx/Buttocks WDL  (R) Leg WDL  (L) Leg WDL  (R) Heel/Foot/Toe WDL  (L) Heel/Foot/Toe WDL          Devices In Places Blood Pressure Cuff, Pulse Ox, and SCD's      Interventions In Place Pressure Redistribution Mattress    Possible Skin Injury No    Pictures Uploaded Into Epic N/A  Wound Consult Placed N/A  RN Wound Prevention Protocol Ordered No

## 2023-03-09 NOTE — THERAPY
"Physical Therapy   Initial Evaluation and Discharge     Patient Name: Quirino Ruiz  Age:  67 y.o., Sex:  male  Medical Record #: [id number]  Today's Date: 3/9/2023     Precautions  Precautions: Fall Risk;Spinal / Back Precautions ;Cervical Collar    Comments: c-collar AAT, off for shower/meals    Assessment  Patient is 67 y.o. male s/p C4 anterior cervical decompression, C3-5 fusion and removal of C5-7 anterior plate on 3/8. PMHx of C5-7 ACDF, L5-S1 ALIF, s/p liver transplant, HTN, L frontoparietal ICH, and active smoker. Pt with good demo of cervical precautions, log roll, and mobility in hallway/stairs with no AD. Pt functionally capable of returning home, with assist for heavy lifting from daughter/neighbors as needed. Pt with all questions answered. Recommend home with OP PT once acute healing completing.     Plan    Physical Therapy Initial Treatment Plan   Duration: Evaluation only    DC Equipment Recommendations: None  Discharge Recommendations: Recommend outpatient physical therapy services to address higher level deficits       Subjective    \"I have to do my own laundry, cooking, and eating. But I won't be doing any heavy lifting.\"      Objective     03/09/23 0944   Vitals   O2 Delivery Device None - Room Air   Pain 0 - 10 Group   Therapist Pain Assessment Nurse Notified;Post Activity Pain Same as Prior to Activity  (no c/o pain)   Prior Living Situation   Prior Services None   Housing / Facility 1 Story House   Steps Into Home 4   Steps In Home 0   Rail Both Rail (Steps into Home)   Equipment Owned Front-Wheel Walker;Single Point Cane;Wheelchair   Lives with - Patient's Self Care Capacity Alone and Able to Care For Self   Comments reports has daughter and neighbors that are able to assist if needed   Prior Level of Functional Mobility   Bed Mobility Independent   Transfer Status Independent   Ambulation Independent   Ambulation Distance   (community)   Assistive Devices Used None   Stairs Independent "   Cognition    Cognition / Consciousness WDL   Level of Consciousness Alert   Comments Pleasant and cooperative   Active ROM Upper Body   Active ROM Upper Body  WDL   Comments within precautions   Strength Upper Body   Upper Body Strength  WDL   Comments within precautions, functional with mobility   Sensation Upper Body   Upper Extremity Sensation  X   Comments c/o N/T in B thumbs only   Upper Body Muscle Tone   Upper Body Muscle Tone  WDL   Active ROM Lower Body    Active ROM Lower Body  WDL   Strength Lower Body   Lower Body Strength  WDL   Sensation Lower Body   Comments denies   Coordination Upper Body   Coordination WDL   Comments with mobility   Coordination Lower Body    Coordination Lower Body  WDL   Comments with mobility   Balance Assessment   Sitting Balance (Static) Good   Sitting Balance (Dynamic) Good   Standing Balance (Static) Fair +   Standing Balance (Dynamic) Fair +   Weight Shift Sitting Good   Weight Shift Standing Good   Comments no AD or LOB   Bed Mobility    Supine to Sit Modified Independent   Sit to Supine Modified Independent   Scooting Modified Independent   Rolling Modified Independent   Comments good demo of log roll, utilizes at baseline. HOB flat   Gait Analysis   Gait Level Of Assist Supervised   Assistive Device None   Distance (Feet) 300   # of Times Distance was Traveled 1   Deviation Bradykinetic;Shuffled Gait   # of Stairs Climbed 4  (B rails)   Level of Assist with Stairs Supervised   Weight Bearing Status no restrictions   Functional Mobility   Sit to Stand Supervised   Bed, Chair, Wheelchair Transfer Supervised   Transfer Method Stand Step   Mobility no AD in hallway, stairs, up to chair   How much difficulty does the patient currently have...   Turning over in bed (including adjusting bedclothes, sheets and blankets)? 3   Sitting down on and standing up from a chair with arms (e.g., wheelchair, bedside commode, etc.) 4   Moving from lying on back to sitting on the side of  the bed? 3   How much help from another person does the patient currently need...   Moving to and from a bed to a chair (including a wheelchair)? 4   Need to walk in a hospital room? 4   Climbing 3-5 steps with a railing? 4   6 clicks Mobility Score 22   Activity Tolerance   Sitting in Chair up post   Sitting Edge of Bed < 2 min   Standing < 10 min   Comments no overt s/s fatigue or pain   Education Group   Education Provided Role of Physical Therapist;Gait Training;Stair Training;Brace Wear and Care;Cervical Precautions   Cervical Precautions Patient Response Patient;Acceptance;Explanation;Demonstration;Handout;Verbal Demonstration;Action Demonstration   Role of Physical Therapist Patient Response Patient;Acceptance;Explanation;Handout;Verbal Demonstration   Gait Training Patient Response Patient;Acceptance;Explanation;Action Demonstration   Stair Training Patient Response Patient;Acceptance;Explanation;Action Demonstration   Brace Wear & Care Patient Response Patient;Acceptance;Explanation;Handout;Verbal Demonstration   Additional Comments Educated on self management and compensatory strategies with mobility s/p cervical surgery   Physical Therapy Initial Treatment Plan    Duration Evaluation only   Problem List    Problems Functional ROM Deficit;Decreased Activity Tolerance   Anticipated Discharge Equipment and Recommendations   DC Equipment Recommendations None   Discharge Recommendations Recommend outpatient physical therapy services to address higher level deficits   Interdisciplinary Plan of Care Collaboration   IDT Collaboration with  Nursing;Occupational Therapist   Patient Position at End of Therapy Seated;Call Light within Reach;Tray Table within Reach;Phone within Reach  (RN cleared pt for up self in room)   Collaboration Comments RN updated   Session Information   Date / Session Number  3/9: Eval only, d/c PT

## 2023-03-09 NOTE — DISCHARGE INSTRUCTIONS
FOLLOW UP ITEMS POST DISCHARGE  Follow up with our office in 2, 6, and 12 weeks.  Report to ER with any complications.  Call our office with any questions or concerns.  No anti-inflammatories for 3 months, no blood thinners for 2 weeks.   Clear to shower Saturday, pat incision dry, no special creams or ointments.   Avoid bending, lifting and twisting.   Walk 4-6 times per day as tolerated to help prevent blood clots.      DISCHARGE DIAGNOSES  Principal Problem:    Cervical stenosis of spine POA: Unknown      Overview: Added automatically from request for surgery [id number]  Resolved Problems:    * No resolved hospital problems. *        FOLLOW UP         Future Appointments   Date Time Provider Department Center   3/22/2023 10:30 AM [facility]   3/30/2023  4:00 PM Mack Still M.D. [facility] None   4/25/2023 12:30 PM [facility]

## 2023-03-09 NOTE — THERAPY
Occupational Therapy   Initial Evaluation     Patient Name: Quirino Ruiz  Age:  67 y.o., Sex:  male  Medical Record #: [id number]  Today's Date: 3/9/2023     Precautions: (P) Spinal / Back Precautions , Cervical Collar    Comments: (P) miami J on AAT off showers/meals    Assessment  Patient is 67 y.o. male seen s/p C5-7 ACDF for OT evaluation. Pt demonstrated supervision level for dressing, donning/ doffing miami J, ADL txfs, and G/H. Provided extensive education to pt regarding maintaining neutral spine position during ADLs, brace wear and care, pacing of activities at home, compensatory strategies for IADLs, and recommended AD. Pt receptive to education and verbalized understanding and agreement. Family/friends able to assist during post surgical recovery period. No further OT needs at this time.    Plan    Occupational Therapy Initial Treatment Plan   Duration: (P) Evaluation only    DC Equipment Recommendations: (P) None  Discharge Recommendations: (P) Anticipate that the patient will have no further occupational therapy needs after discharge from the hospital        03/09/23 1115   Prior Living Situation   Prior Services None   Housing / Facility [address]   Bathroom Set up Walk In Shower   Equipment Owned Front-Wheel Walker;Single Point Cane;Wheelchair   Lives with - Patient's Self Care Capacity Alone and Able to Care For Self   Comments family/friends near by that can help if needed   Prior Level of ADL Function   Self Feeding Independent   Grooming / Hygiene Independent   Bathing Independent   Dressing Independent   Toileting Independent   Prior Level of IADL Function   Medication Management Independent   Laundry Independent   Kitchen Mobility Independent   Finances Independent   Home Management Independent   Shopping Independent   Prior Level Of Mobility Independent Without Device in Community   Driving / Transportation Driving Independent   Precautions   Precautions Spinal / Back Precautions  ;Cervical Collar     Comments miami BRETT on AAT off showers/meals   Cognition    Cognition / Consciousness WDL   Comments pleasant and receptive   Active ROM Upper Body   Active ROM Upper Body  WDL   Strength Upper Body   Upper Body Strength  WDL   Sensation Upper Body   Upper Extremity Sensation  X   Comments reporting R UE tingling   Balance Assessment   Sitting Balance (Static) Good   Sitting Balance (Dynamic) Good   Standing Balance (Static) Fair +   Standing Balance (Dynamic) Fair +   Weight Shift Sitting Good   Weight Shift Standing Good   Comments without AD   Bed Mobility    Supine to Sit Modified Independent   Sit to Supine Modified Independent   Scooting Modified Independent   ADL Assessment   Grooming Supervision;Standing   Upper Body Dressing Supervision   Lower Body Dressing Supervision   Toileting Supervision   Comments training/practice donning/doffing alexandra WEST and adjustments for best fit   How much help from another person does the patient currently need...   6 Clicks Daily Activity Score 24   Functional Mobility   Sit to Stand Supervised   Bed, Chair, Wheelchair Transfer Supervised   Toilet Transfers   (no concerns)   Education Group   Education Provided Spinal Precautions   Role of Occupational Therapist Patient Response Patient;Acceptance;Explanation   Spinal Precautions Patient Response Patient;Acceptance;Explanation;Handout   Brace Wear & Care Patient Response Patient;Acceptance;Explanation;Handout;Verbal Demonstration   Occupational Therapy Initial Treatment Plan    Duration Evaluation only   Anticipated Discharge Equipment and Recommendations   DC Equipment Recommendations None   Discharge Recommendations Anticipate that the patient will have no further occupational therapy needs after discharge from the hospital

## 2023-03-09 NOTE — DISCHARGE PLANNING
Care Transition Team Assessment    Met with patient at bedside, discussed PLOF and confirmed demographics. Lives alone and indep PLOF, drives and works full time. His daughter lives close and can assist as needed. Neighbour to drive him home today. Has FWW and cane from his wife who passed that he can use, PT/OT recs pending.     Information Source  Orientation Level: Oriented X4    Elopement Risk  Legal Hold: No  Ambulatory or Self Mobile in Wheelchair: Yes  Disoriented: No  Psychiatric Symptoms: None  History of Wandering: No  Elopement this Admit: No  Vocalizing Wanting to Leave: No  Displays Behaviors, Body Language Wanting to Leave: No-Not at Risk for Elopement  Elopement Risk: Not at Risk for Elopement    Interdisciplinary Discharge Planning  Lives with - Patient's Self Care Capacity: Alone and Able to Care For Self  Patient or legal guardian wants to designate a caregiver: No  Support Systems: Children  Housing / Facility: 1 Story House, 3 steps outside with railing.   Able to Return to Previous ADL's: Future Time w/Therapy  Mobility Issues: No  Prior Services: None  Durable Medical Equipment:  (fww, cane from wife he can use.)    Discharge Preparedness  What is your plan after discharge?: Home with help  What are your discharge supports?: Daughter and neighbour.   Prior Functional Level: Independent with Activities of Daily Living  Difficulity with ADLs: None  Difficulity with IADLs: None    Functional Assesment  Prior Functional Level: Independent with Activities of Daily Living    Vision / Hearing Impairment  Right Eye Vision: Impaired, Wears Glasses  Left Eye Vision: Impaired, Wears Glasses  Hearing Impairment: Both Ears, Hearing Device(s) Available    Discharge Risks or Barriers  Discharge risks or barriers?: No    Anticipated Discharge Information  Discharge Disposition: Discharged to home/self care (01)  Discharge Address: [address]   [place] [id number]

## 2023-03-11 LAB — TACROLIMUS BLD-MCNC: 4.3 NG/ML

## 2023-03-30 ENCOUNTER — OFFICE VISIT (OUTPATIENT)
Dept: NEUROLOGY | Facility: MEDICAL CENTER | Age: 68
End: 2023-03-30
Attending: PSYCHIATRY & NEUROLOGY
Payer: MEDICARE

## 2023-03-30 ENCOUNTER — HOSPITAL ENCOUNTER (OUTPATIENT)
Dept: LAB | Facility: MEDICAL CENTER | Age: 68
End: 2023-03-30
Attending: PSYCHIATRY & NEUROLOGY
Payer: MEDICARE

## 2023-03-30 VITALS
BODY MASS INDEX: 25.26 KG/M2 | DIASTOLIC BLOOD PRESSURE: 78 MMHG | HEART RATE: 102 BPM | WEIGHT: 160.94 LBS | TEMPERATURE: 98.3 F | OXYGEN SATURATION: 94 % | SYSTOLIC BLOOD PRESSURE: 130 MMHG | HEIGHT: 67 IN

## 2023-03-30 DIAGNOSIS — Z98.890 H/O CERVICAL SPINE SURGERY: ICD-10-CM

## 2023-03-30 DIAGNOSIS — E74.819 DISORDERS OF GLUCOSE TRANSPORT, UNSPECIFIED (HCC): ICD-10-CM

## 2023-03-30 DIAGNOSIS — G56.03 BILATERAL CARPAL TUNNEL SYNDROME: ICD-10-CM

## 2023-03-30 DIAGNOSIS — G56.03 BILATERAL CARPAL TUNNEL SYNDROME: Primary | ICD-10-CM

## 2023-03-30 PROBLEM — N21.0 URINARY BLADDER STONE: Status: ACTIVE | Noted: 2022-11-23

## 2023-03-30 PROBLEM — Z85.528 HISTORY OF PRIMARY MALIGNANT NEOPLASM OF KIDNEY: Status: ACTIVE | Noted: 2022-11-23

## 2023-03-30 PROBLEM — N13.8 BENIGN PROSTATIC HYPERPLASIA WITH URINARY OBSTRUCTION: Status: ACTIVE | Noted: 2022-11-23

## 2023-03-30 PROBLEM — N40.1 BENIGN PROSTATIC HYPERPLASIA WITH URINARY OBSTRUCTION: Status: ACTIVE | Noted: 2022-11-23

## 2023-03-30 PROCEDURE — 99212 OFFICE O/P EST SF 10 MIN: CPT | Performed by: PSYCHIATRY & NEUROLOGY

## 2023-03-30 PROCEDURE — 84425 ASSAY OF VITAMIN B-1: CPT

## 2023-03-30 PROCEDURE — 36415 COLL VENOUS BLD VENIPUNCTURE: CPT

## 2023-03-30 PROCEDURE — 84443 ASSAY THYROID STIM HORMONE: CPT

## 2023-03-30 PROCEDURE — 84155 ASSAY OF PROTEIN SERUM: CPT

## 2023-03-30 PROCEDURE — 84165 PROTEIN E-PHORESIS SERUM: CPT

## 2023-03-30 PROCEDURE — 83036 HEMOGLOBIN GLYCOSYLATED A1C: CPT

## 2023-03-30 PROCEDURE — 84207 ASSAY OF VITAMIN B-6: CPT

## 2023-03-30 PROCEDURE — 99204 OFFICE O/P NEW MOD 45 MIN: CPT | Performed by: PSYCHIATRY & NEUROLOGY

## 2023-03-30 PROCEDURE — 82607 VITAMIN B-12: CPT

## 2023-03-30 RX ORDER — MIRABEGRON 50 MG/1
TABLET, FILM COATED, EXTENDED RELEASE ORAL
COMMUNITY
End: 2023-03-30

## 2023-03-30 RX ORDER — CEFDINIR 300 MG/1
CAPSULE ORAL
COMMUNITY
End: 2023-03-30

## 2023-03-30 RX ORDER — POLYETHYLENE GLYCOL-3350 AND ELECTROLYTES 236; 6.74; 5.86; 2.97; 22.74 G/274.31G; G/274.31G; G/274.31G; G/274.31G; G/274.31G
POWDER, FOR SOLUTION ORAL
COMMUNITY
End: 2023-09-07

## 2023-03-30 RX ORDER — CHOLECALCIFEROL (VITAMIN D3) 125 MCG
CAPSULE ORAL
COMMUNITY

## 2023-03-30 ASSESSMENT — PATIENT HEALTH QUESTIONNAIRE - PHQ9: CLINICAL INTERPRETATION OF PHQ2 SCORE: 0

## 2023-03-30 ASSESSMENT — FIBROSIS 4 INDEX: FIB4 SCORE: 1.32
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
## 2023-03-31 LAB
EST. AVERAGE GLUCOSE BLD GHB EST-MCNC: 117 MG/DL
HBA1C MFR BLD: 5.7 % (ref 4–5.6)
TSH SERPL DL<=0.005 MIU/L-ACNC: 2.29 UIU/ML (ref 0.38–5.33)
VIT B12 SERPL-MCNC: 556 PG/ML (ref 211–911)

## 2023-04-04 LAB — VIT B6 SERPL-MCNC: 32.4 NMOL/L (ref 20–125)

## 2023-04-05 LAB — VIT B1 BLD-MCNC: 153 NMOL/L (ref 70–180)

## 2023-04-06 ENCOUNTER — HOSPITAL ENCOUNTER (OUTPATIENT)
Dept: LAB | Facility: MEDICAL CENTER | Age: 68
End: 2023-04-06
Attending: PSYCHIATRY & NEUROLOGY
Payer: MEDICARE

## 2023-04-06 PROCEDURE — 36415 COLL VENOUS BLD VENIPUNCTURE: CPT

## 2023-04-06 PROCEDURE — 84155 ASSAY OF PROTEIN SERUM: CPT

## 2023-04-06 PROCEDURE — 84165 PROTEIN E-PHORESIS SERUM: CPT

## 2023-04-10 LAB
ALBUMIN SERPL ELPH-MCNC: 4.28 G/DL (ref 3.75–5.01)
ALPHA1 GLOB SERPL ELPH-MCNC: 0.29 G/DL (ref 0.19–0.46)
ALPHA2 GLOB SERPL ELPH-MCNC: 0.93 G/DL (ref 0.48–1.05)
B-GLOBULIN SERPL ELPH-MCNC: 0.84 G/DL (ref 0.48–1.1)
GAMMA GLOB SERPL ELPH-MCNC: 1.16 G/DL (ref 0.62–1.51)
INTERPRETATION SERPL IFE-IMP: NORMAL
MONOCLON BAND OBS SERPL: NORMAL
MONOCLONAL PROTEIN NL11656: NORMAL G/DL
PATHOLOGY STUDY: NORMAL
PROT SERPL-MCNC: 7.5 G/DL (ref 6.3–8.2)

## 2023-04-28 ENCOUNTER — APPOINTMENT (OUTPATIENT)
Dept: RADIOLOGY | Facility: MEDICAL CENTER | Age: 68
End: 2023-04-28
Attending: EMERGENCY MEDICINE
Payer: MEDICARE

## 2023-04-28 ENCOUNTER — HOSPITAL ENCOUNTER (EMERGENCY)
Facility: MEDICAL CENTER | Age: 68
End: 2023-04-28
Attending: EMERGENCY MEDICINE
Payer: MEDICARE

## 2023-04-28 VITALS
SYSTOLIC BLOOD PRESSURE: 118 MMHG | RESPIRATION RATE: 20 BRPM | HEIGHT: 67 IN | OXYGEN SATURATION: 90 % | WEIGHT: 165.79 LBS | DIASTOLIC BLOOD PRESSURE: 58 MMHG | HEART RATE: 82 BPM | BODY MASS INDEX: 26.02 KG/M2 | TEMPERATURE: 99.5 F

## 2023-04-28 DIAGNOSIS — R06.00 DYSPNEA, UNSPECIFIED TYPE: ICD-10-CM

## 2023-04-28 LAB
ALBUMIN SERPL BCP-MCNC: 3.9 G/DL (ref 3.2–4.9)
ALBUMIN/GLOB SERPL: 1.2 G/DL
ALP SERPL-CCNC: 85 U/L (ref 30–99)
ALT SERPL-CCNC: 26 U/L (ref 2–50)
ANION GAP SERPL CALC-SCNC: 15 MMOL/L (ref 7–16)
AST SERPL-CCNC: 21 U/L (ref 12–45)
BASOPHILS # BLD AUTO: 0.9 % (ref 0–1.8)
BASOPHILS # BLD: 0.07 K/UL (ref 0–0.12)
BILIRUB SERPL-MCNC: 0.2 MG/DL (ref 0.1–1.5)
BUN SERPL-MCNC: 12 MG/DL (ref 8–22)
CALCIUM ALBUM COR SERPL-MCNC: 8.6 MG/DL (ref 8.5–10.5)
CALCIUM SERPL-MCNC: 8.5 MG/DL (ref 8.5–10.5)
CHLORIDE SERPL-SCNC: 103 MMOL/L (ref 96–112)
CO2 SERPL-SCNC: 18 MMOL/L (ref 20–33)
CREAT SERPL-MCNC: 0.77 MG/DL (ref 0.5–1.4)
EKG IMPRESSION: NORMAL
EOSINOPHIL # BLD AUTO: 0.4 K/UL (ref 0–0.51)
EOSINOPHIL NFR BLD: 4.9 % (ref 0–6.9)
ERYTHROCYTE [DISTWIDTH] IN BLOOD BY AUTOMATED COUNT: 42.9 FL (ref 35.9–50)
GFR SERPLBLD CREATININE-BSD FMLA CKD-EPI: 97 ML/MIN/1.73 M 2
GLOBULIN SER CALC-MCNC: 3.2 G/DL (ref 1.9–3.5)
GLUCOSE SERPL-MCNC: 110 MG/DL (ref 65–99)
HCT VFR BLD AUTO: 43.9 % (ref 42–52)
HGB BLD-MCNC: 15 G/DL (ref 14–18)
IMM GRANULOCYTES # BLD AUTO: 0.06 K/UL (ref 0–0.11)
IMM GRANULOCYTES NFR BLD AUTO: 0.7 % (ref 0–0.9)
LYMPHOCYTES # BLD AUTO: 3.56 K/UL (ref 1–4.8)
LYMPHOCYTES NFR BLD: 43.8 % (ref 22–41)
MCH RBC QN AUTO: 30.8 PG (ref 27–33)
MCHC RBC AUTO-ENTMCNC: 34.2 G/DL (ref 33.7–35.3)
MCV RBC AUTO: 90.1 FL (ref 81.4–97.8)
MONOCYTES # BLD AUTO: 0.94 K/UL (ref 0–0.85)
MONOCYTES NFR BLD AUTO: 11.6 % (ref 0–13.4)
NEUTROPHILS # BLD AUTO: 3.09 K/UL (ref 1.82–7.42)
NEUTROPHILS NFR BLD: 38.1 % (ref 44–72)
NRBC # BLD AUTO: 0 K/UL
NRBC BLD-RTO: 0 /100 WBC
NT-PROBNP SERPL IA-MCNC: 89 PG/ML (ref 0–125)
PLATELET # BLD AUTO: 222 K/UL (ref 164–446)
PMV BLD AUTO: 9.6 FL (ref 9–12.9)
POTASSIUM SERPL-SCNC: 3.8 MMOL/L (ref 3.6–5.5)
PROCALCITONIN SERPL-MCNC: <0.05 NG/ML
PROT SERPL-MCNC: 7.1 G/DL (ref 6–8.2)
RBC # BLD AUTO: 4.87 M/UL (ref 4.7–6.1)
SODIUM SERPL-SCNC: 136 MMOL/L (ref 135–145)
TROPONIN T SERPL-MCNC: 19 NG/L (ref 6–19)
WBC # BLD AUTO: 8.1 K/UL (ref 4.8–10.8)

## 2023-04-28 PROCEDURE — 84145 PROCALCITONIN (PCT): CPT

## 2023-04-28 PROCEDURE — 700117 HCHG RX CONTRAST REV CODE 255: Performed by: EMERGENCY MEDICINE

## 2023-04-28 PROCEDURE — 93005 ELECTROCARDIOGRAM TRACING: CPT | Performed by: EMERGENCY MEDICINE

## 2023-04-28 PROCEDURE — 36415 COLL VENOUS BLD VENIPUNCTURE: CPT

## 2023-04-28 PROCEDURE — 71045 X-RAY EXAM CHEST 1 VIEW: CPT

## 2023-04-28 PROCEDURE — 84484 ASSAY OF TROPONIN QUANT: CPT

## 2023-04-28 PROCEDURE — 83880 ASSAY OF NATRIURETIC PEPTIDE: CPT

## 2023-04-28 PROCEDURE — 85025 COMPLETE CBC W/AUTO DIFF WBC: CPT

## 2023-04-28 PROCEDURE — 71275 CT ANGIOGRAPHY CHEST: CPT

## 2023-04-28 PROCEDURE — 99284 EMERGENCY DEPT VISIT MOD MDM: CPT

## 2023-04-28 PROCEDURE — 80053 COMPREHEN METABOLIC PANEL: CPT

## 2023-04-28 RX ADMIN — IOHEXOL 59 ML: 350 INJECTION, SOLUTION INTRAVENOUS at 18:00

## 2023-04-28 ASSESSMENT — FIBROSIS 4 INDEX: FIB4 SCORE: 1.34

## 2023-04-28 NOTE — ED PROVIDER NOTES
ED Provider Note    CHIEF COMPLAINT  Chief Complaint   Patient presents with    Shortness of Breath     Started yesterday. Pt endorses having trouble catching his breath. He checked his oxygen last night on the pulse ox and noticed it was at 70%. Pt states periods of shortness of breath are intermittent. Pt called his MD and it was recommended that he come here for further workup.          HPI    Quirino Ruiz is a 68 y.o. male who presents to the Emergency Department stating that last 2 days he has had episodes of increased work of breathing and hypoxia.  He has a pulse oximeter at home and states that last 2 days he has had a very intermittent episodes of increased work of breathing and hypoxia with a saturation oxygen down to 70 percentile that is slowly comes back up to the 80s a few minutes.  Denies any chest pain, fever, shakes, chills, sweats, nausea, vomiting.  He recently had neck surgery.  He called his primary care physician was instructed come the emergency department for evaluation.    OUTSIDE HISTORIAN(S):  Select: None    EXTERNAL RECORDS REVIEWED  Select: Select: Progress note completed on 3/30/2023 for status post neck surgery by Dr. Still  REVIEW OF SYSTEMS  Pertinent positives include: Shortness of breath  Pertinent negatives include: Cough, fever, chest pain    PAST MEDICAL HISTORY  Past Medical History:   Diagnosis Date    Arthritis     fingers, hands    Back pain     Cancer (HCC) 2002;2013    kidney / liver - treated with chemo, & transplant    Cholesterol blood decreased     Chronic pain     Cold 08/27/2014    URI    Dental disorder     upper  and lower dentures    Gout     Heart burn     under control with meds    Hepatitis C 1993    Hypertension     Indigestion     Infectious viral hepatitis     Liver disease     Liver transplanted (HCC) 2002    Pain     neck    Pain     neck and lower back    Renal cancer (HCC)     Renal disorder     left nephrectomy; right cryoablation, voids (no  dialysis)    Shortness of breath     Spinal disorder     Stroke (HCC) 2018    denies residual       FAMILY HISTORY  Family History   Problem Relation Age of Onset    Hypertension Other        SOCIAL HISTORY  Social History     Tobacco Use    Smoking status: Some Days     Packs/day: 0.10     Years: 50.00     Pack years: 5.00     Types: Cigarettes     Last attempt to quit: 2021     Years since quittin.8    Smokeless tobacco: Never    Tobacco comments:     Some day smoking   Vaping Use    Vaping Use: Former    Substances: Nicotine, quit    Devices: Pre-filled or refillable cartridge, quit   Substance Use Topics    Alcohol use: Yes     Alcohol/week: 0.6 oz     Types: 1 Cans of beer per week     Comment: every 3 weeks    Drug use: No     Social History     Substance and Sexual Activity   Drug Use No       SURGICAL HISTORY  Past Surgical History:   Procedure Laterality Date    SC ARTHRODESIS ANT INTERBODY INC DISCECTOMY, CERVICAL BELOW C2  3/8/2023    Procedure: C4 corpectomy with C3-5 anterior cervical decompression and fusion, with removal of anterior cervical plate from C5-C7;  Surgeon: Maria Alejandra Guillen M.D.;  Location: [facility];  Service: Orthopedics    CORPECTOMY  3/8/2023    Procedure: CORPECTOMY, SPINE;  Surgeon: Maria Alejandra Guillen M.D.;  Location: [facility];  Service: Orthopedics    PUMP REVISION Left 2021    Procedure: REVISION, INSERTION, OR REPLACEMENT, INTRATHECAL ANALGESIC PUMP - REPLACEMENT;  Surgeon: Jass Mendes M.D.;  Location: [facility];  Service: Pain Management    RECOVERY  2015    Procedure: US-Non Targeted Liver Biopsy-;  Surgeon: Recoveryonly Surgery;  Location: SURGERY PRE-POST PROC UNIT [facility] [place];  Service:     PUMP INSERT/REMOVE  2014    Performed by Jass Mendes M.D. at [facility] ORS    CATH PLACE PERM EPIDURAL  2014    Performed by Jass Mendes M.D. at [facility] ORS    OTHER  2014     cryoablation right kidney    EVACUATION OF HEMATOMA  5/31/2013    Performed by Davis Murray M.D. at SURGERY [facility] ORS    EXPLORATORY LAPAROTOMY  5/31/2013    Performed by Davis Murray M.D. at SURGERY [facility] ORS    LUMBAR FUSION ANTERIOR  5/29/2013    Performed by Suman Burks M.D. at SURGERY [facility]    RECOVERY  4/23/2013    Performed by -Recovery Surgery at SURGERY SAME DAY [facility] ORS    CERVICAL DISK AND FUSION ANTERIOR  12/11/2012    Performed by Suman Burks M.D. at SURGERY [facility] ORS    OTHER  1994    gallbladder removal    CERVICAL DECOMPRESSION POSTERIOR      CHOLECYSTECTOMY      NEPHRECTOMY RADICAL      NEPHRECTOMY RADICAL Left     Left kidney removed    OTHER      liver transplant    OTHER SURGICAL PROCEDURE      liver transplant    PUMP INSERT/REMOVE      pain pump placed       CURRENT MEDICATIONS  No current facility-administered medications for this encounter.    Current Outpatient Medications:     melatonin 5 mg Tab, melatonin 5 mg tablet  TAKE 1 TABLET BY MOUTH EVERY EVENING *OTC, Disp: , Rfl:     polyethylene glycol-electrolytes (GAVILYTE-G) 236 GM Recon Soln, GaviLyte-G 236 gram-22.74 gram-6.74 gram-5.86 gram oral solution, Disp: , Rfl:     cyclobenzaprine (FLEXERIL) 10 mg Tab, Take 1 Tablet by mouth every 8 hours as needed for Muscle Spasms., Disp: 90 Tablet, Rfl: 1    pantoprazole (PROTONIX) 40 MG Tablet Delayed Response, Take 40 mg by mouth every day., Disp: , Rfl:     metoprolol SR (TOPROL XL) 25 MG TABLET SR 24 HR, TAKE 1 TABLET BY MOUTH EVERY DAY, Disp: 100 Tablet, Rfl: 1    omeprazole (PRILOSEC) 20 MG delayed-release capsule, Take 20 mg by mouth 2 times a day., Disp: , Rfl:     tadalafil (CIALIS) 5 MG tablet, tadalafil 5 mg tablet  Take 1 tablet(s) EVERY DAY by oral route. (Patient not taking: Reported on 3/30/2023), Disp: , Rfl:     tamsulosin (FLOMAX) 0.4 MG capsule, Take 0.4 mg by mouth every day., Disp: , Rfl:     ROPINIRole (REQUIP) 1 MG Tab,  Take 1 mg by mouth as needed. (Patient not taking: Reported on 3/30/2023), Disp: , Rfl:     nitroglycerin (NITROSTAT) 0.4 MG SL Tab, Place 1 Tablet under the tongue as needed for Chest Pain. (Patient not taking: Reported on 3/30/2023), Disp: 25 Tablet, Rfl: 3    citalopram (CELEXA) 20 MG Tab, Take 20 mg by mouth every day., Disp: , Rfl:     albuterol 108 (90 Base) MCG/ACT Aero Soln inhalation aerosol, Inhale 2 Puffs every four hours as needed for Shortness of Breath., Disp: 8.5 g, Rfl: 0    ascorbic acid (VITAMIN C) 500 MG tablet, Take 1 Tablet by mouth 2 times a day., Disp: 30 Tablet, Rfl: 0    vitamin D3 (VITAMIND D3) 1000 UNIT Tab, Take 1 Tablet by mouth every day., Disp: 60 Tablet, Rfl:     zinc sulfate (ZINCATE) 220 (50 Zn) MG Cap, Take 2,200 mg by mouth every day., Disp: 7 Capsule, Rfl: 0    rizatriptan (MAXALT-MLT) 10 MG disintegrating tablet, Take 10 mg by mouth one time as needed., Disp: , Rfl:     allopurinol (ZYLOPRIM) 100 MG Tab, Take 100 mg by mouth every bedtime., Disp: , Rfl:     lisinopril (PRINIVIL) 10 MG Tab, Take 10 mg by mouth every morning. Indications: High Blood Pressure Disorder, Disp: , Rfl:     Pain Pump (PATIENT SUPPLIED) XX BRANDON, Inject  as directed continuous. Patient's Pain Pump (placed and maintained as an outpatient) Medications/concentrations: Hydromorphone 400.0 mcg/mL Route: Intrathecally Date of Placement: Unknown Last changed: 03/16/2022 due to refill pump before 5/16/2022 Continuous infusion rates (Drug/Rate): Hydromorphone 139.81 mcg / 24 hours (5.83 mcg / hr) Patient activation dose: Hydromorphone 15.00 mcg Maximum daily activation dose: Hydromorphone 228.06 mcg Patient activation lockout interval: 1 hour Maximum activations per day: 6 Dr Mendes [phone], Disp: , Rfl:     tacrolimus (PROGRAF) 1 MG Cap, Take 2 mg by mouth 2 Times a Day. Indications: Liver Transplant Recipients, Disp: , Rfl:     ALLERGIES  Allergies   Allergen Reactions    Niacin      Passed out.     Latex  "Rash     Reaction- Itching, Rash    Pcn [Penicillins] Anaphylaxis and Swelling     Patient has tolerated cefazolin in past    Morphine     Morphine Sulfate      Per pt., \"it makes me cranky\"    Mushroom Extract Complex        PHYSICAL EXAM  VITAL SIGNS: /58   Pulse 82   Temp 37.5 °C (99.5 °F)   Resp 20   Ht 1.702 m (5' 7\")   Wt 75.2 kg (165 lb 12.6 oz)   SpO2 90%   BMI 25.97 kg/m²     Constitutional: Well developed, Well nourished,  Eyes: PERRLA, conjunctiva pink, no scleral icterus.   Cardiovascular: Regular rate and rhythm. No murmurs, rubs or gallops.  No dependent edema or calf tenderness  Respiratory: Lungs clear to auscultation bilaterally. No wheezes, rales, or rhonchi.  Skin: No erythema, no rash. No wounds or bruising.  Musculoskeletal: no deformities.   Psychiatric: Affect normal, Judgment normal, Mood normal.     LABS Ordered and Reviewed by Me:  Results for orders placed or performed during the hospital encounter of 04/28/23   CBC with Differential   Result Value Ref Range    WBC 8.1 4.8 - 10.8 K/uL    RBC 4.87 4.70 - 6.10 M/uL    Hemoglobin 15.0 14.0 - 18.0 g/dL    Hematocrit 43.9 42.0 - 52.0 %    MCV 90.1 81.4 - 97.8 fL    MCH 30.8 27.0 - 33.0 pg    MCHC 34.2 33.7 - 35.3 g/dL    RDW 42.9 35.9 - 50.0 fL    Platelet Count 222 164 - 446 K/uL    MPV 9.6 9.0 - 12.9 fL    Neutrophils-Polys 38.10 (L) 44.00 - 72.00 %    Lymphocytes 43.80 (H) 22.00 - 41.00 %    Monocytes 11.60 0.00 - 13.40 %    Eosinophils 4.90 0.00 - 6.90 %    Basophils 0.90 0.00 - 1.80 %    Immature Granulocytes 0.70 0.00 - 0.90 %    Nucleated RBC 0.00 /100 WBC    Neutrophils (Absolute) 3.09 1.82 - 7.42 K/uL    Lymphs (Absolute) 3.56 1.00 - 4.80 K/uL    Monos (Absolute) 0.94 (H) 0.00 - 0.85 K/uL    Eos (Absolute) 0.40 0.00 - 0.51 K/uL    Baso (Absolute) 0.07 0.00 - 0.12 K/uL    Immature Granulocytes (abs) 0.06 0.00 - 0.11 K/uL    NRBC (Absolute) 0.00 K/uL   Comp Metabolic Panel   Result Value Ref Range    Sodium 136 135 - 145 " mmol/L    Potassium 3.8 3.6 - 5.5 mmol/L    Chloride 103 96 - 112 mmol/L    Co2 18 (L) 20 - 33 mmol/L    Anion Gap 15.0 7.0 - 16.0    Glucose 110 (H) 65 - 99 mg/dL    Bun 12 8 - 22 mg/dL    Creatinine 0.77 0.50 - 1.40 mg/dL    Calcium 8.5 8.5 - 10.5 mg/dL    AST(SGOT) 21 12 - 45 U/L    ALT(SGPT) 26 2 - 50 U/L    Alkaline Phosphatase 85 30 - 99 U/L    Total Bilirubin 0.2 0.1 - 1.5 mg/dL    Albumin 3.9 3.2 - 4.9 g/dL    Total Protein 7.1 6.0 - 8.2 g/dL    Globulin 3.2 1.9 - 3.5 g/dL    A-G Ratio 1.2 g/dL   proBrain Natriuretic Peptide, NT   Result Value Ref Range    NT-proBNP 89 0 - 125 pg/mL   PROCALCITONIN   Result Value Ref Range    Procalcitonin <0.05 <0.25 ng/mL   TROPONIN   Result Value Ref Range    Troponin T 19 6 - 19 ng/L   CORRECTED CALCIUM   Result Value Ref Range    Correct Calcium 8.6 8.5 - 10.5 mg/dL   ESTIMATED GFR   Result Value Ref Range    GFR (CKD-EPI) 97 >60 mL/min/1.73 m 2   EKG   Result Value Ref Range    Report       [facility] Emergency Dept.    Test Date:  2023  Pt Name:    PRERNA SOUZA                Department: ER  MRN:        [id number]                      Room:        10  Gender:     Male                         Technician: [id number]  :        1955                   Requested By:TIMMY KIMBROUGH  Order #:    [id number]                    Reading MD: TIMMY KIMBROUGH DO    Measurements  Intervals                                Axis  Rate:       104                          P:          61  NE:         151                          QRS:        47  QRSD:       83                           T:          71  QT:         347  QTc:        457    Interpretive Statements  Sinus tachycardia  Borderline T abnormalities, anterior leads  Compared to ECG 2023 09:13:47  T-wave abnormality now present  Sinus rhythm no longer present  Electronically Signed On 2023 15:41:21 PDT by TIMMY KIMBROUGH DO           Rhythm Strip: Interpretation by me sinus  rhythm on monitor    RADIOLOGY  I have independently interpreted the CT scan pulmonary angiogram associated with this visit demonstrating no evidence of pulmonary embolism or infiltrate.  I am awaiting the final reading from the radiologist.     Final Radiology Report  CT-CTA CHEST PULMONARY ARTERY W/ RECONS   Final Result         1. No CT evidence of pulmonary embolism.      2. Hazy bibasilar opacities, likely atelectasis.            DX-CHEST-PORTABLE (1 VIEW)   Final Result         1. No acute cardiopulmonary abnormalities are identified.        Radiologist interpretation have been reviewed by me.     ED COURSE:    ED Observation Status? Yes; Patient placed in observation status at 1545  04/28/23     Observation plan is as follows: Blood work, evaluation of CT    INTERVENTIONS BY ME:  Medications   iohexol (OMNIPAQUE) 350 mg/mL (IV) (59 mL Intravenous Given 4/28/23 1800)         Patient discharged from ED observation at 1830 pm on  04/28/23      MEDICAL DECISION MAKING:  PROBLEMS EVALUATED THIS VISIT:  Dyspnea  This is a pleasant 68-year-old gentleman presents with dyspnea.  He recently had neck surgery is concern for possible pulmonary embolism or pulm pneumonia therefore CT pulmonary angiogram is completed.  Fortunately was negative for significant abnormality except for slight atelectasis which could be early bronchitis causing his dyspnea.  Here in the emergency department, the patient is amatory in drop down to 89 and 91 percentile at rest he is 91 to 95% no rest or distress.  Not believe he has evidence of heart failure with negative BNP, noted CT scan, negative troponin and EKG are negative for myocardial infarction.  He has a heart score of 3.  Here in the emergency department, he had no evidence of profound hypoxia, he states he is feeling fine will be discharged with strict return precautions.    Diagnostic tests and prescription drugs considered including, but not limited to: Select: CT scan was  completed and I do not believe the patient requires a VQ scan.       PLAN:  Discharge Medication List as of 4/28/2023  6:32 PM          Followup:  [facility], Emergency Dept  [address] [id number] [phone]  Schedule an appointment as soon as possible for a visit   If symptoms worsen    Alejandra Sutherland M.D.  [address]  [address]  [place] [facility] [address]  [phone]    Schedule an appointment as soon as possible for a visit in 1 week        CONDITION: Stable    FINAL IMPRESSION  1. Dyspnea, unspecified type       ED Observation Care    Electronically signed by: Jason Hill D.O., 4/28/2023 3:28 PM

## 2023-04-28 NOTE — ED TRIAGE NOTES
"Chief Complaint   Patient presents with    Shortness of Breath     Started yesterday. Pt endorses having trouble catching his breath. He checked his oxygen last night on the pulse ox and noticed it was at 70%. Pt states periods of shortness of breath are intermittent. Pt called his MD and it was recommended that he come here for further workup.      BP (!) 144/88   Pulse (!) 114   Temp 37.4 °C (99.3 °F) (Temporal)   Resp 16   Ht 1.702 m (5' 7\")   Wt 75.2 kg (165 lb 12.6 oz)   SpO2 95%   BMI 25.97 kg/m²     Pt ambulatory from lobby with steady gait.  Pt states he had a discectomy March 8th and denies complications since then. Pt denies any flu like symptoms of fevers. Shortness of breath protocol ordered.     Pt is alert and oriented, speaking in full sentences, follows commands and responds appropriately to questions. Resp are even and unlabored.      Pt placed in lobby. Pt educated on triage process. Pt encouraged to alert staff for any changes.     Patient and staff wearing appropriate PPE    "

## 2023-06-01 ENCOUNTER — HOSPITAL ENCOUNTER (OUTPATIENT)
Facility: MEDICAL CENTER | Age: 68
End: 2023-06-01
Attending: PHYSICIAN ASSISTANT
Payer: MEDICARE

## 2023-06-01 PROCEDURE — 87077 CULTURE AEROBIC IDENTIFY: CPT

## 2023-06-01 PROCEDURE — 87186 SC STD MICRODIL/AGAR DIL: CPT

## 2023-06-01 PROCEDURE — 87086 URINE CULTURE/COLONY COUNT: CPT

## 2023-06-04 LAB
BACTERIA UR CULT: ABNORMAL
BACTERIA UR CULT: ABNORMAL
SIGNIFICANT IND 70042: ABNORMAL
SITE SITE: ABNORMAL
SOURCE SOURCE: ABNORMAL

## 2023-07-12 ENCOUNTER — OFFICE VISIT (OUTPATIENT)
Dept: PHYSICAL MEDICINE AND REHAB | Facility: MEDICAL CENTER | Age: 68
End: 2023-07-12
Payer: MEDICARE

## 2023-07-12 VITALS
TEMPERATURE: 97.8 F | WEIGHT: 164.46 LBS | HEART RATE: 71 BPM | SYSTOLIC BLOOD PRESSURE: 144 MMHG | BODY MASS INDEX: 24.36 KG/M2 | OXYGEN SATURATION: 94 % | DIASTOLIC BLOOD PRESSURE: 78 MMHG | HEIGHT: 69 IN

## 2023-07-12 DIAGNOSIS — R20.0 NUMBNESS AND TINGLING OF RIGHT ARM: ICD-10-CM

## 2023-07-12 DIAGNOSIS — R20.2 NUMBNESS AND TINGLING IN LEFT ARM: ICD-10-CM

## 2023-07-12 DIAGNOSIS — R20.0 NUMBNESS AND TINGLING IN LEFT ARM: ICD-10-CM

## 2023-07-12 DIAGNOSIS — R20.2 NUMBNESS AND TINGLING OF RIGHT ARM: ICD-10-CM

## 2023-07-12 DIAGNOSIS — Z13.31 POSITIVE DEPRESSION SCREENING: ICD-10-CM

## 2023-07-12 DIAGNOSIS — Z98.890 HISTORY OF CERVICAL SPINAL SURGERY: ICD-10-CM

## 2023-07-12 PROCEDURE — 3077F SYST BP >= 140 MM HG: CPT | Performed by: STUDENT IN AN ORGANIZED HEALTH CARE EDUCATION/TRAINING PROGRAM

## 2023-07-12 PROCEDURE — 99204 OFFICE O/P NEW MOD 45 MIN: CPT | Performed by: STUDENT IN AN ORGANIZED HEALTH CARE EDUCATION/TRAINING PROGRAM

## 2023-07-12 PROCEDURE — 1125F AMNT PAIN NOTED PAIN PRSNT: CPT | Performed by: STUDENT IN AN ORGANIZED HEALTH CARE EDUCATION/TRAINING PROGRAM

## 2023-07-12 PROCEDURE — 3078F DIAST BP <80 MM HG: CPT | Performed by: STUDENT IN AN ORGANIZED HEALTH CARE EDUCATION/TRAINING PROGRAM

## 2023-07-12 ASSESSMENT — PAIN SCALES - GENERAL: PAINLEVEL: 4=SLIGHT-MODERATE PAIN

## 2023-07-12 ASSESSMENT — PATIENT HEALTH QUESTIONNAIRE - PHQ9
5. POOR APPETITE OR OVEREATING: 3 - NEARLY EVERY DAY
CLINICAL INTERPRETATION OF PHQ2 SCORE: 3
SUM OF ALL RESPONSES TO PHQ QUESTIONS 1-9: 13

## 2023-07-12 ASSESSMENT — FIBROSIS 4 INDEX: FIB4 SCORE: 1.28

## 2023-07-12 NOTE — PROGRESS NOTES
History new Patient Note    Interventional Pain and Spine  Physiatry (Physical Medicine and Rehabilitation)     Patient Name: Quirino Ruiz  : 1955  Date of Service: 2023  PCP: Alejandra Sutherland M.D.  Referring Provider: Mack Still M.D.    Chief Complaint:   Chief Complaint   Patient presents with    New Patient     Bilateral carpal tunnel syndrome       HPI  HISTORY FROM INITIAL VISIT (2023):  Quirino Ruiz is a 68 y.o. male who presents today as referred by neuro on 3/30/2023 for consideration of EMG for numbness/tingling in bilateral hands. In 3/8/2023 he had C4 corpectomy with C4-C5 instrumented fusion with removal of hardware C5-C7 with Dr. Guillen.  After the surgery he has continued to have numbness and tingling in all his fingertips, worse at his bilateral index fingers, and burning pain radiating down his arms. He was advised by Easton Villa PA-C that it can take 12 months for the symptoms to improve. Denies focal weakness in upper extremities.  He would like to identify and treat the root cause of the symptoms sooner if possible.    Pain right now is 4/10 on the numeric pain scale. His pain at its best-worse level during the course of the day is typically 7-10/10, respectively.  Pain worsens with  activity  and improves with nothing. His pain interferes somewhat with ADLs. The patient notes some numbness and tingling in his back and previous hx of back surgery but denies numbness and tingling elsewhere.    He notes he had an EMG in October before his surgery which showed a right C5-C6 radiculopathy and no evidence of median or ulnar neuropathy.    Medical history includes HTN, depression.    Psychological testing for pain as depression and pain commonly coexist and need to both be treated.     Opioid Risk Score: 0      Interpretation of Opioid Risk Score   Score 0-3 = Low risk of abuse. Do UDS at least once per year.  Score 4-7 = Moderate risk of abuse. Do UDS 1-4 times  per year.  Score 8+ = High risk of abuse. Refer to specialist.    PHQ      3/30/2022     9:00 PM 3/30/2023     4:00 PM 7/12/2023     9:40 AM   Depression Screen (PHQ-2/PHQ-9)   PHQ-2 Total Score 0     PHQ-2 Total Score  0 3   PHQ-9 Total Score   13       Interpretation of PHQ-9 Total Score   Score Severity   1-4 No Depression   5-9 Mild Depression   10-14 Moderate Depression   15-19 Moderately Severe Depression   20-27 Severe Depression      Medical records review:  I reviewed the note from the referring provider Mack Still M.D. including the note dated 3/30/23 and NRS[facility] note 6/8/23 indicating PT referral and the following impression::  12 weeks status post C4 corpectomy with C4-C5 instrumented fusion with removal of hardware C5-C7  Implanted pain pump, previous liver transplant, nicotine dependence  Resolution of pain, numbness and tingling into the arms, functional weakness remains in the hands  Mid and low back pains likely myofascial       ROS:   Red Flags ROS:   Fever, Chills, Sweats: Denies  Involuntary Weight Loss: Denies  Bladder Incontinence: Denies  Bowel Incontinence: denies  Saddle Anesthesia: Denies    All other systems reviewed and negative.     PMHx:   Past Medical History:   Diagnosis Date    Arthritis     fingers, hands    Back pain     Cancer (HCC) 2002;2013    kidney / liver - treated with chemo, & transplant    Cholesterol blood decreased     Chronic pain     Cold 08/27/2014    URI    Dental disorder     upper  and lower dentures    Gout     Heart burn     under control with meds    Hepatitis C 1993    Hypertension     Indigestion     Infectious viral hepatitis     Liver disease     Liver transplanted (HCC) 2002    Pain     neck    Pain     neck and lower back    Renal cancer (HCC)     Renal disorder     left nephrectomy; right cryoablation, voids (no dialysis)    Shortness of breath     Spinal disorder     Stroke (HCC) 03/2018    denies residual       PSHx:   Past Surgical History:    Procedure Laterality Date    ME ARTHRODESIS ANT INTERBODY INC DISCECTOMY, CERVICAL BELOW C2  3/8/2023    Procedure: C4 corpectomy with C3-5 anterior cervical decompression and fusion, with removal of anterior cervical plate from C5-C7;  Surgeon: Maria Alejandra Guillen M.D.;  Location: [facility];  Service: Orthopedics    CORPECTOMY  3/8/2023    Procedure: CORPECTOMY, SPINE;  Surgeon: Maria Alejandra Guillen M.D.;  Location: [facility];  Service: Orthopedics    PUMP REVISION Left 9/17/2021    Procedure: REVISION, INSERTION, OR REPLACEMENT, INTRATHECAL ANALGESIC PUMP - REPLACEMENT;  Surgeon: Jass Mendes M.D.;  Location: [facility];  Service: Pain Management    RECOVERY  11/30/2015    Procedure: US-Non Targeted Liver Biopsy-;  Surgeon: [facility];  Location: SURGERY PRE-POST PROC UNIT [facility];  Service:     PUMP INSERT/REMOVE  11/25/2014    Performed by Jass Mendes M.D. at [facility] ORS    CATH PLACE PERM EPIDURAL  9/9/2014    Performed by Jass Mendes M.D. at [facility] ORS    OTHER  2/28/2014    cryoablation right kidney    EVACUATION OF HEMATOMA  5/31/2013    Performed by Davis Murray M.D. at SURGERY [facility] ORS    EXPLORATORY LAPAROTOMY  5/31/2013    Performed by Davis Murray M.D. at [facility] ORS    LUMBAR FUSION ANTERIOR  5/29/2013    Performed by Suman Burks M.D. at SURGERY [facility] ORS    RECOVERY  4/23/2013    Performed by -Recovery Surgery at [facility] SAME DAY [facility] ORS    CERVICAL DISK AND FUSION ANTERIOR  12/11/2012    Performed by Suman Burks M.D. at SURGERY [facility] ORS    OTHER  1994    gallbladder removal    CERVICAL DECOMPRESSION POSTERIOR      CHOLECYSTECTOMY      NEPHRECTOMY RADICAL      NEPHRECTOMY RADICAL Left     Left kidney removed    OTHER      liver transplant    OTHER SURGICAL PROCEDURE      liver transplant    PUMP INSERT/REMOVE      pain pump placed       Family Hx:   Family  "History   Problem Relation Age of Onset    Hypertension Other        Social Hx:  Social History     Socioeconomic History    Marital status:      Spouse name: Not on file    Number of children: Not on file    Years of education: Not on file    Highest education level: Not on file   Occupational History    Not on file   Tobacco Use    Smoking status: Some Days     Packs/day: 0.10     Years: 50.00     Pack years: 5.00     Types: Cigarettes     Last attempt to quit: 2021     Years since quittin.0    Smokeless tobacco: Never    Tobacco comments:     Some day smoking   Vaping Use    Vaping Use: Former    Substances: Nicotine, quit    Devices: Pre-filled or refillable cartridge, quit   Substance and Sexual Activity    Alcohol use: Yes     Alcohol/week: 0.6 oz     Types: 1 Cans of beer per week     Comment: every 3 weeks    Drug use: No    Sexual activity: Not on file   Other Topics Concern    Not on file   Social History Narrative    ** Merged History Encounter **          Social Determinants of Health     Financial Resource Strain: Not on file   Food Insecurity: Not on file   Transportation Needs: Not on file   Physical Activity: Not on file   Stress: Not on file   Social Connections: Not on file   Intimate Partner Violence: Not on file   Housing Stability: Not on file       Allergies:  Allergies   Allergen Reactions    Niacin      Passed out.     Latex Rash     Reaction- Itching, Rash    Pcn [Penicillins] Anaphylaxis and Swelling     Patient has tolerated cefazolin in past    Morphine     Morphine Sulfate      Per pt., \"it makes me cranky\"    Mushroom Extract Complex        Medications: reviewed on epic.   Outpatient Medications Marked as Taking for the 23 encounter (Office Visit) with Nickie Jaimes M.D.   Medication Sig Dispense Refill    melatonin 5 mg Tab melatonin 5 mg tablet   TAKE 1 TABLET BY MOUTH EVERY EVENING *OTC      cyclobenzaprine (FLEXERIL) 10 mg Tab Take 1 Tablet by mouth every 8 " hours as needed for Muscle Spasms. 90 Tablet 1    pantoprazole (PROTONIX) 40 MG Tablet Delayed Response Take 40 mg by mouth every day.      metoprolol SR (TOPROL XL) 25 MG TABLET SR 24 HR TAKE 1 TABLET BY MOUTH EVERY  Tablet 1    tadalafil (CIALIS) 5 MG tablet       tamsulosin (FLOMAX) 0.4 MG capsule Take 0.4 mg by mouth 2 times a day.      citalopram (CELEXA) 20 MG Tab Take 20 mg by mouth every day.      albuterol 108 (90 Base) MCG/ACT Aero Soln inhalation aerosol Inhale 2 Puffs every four hours as needed for Shortness of Breath. 8.5 g 0    rizatriptan (MAXALT-MLT) 10 MG disintegrating tablet Take 10 mg by mouth one time as needed.      allopurinol (ZYLOPRIM) 100 MG Tab Take 100 mg by mouth every bedtime.      lisinopril (PRINIVIL) 10 MG Tab Take 10 mg by mouth every morning. Indications: High Blood Pressure Disorder      Pain Pump (PATIENT SUPPLIED) XX BRANDON Inject  as directed continuous. Patient's Pain Pump (placed and maintained as an outpatient)  Medications/concentrations: Hydromorphone 400.0 mcg/mL  Route: Intrathecally  Date of Placement: Unknown  Last changed: 03/16/2022 due to refill pump before 5/16/2022  Continuous infusion rates (Drug/Rate):  Hydromorphone 139.81 mcg / 24 hours (5.83 mcg / hr)  Patient activation dose: Hydromorphone 15.00 mcg  Maximum daily activation dose: Hydromorphone 228.06 mcg  Patient activation lockout interval: 1 hour  Maximum activations per day: 6  Dr Mendes [phone]      tacrolimus (PROGRAF) 1 MG Cap Take 2 mg by mouth 2 Times a Day. Indications: Liver Transplant Recipients          Current Outpatient Medications on File Prior to Visit   Medication Sig Dispense Refill    melatonin 5 mg Tab melatonin 5 mg tablet   TAKE 1 TABLET BY MOUTH EVERY EVENING *OTC      cyclobenzaprine (FLEXERIL) 10 mg Tab Take 1 Tablet by mouth every 8 hours as needed for Muscle Spasms. 90 Tablet 1    pantoprazole (PROTONIX) 40 MG Tablet Delayed Response Take 40 mg by mouth every day.       metoprolol SR (TOPROL XL) 25 MG TABLET SR 24 HR TAKE 1 TABLET BY MOUTH EVERY  Tablet 1    tadalafil (CIALIS) 5 MG tablet       tamsulosin (FLOMAX) 0.4 MG capsule Take 0.4 mg by mouth 2 times a day.      citalopram (CELEXA) 20 MG Tab Take 20 mg by mouth every day.      albuterol 108 (90 Base) MCG/ACT Aero Soln inhalation aerosol Inhale 2 Puffs every four hours as needed for Shortness of Breath. 8.5 g 0    rizatriptan (MAXALT-MLT) 10 MG disintegrating tablet Take 10 mg by mouth one time as needed.      allopurinol (ZYLOPRIM) 100 MG Tab Take 100 mg by mouth every bedtime.      lisinopril (PRINIVIL) 10 MG Tab Take 10 mg by mouth every morning. Indications: High Blood Pressure Disorder      Pain Pump (PATIENT SUPPLIED) XX BRANDON Inject  as directed continuous. Patient's Pain Pump (placed and maintained as an outpatient)  Medications/concentrations: Hydromorphone 400.0 mcg/mL  Route: Intrathecally  Date of Placement: Unknown  Last changed: 03/16/2022 due to refill pump before 5/16/2022  Continuous infusion rates (Drug/Rate):  Hydromorphone 139.81 mcg / 24 hours (5.83 mcg / hr)  Patient activation dose: Hydromorphone 15.00 mcg  Maximum daily activation dose: Hydromorphone 228.06 mcg  Patient activation lockout interval: 1 hour  Maximum activations per day: 6  Dr Mendes [phone]      tacrolimus (PROGRAF) 1 MG Cap Take 2 mg by mouth 2 Times a Day. Indications: Liver Transplant Recipients      polyethylene glycol-electrolytes (GAVILYTE-G) 236 GM Recon Soln GaviLyte-G 236 gram-22.74 gram-6.74 gram-5.86 gram oral solution      omeprazole (PRILOSEC) 20 MG delayed-release capsule Take 20 mg by mouth 2 times a day.      ROPINIRole (REQUIP) 1 MG Tab Take 1 mg by mouth as needed. (Patient not taking: Reported on 3/30/2023)      nitroglycerin (NITROSTAT) 0.4 MG SL Tab Place 1 Tablet under the tongue as needed for Chest Pain. (Patient not taking: Reported on 3/30/2023) 25 Tablet 3    ascorbic acid (VITAMIN C) 500 MG tablet  "Take 1 Tablet by mouth 2 times a day. 30 Tablet 0    vitamin D3 (VITAMIND D3) 1000 UNIT Tab Take 1 Tablet by mouth every day. 60 Tablet     zinc sulfate (ZINCATE) 220 (50 Zn) MG Cap Take 2,200 mg by mouth every day. 7 Capsule 0     No current facility-administered medications on file prior to visit.         EXAMINATION     Physical Exam:   BP (!) 144/78 (BP Location: Right arm, Patient Position: Sitting, BP Cuff Size: Adult)   Pulse 71   Temp 36.6 °C (97.8 °F) (Temporal)   Ht 1.753 m (5' 9\")   Wt 74.6 kg (164 lb 7.4 oz)   SpO2 94%     Constitutional:   Body Habitus: Body mass index is 24.29 kg/m².  Cooperation: Fully cooperates with exam  Appearance: Well-groomed, well-nourished.    Eyes: No scleral icterus to suggest severe liver disease, no proptosis to suggest severe hyperthyroidism    ENT -no obvious auditory deficits, no noticeable facial droop     Skin -no rashes or lesions noted     Respiratory-  breathing comfortably on room air, no audible wheezing    Cardiovascular-distal extremities warm and well perfused.  No lower extremity edema is noted.     Gastrointestinal - no obvious abdominal masses, non-distended    Psychiatric- alert and oriented ×3. Normal affect.     Gait - normal gait, no use of ambulatory device, nonantalgic.   Musculoskeletal and Neuro -     Cervical spine   Inspection: No deformities of the skin over the cervical spine. No rashes or lesions.    limited active range of motion in all directions    Spurling's sign  negative bilaterally  Cervical facet loading maneuver  negative bilaterally    No signs of muscular atrophy in bilateral upper extremities     No tenderness to palpation at midline of cervical spine, paracervical muscles bilaterally, cervical facets bilaterally, and upper trapezius bilaterally.     Key points for the international standards for neurological classification of spinal cord injury (ISNCSCI) to light touch.     Dermatome R L   C4 2 2   C5 2 2   C6 1 1   C7 1 1 "   C8 1 1   T1 2 2   T2 2 2       Motor Exam Upper Extremities   ? Myotome R L   Shoulder abduction C5 5 5   Elbow flexion C5 5 5   Wrist extension C6 5 5   Elbow extension C7 4 4   Finger flexion C8 5 5   Finger abduction T1 5 5     Reflexes  ?  R L   Biceps  2+ 2+   Brachioradialis  2+ 2+     Mcmahon's sign negative right, positive left        Bilateral hands:   Inspection: No swelling, deformities, or rashes. Symmetric appearing thenar and hyperthenar regions bilaterally.  Palpation: no significant tenderness to palpation throughout the bilateral hands  Range of motion is within normal limits throughout bilateral hands, fingers and wrist.    Special tests:  Tinel's at the wrist over the median nerve positive bilaterally  Carpal tunnel compression: positive bilaterally  Phalen's test: positive bilaterally  Tinel's at the cubital tunnel: positive bilaterally      MEDICAL DECISION MAKING    Medical records review: see under HPI section.     DATA    Labs: Personally reviewed at today's visit:     Lab Results   Component Value Date/Time    SODIUM 136 04/28/2023 03:20 PM    POTASSIUM 3.8 04/28/2023 03:20 PM    CHLORIDE 103 04/28/2023 03:20 PM    CO2 18 (L) 04/28/2023 03:20 PM    ANION 15.0 04/28/2023 03:20 PM    GLUCOSE 110 (H) 04/28/2023 03:20 PM    BUN 12 04/28/2023 03:20 PM    CREATININE 0.77 04/28/2023 03:20 PM    CALCIUM 8.5 04/28/2023 03:20 PM    ASTSGOT 21 04/28/2023 03:20 PM    ALTSGPT 26 04/28/2023 03:20 PM    TBILIRUBIN 0.2 04/28/2023 03:20 PM    ALBUMIN 3.9 04/28/2023 03:20 PM    ALBUMIN 4.28 04/06/2023 12:26 PM    TOTPROTEIN 7.1 04/28/2023 03:20 PM    TOTPROTEIN 7.5 04/06/2023 12:26 PM    GLOBULIN 3.2 04/28/2023 03:20 PM    AGRATIO 1.2 04/28/2023 03:20 PM       Lab Results   Component Value Date/Time    PROTHROMBTM 14.1 03/09/2023 02:36 AM    INR 1.10 03/09/2023 02:36 AM        Lab Results   Component Value Date/Time    WBC 8.1 04/28/2023 03:20 PM    RBC 4.87 04/28/2023 03:20 PM    HEMOGLOBIN 15.0 04/28/2023  03:20 PM    HEMATOCRIT 43.9 04/28/2023 03:20 PM    MCV 90.1 04/28/2023 03:20 PM    MCH 30.8 04/28/2023 03:20 PM    MCHC 34.2 04/28/2023 03:20 PM    MPV 9.6 04/28/2023 03:20 PM    NEUTSPOLYS 38.10 (L) 04/28/2023 03:20 PM    LYMPHOCYTES 43.80 (H) 04/28/2023 03:20 PM    MONOCYTES 11.60 04/28/2023 03:20 PM    EOSINOPHILS 4.90 04/28/2023 03:20 PM    BASOPHILS 0.90 04/28/2023 03:20 PM    HYPOCHROMIA 1+ 03/05/2014 03:49 PM    ANISOCYTOSIS 1+ 06/04/2013 03:13 AM        Lab Results   Component Value Date/Time    HBA1C 5.7 (H) 03/30/2023 05:30 PM        Imaging:   I personally reviewed following images, these are my reads  MRI cervical spine 11/18/2022  Disc bulge at C3-4 and C4-5.  At C3-4 there is severe central canal stenosis and moderate to severe bilateral neuroforaminal stenosis.  At C4-5 there is severe central canal stenosis and moderate to severe bilateral neuroforaminal stenosis.  At C5-6 there is moderate right and mild left neuroforaminal stenosis.  See formal radiology report for further details.    X-ray cervical spine 6/8/2023  Fusion at C5-6 and C6-7. See formal radiology report for further details.      IMAGING radiology reads. I reviewed the following radiology reads           Results for orders placed during the hospital encounter of 11/18/22    MR-CERVICAL SPINE-W/O    Impression  1.  Postsurgical and degenerative changes in the cervical spine as described above.  2.  There is large central disc protrusion at C3-4 causing severe central canal stenosis.  3.  There is also severe central canal stenosis at the level of C4-5.  4.  Multilevel neural foraminal stenosis described above.  5.  There has been no significant interval change.  6.  Stable an approximately 6 mm sized right thyroid nodule.        X-ray cervical spine 6/8/2023  4 view cervical x-rays from the [facility] dated 6/8/2023.     This shows corpectomy at C4 and adjacent at C3 and C5.  There is a   corpectomy cage in place without  evidence of subsidence or movement.  He   does have fusion C5-6 and C6-7 which is intact.  I see no evidence of   screw pullout or hardware lucencies.  These are stable postoperative   images.    Diagnosis  Visit Diagnoses     ICD-10-CM   1. Numbness and tingling in left arm  R20.0    R20.2   2. Numbness and tingling of right arm  R20.0    R20.2   3. Positive depression screening  Z13.31   4. History of cervical spinal surgery: 3/8/2023 C4 sierra/ectomy with C4-C5 instrumented fusion with removal of hardware C5-C7 with Dr. Guillen  Z98.890         ASSESSMENT AND PLAN:  Quirino Ruiz ( 1955) is a male     Quirino was seen today for new patient.    Diagnoses and all orders for this visit:    Numbness and tingling in left arm  -     Referral to Pain Clinic    Numbness and tingling of right arm  -     Referral to Pain Clinic    Positive depression screening  -     Patient has been identified as having a positive depression screening. Appropriate orders and counseling have been given.    History of cervical spinal surgery: 3/8/2023 C4 sierra/ectomy with C4-C5 instrumented fusion with removal of hardware C5-C7 with Dr. Guillen          PLAN  Physical Therapy: The patient has done extensive physical therapy for this problem in the past.  Advised patient to continue exercise program as able.    Diagnostic workup: Personally reviewed at today's visit:MRI cervical spine 2022, X-ray cervical spine 2023    Medications:   -The patient has intrathecal pain pump in place, placed by Dr. Mendes.  The patient notes that his pain pump is functioning well.    Referrals:   -For EMG/NCS with me    Follow-up: Next available for EMG/NCS with me    Orders Placed This Encounter    Referral to Pain Clinic    Patient has been identified as having a positive depression screening. Appropriate orders and counseling have been given.       Nickie Jaimes MD  Interventional Pain and Spine  Physical Medicine and Rehabilitation  [facility]    CC Mack Still M.D.     The above note documents my personal evaluation of this patient. In addition, I have reviewed and confirmed with the patient and MA the supportive information documented in today's Patient Health Questionnaire and Office Note.     Please note that this dictation was created using voice recognition software. I have made every reasonable attempt to correct obvious errors, but I expect that there are errors of grammar and possibly content that I did not discover before finalizing the note.

## 2023-08-01 ENCOUNTER — OFFICE VISIT (OUTPATIENT)
Dept: PHYSICAL MEDICINE AND REHAB | Facility: MEDICAL CENTER | Age: 68
End: 2023-08-01
Payer: MEDICARE

## 2023-08-01 VITALS
DIASTOLIC BLOOD PRESSURE: 90 MMHG | OXYGEN SATURATION: 98 % | SYSTOLIC BLOOD PRESSURE: 140 MMHG | WEIGHT: 164.68 LBS | TEMPERATURE: 97 F | HEIGHT: 69 IN | BODY MASS INDEX: 24.39 KG/M2 | HEART RATE: 66 BPM

## 2023-08-01 DIAGNOSIS — R20.0 NUMBNESS AND TINGLING IN LEFT ARM: ICD-10-CM

## 2023-08-01 DIAGNOSIS — R20.2 NUMBNESS AND TINGLING IN LEFT ARM: ICD-10-CM

## 2023-08-01 DIAGNOSIS — R20.0 NUMBNESS AND TINGLING OF RIGHT ARM: ICD-10-CM

## 2023-08-01 DIAGNOSIS — R20.2 NUMBNESS AND TINGLING OF RIGHT ARM: ICD-10-CM

## 2023-08-01 DIAGNOSIS — Z98.890 HISTORY OF CERVICAL SPINAL SURGERY: ICD-10-CM

## 2023-08-01 PROCEDURE — 95910 NRV CNDJ TEST 7-8 STUDIES: CPT | Performed by: STUDENT IN AN ORGANIZED HEALTH CARE EDUCATION/TRAINING PROGRAM

## 2023-08-01 PROCEDURE — 3080F DIAST BP >= 90 MM HG: CPT | Performed by: STUDENT IN AN ORGANIZED HEALTH CARE EDUCATION/TRAINING PROGRAM

## 2023-08-01 PROCEDURE — 95885 MUSC TST DONE W/NERV TST LIM: CPT | Performed by: STUDENT IN AN ORGANIZED HEALTH CARE EDUCATION/TRAINING PROGRAM

## 2023-08-01 PROCEDURE — 3077F SYST BP >= 140 MM HG: CPT | Performed by: STUDENT IN AN ORGANIZED HEALTH CARE EDUCATION/TRAINING PROGRAM

## 2023-08-01 ASSESSMENT — PAIN SCALES - GENERAL: PAINLEVEL: 5=MODERATE PAIN

## 2023-08-01 ASSESSMENT — FIBROSIS 4 INDEX: FIB4 SCORE: 1.28

## 2023-08-01 ASSESSMENT — PATIENT HEALTH QUESTIONNAIRE - PHQ9: CLINICAL INTERPRETATION OF PHQ2 SCORE: 0

## 2023-08-01 NOTE — Clinical Note
Hi Dr. Still,  I completed Quirino's EMG which showed evidence of a chronic right C5 and C6 radiculopathy without acute denervation and no evidence of a median mononeuropathy or ulnar mononeuropathy on either side. I discussed the findings with him. He stated that he was interested in anything possible to alleviate his symptoms.  I discussed that a referral for hand therapy could be considered for a presumptive diagnosis of bilateral carpal tunnel syndrome and cubital tunnel syndrome o na clinical basis alone, with positive provocative exam findings.  I discussed that there is a subjective element to EMG testing.  I will share these results with his PCP for FYI.  He may follow-up with me as needed.  Thanks, Nickie

## 2023-08-02 ENCOUNTER — DOCUMENTATION (OUTPATIENT)
Dept: NEUROLOGY | Facility: MEDICAL CENTER | Age: 68
End: 2023-08-02
Payer: MEDICARE

## 2023-08-02 DIAGNOSIS — G56.03 BILATERAL CARPAL TUNNEL SYNDROME: ICD-10-CM

## 2023-08-03 NOTE — PROCEDURES
"Test Date:  2023    Patient: Quirino Ruiz : 1955 Physician: Dr. Jaimes   Sex: Male Height: 55' 9\" Ref Phys:    ID#: [id number] Weight: 33.9 kg Technician:      History and Physical Examination  68 year old male with numbness/tingling in bilateral hands for years. On 3/8/2023 he had C4 corpectomy with C4-C5 instrumented fusion with removal of hardware C5-C7 with Dr. Guillen.  After the surgery he has continued to have numbness and tingling in all his fingertips, worse at his bilateral index fingers, and burning pain radiating down his arms. He reports he was advised by Easton Villa PA-C that it can take 12 months for the symptoms to improve. Denies focal weakness in upper extremities.       He notes he had an EMG in October before his surgery which showed a right C5-C6 radiculopathy and no evidence of median or ulnar neuropathy.     Cervical spine   Inspection: No deformities of the skin over the cervical spine. No rashes or lesions.     limited active range of motion in all directions     Spurling's sign  negative bilaterally  Cervical facet loading maneuver  negative bilaterally     No signs of muscular atrophy in bilateral upper extremities      No tenderness to palpation at midline of cervical spine, paracervical muscles bilaterally, cervical facets bilaterally, and upper trapezius bilaterally.      Key points for the international standards for neurological classification of spinal cord injury (ISNCSCI) to light touch.      Dermatome R L   C4 2 2   C5 2 2   C6 1 1   C7 1 1   C8 1 1   T1 2 2   T2 2 2         Motor Exam Upper Extremities   ? Myotome R L   Shoulder abduction C5 5 5   Elbow flexion C5 5 5   Wrist extension C6 5 5   Elbow extension C7 4 4   Finger flexion C8 5 5   Finger abduction T1 5 5      Reflexes  ?   R L   Biceps   2+ 2+   Brachioradialis   2+ 2+      Mcmahon's sign negative right, positive left         Bilateral hands:   Inspection: No swelling, deformities, or rashes. Symmetric appearing " thenar and hyperthenar regions bilaterally.  Palpation: no significant tenderness to palpation throughout the bilateral hands  Range of motion is within normal limits throughout bilateral hands, fingers and wrist.     Special tests:  Tinel's at the wrist over the median nerve positive bilaterally  Carpal tunnel compression: positive bilaterally  Phalen's test: positive bilaterally  Tinel's at the cubital tunnel: positive bilaterally    The patient's temperature remained above 32 °C at the bilateral volar wrists throughout the duration of today's study.    NCV & EMG Findings:  All nerve conduction studies (as indicated in the following tables) were within normal limits.  All left vs. right side differences were within normal limits.      Needle evaluation of the right deltoid, the right biceps, and the right triceps muscles showed increased motor unit amplitude, increased motor unit duration, and diminished recruitment.  All remaining muscles (as indicated in the following table) showed no evidence of electrical instability.      Diagnostic Interpretation  This is an abnormal electrodiagnostic study.  There is electrodiagnostic evidence of chronic right C5 and C6 radiculopathy without acute denervation.   There is no electrodiagnostic evidence of median mononeuropathy or ulnar mononeuropathy on either side.    Recommendations  Findings discussed with patient  The patient stated that he was interested in anything possible to alleviate his symptoms.  Discussed that a referral for hand therapy could be considered for a presumptive diagnosis of bilateral carpal tunnel syndrome and cubital tunnel syndrome on a clinical basis alone, with positive provocative exam findings.  Discussed that there is a subjective element to EMG testing.  Results shared with referring provider Dr. Still and patient's PCP Dr. Sutherland.    ___________________________  Nickie Jaimes MD  Physical Medicine and Rehabilitation  [facility]  Group          Nerve Conduction Studies  Anti Sensory Summary Table     Stim Site NR Peak (ms) Norm Peak (ms) P-T Amp (µV) Norm P-T Amp Site1 Site2 Delta-P (ms) Dist (cm) Sampson (m/s) Norm Sampson (m/s)   Left Median Anti Sensory (2nd Digit)   Wrist    3.5 <3.6 35.5 >10 Wrist 2nd Digit 3.5 14.0 40 >39   Right Median Anti Sensory (2nd Digit)   Wrist    3.3 <3.6 24.4 >10 Wrist 2nd Digit 3.3 14.0 42 >39   Left Ulnar Anti Sensory (5th Digit)   Wrist    3.7 <3.7 19.2 >15.0 Wrist 5th Digit 3.7 14.0 38 >38   Right Ulnar Anti Sensory (5th Digit)   Wrist    3.7 <3.7 36.5 >15.0 Wrist 5th Digit 3.7 14.0 38 >38     Motor Summary Table     Stim Site NR Onset (ms) Norm Onset (ms) O-P Amp (mV) Norm O-P Amp Site1 Site2 Delta-0 (ms) Dist (cm) Sampson (m/s) Norm Sampson (m/s)   Left Median Motor (Abd Poll Brev)   Wrist    3.4 <4.2 7.9 >5 Elbow Wrist 4.0 23.0 58 >50   Elbow    7.4  6.7          Right Median Motor (Abd Poll Brev)   Wrist    3.3 <4.2 10.5 >5 Elbow Wrist 4.2 22.0 52 >50   Elbow    7.5  9.7          Left Ulnar Motor (Abd Dig Min)   Wrist    3.0 <4.2 8.7 >3 B Elbow Wrist 3.3 0.0  >53   B Elbow    6.3  8.1  A Elbow B Elbow 2.4 0.0  >53   A Elbow    8.7  7.8          Right Ulnar Motor (Abd Dig Min)   Wrist    2.9 <4.2 4.0 >3 B Elbow Wrist 3.2 17.0 53 >53   B Elbow    6.1  5.6  A Elbow B Elbow 2.4 13.0 54 >53   A Elbow    8.5  4.2            EMG+     Side Muscle Nerve Root Ins Act Fibs Psw Amp Dur Poly Recrt Int Pat Comment   Right Deltoid Axillary C5-6 Nml Nml Nml Incr >12ms 0 Reduced Nml    Right Biceps Musculocut C5-6 Nml Nml Nml Incr >12ms 0 Reduced Nml    Right Triceps Radial C6-7-8 Nml Nml Nml Incr >12ms 0 Reduced Nml    Right PronatorTeres Median C6-7 Nml Nml Nml Nml Nml 0 Nml Nml    Right Abd Poll Brev Median C8-T1 Nml Nml Nml Nml Nml 0 Nml Nml    Right 1stDorInt Ulnar C8-T1 Nml Nml Nml Nml Nml 0 Nml Nml        Waveforms:

## 2023-08-07 ENCOUNTER — HOSPITAL ENCOUNTER (OUTPATIENT)
Dept: LAB | Facility: MEDICAL CENTER | Age: 68
End: 2023-08-07
Attending: FAMILY MEDICINE
Payer: MEDICARE

## 2023-08-07 ENCOUNTER — HOSPITAL ENCOUNTER (OUTPATIENT)
Dept: RADIOLOGY | Facility: MEDICAL CENTER | Age: 68
End: 2023-08-07
Attending: FAMILY MEDICINE
Payer: MEDICARE

## 2023-08-07 DIAGNOSIS — C64.9 RENAL CELL CARCINOMA, UNSPECIFIED LATERALITY (HCC): ICD-10-CM

## 2023-08-07 LAB
ALBUMIN SERPL BCP-MCNC: 4.3 G/DL (ref 3.2–4.9)
ALBUMIN/GLOB SERPL: 1.3 G/DL
ALP SERPL-CCNC: 68 U/L (ref 30–99)
ALT SERPL-CCNC: 20 U/L (ref 2–50)
ANION GAP SERPL CALC-SCNC: 14 MMOL/L (ref 7–16)
APPEARANCE UR: CLEAR
AST SERPL-CCNC: 16 U/L (ref 12–45)
BACTERIA #/AREA URNS HPF: NEGATIVE /HPF
BASOPHILS # BLD AUTO: 0.6 % (ref 0–1.8)
BASOPHILS # BLD: 0.06 K/UL (ref 0–0.12)
BILIRUB SERPL-MCNC: 0.4 MG/DL (ref 0.1–1.5)
BILIRUB UR QL STRIP.AUTO: NEGATIVE
BUN SERPL-MCNC: 14 MG/DL (ref 8–22)
CALCIUM ALBUM COR SERPL-MCNC: 9 MG/DL (ref 8.5–10.5)
CALCIUM SERPL-MCNC: 9.2 MG/DL (ref 8.5–10.5)
CHLORIDE SERPL-SCNC: 96 MMOL/L (ref 96–112)
CHOLEST SERPL-MCNC: 176 MG/DL (ref 100–199)
CO2 SERPL-SCNC: 22 MMOL/L (ref 20–33)
COLOR UR: YELLOW
CREAT SERPL-MCNC: 0.78 MG/DL (ref 0.5–1.4)
EOSINOPHIL # BLD AUTO: 0.13 K/UL (ref 0–0.51)
EOSINOPHIL NFR BLD: 1.2 % (ref 0–6.9)
EPI CELLS #/AREA URNS HPF: NEGATIVE /HPF
ERYTHROCYTE [DISTWIDTH] IN BLOOD BY AUTOMATED COUNT: 46.1 FL (ref 35.9–50)
EST. AVERAGE GLUCOSE BLD GHB EST-MCNC: 108 MG/DL
FOLATE SERPL-MCNC: 6 NG/ML
GFR SERPLBLD CREATININE-BSD FMLA CKD-EPI: 96 ML/MIN/1.73 M 2
GLOBULIN SER CALC-MCNC: 3.2 G/DL (ref 1.9–3.5)
GLUCOSE SERPL-MCNC: 92 MG/DL (ref 65–99)
GLUCOSE UR STRIP.AUTO-MCNC: NEGATIVE MG/DL
HBA1C MFR BLD: 5.4 % (ref 4–5.6)
HCT VFR BLD AUTO: 44.8 % (ref 42–52)
HDLC SERPL-MCNC: 59 MG/DL
HGB BLD-MCNC: 15.2 G/DL (ref 14–18)
HYALINE CASTS #/AREA URNS LPF: ABNORMAL /LPF
IMM GRANULOCYTES # BLD AUTO: 0.07 K/UL (ref 0–0.11)
IMM GRANULOCYTES NFR BLD AUTO: 0.6 % (ref 0–0.9)
KETONES UR STRIP.AUTO-MCNC: NEGATIVE MG/DL
LDLC SERPL CALC-MCNC: 104 MG/DL
LEUKOCYTE ESTERASE UR QL STRIP.AUTO: NEGATIVE
LYMPHOCYTES # BLD AUTO: 2.88 K/UL (ref 1–4.8)
LYMPHOCYTES NFR BLD: 26.6 % (ref 22–41)
MCH RBC QN AUTO: 31.3 PG (ref 27–33)
MCHC RBC AUTO-ENTMCNC: 33.9 G/DL (ref 32.3–36.5)
MCV RBC AUTO: 92.2 FL (ref 81.4–97.8)
MICRO URNS: ABNORMAL
MONOCYTES # BLD AUTO: 1.14 K/UL (ref 0–0.85)
MONOCYTES NFR BLD AUTO: 10.5 % (ref 0–13.4)
NEUTROPHILS # BLD AUTO: 6.56 K/UL (ref 1.82–7.42)
NEUTROPHILS NFR BLD: 60.5 % (ref 44–72)
NITRITE UR QL STRIP.AUTO: NEGATIVE
NRBC # BLD AUTO: 0 K/UL
NRBC BLD-RTO: 0 /100 WBC (ref 0–0.2)
PH UR STRIP.AUTO: 6.5 [PH] (ref 5–8)
PLATELET # BLD AUTO: 258 K/UL (ref 164–446)
PMV BLD AUTO: 9.3 FL (ref 9–12.9)
POTASSIUM SERPL-SCNC: 4.3 MMOL/L (ref 3.6–5.5)
PROT SERPL-MCNC: 7.5 G/DL (ref 6–8.2)
PROT UR QL STRIP: 30 MG/DL
PSA SERPL-MCNC: 2.12 NG/ML (ref 0–4)
RBC # BLD AUTO: 4.86 M/UL (ref 4.7–6.1)
RBC # URNS HPF: ABNORMAL /HPF
RBC UR QL AUTO: NEGATIVE
SODIUM SERPL-SCNC: 132 MMOL/L (ref 135–145)
SP GR UR STRIP.AUTO: 1.01
T3FREE SERPL-MCNC: 2.9 PG/ML (ref 2–4.4)
T4 FREE SERPL-MCNC: 1.34 NG/DL (ref 0.93–1.7)
TRIGL SERPL-MCNC: 64 MG/DL (ref 0–149)
TSH SERPL DL<=0.005 MIU/L-ACNC: 1.06 UIU/ML (ref 0.38–5.33)
UROBILINOGEN UR STRIP.AUTO-MCNC: 0.2 MG/DL
VIT B12 SERPL-MCNC: 384 PG/ML (ref 211–911)
WBC # BLD AUTO: 10.8 K/UL (ref 4.8–10.8)
WBC #/AREA URNS HPF: ABNORMAL /HPF

## 2023-08-07 PROCEDURE — 84481 FREE ASSAY (FT-3): CPT

## 2023-08-07 PROCEDURE — 86800 THYROGLOBULIN ANTIBODY: CPT

## 2023-08-07 PROCEDURE — 84439 ASSAY OF FREE THYROXINE: CPT

## 2023-08-07 PROCEDURE — 82746 ASSAY OF FOLIC ACID SERUM: CPT

## 2023-08-07 PROCEDURE — 76775 US EXAM ABDO BACK WALL LIM: CPT

## 2023-08-07 PROCEDURE — 80061 LIPID PANEL: CPT

## 2023-08-07 PROCEDURE — 84153 ASSAY OF PSA TOTAL: CPT

## 2023-08-07 PROCEDURE — 85025 COMPLETE CBC W/AUTO DIFF WBC: CPT

## 2023-08-07 PROCEDURE — 82607 VITAMIN B-12: CPT

## 2023-08-07 PROCEDURE — 84443 ASSAY THYROID STIM HORMONE: CPT

## 2023-08-07 PROCEDURE — 81001 URINALYSIS AUTO W/SCOPE: CPT

## 2023-08-07 PROCEDURE — 80053 COMPREHEN METABOLIC PANEL: CPT

## 2023-08-07 PROCEDURE — 83036 HEMOGLOBIN GLYCOSYLATED A1C: CPT

## 2023-08-07 PROCEDURE — 36415 COLL VENOUS BLD VENIPUNCTURE: CPT

## 2023-08-09 LAB — THYROGLOB AB SERPL-ACNC: <0.9 IU/ML (ref 0–4)

## 2023-08-28 DIAGNOSIS — I49.3 PVC'S (PREMATURE VENTRICULAR CONTRACTIONS): ICD-10-CM

## 2023-08-28 DIAGNOSIS — R07.89 CHEST PRESSURE: ICD-10-CM

## 2023-08-29 RX ORDER — METOPROLOL SUCCINATE 25 MG/1
25 TABLET, EXTENDED RELEASE ORAL DAILY
Qty: 100 TABLET | Refills: 0 | Status: SHIPPED | OUTPATIENT
Start: 2023-08-29

## 2023-08-29 NOTE — TELEPHONE ENCOUNTER
Is the patient due for a refill? Yes- courtesy refill    Was the patient seen the past year? No    Date of last office visit: 6/24/2022    Does the patient have an upcoming appointment?  No   If yes, When?     Provider to refill:DA    Does the patients insurance require a 100 day supply?  Yes

## 2023-09-08 ENCOUNTER — TELEPHONE (OUTPATIENT)
Dept: HEALTH INFORMATION MANAGEMENT | Facility: OTHER | Age: 68
End: 2023-09-08

## 2023-11-07 PROBLEM — F33.1 MODERATE EPISODE OF RECURRENT MAJOR DEPRESSIVE DISORDER (HCC): Status: ACTIVE | Noted: 2020-12-06

## 2023-11-07 PROBLEM — Z79.899 IMMUNODEFICIENCY DUE TO DRUGS (HCC): Status: ACTIVE | Noted: 2023-11-07

## 2023-11-07 PROBLEM — R73.03 PREDIABETES: Status: ACTIVE | Noted: 2023-11-07

## 2023-11-07 PROBLEM — I73.9 PAD (PERIPHERAL ARTERY DISEASE) (HCC): Status: ACTIVE | Noted: 2023-11-07

## 2023-11-07 PROBLEM — D84.821 IMMUNODEFICIENCY DUE TO DRUGS (HCC): Status: ACTIVE | Noted: 2023-11-07

## 2023-11-07 PROBLEM — F11.20 UNCOMPLICATED OPIOID DEPENDENCE (HCC): Status: ACTIVE | Noted: 2023-11-07

## 2024-01-29 ENCOUNTER — HOSPITAL ENCOUNTER (OUTPATIENT)
Dept: LAB | Facility: MEDICAL CENTER | Age: 69
End: 2024-01-29
Attending: FAMILY MEDICINE
Payer: MEDICARE

## 2024-01-29 ENCOUNTER — HOSPITAL ENCOUNTER (OUTPATIENT)
Dept: RADIOLOGY | Facility: MEDICAL CENTER | Age: 69
End: 2024-01-29
Attending: FAMILY MEDICINE
Payer: MEDICARE

## 2024-01-29 DIAGNOSIS — R07.81 RIB PAIN ON RIGHT SIDE: ICD-10-CM

## 2024-01-29 DIAGNOSIS — M54.2 CERVICAL PAIN: ICD-10-CM

## 2024-01-29 LAB
HBV SURFACE AG SER QL: NORMAL
HCV AB SER QL: REACTIVE
HIV 1+2 AB+HIV1 P24 AG SERPL QL IA: NORMAL
T PALLIDUM AB SER QL IA: NORMAL

## 2024-01-29 PROCEDURE — 87389 HIV-1 AG W/HIV-1&-2 AB AG IA: CPT

## 2024-01-29 PROCEDURE — 87340 HEPATITIS B SURFACE AG IA: CPT

## 2024-01-29 PROCEDURE — 72040 X-RAY EXAM NECK SPINE 2-3 VW: CPT

## 2024-01-29 PROCEDURE — 87591 N.GONORRHOEAE DNA AMP PROB: CPT

## 2024-01-29 PROCEDURE — 86803 HEPATITIS C AB TEST: CPT

## 2024-01-29 PROCEDURE — 87491 CHLMYD TRACH DNA AMP PROBE: CPT

## 2024-01-29 PROCEDURE — 71101 X-RAY EXAM UNILAT RIBS/CHEST: CPT | Mod: RT

## 2024-01-29 PROCEDURE — 87522 HEPATITIS C REVRS TRNSCRPJ: CPT

## 2024-01-29 PROCEDURE — 36415 COLL VENOUS BLD VENIPUNCTURE: CPT

## 2024-01-29 PROCEDURE — 86780 TREPONEMA PALLIDUM: CPT

## 2024-01-30 LAB
C TRACH DNA SPEC QL NAA+PROBE: NEGATIVE
N GONORRHOEA DNA SPEC QL NAA+PROBE: NEGATIVE
SPECIMEN SOURCE: NORMAL

## 2024-03-07 ENCOUNTER — HOSPITAL ENCOUNTER (OUTPATIENT)
Facility: MEDICAL CENTER | Age: 69
End: 2024-03-07
Attending: STUDENT IN AN ORGANIZED HEALTH CARE EDUCATION/TRAINING PROGRAM
Payer: MEDICARE

## 2024-03-07 PROCEDURE — G0480 DRUG TEST DEF 1-7 CLASSES: HCPCS

## 2024-03-22 LAB
6MAM UR CFM-MCNC: <10 NG/ML
CODEINE UR CFM-MCNC: <20 NG/ML
HYDROCODONE UR CFM-MCNC: <20 NG/ML
HYDROMORPHONE UR CFM-MCNC: <20 NG/ML
MORPHINE UR CFM-MCNC: <20 NG/ML
NORHYDROCODONE UR CFM-MCNC: <20 NG/ML
NOROXYCODONE UR CFM-MCNC: <20 NG/ML
OPIATES UR NOROXYM Q0836: <20 NG/ML
OXYCODONE UR CFM-MCNC: <20 NG/ML
OXYMORPHONE UR CFM-MCNC: <20 NG/ML

## 2024-04-05 DIAGNOSIS — R07.89 CHEST PRESSURE: ICD-10-CM

## 2024-04-05 DIAGNOSIS — I49.3 PVC'S (PREMATURE VENTRICULAR CONTRACTIONS): ICD-10-CM

## 2024-04-08 RX ORDER — METOPROLOL SUCCINATE 25 MG/1
25 TABLET, EXTENDED RELEASE ORAL DAILY
Qty: 100 TABLET | Refills: 0 | OUTPATIENT
Start: 2024-04-08

## 2024-05-01 NOTE — ED NOTES
Right wrist IV, rate change to Vancomycin to reduce risk of phlebitis during infusion.    Detail Level: Detailed

## 2024-09-04 ENCOUNTER — PATIENT MESSAGE (OUTPATIENT)
Dept: HEALTH INFORMATION MANAGEMENT | Facility: OTHER | Age: 69
End: 2024-09-04

## 2024-10-03 ENCOUNTER — APPOINTMENT (OUTPATIENT)
Dept: MEDICAL GROUP | Facility: MEDICAL CENTER | Age: 69
End: 2024-10-03
Payer: MEDICARE

## 2024-10-03 DIAGNOSIS — Z79.899 IMMUNODEFICIENCY DUE TO DRUGS (HCC): ICD-10-CM

## 2024-10-03 DIAGNOSIS — Z94.4 LIVER TRANSPLANT RECIPIENT (HCC): ICD-10-CM

## 2024-10-03 DIAGNOSIS — N40.1 BENIGN PROSTATIC HYPERPLASIA WITH URINARY OBSTRUCTION: ICD-10-CM

## 2024-10-03 DIAGNOSIS — Z12.12 ENCOUNTER FOR COLORECTAL CANCER SCREENING: ICD-10-CM

## 2024-10-03 DIAGNOSIS — I10 PRIMARY HYPERTENSION: ICD-10-CM

## 2024-10-03 DIAGNOSIS — E78.5 DYSLIPIDEMIA: ICD-10-CM

## 2024-10-03 DIAGNOSIS — F11.20 UNCOMPLICATED OPIOID DEPENDENCE (HCC): ICD-10-CM

## 2024-10-03 DIAGNOSIS — Z23 NEED FOR VACCINATION: ICD-10-CM

## 2024-10-03 DIAGNOSIS — F33.1 MODERATE EPISODE OF RECURRENT MAJOR DEPRESSIVE DISORDER (HCC): ICD-10-CM

## 2024-10-03 DIAGNOSIS — Z12.11 ENCOUNTER FOR COLORECTAL CANCER SCREENING: ICD-10-CM

## 2024-10-03 DIAGNOSIS — D84.821 IMMUNODEFICIENCY DUE TO DRUGS (HCC): ICD-10-CM

## 2024-10-03 DIAGNOSIS — N13.8 BENIGN PROSTATIC HYPERPLASIA WITH URINARY OBSTRUCTION: ICD-10-CM

## 2024-10-03 PROBLEM — T14.90XA TRAUMA: Status: RESOLVED | Noted: 2018-04-01 | Resolved: 2024-10-03

## 2024-10-03 PROBLEM — L03.211 CELLULITIS OF FACE: Status: RESOLVED | Noted: 2020-12-06 | Resolved: 2024-10-03

## 2024-10-03 PROBLEM — M62.82 RHABDOMYOLYSIS: Status: RESOLVED | Noted: 2019-08-16 | Resolved: 2024-10-03

## 2024-10-03 PROBLEM — Z87.898 HISTORY OF ACUTE ALCOHOL INTOXICATION: Status: ACTIVE | Noted: 2018-04-01

## 2024-10-03 PROBLEM — Z78.9 NO CONTRAINDICATION TO DEEP VEIN THROMBOSIS (DVT) PROPHYLAXIS: Status: RESOLVED | Noted: 2018-04-01 | Resolved: 2024-10-03

## 2024-10-03 PROBLEM — S06.2XAA INTRACRANIAL HEMATOMA FOLLOWING INJURY (HCC): Status: RESOLVED | Noted: 2018-04-03 | Resolved: 2024-10-03

## 2024-10-03 PROBLEM — S22.42XA CLOSED FRACTURE OF MULTIPLE RIBS OF LEFT SIDE: Status: RESOLVED | Noted: 2018-04-01 | Resolved: 2024-10-03

## 2024-11-07 ENCOUNTER — APPOINTMENT (OUTPATIENT)
Dept: RADIOLOGY | Facility: MEDICAL CENTER | Age: 69
End: 2024-11-07
Payer: MEDICARE

## 2024-11-07 DIAGNOSIS — M54.2 CERVICALGIA: ICD-10-CM

## 2024-11-07 PROCEDURE — 72040 X-RAY EXAM NECK SPINE 2-3 VW: CPT

## 2024-12-23 ENCOUNTER — TELEPHONE (OUTPATIENT)
Dept: HEALTH INFORMATION MANAGEMENT | Facility: OTHER | Age: 69
End: 2024-12-23
Payer: MEDICARE

## 2025-08-07 ENCOUNTER — HOSPITAL ENCOUNTER (OUTPATIENT)
Dept: LAB | Facility: MEDICAL CENTER | Age: 70
End: 2025-08-07
Attending: FAMILY MEDICINE
Payer: MEDICARE

## 2025-08-07 ENCOUNTER — HOSPITAL ENCOUNTER (OUTPATIENT)
Dept: LAB | Facility: MEDICAL CENTER | Age: 70
End: 2025-08-07
Attending: INTERNAL MEDICINE
Payer: MEDICARE

## 2025-08-07 LAB
ALBUMIN SERPL BCP-MCNC: 4.1 G/DL (ref 3.2–4.9)
ALBUMIN SERPL BCP-MCNC: 4.1 G/DL (ref 3.2–4.9)
ALBUMIN/GLOB SERPL: 1.3 G/DL
ALBUMIN/GLOB SERPL: 1.3 G/DL
ALP SERPL-CCNC: 56 U/L (ref 30–99)
ALP SERPL-CCNC: 57 U/L (ref 30–99)
ALT SERPL-CCNC: 27 U/L (ref 2–50)
ALT SERPL-CCNC: 28 U/L (ref 2–50)
ANION GAP SERPL CALC-SCNC: 12 MMOL/L (ref 7–16)
ANION GAP SERPL CALC-SCNC: 13 MMOL/L (ref 7–16)
AST SERPL-CCNC: 21 U/L (ref 12–45)
AST SERPL-CCNC: 22 U/L (ref 12–45)
BASOPHILS # BLD AUTO: 0.5 % (ref 0–1.8)
BASOPHILS # BLD AUTO: 0.8 % (ref 0–1.8)
BASOPHILS # BLD: 0.04 K/UL (ref 0–0.12)
BASOPHILS # BLD: 0.06 K/UL (ref 0–0.12)
BILIRUB SERPL-MCNC: 0.3 MG/DL (ref 0.1–1.5)
BILIRUB SERPL-MCNC: 0.3 MG/DL (ref 0.1–1.5)
BUN SERPL-MCNC: 12 MG/DL (ref 8–22)
BUN SERPL-MCNC: 12 MG/DL (ref 8–22)
CALCIUM ALBUM COR SERPL-MCNC: 9.1 MG/DL (ref 8.5–10.5)
CALCIUM ALBUM COR SERPL-MCNC: 9.1 MG/DL (ref 8.5–10.5)
CALCIUM SERPL-MCNC: 9.2 MG/DL (ref 8.5–10.5)
CALCIUM SERPL-MCNC: 9.2 MG/DL (ref 8.5–10.5)
CHLORIDE SERPL-SCNC: 100 MMOL/L (ref 96–112)
CHLORIDE SERPL-SCNC: 101 MMOL/L (ref 96–112)
CHOLEST SERPL-MCNC: 192 MG/DL (ref 100–199)
CO2 SERPL-SCNC: 22 MMOL/L (ref 20–33)
CO2 SERPL-SCNC: 22 MMOL/L (ref 20–33)
CREAT SERPL-MCNC: 0.86 MG/DL (ref 0.5–1.4)
CREAT SERPL-MCNC: 0.88 MG/DL (ref 0.5–1.4)
EOSINOPHIL # BLD AUTO: 0.19 K/UL (ref 0–0.51)
EOSINOPHIL # BLD AUTO: 0.2 K/UL (ref 0–0.51)
EOSINOPHIL NFR BLD: 2.6 % (ref 0–6.9)
EOSINOPHIL NFR BLD: 2.7 % (ref 0–6.9)
ERYTHROCYTE [DISTWIDTH] IN BLOOD BY AUTOMATED COUNT: 43.9 FL (ref 35.9–50)
ERYTHROCYTE [DISTWIDTH] IN BLOOD BY AUTOMATED COUNT: 44.2 FL (ref 35.9–50)
EST. AVERAGE GLUCOSE BLD GHB EST-MCNC: 111 MG/DL
GFR SERPLBLD CREATININE-BSD FMLA CKD-EPI: 92 ML/MIN/1.73 M 2
GFR SERPLBLD CREATININE-BSD FMLA CKD-EPI: 92 ML/MIN/1.73 M 2
GLOBULIN SER CALC-MCNC: 3.2 G/DL (ref 1.9–3.5)
GLOBULIN SER CALC-MCNC: 3.2 G/DL (ref 1.9–3.5)
GLUCOSE SERPL-MCNC: 94 MG/DL (ref 65–99)
GLUCOSE SERPL-MCNC: 94 MG/DL (ref 65–99)
HBA1C MFR BLD: 5.5 % (ref 4–5.6)
HCT VFR BLD AUTO: 42.5 % (ref 42–52)
HCT VFR BLD AUTO: 42.5 % (ref 42–52)
HDLC SERPL-MCNC: 59 MG/DL
HGB BLD-MCNC: 13.9 G/DL (ref 14–18)
HGB BLD-MCNC: 13.9 G/DL (ref 14–18)
IMM GRANULOCYTES # BLD AUTO: 0.03 K/UL (ref 0–0.11)
IMM GRANULOCYTES # BLD AUTO: 0.04 K/UL (ref 0–0.11)
IMM GRANULOCYTES NFR BLD AUTO: 0.4 % (ref 0–0.9)
IMM GRANULOCYTES NFR BLD AUTO: 0.5 % (ref 0–0.9)
LDLC SERPL CALC-MCNC: 119 MG/DL
LYMPHOCYTES # BLD AUTO: 2.79 K/UL (ref 1–4.8)
LYMPHOCYTES # BLD AUTO: 2.89 K/UL (ref 1–4.8)
LYMPHOCYTES NFR BLD: 38.2 % (ref 22–41)
LYMPHOCYTES NFR BLD: 39.5 % (ref 22–41)
MCH RBC QN AUTO: 30.8 PG (ref 27–33)
MCH RBC QN AUTO: 30.8 PG (ref 27–33)
MCHC RBC AUTO-ENTMCNC: 32.7 G/DL (ref 32.3–36.5)
MCHC RBC AUTO-ENTMCNC: 32.7 G/DL (ref 32.3–36.5)
MCV RBC AUTO: 94 FL (ref 81.4–97.8)
MCV RBC AUTO: 94.2 FL (ref 81.4–97.8)
MONOCYTES # BLD AUTO: 0.71 K/UL (ref 0–0.85)
MONOCYTES # BLD AUTO: 0.72 K/UL (ref 0–0.85)
MONOCYTES NFR BLD AUTO: 9.7 % (ref 0–13.4)
MONOCYTES NFR BLD AUTO: 9.8 % (ref 0–13.4)
NEUTROPHILS # BLD AUTO: 3.41 K/UL (ref 1.82–7.42)
NEUTROPHILS # BLD AUTO: 3.53 K/UL (ref 1.82–7.42)
NEUTROPHILS NFR BLD: 46.8 % (ref 44–72)
NEUTROPHILS NFR BLD: 48.5 % (ref 44–72)
NRBC # BLD AUTO: 0 K/UL
NRBC # BLD AUTO: 0 K/UL
NRBC BLD-RTO: 0 /100 WBC (ref 0–0.2)
NRBC BLD-RTO: 0 /100 WBC (ref 0–0.2)
PLATELET # BLD AUTO: 242 K/UL (ref 164–446)
PLATELET # BLD AUTO: 254 K/UL (ref 164–446)
PMV BLD AUTO: 9.4 FL (ref 9–12.9)
PMV BLD AUTO: 9.7 FL (ref 9–12.9)
POTASSIUM SERPL-SCNC: 4.2 MMOL/L (ref 3.6–5.5)
POTASSIUM SERPL-SCNC: 4.3 MMOL/L (ref 3.6–5.5)
PROT SERPL-MCNC: 7.3 G/DL (ref 6–8.2)
PROT SERPL-MCNC: 7.3 G/DL (ref 6–8.2)
PSA SERPL DL<=0.01 NG/ML-MCNC: 2.9 NG/ML (ref 0–4)
RBC # BLD AUTO: 4.51 M/UL (ref 4.7–6.1)
RBC # BLD AUTO: 4.52 M/UL (ref 4.7–6.1)
SODIUM SERPL-SCNC: 134 MMOL/L (ref 135–145)
SODIUM SERPL-SCNC: 136 MMOL/L (ref 135–145)
TRIGL SERPL-MCNC: 72 MG/DL (ref 0–149)
WBC # BLD AUTO: 7.3 K/UL (ref 4.8–10.8)
WBC # BLD AUTO: 7.3 K/UL (ref 4.8–10.8)

## 2025-08-07 PROCEDURE — 84153 ASSAY OF PSA TOTAL: CPT

## 2025-08-07 PROCEDURE — 80053 COMPREHEN METABOLIC PANEL: CPT | Mod: 91

## 2025-08-07 PROCEDURE — 80061 LIPID PANEL: CPT

## 2025-08-07 PROCEDURE — 80197 ASSAY OF TACROLIMUS: CPT

## 2025-08-07 PROCEDURE — 83036 HEMOGLOBIN GLYCOSYLATED A1C: CPT

## 2025-08-07 PROCEDURE — 85025 COMPLETE CBC W/AUTO DIFF WBC: CPT | Mod: 91

## 2025-08-07 PROCEDURE — 36415 COLL VENOUS BLD VENIPUNCTURE: CPT

## 2025-08-07 PROCEDURE — 80053 COMPREHEN METABOLIC PANEL: CPT

## 2025-08-07 PROCEDURE — 85025 COMPLETE CBC W/AUTO DIFF WBC: CPT

## 2025-08-08 LAB — TACROLIMUS BLD-MCNC: 5.3 NG/ML (ref 5–20)

## 2025-08-26 PROBLEM — M17.12 DEGENERATIVE ARTHRITIS OF LEFT KNEE: Status: ACTIVE | Noted: 2025-08-26

## (undated) DEVICE — BOVIE NEEDLE TIP INSULATD NON-SAFETY 2CM (50/PK)

## (undated) DEVICE — SUTURE GENERAL

## (undated) DEVICE — NEEDLE SPINAL NON-SAFETY 18 GA X 3 IN (25EA/BX)

## (undated) DEVICE — SPONGE GAUZESTER 4 X 4 4PLY - (128PK/CA)

## (undated) DEVICE — DRESSING TRANSPARENT FILM TEGADERM 4 X 4.75" (50EA/BX)"

## (undated) DEVICE — LACTATED RINGERS INJ 1000 ML - (14EA/CA 60CA/PF)

## (undated) DEVICE — TOOL MR8 14CM MATCH HD SYM-TRI 3MM DIAMETER (1/EA)

## (undated) DEVICE — HUMID-VENT HEAT AND MOISTURE EXCHANGE- (50/BX)

## (undated) DEVICE — DRAPE STRLE REG TOWEL 18X24 - (10/BX 4BX/CA)"

## (undated) DEVICE — TOOL MR8 14CM CYLINDER 5MM DIAMETER (1/EA)

## (undated) DEVICE — PROTECTOR ULNA NERVE - (36PR/CA)

## (undated) DEVICE — SUCTION INSTRUMENT YANKAUER BULBOUS TIP W/O VENT (50EA/CA)

## (undated) DEVICE — SENSOR SPO2 NEO LNCS ADHESIVE (20/BX) SEE USER NOTES

## (undated) DEVICE — BONE PRESS SPINAL EDITION HENSLER (10EA/CA)

## (undated) DEVICE — HEAD HOLDER JUNIOR/ADULT

## (undated) DEVICE — SODIUM CHL IRRIGATION 0.9% 1000ML (12EA/CA)

## (undated) DEVICE — TOWEL STOP TIMEOUT SAFETY FLAG (40EA/CA)

## (undated) DEVICE — CANISTER SUCTION RIGID RED 1500CC (40EA/CA)

## (undated) DEVICE — PLATE PIN GOLDIN (2TX3=6)

## (undated) DEVICE — INTRAOP NEURO IN OR 1:1 PER 15 MIN

## (undated) DEVICE — DRESSING XEROFORM 1X8 - (50/BX 4BX/CA)

## (undated) DEVICE — GOWN WARMING STANDARD FLEX - (30/CA)

## (undated) DEVICE — KIT ANESTHESIA W/CIRCUIT & 3/LT BAG W/FILTER (20EA/CA)

## (undated) DEVICE — GLOVE, LITE (PAIR)

## (undated) DEVICE — BOVIE BLADE COATED &INSULATED - 25/PK

## (undated) DEVICE — SUTURE 2-0 VICRYL PLUS CT-1 36 (36PK/BX)"

## (undated) DEVICE — DRAPE IOBAN II INCISE 23X17 - (10EA/BX 4BX/CA)

## (undated) DEVICE — BAG SPONGE COUNT 10.25 X 32 - BLUE (250/CA)

## (undated) DEVICE — DRAPE LARGE 3 QUARTER - (20/CA)

## (undated) DEVICE — SUTURE 3-0 VICRYL PLUS SH - 8X 18 INCH (12/BX)

## (undated) DEVICE — SET EXTENSION WITH 2 PORTS (48EA/CA) ***PART #2C8610 IS A SUBSTITUTE*****

## (undated) DEVICE — CLIP SM INTNL HRZN TI ESCP LGT - (24EA/PK 25PK/BX)

## (undated) DEVICE — SCREW DISTRACTION 14MM YELLOW - STERILE (10EA/BX) (5TX4=20)

## (undated) DEVICE — TUBING CLEARLINK DUO-VENT - C-FLO (48EA/CA)

## (undated) DEVICE — ELECTRODE DUAL RETURN W/ CORD - (50/PK)

## (undated) DEVICE — GLOVE BIOGEL INDICATOR SZ 8 SURGICAL PF LTX - (50/BX 4BX/CA)

## (undated) DEVICE — KIT SURGIFLO W/OUT THROMBIN - (6EA/CA)

## (undated) DEVICE — SUTURE 0 ETHIBOND MO6 C/R - (12/BX) 8-18 INCH ETHICON

## (undated) DEVICE — MASK ANESTHESIA ADULT  - (100/CA)

## (undated) DEVICE — WATER IRRIGATION STERILE 1000ML (12EA/CA)

## (undated) DEVICE — MEDICINE CUP STERILE 2 OZ - (100/CA)

## (undated) DEVICE — TUBE E-T HI-LO CUFF 7.0MM (10EA/PK)

## (undated) DEVICE — BLADE SURGICAL CLIPPER - (50EA/CA)

## (undated) DEVICE — SET LEADWIRE 5 LEAD BEDSIDE DISPOSABLE ECG (1SET OF 5/EA)

## (undated) DEVICE — Device

## (undated) DEVICE — GLOVE BIOGEL SZ 7.5 SURGICAL PF LTX - (50PR/BX 4BX/CA)

## (undated) DEVICE — RESTRAINTS LIMB DISP. - (12/BX 4BX/CA)

## (undated) DEVICE — SYRINGE 10 ML CONTROL LL (25EA/BX 4BX/CA)

## (undated) DEVICE — STAPLER SKIN DISP - (6/BX 10BX/CA) VISISTAT

## (undated) DEVICE — PACK NEURO - (2EA/CA)

## (undated) DEVICE — COVER LIGHT HANDLE ALC PLUS DISP (18EA/BX)

## (undated) DEVICE — DRAPE C-ARM LARGE 41IN X 74 IN - (10/BX 2BX/CA)

## (undated) DEVICE — BIT DRILL 11MM

## (undated) DEVICE — NEPTUNE 4 PORT MANIFOLD - (20/PK)

## (undated) DEVICE — CHLORAPREP 26 ML APPLICATOR - ORANGE TINT(25/CA)

## (undated) DEVICE — SHEET TRANSVERSE LAP - (12EA/CA)

## (undated) DEVICE — MIDAS LUBRICATOR DIFFUSER PACK (4EA/CA)

## (undated) DEVICE — KIT EVACUATER 3 SPRING PVC LF 1/8 DRAIN SIZE (10EA/CA)"

## (undated) DEVICE — GLOVE BIOGEL SZ 8 SURGICAL PF LTX - (50PR/BX 4BX/CA)

## (undated) DEVICE — SENSOR OXIMETER ADULT SPO2 RD SET (20EA/BX)

## (undated) DEVICE — CATHETER IV SAFETY 14 GA X 2 IN (200/CA)

## (undated) DEVICE — SLEEVE VASO CALF MED - (10PR/CA)

## (undated) DEVICE — TUBING C&T SET FLYING LEADS DRAIN TUBING (10EA/BX)

## (undated) DEVICE — SUTURE 3-0 VICRYL PLUS RB-1 - 8 X 18 INCH (12/BX)

## (undated) DEVICE — DRAPE MICROSCOPE ARMATEC 120IN X 46IN (10EA/CA)

## (undated) DEVICE — CANISTER SUCTION 3000ML MECHANICAL FILTER AUTO SHUTOFF MEDI-VAC NONSTERILE LF DISP  (40EA/CA)

## (undated) DEVICE — SPONGE PEANUT - (5/PK 50PK/CA)

## (undated) DEVICE — BLADE SURGICAL #15 - (50/BX 3BX/CA)

## (undated) DEVICE — TUBE CONNECTING SUCTION - CLEAR PLASTIC STERILE 72 IN (50EA/CA)

## (undated) DEVICE — PACK BREAST SM OR - (1EA/CA)

## (undated) DEVICE — LACTATED RINGERS INJ. 500 ML - (24EA/CA)

## (undated) DEVICE — ELECTRODE 850 FOAM ADHESIVE - HYDROGEL RADIOTRNSPRNT (50/PK)

## (undated) DEVICE — SHEET PEDIATRIC LAPAROTOMY - (10/CA)